# Patient Record
Sex: FEMALE | Race: WHITE | NOT HISPANIC OR LATINO | Employment: OTHER | URBAN - METROPOLITAN AREA
[De-identification: names, ages, dates, MRNs, and addresses within clinical notes are randomized per-mention and may not be internally consistent; named-entity substitution may affect disease eponyms.]

---

## 2023-08-06 ENCOUNTER — APPOINTMENT (OUTPATIENT)
Dept: CARDIOLOGY | Facility: HOSPITAL | Age: 83
DRG: 281 | End: 2023-08-06
Payer: MEDICARE

## 2023-08-06 ENCOUNTER — HOSPITAL ENCOUNTER (INPATIENT)
Facility: HOSPITAL | Age: 83
LOS: 2 days | Discharge: HOME | DRG: 281 | End: 2023-08-08
Attending: EMERGENCY MEDICINE
Payer: MEDICARE

## 2023-08-06 ENCOUNTER — APPOINTMENT (OUTPATIENT)
Dept: GENERAL RADIOLOGY | Facility: HOSPITAL | Age: 83
DRG: 281 | End: 2023-08-06
Payer: MEDICARE

## 2023-08-06 DIAGNOSIS — Z78.9 DECREASED ACTIVITIES OF DAILY LIVING (ADL): ICD-10-CM

## 2023-08-06 DIAGNOSIS — I21.3 ST ELEVATION MYOCARDIAL INFARCTION (STEMI), UNSPECIFIED ARTERY: Primary | ICD-10-CM

## 2023-08-06 DIAGNOSIS — E87.1 HYPONATREMIA: ICD-10-CM

## 2023-08-06 DIAGNOSIS — J44.1 COPD WITH EXACERBATION: ICD-10-CM

## 2023-08-06 DIAGNOSIS — I51.81 STRESS-INDUCED CARDIOMYOPATHY: ICD-10-CM

## 2023-08-06 PROBLEM — I21.9 TYPE 1 MYOCARDIAL INFARCTION: Status: ACTIVE | Noted: 2023-08-06

## 2023-08-06 PROBLEM — I21.4: Status: ACTIVE | Noted: 2023-08-06

## 2023-08-06 PROBLEM — I21.4 NSTEMI (NON-ST ELEVATED MYOCARDIAL INFARCTION): Status: ACTIVE | Noted: 2023-08-06

## 2023-08-06 LAB
ALBUMIN SERPL-MCNC: 3.9 G/DL (ref 3.5–5.2)
ALBUMIN/GLOB SERPL: 1.3 G/DL
ALP SERPL-CCNC: 117 U/L (ref 39–117)
ALT SERPL W P-5'-P-CCNC: 11 U/L (ref 1–33)
ANION GAP SERPL CALCULATED.3IONS-SCNC: 12.2 MMOL/L (ref 5–15)
ANION GAP SERPL CALCULATED.3IONS-SCNC: 12.7 MMOL/L (ref 5–15)
AST SERPL-CCNC: 22 U/L (ref 1–32)
BASOPHILS # BLD AUTO: 0.04 10*3/MM3 (ref 0–0.2)
BASOPHILS NFR BLD AUTO: 0.3 % (ref 0–1.5)
BILIRUB SERPL-MCNC: 0.4 MG/DL (ref 0–1.2)
BUN SERPL-MCNC: 11 MG/DL (ref 8–23)
BUN SERPL-MCNC: 12 MG/DL (ref 8–23)
BUN/CREAT SERPL: 16.2 (ref 7–25)
BUN/CREAT SERPL: 17.9 (ref 7–25)
CALCIUM SPEC-SCNC: 9.1 MG/DL (ref 8.6–10.5)
CALCIUM SPEC-SCNC: 9.2 MG/DL (ref 8.6–10.5)
CHLORIDE SERPL-SCNC: 93 MMOL/L (ref 98–107)
CHLORIDE SERPL-SCNC: 93 MMOL/L (ref 98–107)
CHOLEST SERPL-MCNC: 216 MG/DL (ref 0–200)
CO2 SERPL-SCNC: 21.8 MMOL/L (ref 22–29)
CO2 SERPL-SCNC: 23.3 MMOL/L (ref 22–29)
CREAT SERPL-MCNC: 0.67 MG/DL (ref 0.57–1)
CREAT SERPL-MCNC: 0.68 MG/DL (ref 0.57–1)
DEPRECATED RDW RBC AUTO: 40.5 FL (ref 37–54)
EGFRCR SERPLBLD CKD-EPI 2021: 87.1 ML/MIN/1.73
EGFRCR SERPLBLD CKD-EPI 2021: 87.4 ML/MIN/1.73
EOSINOPHIL # BLD AUTO: 0.45 10*3/MM3 (ref 0–0.4)
EOSINOPHIL NFR BLD AUTO: 3.7 % (ref 0.3–6.2)
ERYTHROCYTE [DISTWIDTH] IN BLOOD BY AUTOMATED COUNT: 12.9 % (ref 12.3–15.4)
GEN 5 2HR TROPONIN T REFLEX: 553 NG/L
GLOBULIN UR ELPH-MCNC: 3 GM/DL
GLUCOSE SERPL-MCNC: 146 MG/DL (ref 65–99)
GLUCOSE SERPL-MCNC: 174 MG/DL (ref 65–99)
HCT VFR BLD AUTO: 42.2 % (ref 34–46.6)
HDLC SERPL-MCNC: 44 MG/DL (ref 40–60)
HGB BLD-MCNC: 14.3 G/DL (ref 12–15.9)
HOLD SPECIMEN: NORMAL
HOLD SPECIMEN: NORMAL
IMM GRANULOCYTES # BLD AUTO: 0.05 10*3/MM3 (ref 0–0.05)
IMM GRANULOCYTES NFR BLD AUTO: 0.4 % (ref 0–0.5)
LDLC SERPL CALC-MCNC: 139 MG/DL (ref 0–100)
LDLC/HDLC SERPL: 3.07 {RATIO}
LIPASE SERPL-CCNC: 29 U/L (ref 13–60)
LYMPHOCYTES # BLD AUTO: 1.88 10*3/MM3 (ref 0.7–3.1)
LYMPHOCYTES NFR BLD AUTO: 15.5 % (ref 19.6–45.3)
MAGNESIUM SERPL-MCNC: 1.7 MG/DL (ref 1.6–2.4)
MCH RBC QN AUTO: 29.1 PG (ref 26.6–33)
MCHC RBC AUTO-ENTMCNC: 33.9 G/DL (ref 31.5–35.7)
MCV RBC AUTO: 85.8 FL (ref 79–97)
MONOCYTES # BLD AUTO: 0.56 10*3/MM3 (ref 0.1–0.9)
MONOCYTES NFR BLD AUTO: 4.6 % (ref 5–12)
NEUTROPHILS NFR BLD AUTO: 75.5 % (ref 42.7–76)
NEUTROPHILS NFR BLD AUTO: 9.13 10*3/MM3 (ref 1.7–7)
NRBC BLD AUTO-RTO: 0 /100 WBC (ref 0–0.2)
NT-PROBNP SERPL-MCNC: 1164 PG/ML (ref 0–1800)
PLATELET # BLD AUTO: 280 10*3/MM3 (ref 140–450)
PMV BLD AUTO: 10.6 FL (ref 6–12)
POTASSIUM SERPL-SCNC: 4 MMOL/L (ref 3.5–5.2)
POTASSIUM SERPL-SCNC: 4.1 MMOL/L (ref 3.5–5.2)
PROT SERPL-MCNC: 6.9 G/DL (ref 6–8.5)
RBC # BLD AUTO: 4.92 10*6/MM3 (ref 3.77–5.28)
SODIUM SERPL-SCNC: 127 MMOL/L (ref 136–145)
SODIUM SERPL-SCNC: 129 MMOL/L (ref 136–145)
TRIGL SERPL-MCNC: 185 MG/DL (ref 0–150)
TROPONIN T DELTA: -37 NG/L
TROPONIN T SERPL HS-MCNC: 590 NG/L
VLDLC SERPL-MCNC: 33 MG/DL (ref 5–40)
WBC NRBC COR # BLD: 12.11 10*3/MM3 (ref 3.4–10.8)
WHOLE BLOOD HOLD COAG: NORMAL
WHOLE BLOOD HOLD SPECIMEN: NORMAL

## 2023-08-06 PROCEDURE — 25010000002 MIDAZOLAM PER 1MG: Performed by: INTERNAL MEDICINE

## 2023-08-06 PROCEDURE — 99285 EMERGENCY DEPT VISIT HI MDM: CPT

## 2023-08-06 PROCEDURE — 25010000002 MORPHINE PER 10 MG: Performed by: EMERGENCY MEDICINE

## 2023-08-06 PROCEDURE — 25010000002 FUROSEMIDE PER 20 MG: Performed by: INTERNAL MEDICINE

## 2023-08-06 PROCEDURE — 93005 ELECTROCARDIOGRAM TRACING: CPT | Performed by: EMERGENCY MEDICINE

## 2023-08-06 PROCEDURE — 99223 1ST HOSP IP/OBS HIGH 75: CPT | Performed by: INTERNAL MEDICINE

## 2023-08-06 PROCEDURE — 84300 ASSAY OF URINE SODIUM: CPT | Performed by: HOSPITALIST

## 2023-08-06 PROCEDURE — 93458 L HRT ARTERY/VENTRICLE ANGIO: CPT | Performed by: INTERNAL MEDICINE

## 2023-08-06 PROCEDURE — 99152 MOD SED SAME PHYS/QHP 5/>YRS: CPT | Performed by: INTERNAL MEDICINE

## 2023-08-06 PROCEDURE — 83690 ASSAY OF LIPASE: CPT | Performed by: EMERGENCY MEDICINE

## 2023-08-06 PROCEDURE — 94799 UNLISTED PULMONARY SVC/PX: CPT

## 2023-08-06 PROCEDURE — 83935 ASSAY OF URINE OSMOLALITY: CPT | Performed by: HOSPITALIST

## 2023-08-06 PROCEDURE — 93306 TTE W/DOPPLER COMPLETE: CPT | Performed by: INTERNAL MEDICINE

## 2023-08-06 PROCEDURE — B2111ZZ FLUOROSCOPY OF MULTIPLE CORONARY ARTERIES USING LOW OSMOLAR CONTRAST: ICD-10-PCS | Performed by: INTERNAL MEDICINE

## 2023-08-06 PROCEDURE — 83880 ASSAY OF NATRIURETIC PEPTIDE: CPT | Performed by: EMERGENCY MEDICINE

## 2023-08-06 PROCEDURE — 83930 ASSAY OF BLOOD OSMOLALITY: CPT | Performed by: HOSPITALIST

## 2023-08-06 PROCEDURE — C1894 INTRO/SHEATH, NON-LASER: HCPCS | Performed by: INTERNAL MEDICINE

## 2023-08-06 PROCEDURE — 93005 ELECTROCARDIOGRAM TRACING: CPT

## 2023-08-06 PROCEDURE — 71045 X-RAY EXAM CHEST 1 VIEW: CPT

## 2023-08-06 PROCEDURE — 25510000001 IOPAMIDOL PER 1 ML: Performed by: INTERNAL MEDICINE

## 2023-08-06 PROCEDURE — 25010000002 ONDANSETRON PER 1 MG: Performed by: EMERGENCY MEDICINE

## 2023-08-06 PROCEDURE — C1769 GUIDE WIRE: HCPCS | Performed by: INTERNAL MEDICINE

## 2023-08-06 PROCEDURE — C1760 CLOSURE DEV, VASC: HCPCS | Performed by: INTERNAL MEDICINE

## 2023-08-06 PROCEDURE — 4A023N7 MEASUREMENT OF CARDIAC SAMPLING AND PRESSURE, LEFT HEART, PERCUTANEOUS APPROACH: ICD-10-PCS | Performed by: INTERNAL MEDICINE

## 2023-08-06 PROCEDURE — 80061 LIPID PANEL: CPT | Performed by: HOSPITALIST

## 2023-08-06 PROCEDURE — 85025 COMPLETE CBC W/AUTO DIFF WBC: CPT | Performed by: EMERGENCY MEDICINE

## 2023-08-06 PROCEDURE — 83735 ASSAY OF MAGNESIUM: CPT | Performed by: EMERGENCY MEDICINE

## 2023-08-06 PROCEDURE — 25010000002 FENTANYL CITRATE (PF) 50 MCG/ML SOLUTION: Performed by: INTERNAL MEDICINE

## 2023-08-06 PROCEDURE — 80053 COMPREHEN METABOLIC PANEL: CPT | Performed by: EMERGENCY MEDICINE

## 2023-08-06 PROCEDURE — 25010000002 HEPARIN (PORCINE) PER 1000 UNITS: Performed by: EMERGENCY MEDICINE

## 2023-08-06 PROCEDURE — 93306 TTE W/DOPPLER COMPLETE: CPT

## 2023-08-06 PROCEDURE — 25010000002 ONDANSETRON PER 1 MG: Performed by: INTERNAL MEDICINE

## 2023-08-06 PROCEDURE — 84484 ASSAY OF TROPONIN QUANT: CPT | Performed by: EMERGENCY MEDICINE

## 2023-08-06 PROCEDURE — 93010 ELECTROCARDIOGRAM REPORT: CPT | Performed by: INTERNAL MEDICINE

## 2023-08-06 PROCEDURE — 84484 ASSAY OF TROPONIN QUANT: CPT | Performed by: INTERNAL MEDICINE

## 2023-08-06 RX ORDER — ONDANSETRON 2 MG/ML
4 INJECTION INTRAMUSCULAR; INTRAVENOUS ONCE
Status: COMPLETED | OUTPATIENT
Start: 2023-08-06 | End: 2023-08-06

## 2023-08-06 RX ORDER — BISACODYL 10 MG
10 SUPPOSITORY, RECTAL RECTAL DAILY PRN
Status: DISCONTINUED | OUTPATIENT
Start: 2023-08-06 | End: 2023-08-08 | Stop reason: HOSPADM

## 2023-08-06 RX ORDER — POLYETHYLENE GLYCOL 3350 17 G/17G
17 POWDER, FOR SOLUTION ORAL DAILY PRN
Status: DISCONTINUED | OUTPATIENT
Start: 2023-08-06 | End: 2023-08-08 | Stop reason: HOSPADM

## 2023-08-06 RX ORDER — FENTANYL CITRATE 50 UG/ML
INJECTION, SOLUTION INTRAMUSCULAR; INTRAVENOUS
Status: DISCONTINUED | OUTPATIENT
Start: 2023-08-06 | End: 2023-08-06 | Stop reason: HOSPADM

## 2023-08-06 RX ORDER — SODIUM CHLORIDE 9 MG/ML
INJECTION, SOLUTION INTRAVENOUS
Status: COMPLETED | OUTPATIENT
Start: 2023-08-06 | End: 2023-08-06

## 2023-08-06 RX ORDER — HEPARIN SODIUM 5000 [USP'U]/ML
60 INJECTION, SOLUTION INTRAVENOUS; SUBCUTANEOUS ONCE
Status: COMPLETED | OUTPATIENT
Start: 2023-08-06 | End: 2023-08-06

## 2023-08-06 RX ORDER — ACETAMINOPHEN 325 MG/1
650 TABLET ORAL EVERY 4 HOURS PRN
Status: DISCONTINUED | OUTPATIENT
Start: 2023-08-06 | End: 2023-08-08 | Stop reason: HOSPADM

## 2023-08-06 RX ORDER — AMOXICILLIN 250 MG
2 CAPSULE ORAL 2 TIMES DAILY
Status: DISCONTINUED | OUTPATIENT
Start: 2023-08-06 | End: 2023-08-08 | Stop reason: HOSPADM

## 2023-08-06 RX ORDER — ONDANSETRON 2 MG/ML
INJECTION INTRAMUSCULAR; INTRAVENOUS
Status: DISCONTINUED | OUTPATIENT
Start: 2023-08-06 | End: 2023-08-06 | Stop reason: HOSPADM

## 2023-08-06 RX ORDER — HYDROCODONE BITARTRATE AND ACETAMINOPHEN 5; 325 MG/1; MG/1
1 TABLET ORAL EVERY 4 HOURS PRN
Status: DISCONTINUED | OUTPATIENT
Start: 2023-08-06 | End: 2023-08-08 | Stop reason: HOSPADM

## 2023-08-06 RX ORDER — TRIAMTERENE AND HYDROCHLOROTHIAZIDE 37.5; 25 MG/1; MG/1
1 TABLET ORAL DAILY
COMMUNITY
End: 2023-08-08 | Stop reason: HOSPADM

## 2023-08-06 RX ORDER — SODIUM CHLORIDE 0.9 % (FLUSH) 0.9 %
10 SYRINGE (ML) INJECTION AS NEEDED
Status: DISCONTINUED | OUTPATIENT
Start: 2023-08-06 | End: 2023-08-08 | Stop reason: HOSPADM

## 2023-08-06 RX ORDER — BISACODYL 5 MG/1
5 TABLET, DELAYED RELEASE ORAL DAILY PRN
Status: DISCONTINUED | OUTPATIENT
Start: 2023-08-06 | End: 2023-08-08 | Stop reason: HOSPADM

## 2023-08-06 RX ORDER — MORPHINE SULFATE 2 MG/ML
2 INJECTION, SOLUTION INTRAMUSCULAR; INTRAVENOUS ONCE
Status: COMPLETED | OUTPATIENT
Start: 2023-08-06 | End: 2023-08-06

## 2023-08-06 RX ORDER — POTASSIUM CHLORIDE 750 MG/1
10 CAPSULE, EXTENDED RELEASE ORAL DAILY
COMMUNITY
End: 2023-08-08 | Stop reason: HOSPADM

## 2023-08-06 RX ORDER — FUROSEMIDE 10 MG/ML
INJECTION INTRAMUSCULAR; INTRAVENOUS
Status: DISCONTINUED | OUTPATIENT
Start: 2023-08-06 | End: 2023-08-06 | Stop reason: HOSPADM

## 2023-08-06 RX ORDER — CARVEDILOL 3.12 MG/1
3.12 TABLET ORAL 2 TIMES DAILY WITH MEALS
Status: DISCONTINUED | OUTPATIENT
Start: 2023-08-06 | End: 2023-08-08 | Stop reason: HOSPADM

## 2023-08-06 RX ORDER — ASPIRIN 81 MG/1
81 TABLET ORAL DAILY
Status: DISCONTINUED | OUTPATIENT
Start: 2023-08-06 | End: 2023-08-08 | Stop reason: HOSPADM

## 2023-08-06 RX ORDER — ONDANSETRON 2 MG/ML
4 INJECTION INTRAMUSCULAR; INTRAVENOUS EVERY 6 HOURS PRN
Status: DISCONTINUED | OUTPATIENT
Start: 2023-08-06 | End: 2023-08-08 | Stop reason: HOSPADM

## 2023-08-06 RX ORDER — DIPHENHYDRAMINE HCL 25 MG
25 CAPSULE ORAL EVERY 6 HOURS PRN
Status: DISCONTINUED | OUTPATIENT
Start: 2023-08-06 | End: 2023-08-08 | Stop reason: HOSPADM

## 2023-08-06 RX ORDER — LIDOCAINE HYDROCHLORIDE 20 MG/ML
INJECTION, SOLUTION INFILTRATION; PERINEURAL
Status: DISCONTINUED | OUTPATIENT
Start: 2023-08-06 | End: 2023-08-06 | Stop reason: HOSPADM

## 2023-08-06 RX ORDER — ONDANSETRON 4 MG/1
4 TABLET, FILM COATED ORAL EVERY 6 HOURS PRN
Status: DISCONTINUED | OUTPATIENT
Start: 2023-08-06 | End: 2023-08-08 | Stop reason: HOSPADM

## 2023-08-06 RX ORDER — ASPIRIN 81 MG/1
324 TABLET, CHEWABLE ORAL ONCE
Status: COMPLETED | OUTPATIENT
Start: 2023-08-06 | End: 2023-08-06

## 2023-08-06 RX ORDER — POTASSIUM CHLORIDE 750 MG/1
10 CAPSULE, EXTENDED RELEASE ORAL DAILY
Status: DISCONTINUED | OUTPATIENT
Start: 2023-08-06 | End: 2023-08-08 | Stop reason: HOSPADM

## 2023-08-06 RX ORDER — ASPIRIN 81 MG/1
324 TABLET, CHEWABLE ORAL ONCE
Status: DISCONTINUED | OUTPATIENT
Start: 2023-08-06 | End: 2023-08-08

## 2023-08-06 RX ORDER — FUROSEMIDE 40 MG/1
40 TABLET ORAL DAILY
Status: DISCONTINUED | OUTPATIENT
Start: 2023-08-06 | End: 2023-08-07

## 2023-08-06 RX ORDER — ISOSORBIDE MONONITRATE 30 MG/1
30 TABLET, EXTENDED RELEASE ORAL
Status: DISCONTINUED | OUTPATIENT
Start: 2023-08-06 | End: 2023-08-08

## 2023-08-06 RX ORDER — LISINOPRIL 20 MG/1
20 TABLET ORAL DAILY
COMMUNITY
End: 2023-08-08 | Stop reason: HOSPADM

## 2023-08-06 RX ORDER — LISINOPRIL 20 MG/1
20 TABLET ORAL DAILY
Status: DISCONTINUED | OUTPATIENT
Start: 2023-08-06 | End: 2023-08-08

## 2023-08-06 RX ORDER — MIDAZOLAM HYDROCHLORIDE 2 MG/2ML
INJECTION, SOLUTION INTRAMUSCULAR; INTRAVENOUS
Status: DISCONTINUED | OUTPATIENT
Start: 2023-08-06 | End: 2023-08-06 | Stop reason: HOSPADM

## 2023-08-06 RX ORDER — ALUMINA, MAGNESIA, AND SIMETHICONE 2400; 2400; 240 MG/30ML; MG/30ML; MG/30ML
15 SUSPENSION ORAL EVERY 6 HOURS PRN
Status: DISCONTINUED | OUTPATIENT
Start: 2023-08-06 | End: 2023-08-08 | Stop reason: HOSPADM

## 2023-08-06 RX ORDER — NITROGLYCERIN 0.4 MG/1
0.4 TABLET SUBLINGUAL
Status: DISCONTINUED | OUTPATIENT
Start: 2023-08-06 | End: 2023-08-08 | Stop reason: HOSPADM

## 2023-08-06 RX ORDER — CHOLECALCIFEROL (VITAMIN D3) 125 MCG
5 CAPSULE ORAL NIGHTLY PRN
Status: DISCONTINUED | OUTPATIENT
Start: 2023-08-06 | End: 2023-08-08 | Stop reason: HOSPADM

## 2023-08-06 RX ADMIN — POTASSIUM CHLORIDE 10 MEQ: 10 CAPSULE, COATED, EXTENDED RELEASE ORAL at 08:25

## 2023-08-06 RX ADMIN — NITROGLYCERIN 0.4 MG: 0.4 TABLET, ORALLY DISINTEGRATING SUBLINGUAL at 14:34

## 2023-08-06 RX ADMIN — ISOSORBIDE MONONITRATE 30 MG: 30 TABLET, EXTENDED RELEASE ORAL at 16:12

## 2023-08-06 RX ADMIN — LISINOPRIL 20 MG: 20 TABLET ORAL at 08:25

## 2023-08-06 RX ADMIN — SENNOSIDES AND DOCUSATE SODIUM 2 TABLET: 50; 8.6 TABLET ORAL at 23:08

## 2023-08-06 RX ADMIN — ONDANSETRON 4 MG: 2 INJECTION INTRAMUSCULAR; INTRAVENOUS at 03:46

## 2023-08-06 RX ADMIN — ASPIRIN 81 MG 324 MG: 81 TABLET ORAL at 04:42

## 2023-08-06 RX ADMIN — CARVEDILOL 3.12 MG: 3.12 TABLET, FILM COATED ORAL at 08:25

## 2023-08-06 RX ADMIN — Medication 10 ML: at 08:32

## 2023-08-06 RX ADMIN — ACETAMINOPHEN 650 MG: 325 TABLET ORAL at 14:35

## 2023-08-06 RX ADMIN — HEPARIN SODIUM 3800 UNITS: 5000 INJECTION, SOLUTION INTRAVENOUS; SUBCUTANEOUS at 04:45

## 2023-08-06 RX ADMIN — NITROGLYCERIN 0.4 MG: 0.4 TABLET, ORALLY DISINTEGRATING SUBLINGUAL at 11:28

## 2023-08-06 RX ADMIN — CARVEDILOL 3.12 MG: 3.12 TABLET, FILM COATED ORAL at 17:56

## 2023-08-06 RX ADMIN — MORPHINE SULFATE 2 MG: 2 INJECTION, SOLUTION INTRAMUSCULAR; INTRAVENOUS at 03:46

## 2023-08-06 RX ADMIN — TICAGRELOR 90 MG: 90 TABLET ORAL at 23:06

## 2023-08-06 NOTE — CONSULTS
[facility]   Hospitalist Consult Note  Date: 2023   Patient Name: Cris Agudelo  : 1940  MRN: [id number]  Primary Care Physician:  Ramirez Maurice MD  Referring Physician: No ref. provider found  Date of admission: 2023    Subjective Chest pain   Subjective     Reason for Consult/ Chief Complaint: chest pain     HPI:  Cris Agudelo is a 82 y.o. female with a past medical history of hypertension; who presented with an elevated troponin and ECG with subtle ST elevations in the anterior leads.  Repeat ECG also showed reciprocal changes in the inferior leads and code   STEMI was activated.  Patient was urgently taken to the Cath Lab.  Urgent cardiac catheter showed significant lesions in the diagonal and the PDA.  Since patient was asymptomatic and the risk benefit of intervention t was discussed; medical management was decided to be the safer option.  Patient was loaded with DAPT.    After the procedure, patient's temperature was 98.2, pulse was 58, respiratory rate was 16, blood pressure was 116/44, and she was saturating 94% on room air.  Patient did not have any acute complaints.    Labs, patient's glucose was 174, sodium was 129, chloride was 93.        Review of Systems   All systems were reviewed and negative except for: chest pain     Personal History     Past Medical History:  Past Medical History:   Diagnosis Date    Hypertension          Past Surgical History:  History reviewed. No pertinent surgical history.     Family History:   Breast Cancer-related family history is not on file.      Social History:   Social History     Socioeconomic History    Marital status:    Tobacco Use    Smoking status: Former     Types: Cigarettes    Smokeless tobacco: Never   Vaping Use    Vaping Use: Never used   Substance and Sexual Activity    Alcohol use: Not Currently    Drug use: Never    Sexual activity: Defer         Home Medications:  lisinopril, potassium chloride, and  triamterene-hydrochlorothiazide    Allergies:  Allergies   Allergen Reactions    Aleve [Naproxen] Hives       Review of Systems   All systems were reviewed and negative except for: Chest pain    Objective    Objective     Vitals:   Temp:  [97.3 øF (36.3 øC)-98.2 øF (36.8 øC)] 98.2 øF (36.8 øC)  Heart Rate:  [58-89] 58  Resp:  [16-18] 16  BP: ()/(44-84) 116/44  Flow (L/min):  [2] 2    Physical Exam:   Constitutional: Awake, alert, no acute distress   Eyes: Pupils equal, sclerae anicteric, no conjunctival injection   HENT: NCAT, mucous membranes moist   Neck: Supple, no thyromegaly, no lymphadenopathy, trachea midline   Respiratory: Clear to auscultation bilaterally, nonlabored respirations    Cardiovascular: RRR, no murmurs, rubs, or gallops, palpable pedal pulses bilaterally   Gastrointestinal: Positive bowel sounds, soft, nontender, nondistended   Musculoskeletal: No bilateral ankle edema, no clubbing or cyanosis to extremities   Psychiatric: Appropriate affect, cooperative   Neurologic: Oriented x 3, strength symmetric in all extremities, Cranial Nerves grossly intact to confrontation, speech clear   Skin: No rashes     Result Review    Result Review:  I have personally reviewed the results from the time of this admission to 8/6/2023 17:31 EDT and agree with these findings:  [x]  Laboratory  []  Microbiology  [x]  Radiology  []  EKG/Telemetry   []  Cardiology/Vascular   []  Pathology  [x]  Old records  []  Other:    Assessment & Plan   Assessment / Plan   Assessment/Plan:  #1 NSTEMI of the anterior wall involving the right ventricle  -Significant lesions in the diagonal and the PDA  -No stenting done; patient medically managed since it was the safer option.  -DAPT was started.  -Patient reports statin intolerance.  Lipid panel ordered.  Hemoglobin A1c ordered.  -Patient will need referral to cardiac rehab.    #2 concern for CHF  -Patient started on IV Lasix.  Will transition to oral.  Will supplement  potassium.  Goal-directed care: Patient started on beta-blocker, ACE inhibitor, Imdur, diuretic.    #3 hyponatremia  -Ordered urine studies  -Continue diuresis since patient is hypervolemic.    #4 insomnia we will order melatonin    #5 poor oral intake started supplements    DVT prophylaxis:  No DVT prophylaxis order currently exists.    CODE STATUS:    Level Of Support Discussed With: Patient  Code Status (Patient has no pulse and is not breathing): CPR (Attempt to Resuscitate)  Medical Interventions (Patient has pulse or is breathing): Full Support  Release to patient: Routine Release      Electronically signed by Kevyn Sommer DO, 08/06/23, 5:31 PM EDT.

## 2023-08-06 NOTE — H&P
HISTORY AND PHYSICAL       Reason for Admission: Chest pain      Patient Care Team:  Ramirez Maurice MD as PCP - General (Family Medicine)      SUBJECTIVE     Chief Complaint: Chest pain    History of present illness:  Cris Agudelo is a 82 y.o. female with no known medical history other than hypertension however her medications also suggest heart failure who presented to the hospital with sudden onset of left-sided chest pain.  Once in the emergency room she was noted to have elevated troponin of 590 and ECG suggested subtle ST elevations in the anterior leads.  Repeat ECG also showed reciprocal changes in the inferior leads and code STEMI was activated.  Urgent discussion of risk and benefit of cardiac catheterization was held with the patient and her daughter and it was decided to take her emergently to the Cath Lab.    Review of systems:    Constitutional: No weakness, fatigue, fever, rigors, chills   Eyes: No vision changes, eye pain   ENT/oropharynx: No difficulty swallowing, sore throat, epistaxis, changes in hearing   Cardiovascular: + chest pain, chest tightness, palpitations, paroxysmal nocturnal dyspnea, orthopnea, diaphoresis, dizziness / syncopal episode   Respiratory: No shortness of breath, dyspnea on exertion, cough, wheezing, hemoptysis   Gastrointestinal: No abdominal pain, nausea, vomiting, diarrhea, bloody stools   Genitourinary: No hematuria, dysuria   Neurological: No headache, tremors, numbness, one-sided weakness    Musculoskeletal: No cramps, myalgias, joint pain, joint swelling   Integument: No rash, edema        Personal History:      Past Medical History:   Diagnosis Date    Hypertension        History reviewed. No pertinent surgical history.    History reviewed. No pertinent family history.    Social History     Tobacco Use    Smoking status: Former     Types: Cigarettes   Substance Use Topics    Drug use: Never        Medications Prior to Admission   Medication Sig Dispense Refill Last  "Dose    lisinopril (PRINIVIL,ZESTRIL) 20 MG tablet Take 1 tablet by mouth Daily.       potassium chloride (MICRO-K) 10 MEQ CR capsule Take 1 capsule by mouth Daily.       triamterene-hydrochlorothiazide (MAXZIDE-25) 37.5-25 MG per tablet Take 1 tablet by mouth Daily.           Allergies:     Aleve [naproxen]    Scheduled Meds:aspirin, 324 mg, Oral, Once  aspirin, 81 mg, Oral, Daily  carvedilol, 3.125 mg, Oral, BID With Meals  furosemide, 40 mg, Oral, Daily  lisinopril, 20 mg, Oral, Daily  potassium chloride, 10 mEq, Oral, Daily  ticagrelor, 90 mg, Oral, BID      Continuous Infusions:   PRN Meds:  acetaminophen    diphenhydrAMINE    HYDROcodone-acetaminophen    nitroglycerin    ondansetron **OR** ondansetron    sodium chloride      OBJECTIVE    Vital Signs  Vitals:    08/06/23 0300 08/06/23 0430 08/06/23 0500 08/06/23 0510   BP: 128/78 105/84 109/63    BP Location: Left arm      Patient Position: Sitting      Pulse: 86 77 77    Resp: 18  18    Temp: 97.8 øF (36.6 øC)      TempSrc: Oral      SpO2: 94% 95% 95% 99%   Weight: 63.2 kg (139 lb 5.3 oz)      Height: 152.4 cm (60\")          Flowsheet Rows      Flowsheet Row First Filed Value   Admission Height 152.4 cm (60\") Documented at 08/06/2023 0300   Admission Weight 63.2 kg (139 lb 5.3 oz) Documented at 08/06/2023 0300            No intake or output data in the 24 hours ending 08/06/23 0545     Telemetry: Sinus rhythm    Physical Exam:  The patient is alert, oriented and in no distress.  Vital signs as noted above.  Head and neck revealed no carotid bruits or jugular venous distention.  No thyromegaly or lymphadenopathy is present  Lungs clear.  No wheezing.  Breath sounds are normal bilaterally.  Heart: Normal first and second heart sounds. No murmur.  No precordial rub is present.  No gallop is present.  Abdomen: Soft and nontender.  No organomegaly is present.  Extremities with good peripheral pulses without any pedal edema.  Skin: Warm and dry.  Musculoskeletal " system is grossly normal.  CNS grossly normal.       Results Review:  I have personally reviewed the results from the time of this admission to 8/6/2023 05:45 EDT and agree with these findings:  []  Laboratory  []  Microbiology  []  Radiology  []  EKG/Telemetry   []  Cardiology/Vascular   []  Pathology  []  Old records  []  Other:    Most notable findings include:     Lab Results (last 24 hours)       Procedure Component Value Units Date/Time    High Sensitivity Troponin T [[id number]]  (Abnormal) Collected: 08/06/23 0325    Specimen: Blood from Arm, Right Updated: 08/06/23 0430     HS Troponin T 590 ng/L     Narrative:      High Sensitive Troponin T Reference Range:  <10.0 ng/L- Negative Female for AMI  <15.0 ng/L- Negative Male for AMI  >=10 - Abnormal Female indicating possible myocardial injury.  >=15 - Abnormal Male indicating possible myocardial injury.   Clinicians would have to utilize clinical acumen, EKG, Troponin, and serial changes to determine if it is an Acute Myocardial Infarction or myocardial injury due to an underlying chronic condition.         Comprehensive Metabolic Panel [[id number]]  (Abnormal) Collected: 08/06/23 0325    Specimen: Blood from Arm, Right Updated: 08/06/23 0418     Glucose 174 mg/dL      BUN 11 mg/dL      Creatinine 0.68 mg/dL      Sodium 129 mmol/L      Potassium 4.1 mmol/L      Comment: Slight hemolysis detected by analyzer. Results may be affected.        Chloride 93 mmol/L      CO2 23.3 mmol/L      Calcium 9.2 mg/dL      Total Protein 6.9 g/dL      Albumin 3.9 g/dL      ALT (SGPT) 11 U/L      AST (SGOT) 22 U/L      Comment: Slight hemolysis detected by analyzer. Results may be affected.        Alkaline Phosphatase 117 U/L      Total Bilirubin 0.4 mg/dL      Globulin 3.0 gm/dL      A/G Ratio 1.3 g/dL      BUN/Creatinine Ratio 16.2     Anion Gap 12.7 mmol/L      eGFR 87.1 mL/min/1.73     Narrative:      GFR Normal >60  Chronic Kidney Disease <60  Kidney Failure <15    The GFR  formula is only valid for adults with stable renal function between ages 18 and 70.    Magnesium [[id number]]  (Normal) Collected: 08/06/23 0325    Specimen: Blood from Arm, Right Updated: 08/06/23 0418     Magnesium 1.7 mg/dL     Lipase [[id number]]  (Normal) Collected: 08/06/23 0325    Specimen: Blood from Arm, Right Updated: 08/06/23 0414     Lipase 29 U/L     BNP [[id number]]  (Normal) Collected: 08/06/23 0325    Specimen: Blood from Arm, Right Updated: 08/06/23 0410     proBNP 1,164.0 pg/mL     Narrative:      Among patients with dyspnea, NT-proBNP is highly sensitive for the detection of acute congestive heart failure. In addition NT-proBNP of <300 pg/ml effectively rules out acute congestive heart failure with 99% negative predictive value.      Valentine Draw [[id number]] Collected: 08/06/23 0325    Specimen: Blood from Arm, Right Updated: 08/06/23 0409    Narrative:      The following orders were created for panel order Valentine Draw.  Procedure                               Abnormality         Status                     ---------                               -----------         ------                     Green Top (Gel)[[id number]]                                  Final result               Lavender Top[[id number]]                                     Final result               Gold Top - SST[[id number]]                                   Final result               Light Blue Top[[id number]]                                   Final result                 Please view results for these tests on the individual orders.    Gold Top - SST [[id number]] Collected: 08/06/23 0325    Specimen: Blood from Arm, Right Updated: 08/06/23 0409     Extra Tube Hold for add-ons.     Comment: Auto resulted.       Light Blue Top [[id number]] Collected: 08/06/23 0325    Specimen: Blood from Arm, Right Updated: 08/06/23 0409     Extra Tube Hold for add-ons.     Comment: Auto resulted       Green Top (Gel) [[id number]] Collected: 08/06/23 0325     Specimen: Blood from Arm, Right Updated: 08/06/23 0409     Extra Tube Hold for add-ons.     Comment: Auto resulted.       Lavender Top [[id number]] Collected: 08/06/23 0325    Specimen: Blood from Arm, Right Updated: 08/06/23 0409     Extra Tube hold for add-on     Comment: Auto resulted       CBC & Differential [[id number]]  (Abnormal) Collected: 08/06/23 0325    Specimen: Blood from Arm, Right Updated: 08/06/23 0348    Narrative:      The following orders were created for panel order CBC & Differential.  Procedure                               Abnormality         Status                     ---------                               -----------         ------                     CBC Auto Differential[[id number]]        Abnormal            Final result                 Please view results for these tests on the individual orders.    CBC Auto Differential [[id number]]  (Abnormal) Collected: 08/06/23 0325    Specimen: Blood from Arm, Right Updated: 08/06/23 0348     WBC 12.11 10*3/mm3      RBC 4.92 10*6/mm3      Hemoglobin 14.3 g/dL      Hematocrit 42.2 %      MCV 85.8 fL      MCH 29.1 pg      MCHC 33.9 g/dL      RDW 12.9 %      RDW-SD 40.5 fl      MPV 10.6 fL      Platelets 280 10*3/mm3      Neutrophil % 75.5 %      Lymphocyte % 15.5 %      Monocyte % 4.6 %      Eosinophil % 3.7 %      Basophil % 0.3 %      Immature Grans % 0.4 %      Neutrophils, Absolute 9.13 10*3/mm3      Lymphocytes, Absolute 1.88 10*3/mm3      Monocytes, Absolute 0.56 10*3/mm3      Eosinophils, Absolute 0.45 10*3/mm3      Basophils, Absolute 0.04 10*3/mm3      Immature Grans, Absolute 0.05 10*3/mm3      nRBC 0.0 /100 WBC             Imaging Results (Last 24 Hours)       Procedure Component Value Units Date/Time    XR Chest 1 View [[id number]] Collected: 08/06/23 0350     Updated: 08/06/23 0353    Narrative:      PROCEDURE: XR CHEST 1 VW     COMPARISON: None     INDICATIONS: CHEST PAIN     FINDINGS: A single frontal (AP or PA upright portable) chest  radiograph reveals borderline cardiac   enlargement and probably no acute infiltrate. No pneumothorax is seen. External artifacts obscure   detail. The thoracic aorta is atherosclerotic. Chronic calcified granulomatous disease involves the   chest.  Degenerative changes involve the bilateral shoulders and the imaged spine.  There are   slightly low lung volumes.  No definite pleural effusion is suspected.       Impression:       Probably no acute infiltrate is identified.                Please note that portions of this note were completed with a voice recognition program.     KINGSTON MORALES JR, MD         Electronically Signed and Approved By: KINGSTON MORALES JR, MD on 8/06/2023 at 3:50                                LAB RESULTS (LAST 7 DAYS)    CBC  Results from last 7 days   Lab Units 08/06/23  0325   WBC 10*3/mm3 12.11*   RBC 10*6/mm3 4.92   HEMOGLOBIN g/dL 14.3   HEMATOCRIT % 42.2   MCV fL 85.8   PLATELETS 10*3/mm3 280       BMP  Results from last 7 days   Lab Units 08/06/23  0325   SODIUM mmol/L 129*   POTASSIUM mmol/L 4.1   CHLORIDE mmol/L 93*   CO2 mmol/L 23.3   BUN mg/dL 11   CREATININE mg/dL 0.68   GLUCOSE mg/dL 174*   MAGNESIUM mg/dL 1.7       CMP   Results from last 7 days   Lab Units 08/06/23  0325   SODIUM mmol/L 129*   POTASSIUM mmol/L 4.1   CHLORIDE mmol/L 93*   CO2 mmol/L 23.3   BUN mg/dL 11   CREATININE mg/dL 0.68   GLUCOSE mg/dL 174*   ALBUMIN g/dL 3.9   BILIRUBIN mg/dL 0.4   ALK PHOS U/L 117   AST (SGOT) U/L 22   ALT (SGPT) U/L 11   LIPASE U/L 29       BNP        TROPONIN  Results from last 7 days   Lab Units 08/06/23  0325   HSTROP T ng/L 590*       CoAg        Creatinine Clearance  Estimated Creatinine Clearance: 53 mL/min (by C-G formula based on SCr of 0.68 mg/dL).    ABG          Radiology  XR Chest 1 View    Result Date: 8/6/2023   Probably no acute infiltrate is identified.      Please note that portions of this note were completed with a voice recognition program.  KINGSTON MORALES JR, MD        Electronically Signed and Approved By: KINGSTON MORALES JR, MD on 8/06/2023 at 3:50                 EKG  I personally viewed and interpreted the patient's EKG/Telemetry data:  ECG 12 Lead Chest Pain   Preliminary Result   HEART RATE= 81  bpm   RR Interval= 740  ms   OK Interval= 163  ms   P Horizontal Axis=   deg   P Front Axis= 60  deg   QRSD Interval= 107  ms   QT Interval= 396  ms   QRS Axis= -13  deg   T Wave Axis= -26  deg   - ABNORMAL ECG -   Sinus rhythm   Anteroseptal infarct, acute (LAD)   Electronically Signed By:    Date and Time of Study: 2023-08-06 04:34:59      ECG 12 Lead ED Triage Standing Order; Chest Pain   Preliminary Result   HEART RATE= 84  bpm   RR Interval= 724  ms   OK Interval= 169  ms   P Horizontal Axis= 4  deg   P Front Axis= 55  deg   QRSD Interval= 110  ms   QT Interval= 375  ms   QRS Axis= -20  deg   T Wave Axis= 36  deg   - ABNORMAL ECG -   Sinus rhythm   Borderline left axis deviation   Anteroseptal infarct, acute (LAD)   Minimal ST elevation, lateral leads   Electronically Signed By:    Date and Time of Study: 2023-08-06 03:04:29      ECG 12 Lead ED Triage Standing Order; Chest Pain    (Results Pending)         Echocardiogram:          Stress Test:        Cardiac Catheterization:  No results found for this or any previous visit.        Other:      ASSESSMENT & PLAN:    Principal Problem:    Acute non-ST elevation myocardial infarction (NSTEMI) of anterior wall involving right ventricle  Active Problems:    NSTEMI (non-ST elevated myocardial infarction)    Type 1 myocardial infarction    Chest pain/non-ST elevation MI  Signs and symptoms consistent with unstable angina however with elevated troponin and ECG changes she ruled in for non-ST elevation MI.  Urgent cardiac catheterization showed significant lesions in diagonal and PDA.  Both vessels had HERRERA-3 flow.  Native LAD, left circumflex and RCA are free from any significant disease.  Her chest pain completely resolved on the  table  Given absence of symptoms we discussed the risk and benefit of intervention to small caliber vessels.  We concluded that medical management would be a safer option.  She was loaded with dual antiplatelet therapy.  Reports intolerance to statin. Obtain lipid panel.  Adding imdur.  She would also benefit from cardiac rehab at the time of discharge    Heart failure  Unknown ejection fraction  She has elevated EDP noted during the cardiac catheterization.  proBNP is normal  I will start her on IV diuretics to be switched to oral.  Start potassium supplementation.    Hypertension  Continue home medications including lisinopril.  I will discontinue hydrochlorothiazide and switch it with Lasix.  I will start her on Coreg.    Hyponatremia  Sodium is 129  Discontinuing triamterene and hydrochlorothiazide.  Closely monitor sodium levels with diuresis    Preventive care  I will obtain a lipid panel and A1c for further risk stratification.    I will make further changes to her medications depending on the results of echocardiogram and her hospital progress.    Echocardiogram findings consistent with Takotsubo cardiomyopathy  EF is 50 to 55%.      Mitch Correia MD  08/06/23  05:45 EDT

## 2023-08-06 NOTE — PLAN OF CARE
Goal Outcome Evaluation: pt post cath, no interventions.  Cath site dry and intact, no bruising.  Chest pain x 2 today, relieved with sublingual nitro.  Slightly confused at times.  Family at bedside.

## 2023-08-06 NOTE — Clinical Note
Level of Care: Critical Care [6]   Diagnosis: STEMI (ST elevation myocardial infarction) [604986]   Certification: I Certify That Inpatient Hospital Services Are Medically Necessary For Greater Than 2 Midnights

## 2023-08-06 NOTE — ED PROVIDER NOTES
Time: 3:06 AM EDT  Date of encounter:  8/6/2023  Independent Historian/Clinical History and Information was obtained by:   Patient and Family    History is limited by: N/A    Chief Complaint: Chest pain      History of Present Illness:  Patient is a 82 y.o. year old female who presents to the emergency department for evaluation of chest pain    Patient describes onset of chest pain at approximately 1030 last night is located in her left chest and radiates through to her back.  Is associated with nausea.  She has had no shortness of breath.  She has had no treatment prior to arrival.  She denies any previous cardiac history.    [facility]    Patient Care Team  Primary Care Provider: Ramirez Maurice MD    Past Medical History:     Allergies   Allergen Reactions    Aleve [Naproxen] Hives     Past Medical History:   Diagnosis Date    Hypertension      History reviewed. No pertinent surgical history.  History reviewed. No pertinent family history.    Home Medications:  Prior to Admission medications    Not on File        Social History:   Social History     Tobacco Use    Smoking status: Former     Types: Cigarettes    Smokeless tobacco: Never   Vaping Use    Vaping Use: Never used   Substance Use Topics    Alcohol use: Not Currently    Drug use: Never         Review of Systems:  Review of Systems   Constitutional:  Negative for chills and fever.   HENT:  Negative for congestion, ear pain and sore throat.    Eyes:  Negative for pain.   Respiratory:  Negative for cough, chest tightness and shortness of breath.    Cardiovascular:  Positive for chest pain.   Gastrointestinal:  Negative for abdominal pain, diarrhea, nausea and vomiting.   Genitourinary:  Negative for flank pain and hematuria.   Musculoskeletal:  Positive for back pain. Negative for joint swelling.   Skin:  Negative for pallor.   Neurological:  Negative for seizures and headaches.   All other systems reviewed and are negative.     Physical Exam:  /69 (BP  "Location: Left arm, Patient Position: Lying)   Pulse 75   Temp 97.4 øF (36.3 øC) (Oral)   Resp 18   Ht 152.4 cm (60\")   Wt 63.2 kg (139 lb 5.3 oz)   SpO2 97%   BMI 27.21 kg/mý     Physical Exam  Vitals and nursing note reviewed.   Constitutional:       General: She is not in acute distress.     Appearance: Normal appearance. She is not toxic-appearing.      Comments: Patient appears uncomfortable   HENT:      Head: Normocephalic and atraumatic.      Jaw: There is normal jaw occlusion.   Eyes:      General: Lids are normal.      Extraocular Movements: Extraocular movements intact.      Conjunctiva/sclera: Conjunctivae normal.      Pupils: Pupils are equal, round, and reactive to light.   Cardiovascular:      Rate and Rhythm: Normal rate and regular rhythm.      Pulses: Normal pulses.      Heart sounds: Normal heart sounds.   Pulmonary:      Effort: Pulmonary effort is normal. No respiratory distress.      Breath sounds: Normal breath sounds. No wheezing or rhonchi.   Abdominal:      General: Abdomen is flat.      Palpations: Abdomen is soft.      Tenderness: There is no abdominal tenderness. There is no guarding or rebound.   Musculoskeletal:         General: Normal range of motion.      Cervical back: Normal range of motion and neck supple.      Right lower leg: No edema.      Left lower leg: No edema.      Comments: She has a left scapular chronic lipoma   Skin:     General: Skin is warm and dry.   Neurological:      Mental Status: She is alert and oriented to person, place, and time. Mental status is at baseline.   Psychiatric:         Mood and Affect: Mood normal.              Procedures:  Procedures      Medical Decision Making:      Comorbidities that affect care:    Hypertension    External Notes reviewed:    None      The following orders were placed and all results were independently analyzed by me:  Orders Placed This Encounter   Procedures    XR Chest 1 View    [place] Draw    High Sensitivity " Troponin T    Comprehensive Metabolic Panel    Lipase    BNP    Magnesium    CBC Auto Differential    High Sensitivity Troponin T 2Hr    Basic Metabolic Panel    Diet: Cardiac Diets; Healthy Heart (2-3 Na+); Texture: Regular Texture (IDDSI 7); Fluid Consistency: Thin (IDDSI 0)    Undress & Gown    Describe MI Type    Vital Signs and Check distal extremity for warmth, color, sensation and pulses with each vital sign and site check.    Telemetry - Maintain IV Access    Telemetry - Place Orders & Notify Provider of Results When Patient Experiences Acute Chest Pain, Dysrhythmia or Respiratory Distress    No IM injections, hold pressure on venipuncture sites for 5 minutes.    Change site dressing    Encourage fluids    Strict intake and output    Continuous Pulse Oximetry    Notify MD if platelet count is less than 100,000, is less than 1/2 baseline, or if Hgb drops by more than 3mg/dl.    Notify MD of hypotension (SBP less than 95), bleeding, or dysrythmia and follow Sheath Removal Policy if needed.    Notify Provider - Labs    Closure Device Used    Assess Puncture Site, Vital Signs, Distal Pulses & Observe Site for Bleeding or Swelling    Head of Bed: Flat; 1 Hour; Elevate Head of Bed: 30 Degrees; 1 Hour; Keep Affected Extremity/ Extremities Straight; Total Bedrest Time? 2 Hours    Oxygen Therapy- Nasal Cannula; Titrate 1-6 LPM Per SpO2; 90 - 95%    ECG 12 Lead ED Triage Standing Order; Chest Pain    ECG 12 Lead Chest Pain    Adult Transthoracic Echo Complete W/ Cont if Necessary Per Protocol    Insert Peripheral IV    Inpatient Admission    Inpatient Admission    CBC & Differential    Green Top (Gel)    Lavender Top    Gold Top - SST    Light Blue Top       Medications Given in the Emergency Department:  Medications   sodium chloride 0.9 % flush 10 mL ( Intravenous MAR Unhold 8/6/23 0538)   aspirin chewable tablet 324 mg ( Oral MAR Unhold 8/6/23 0538)   lisinopril (PRINIVIL,ZESTRIL) tablet 20 mg (has no  administration in time range)   nitroglycerin (NITROSTAT) SL tablet 0.4 mg (has no administration in time range)   acetaminophen (TYLENOL) tablet 650 mg (has no administration in time range)   HYDROcodone-acetaminophen (NORCO) 5-325 MG per tablet 1 tablet (has no administration in time range)   ondansetron (ZOFRAN) tablet 4 mg (has no administration in time range)     Or   ondansetron (ZOFRAN) injection 4 mg (has no administration in time range)   diphenhydrAMINE (BENADRYL) capsule 25 mg (has no administration in time range)   ticagrelor (BRILINTA) tablet 90 mg (has no administration in time range)   aspirin EC tablet 81 mg (has no administration in time range)   carvedilol (COREG) tablet 3.125 mg (has no administration in time range)   furosemide (LASIX) tablet 40 mg (has no administration in time range)   potassium chloride (MICRO-K) CR capsule 10 mEq (has no administration in time range)   ondansetron (ZOFRAN) injection 4 mg (4 mg Intravenous Given 8/6/23 0346)   morphine injection 2 mg (2 mg Intravenous Given 8/6/23 0346)   aspirin chewable tablet 324 mg (324 mg Oral Given 8/6/23 0442)   heparin (porcine) 5000 UNIT/ML injection 3,800 Units (3,800 Units Intravenous Given 8/6/23 0445)   sodium chloride 0.9 % infusion (125 mL/hr Intravenous New Bag 8/6/23 0514)        ED Course:    ED Course as of 08/06/23 0726   Sun Aug 06, 2023   0441 My interpretation of initial ECG at 0304.    Sinus rhythm 84 minimal ST elevation in V1 V2 without evidence of reciprocal change. [JS]   0442 Repeat ECG at 0434    Sinus rhythm 81 ST elevation in V1 V2  Now with ST depression II, III, aVF  T wave inversion V4 V5 V6 [JS]   0442 Patient's pain is improved after morphine and Zofran in the emergency department. [JS]   0443 Patient's ECG changes and elevated troponin discussed with Dr. Correia of interventional cardiology who agrees with plan for activation of Cath Lab. [JS]      ED Course User Index  [JS] Leon Messina MD        Labs:    Lab Results (last 24 hours)       Procedure Component Value Units Date/Time    High Sensitivity Troponin T [[id number]]  (Abnormal) Collected: 08/06/23 0325    Specimen: Blood from Arm, Right Updated: 08/06/23 0430     HS Troponin T 590 ng/L     Narrative:      High Sensitive Troponin T Reference Range:  <10.0 ng/L- Negative Female for AMI  <15.0 ng/L- Negative Male for AMI  >=10 - Abnormal Female indicating possible myocardial injury.  >=15 - Abnormal Male indicating possible myocardial injury.   Clinicians would have to utilize clinical acumen, EKG, Troponin, and serial changes to determine if it is an Acute Myocardial Infarction or myocardial injury due to an underlying chronic condition.         CBC & Differential [[id number]]  (Abnormal) Collected: 08/06/23 0325    Specimen: Blood from Arm, Right Updated: 08/06/23 0348    Narrative:      The following orders were created for panel order CBC & Differential.  Procedure                               Abnormality         Status                     ---------                               -----------         ------                     CBC Auto Differential[[id number]]        Abnormal            Final result                 Please view results for these tests on the individual orders.    Comprehensive Metabolic Panel [[id number]]  (Abnormal) Collected: 08/06/23 0325    Specimen: Blood from Arm, Right Updated: 08/06/23 0418     Glucose 174 mg/dL      BUN 11 mg/dL      Creatinine 0.68 mg/dL      Sodium 129 mmol/L      Potassium 4.1 mmol/L      Comment: Slight hemolysis detected by analyzer. Results may be affected.        Chloride 93 mmol/L      CO2 23.3 mmol/L      Calcium 9.2 mg/dL      Total Protein 6.9 g/dL      Albumin 3.9 g/dL      ALT (SGPT) 11 U/L      AST (SGOT) 22 U/L      Comment: Slight hemolysis detected by analyzer. Results may be affected.        Alkaline Phosphatase 117 U/L      Total Bilirubin 0.4 mg/dL      Globulin 3.0 gm/dL      A/G Ratio  1.3 g/dL      BUN/Creatinine Ratio 16.2     Anion Gap 12.7 mmol/L      eGFR 87.1 mL/min/1.73     Narrative:      GFR Normal >60  Chronic Kidney Disease <60  Kidney Failure <15    The GFR formula is only valid for adults with stable renal function between ages 18 and 70.    Lipase [[id number]]  (Normal) Collected: 08/06/23 0325    Specimen: Blood from Arm, Right Updated: 08/06/23 0414     Lipase 29 U/L     BNP [[id number]]  (Normal) Collected: 08/06/23 0325    Specimen: Blood from Arm, Right Updated: 08/06/23 0410     proBNP 1,164.0 pg/mL     Narrative:      Among patients with dyspnea, NT-proBNP is highly sensitive for the detection of acute congestive heart failure. In addition NT-proBNP of <300 pg/ml effectively rules out acute congestive heart failure with 99% negative predictive value.      Magnesium [[id number]]  (Normal) Collected: 08/06/23 0325    Specimen: Blood from Arm, Right Updated: 08/06/23 0418     Magnesium 1.7 mg/dL     CBC Auto Differential [[id number]]  (Abnormal) Collected: 08/06/23 0325    Specimen: Blood from Arm, Right Updated: 08/06/23 0348     WBC 12.11 10*3/mm3      RBC 4.92 10*6/mm3      Hemoglobin 14.3 g/dL      Hematocrit 42.2 %      MCV 85.8 fL      MCH 29.1 pg      MCHC 33.9 g/dL      RDW 12.9 %      RDW-SD 40.5 fl      MPV 10.6 fL      Platelets 280 10*3/mm3      Neutrophil % 75.5 %      Lymphocyte % 15.5 %      Monocyte % 4.6 %      Eosinophil % 3.7 %      Basophil % 0.3 %      Immature Grans % 0.4 %      Neutrophils, Absolute 9.13 10*3/mm3      Lymphocytes, Absolute 1.88 10*3/mm3      Monocytes, Absolute 0.56 10*3/mm3      Eosinophils, Absolute 0.45 10*3/mm3      Basophils, Absolute 0.04 10*3/mm3      Immature Grans, Absolute 0.05 10*3/mm3      nRBC 0.0 /100 WBC     High Sensitivity Troponin T 2Hr [[id number]]  (Abnormal) Collected: 08/06/23 0623    Specimen: Blood, Venous Line Updated: 08/06/23 0709     HS Troponin T 553 ng/L      Troponin T Delta -37 ng/L     Narrative:      High  Sensitive Troponin T Reference Range:  <10.0 ng/L- Negative Female for AMI  <15.0 ng/L- Negative Male for AMI  >=10 - Abnormal Female indicating possible myocardial injury.  >=15 - Abnormal Male indicating possible myocardial injury.   Clinicians would have to utilize clinical acumen, EKG, Troponin, and serial changes to determine if it is an Acute Myocardial Infarction or myocardial injury due to an underlying chronic condition.         Basic Metabolic Panel [[id number]]  (Abnormal) Collected: 08/06/23 0623    Specimen: Blood, Venous Line Updated: 08/06/23 0656     Glucose 146 mg/dL      BUN 12 mg/dL      Creatinine 0.67 mg/dL      Sodium 127 mmol/L      Potassium 4.0 mmol/L      Comment: Slight hemolysis detected by analyzer. Results may be affected.        Chloride 93 mmol/L      CO2 21.8 mmol/L      Calcium 9.1 mg/dL      BUN/Creatinine Ratio 17.9     Anion Gap 12.2 mmol/L      eGFR 87.4 mL/min/1.73     Narrative:      GFR Normal >60  Chronic Kidney Disease <60  Kidney Failure <15    The GFR formula is only valid for adults with stable renal function between ages 18 and 70.             Imaging:    Cardiac Catheterization/Vascular Study    Result Date: 8/6/2023  OPERATORS Mitch Correia M.D. (Attending Cardiologist)   PROCEDURES PERFORMED Ultrasound guided Vascular access Left Heart Catheterization Emergent coronary Angiogram 47520 Moderate sedation  INDICATIONS FOR PROCEDURE 82 years old woman with hypertension presented with chest pain and ruled in for non-ST elevation MI.  Due to subtle ST elevations in the anterior leads code STEMI was called and she was emergently brought to the Cath Lab after discussing the risk and benefits of the procedure.  PROCEDURE IN DETAIL Informed consent was obtained from the patient after explaining the risks, benefits, and alternative options of the procedure. After obtaining informed consent, the patient was brought to the cath lab and was prepped in a sterile fashion. Lidocaine 2%  was used for local anesthesia into the right femoral access site. The right femoral artery was accessed with a micropuncture needle via modified Seldinger technique under ultrasound guidance. A 6F was inserted successfully. Afterwards, 6F JR4 and JL4 diagnostic catheters were advanced over a wire into the ascending aorta and were used to engage the ostia of the left main and RCA respectively. JR4 used to cross the AV and obtain LV pressures and gradient across the AV measured via pullback technique. Images of the right and left coronary systems were obtained. All the catheters were exchanged over a wire and subsequently removed. Angiogram of the femoral access site was obtained and did not show complications. The patient tolerated the procedure well without any complications. The pictures were reviewed at the end of the procedure. A 6 English Angio-Seal closure device was applied.  HEMODYNAMICS  LV: 114/13, 29 mmHg AO: 113/53, 80 mmHg No significant gradient across the aortic valve during pullback of JR4 catheter.  We will obtain an echocardiogram and therefore LV gram was not performed.  FINDINGS Coronary Angiogram Right dominant circulation Left main: Left main is a large caliber vessel which gives rise to the Left Anterior Descending and the Left circumflex.  Left main is angiographically free from any significant disease. Left Anterior Descending Artery: LAD is a medium caliber vessel which gives rise to several septal perforators and several diagonal branches.  LAD tapers into a very small less than 1.5 mm caliber from mid to distal segment.  D1 and D2 are very small caliber vessels.  D3 is a medium caliber vessel which has proximal 90% stenosis.  It has HERRERA-3 flow.  This appears to be the culprit for non-ST elevation MI. Left Circumflex: Left circumflex artery gives rise to obtuse marginals.  Left circumflex artery is angiographically free from any significant disease. Right Coronary Artery: The RCA is a large  caliber vessel gives rise to PDA and PLV.  PDA reaches the apex.  Mid segment of PDA has 70% stenosis.  It has HERRERA-3 flow  ESTIMATED BLOOD LOSS: 10 ml  COMPLICATIONS: None  PROCEDURE DATA: Contrast Used: 38 cc Sedation Time: 30mins  IMPRESSIONS Patent LAD, left circumflex and RCA. Obstructive coronary disease involving third diagonal and mid segment of PDA. Elevated LVEDP  RECOMMENDATIONS -Medical management for obstructive branch disease with dual antiplatelet therapy, statin and beta-blocker -Obtain an echocardiogram -Start diuretics -GDMT based on findings of echocardiogram Electronically signed by Mitch Correia MD, 08/06/23, 6:00 AM EDT.       XR Chest 1 View    Result Date: 8/6/2023  PROCEDURE: XR CHEST 1 VW  COMPARISON: None  INDICATIONS: CHEST PAIN  FINDINGS: A single frontal (AP or PA upright portable) chest radiograph reveals borderline cardiac enlargement and probably no acute infiltrate. No pneumothorax is seen. External artifacts obscure detail. The thoracic aorta is atherosclerotic. Chronic calcified granulomatous disease involves the chest.  Degenerative changes involve the bilateral shoulders and the imaged spine.  There are slightly low lung volumes.  No definite pleural effusion is suspected.       Probably no acute infiltrate is identified.      Please note that portions of this note were completed with a voice recognition program.  KINGSTON MORALES JR, MD       Electronically Signed and Approved By: KINGSTON MORALES JR, MD on 8/06/2023 at 3:50                 Differential Diagnosis and Discussion:    Chest Pain:  Based on the patient's signs and symptoms, I considered aortic dissection, myocardial infaction, pulmonary embolism, cardiac tamponade, pericarditis, pneumothorax, musculoskeletal chest pain and other differential diagnosis as an etiology of the patient's chest pain.     All labs were reviewed and interpreted by me.  All X-rays impressions were independently interpreted by me.  EKG was  interpreted by me.    MDM       Critical Care Note: Total Critical Care time of 35 minutes. Total critical care time documented does not include time spent on separately billed procedures for services of nurses or physician assistants. I personally saw and examined the patient. I have reviewed all diagnostic interpretations and treatment plans as written. I was present for the key portions of any procedures performed and the inclusive time noted in any critical care statement. Critical care time includes patient management by me, time spent at the patients bedside,  time to review lab and imaging results, discussing patient care, documentation in the medical record, and time spent with family or caregiver.    Patient Care Considerations:          Consultants/Shared Management Plan:    Consultant: I have discussed the case with Dr. Gomez of interventional cardiology who states he agrees with plan to activate cardiac Cath Lab.    Social Determinants of Health:    Patient has presented with family members who are responsible, reliable and will ensure follow up care.      Disposition and Care Coordination:    Admit:   Through independent evaluation of the patient's history, physical, and imperical data, the patient meets criteria for observation/admission to the hospital.        Final diagnoses:   ST elevation myocardial infarction (STEMI), unspecified artery        ED Disposition       ED Disposition   Decision to Admit    Condition   --    Comment   Level of Care: Critical Care [6]   Diagnosis: NSTEMI (non-ST elevated myocardial infarction) [745670]   Admitting Physician: GERMAINE GOMEZ [[id number]]   Certification: I Certify That Inpatient Hospital Services Are Medically Necessary For Greater Than 2 Midnights                 This medical record created using voice recognition software.             Leon Messina MD  08/06/23 0876

## 2023-08-07 ENCOUNTER — APPOINTMENT (OUTPATIENT)
Dept: GENERAL RADIOLOGY | Facility: HOSPITAL | Age: 83
DRG: 281 | End: 2023-08-07
Payer: MEDICARE

## 2023-08-07 PROBLEM — E87.1 HYPONATREMIA: Status: ACTIVE | Noted: 2023-08-07

## 2023-08-07 PROBLEM — I51.81 STRESS-INDUCED CARDIOMYOPATHY: Status: ACTIVE | Noted: 2023-08-07

## 2023-08-07 PROBLEM — I21.4: Status: RESOLVED | Noted: 2023-08-06 | Resolved: 2023-08-07

## 2023-08-07 LAB
ANION GAP SERPL CALCULATED.3IONS-SCNC: 10.8 MMOL/L (ref 5–15)
BASOPHILS # BLD AUTO: 0.04 10*3/MM3 (ref 0–0.2)
BASOPHILS NFR BLD AUTO: 0.3 % (ref 0–1.5)
BH CV ECHO MEAS - AI P1/2T: 615.3 MSEC
BH CV ECHO MEAS - AO ROOT DIAM: 3 CM
BH CV ECHO MEAS - EDV(CUBED): 54.7 ML
BH CV ECHO MEAS - EDV(MOD-SP2): 48.3 ML
BH CV ECHO MEAS - EDV(MOD-SP4): 38.3 ML
BH CV ECHO MEAS - EF(MOD-BP): 58.2 %
BH CV ECHO MEAS - EF(MOD-SP2): 62.3 %
BH CV ECHO MEAS - EF(MOD-SP4): 57.4 %
BH CV ECHO MEAS - ESV(CUBED): 21.5 ML
BH CV ECHO MEAS - ESV(MOD-SP2): 18.2 ML
BH CV ECHO MEAS - ESV(MOD-SP4): 16.3 ML
BH CV ECHO MEAS - FS: 26.7 %
BH CV ECHO MEAS - IVS/LVPW: 1.46 CM
BH CV ECHO MEAS - IVSD: 1.39 CM
BH CV ECHO MEAS - LA DIMENSION: 3.5 CM
BH CV ECHO MEAS - LAT PEAK E' VEL: 9 CM/SEC
BH CV ECHO MEAS - LV DIASTOLIC VOL/BSA (35-75): 23.7 CM2
BH CV ECHO MEAS - LV MASS(C)D: 148 GRAMS
BH CV ECHO MEAS - LV SYSTOLIC VOL/BSA (12-30): 10.1 CM2
BH CV ECHO MEAS - LVIDD: 3.8 CM
BH CV ECHO MEAS - LVIDS: 2.8 CM
BH CV ECHO MEAS - LVOT AREA: 3.2 CM2
BH CV ECHO MEAS - LVOT DIAM: 2.01 CM
BH CV ECHO MEAS - LVPWD: 0.96 CM
BH CV ECHO MEAS - MED PEAK E' VEL: 4 CM/SEC
BH CV ECHO MEAS - MV A MAX VEL: 125.2 CM/SEC
BH CV ECHO MEAS - MV DEC SLOPE: 354 CM/SEC2
BH CV ECHO MEAS - MV DEC TIME: 0.15 MSEC
BH CV ECHO MEAS - MV E MAX VEL: 51.4 CM/SEC
BH CV ECHO MEAS - MV E/A: 0.41
BH CV ECHO MEAS - RAP SYSTOLE: 5 MMHG
BH CV ECHO MEAS - RVSP: 31 MMHG
BH CV ECHO MEAS - SI(MOD-SP2): 18.6 ML/M2
BH CV ECHO MEAS - SI(MOD-SP4): 13.6 ML/M2
BH CV ECHO MEAS - SV(MOD-SP2): 30.1 ML
BH CV ECHO MEAS - SV(MOD-SP4): 22 ML
BH CV ECHO MEAS - TAPSE (>1.6): 1.38 CM
BH CV ECHO MEAS - TR MAX PG: 26 MMHG
BH CV ECHO MEAS - TR MAX VEL: 255.2 CM/SEC
BH CV ECHO MEASUREMENTS AVERAGE E/E' RATIO: 7.91
BUN SERPL-MCNC: 11 MG/DL (ref 8–23)
BUN/CREAT SERPL: 17.5 (ref 7–25)
CALCIUM SPEC-SCNC: 8.6 MG/DL (ref 8.6–10.5)
CHLORIDE SERPL-SCNC: 88 MMOL/L (ref 98–107)
CK MB SERPL-CCNC: 9.34 NG/ML
CK SERPL-CCNC: 78 U/L (ref 20–180)
CO2 SERPL-SCNC: 25.2 MMOL/L (ref 22–29)
CREAT SERPL-MCNC: 0.63 MG/DL (ref 0.57–1)
DEPRECATED RDW RBC AUTO: 40.4 FL (ref 37–54)
EGFRCR SERPLBLD CKD-EPI 2021: 88.7 ML/MIN/1.73
EOSINOPHIL # BLD AUTO: 0.43 10*3/MM3 (ref 0–0.4)
EOSINOPHIL NFR BLD AUTO: 3.8 % (ref 0.3–6.2)
ERYTHROCYTE [DISTWIDTH] IN BLOOD BY AUTOMATED COUNT: 12.7 % (ref 12.3–15.4)
GLUCOSE SERPL-MCNC: 149 MG/DL (ref 65–99)
HCT VFR BLD AUTO: 38.4 % (ref 34–46.6)
HGB BLD-MCNC: 13 G/DL (ref 12–15.9)
IMM GRANULOCYTES # BLD AUTO: 0.09 10*3/MM3 (ref 0–0.05)
IMM GRANULOCYTES NFR BLD AUTO: 0.8 % (ref 0–0.5)
LEFT ATRIUM VOLUME INDEX: 10.1 ML/M2
LYMPHOCYTES # BLD AUTO: 1.95 10*3/MM3 (ref 0.7–3.1)
LYMPHOCYTES NFR BLD AUTO: 17 % (ref 19.6–45.3)
MCH RBC QN AUTO: 29.5 PG (ref 26.6–33)
MCHC RBC AUTO-ENTMCNC: 33.9 G/DL (ref 31.5–35.7)
MCV RBC AUTO: 87.3 FL (ref 79–97)
MONOCYTES # BLD AUTO: 0.71 10*3/MM3 (ref 0.1–0.9)
MONOCYTES NFR BLD AUTO: 6.2 % (ref 5–12)
NEUTROPHILS NFR BLD AUTO: 71.9 % (ref 42.7–76)
NEUTROPHILS NFR BLD AUTO: 8.23 10*3/MM3 (ref 1.7–7)
NRBC BLD AUTO-RTO: 0 /100 WBC (ref 0–0.2)
OSMOLALITY SERPL: 267 MOSM/KG (ref 280–301)
OSMOLALITY UR: 564 MOSM/KG
PLATELET # BLD AUTO: 259 10*3/MM3 (ref 140–450)
PMV BLD AUTO: 10.3 FL (ref 6–12)
POTASSIUM SERPL-SCNC: 3.5 MMOL/L (ref 3.5–5.2)
POTASSIUM SERPL-SCNC: 4.1 MMOL/L (ref 3.5–5.2)
QT INTERVAL: 375 MS
QT INTERVAL: 396 MS
QT INTERVAL: 500 MS
RBC # BLD AUTO: 4.4 10*6/MM3 (ref 3.77–5.28)
SODIUM SERPL-SCNC: 124 MMOL/L (ref 136–145)
SODIUM UR-SCNC: 34 MMOL/L
WBC NRBC COR # BLD: 11.45 10*3/MM3 (ref 3.4–10.8)

## 2023-08-07 PROCEDURE — 82550 ASSAY OF CK (CPK): CPT | Performed by: INTERNAL MEDICINE

## 2023-08-07 PROCEDURE — 94761 N-INVAS EAR/PLS OXIMETRY MLT: CPT

## 2023-08-07 PROCEDURE — 85025 COMPLETE CBC W/AUTO DIFF WBC: CPT | Performed by: HOSPITALIST

## 2023-08-07 PROCEDURE — 82553 CREATINE MB FRACTION: CPT | Performed by: INTERNAL MEDICINE

## 2023-08-07 PROCEDURE — 99232 SBSQ HOSP IP/OBS MODERATE 35: CPT | Performed by: INTERNAL MEDICINE

## 2023-08-07 PROCEDURE — 71045 X-RAY EXAM CHEST 1 VIEW: CPT

## 2023-08-07 PROCEDURE — 94799 UNLISTED PULMONARY SVC/PX: CPT

## 2023-08-07 PROCEDURE — 94640 AIRWAY INHALATION TREATMENT: CPT

## 2023-08-07 PROCEDURE — 93005 ELECTROCARDIOGRAM TRACING: CPT | Performed by: INTERNAL MEDICINE

## 2023-08-07 PROCEDURE — 80048 BASIC METABOLIC PNL TOTAL CA: CPT | Performed by: HOSPITALIST

## 2023-08-07 PROCEDURE — 25010000002 FUROSEMIDE PER 20 MG: Performed by: INTERNAL MEDICINE

## 2023-08-07 PROCEDURE — 84132 ASSAY OF SERUM POTASSIUM: CPT | Performed by: HOSPITALIST

## 2023-08-07 RX ORDER — ATORVASTATIN CALCIUM 20 MG/1
20 TABLET, FILM COATED ORAL NIGHTLY
Status: DISCONTINUED | OUTPATIENT
Start: 2023-08-07 | End: 2023-08-08 | Stop reason: HOSPADM

## 2023-08-07 RX ORDER — BUDESONIDE 0.5 MG/2ML
0.5 INHALANT ORAL
Status: DISCONTINUED | OUTPATIENT
Start: 2023-08-07 | End: 2023-08-08 | Stop reason: HOSPADM

## 2023-08-07 RX ORDER — IPRATROPIUM BROMIDE AND ALBUTEROL SULFATE 2.5; .5 MG/3ML; MG/3ML
3 SOLUTION RESPIRATORY (INHALATION)
Status: DISCONTINUED | OUTPATIENT
Start: 2023-08-07 | End: 2023-08-07

## 2023-08-07 RX ORDER — ARFORMOTEROL TARTRATE 15 UG/2ML
15 SOLUTION RESPIRATORY (INHALATION)
Status: DISCONTINUED | OUTPATIENT
Start: 2023-08-07 | End: 2023-08-08 | Stop reason: HOSPADM

## 2023-08-07 RX ORDER — ECHINACEA PURPUREA EXTRACT 125 MG
1 TABLET ORAL AS NEEDED
Status: DISCONTINUED | OUTPATIENT
Start: 2023-08-07 | End: 2023-08-08 | Stop reason: HOSPADM

## 2023-08-07 RX ORDER — FUROSEMIDE 10 MG/ML
20 INJECTION INTRAMUSCULAR; INTRAVENOUS ONCE
Status: COMPLETED | OUTPATIENT
Start: 2023-08-07 | End: 2023-08-07

## 2023-08-07 RX ORDER — ATORVASTATIN CALCIUM 20 MG/1
20 TABLET, FILM COATED ORAL NIGHTLY
Qty: 90 TABLET | Refills: 0 | Status: CANCELLED | OUTPATIENT
Start: 2023-08-07

## 2023-08-07 RX ORDER — POTASSIUM CHLORIDE 20 MEQ/1
40 TABLET, EXTENDED RELEASE ORAL EVERY 4 HOURS
Status: COMPLETED | OUTPATIENT
Start: 2023-08-07 | End: 2023-08-07

## 2023-08-07 RX ORDER — ISOSORBIDE MONONITRATE 30 MG/1
30 TABLET, EXTENDED RELEASE ORAL
Qty: 30 TABLET | Refills: 0 | Status: CANCELLED | OUTPATIENT
Start: 2023-08-08

## 2023-08-07 RX ORDER — CARVEDILOL 3.12 MG/1
3.12 TABLET ORAL 2 TIMES DAILY WITH MEALS
Qty: 30 TABLET | Refills: 0 | Status: CANCELLED | OUTPATIENT
Start: 2023-08-07

## 2023-08-07 RX ORDER — GUAIFENESIN 600 MG/1
600 TABLET, EXTENDED RELEASE ORAL EVERY 12 HOURS SCHEDULED
Status: DISCONTINUED | OUTPATIENT
Start: 2023-08-07 | End: 2023-08-08 | Stop reason: HOSPADM

## 2023-08-07 RX ORDER — ASPIRIN 81 MG/1
81 TABLET ORAL DAILY
Qty: 30 TABLET | Refills: 0 | Status: CANCELLED | OUTPATIENT
Start: 2023-08-08

## 2023-08-07 RX ORDER — IPRATROPIUM BROMIDE AND ALBUTEROL SULFATE 2.5; .5 MG/3ML; MG/3ML
3 SOLUTION RESPIRATORY (INHALATION) EVERY 6 HOURS PRN
Status: DISCONTINUED | OUTPATIENT
Start: 2023-08-07 | End: 2023-08-08 | Stop reason: HOSPADM

## 2023-08-07 RX ADMIN — SENNOSIDES AND DOCUSATE SODIUM 2 TABLET: 50; 8.6 TABLET ORAL at 11:26

## 2023-08-07 RX ADMIN — ASPIRIN 81 MG: 81 TABLET, COATED ORAL at 11:26

## 2023-08-07 RX ADMIN — ARFORMOTEROL TARTRATE 15 MCG: 15 SOLUTION RESPIRATORY (INHALATION) at 15:04

## 2023-08-07 RX ADMIN — ISOSORBIDE MONONITRATE 30 MG: 30 TABLET, EXTENDED RELEASE ORAL at 11:26

## 2023-08-07 RX ADMIN — Medication 10 ML: at 21:41

## 2023-08-07 RX ADMIN — Medication 5 MG: at 21:40

## 2023-08-07 RX ADMIN — BUDESONIDE 0.5 MG: 0.5 SUSPENSION RESPIRATORY (INHALATION) at 15:04

## 2023-08-07 RX ADMIN — POTASSIUM CHLORIDE 40 MEQ: 1500 TABLET, EXTENDED RELEASE ORAL at 11:26

## 2023-08-07 RX ADMIN — TICAGRELOR 90 MG: 90 TABLET ORAL at 21:40

## 2023-08-07 RX ADMIN — TICAGRELOR 90 MG: 90 TABLET ORAL at 11:29

## 2023-08-07 RX ADMIN — GUAIFENESIN 600 MG: 600 TABLET ORAL at 14:05

## 2023-08-07 RX ADMIN — FUROSEMIDE 20 MG: 10 INJECTION, SOLUTION INTRAMUSCULAR; INTRAVENOUS at 11:26

## 2023-08-07 RX ADMIN — ARFORMOTEROL TARTRATE 15 MCG: 15 SOLUTION RESPIRATORY (INHALATION) at 20:23

## 2023-08-07 RX ADMIN — POTASSIUM CHLORIDE 40 MEQ: 1500 TABLET, EXTENDED RELEASE ORAL at 14:05

## 2023-08-07 RX ADMIN — CARVEDILOL 3.12 MG: 3.12 TABLET, FILM COATED ORAL at 18:24

## 2023-08-07 RX ADMIN — Medication 10 ML: at 11:27

## 2023-08-07 RX ADMIN — CARVEDILOL 3.12 MG: 3.12 TABLET, FILM COATED ORAL at 11:29

## 2023-08-07 RX ADMIN — ATORVASTATIN CALCIUM 20 MG: 20 TABLET, FILM COATED ORAL at 21:40

## 2023-08-07 RX ADMIN — POTASSIUM CHLORIDE 10 MEQ: 10 CAPSULE, COATED, EXTENDED RELEASE ORAL at 11:27

## 2023-08-07 RX ADMIN — GUAIFENESIN 600 MG: 600 TABLET ORAL at 21:40

## 2023-08-07 NOTE — PLAN OF CARE
Goal Outcome Evaluation: pt alert and oriented today.  O2 for comfort.  No cp. Will monitor.

## 2023-08-07 NOTE — PLAN OF CARE
Goal Outcome Evaluation:    Pt with no apparent distress this shift, pt was cooperative with Care Plan.

## 2023-08-07 NOTE — PROGRESS NOTES
[facility]     Cardiology Progress Note    Patient Name: Cris Agudelo  : 1940  MRN: [id number]  Primary Care Physician:  Ramirez Maurice MD  Date of admission: 2023    Subjective   Subjective     Chief Complaint: Follow-up visit for chest pain, cardiomyopathy    Interval HPI:    Patient denies any new complaints at this time.  Specifically no further episodes of chest pain overnight.  She reports poor sleep.  Per patient's daughter, she is slow to respond, but answering questions appropriately.  No events on telemetry    Review of Systems   All systems were reviewed and negative except for: Fatigue and intermittent confusion    Objective   Objective     Vitals:   Temp:  [97.3 øF (36.3 øC)-98.2 øF (36.8 øC)] 97.5 øF (36.4 øC)  Heart Rate:  [58-89] 71  Resp:  [16] 16  BP: ()/(44-64) 107/54  Flow (L/min):  [2] 2  Physical Exam      General : Alert, awake, no acute distress  CVS : Regular rate and rhythm, no murmur, rubs or gallops  Lungs: Bilateral faint basilar crackles heard, no wheezing  Abdomen: Soft, nontender, bowel sounds heard in all 4 quadrants  Extremities: Warm, well-perfused, no pedal edema    Scheduled Meds:aspirin, 324 mg, Oral, Once  aspirin, 81 mg, Oral, Daily  atorvastatin, 20 mg, Oral, Nightly  carvedilol, 3.125 mg, Oral, BID With Meals  furosemide, 20 mg, Intravenous, Once  isosorbide mononitrate, 30 mg, Oral, Q24H  lisinopril, 20 mg, Oral, Daily  potassium chloride, 10 mEq, Oral, Daily  senna-docusate sodium, 2 tablet, Oral, BID  ticagrelor, 90 mg, Oral, BID             Result Review    Result Review:  I have personally reviewed the results from the time of this admission to 2023 08:19 EDT and agree with these findings:  [x]  Laboratory  []  Microbiology  [x]  Radiology  [x]  EKG/Telemetry   [x]  Cardiology/Vascular   []  Pathology  []  Old records  []  Other:  Most notable findings include:     CBC          2023    03:25 2023    04:23   CBC   WBC 12.11  11.45     RBC 4.92  4.40    Hemoglobin 14.3  13.0    Hematocrit 42.2  38.4    MCV 85.8  87.3    MCH 29.1  29.5    MCHC 33.9  33.9    RDW 12.9  12.7    Platelets 280  259      CMP          8/6/2023    03:25 8/6/2023    06:23 8/7/2023    04:23   CMP   Glucose 174  146  149    BUN 11  12  11    Creatinine 0.68  0.67  0.63    EGFR 87.1  87.4  88.7    Sodium 129  127  124    Potassium 4.1  4.0  3.5    Chloride 93  93  88    Calcium 9.2  9.1  8.6    Total Protein 6.9      Albumin 3.9      Globulin 3.0      Total Bilirubin 0.4      Alkaline Phosphatase 117      AST (SGOT) 22      ALT (SGPT) 11      Albumin/Globulin Ratio 1.3      BUN/Creatinine Ratio 16.2  17.9  17.5    Anion Gap 12.7  12.2  10.8       CARDIAC LABS:     Latest Reference Range & Units 08/06/23 03:25 08/06/23 06:23 08/07/23 04:23   Creatine Kinase 20 - 180 U/L   78   CKMB <=5.30 ng/mL   9.34 (H)   HS Troponin T <10 ng/L 590 (C) 553 (C)    Troponin T Delta >=-4 - <+4 ng/L  -37 (L)    proBNP 0.0 - 1,800.0 pg/mL 1,164.0           Assessment & Plan   Assessment / Plan     Brief Patient Summary:  Cris Agudelo is a 82 y.o. female with hypertension, who presented to the hospital because of left-sided chest pain, cardiac markers are elevated.  Cardiac cath and echocardiogram suggestive of stress-induced cardiomyopathy    Active Hospital Problems:  Active Hospital Problems    Diagnosis     **Stress-induced cardiomyopathy     Hyponatremia     NSTEMI (non-ST elevated myocardial infarction)     Type 1 myocardial infarction        Stress-induced cardiomyopathy : Ejection fraction is 35 to 40% on my visual assessment of sonograms, with a wall motion pattern suggestive of stress-induced cardiomyopathy.  She has mild basal crackles.  Blood pressure stable.    Elevated troponin : Type II MI, secondary to cardiomyopathy.  Cardiac cath did not show any culprit lesions, plaque rupture.  She has nonobstructive and borderline lesions in the diagonal branch.    Essential hypertension :  Blood pressure well controlled.    Hyponatremia    Plan:     We will continue dual antiplatelet therapy for 6 months  Continue carvedilol , lisinopril  Starting atorvastatin 20 mg nightly  We will give 1 dose of IV Lasix 20 mg today : Discussed with nephrology    PT/OT to continue  Wean off oxygen as tolerated, encourage ambulation    Discussed with primary team and nephrology    CODE STATUS:   Level Of Support Discussed With: Patient  Code Status (Patient has no pulse and is not breathing): CPR (Attempt to Resuscitate)  Medical Interventions (Patient has pulse or is breathing): Full Support  Release to patient: Routine Release      Electronically signed by Leon Hernandez MD, 08/07/23, 8:15 AM EDT.

## 2023-08-07 NOTE — PROGRESS NOTES
[facility]   Hospitalist Progress Note  Date: 2023  Patient Name: Cris Agudelo  : 1940  MRN: [id number]  Date of admission: 2023      Subjective Chest pain    Subjective     Chief Complaint: chest pain     Summary: Cris Agudelo is a 82 y.o. female with a past medical history of hypertension; who presented with an elevated troponin and ECG with subtle ST elevations in the anterior leads.  Repeat ECG also showed reciprocal changes in the inferior leads and code   STEMI was activated.  Patient was urgently taken to the Cath Lab.  Urgent cardiac catheter showed significant lesions in the diagonal and the PDA.  Since patient was asymptomatic and the risk benefit of intervention t was discussed; medical management was decided to be the safer option.  Patient was loaded with DAPT.      Interval Followup: Patient did not sleep well last night. She is confused.  Sodium is dropped further.    Review of Systems   All systems were reviewed and negative except for: Patient is confused    Objective   Objective     Vitals:   Temp:  [97.3 øF (36.3 øC)-98.2 øF (36.8 øC)] 97.5 øF (36.4 øC)  Heart Rate:  [58-89] 71  Resp:  [16] 16  BP: ()/(44-61) 107/54  Flow (L/min):  [2] 2  Physical Exam    Constitutional: Awake, alert, no acute distress   Eyes: Pupils equal, sclerae anicteric, no conjunctival injection   HENT: NCAT, mucous membranes moist   Neck: Supple, no thyromegaly, no lymphadenopathy, trachea midline   Respiratory: Clear to auscultation bilaterally, nonlabored respirations    Cardiovascular: RRR, no murmurs, rubs, or gallops, palpable pedal pulses bilaterally   Gastrointestinal: Positive bowel sounds, soft, nontender, nondistended   Musculoskeletal: No bilateral ankle edema, no clubbing or cyanosis to extremities   Psychiatric: Appropriate affect, cooperative   Neurologic: Oriented x 2, strength symmetric in all extremities, Cranial Nerves grossly intact to confrontation, speech clear   Skin: No rashes      Result Review    Result Review:  I have personally reviewed the results from the time of this admission to 8/7/2023 10:03 EDT and agree with these findings:  [x]  Laboratory  []  Microbiology  [x]  Radiology  []  EKG/Telemetry   []  Cardiology/Vascular   []  Pathology  [x]  Old records  []  Other:    Assessment & Plan   Assessment / Plan   Assessment/Plan:  #1 NSTEMI of the anterior wall involving the right ventricle  -Significant lesions in the diagonal and the PDA  -No stenting done; patient medically managed since it was the safer option.  -DAPT was started.  Will need to be continued for 6 months.  -Patient reports statin intolerance.  Start Lipitor 20 mg nightly  -Patient will need referral to cardiac rehab.     #2 concern for CHF-stress-induced cardiomyopathy EF of 35 to 40%  -Try 1 dose of Lasix 20 mg IV.  Cardiology discussed it with nephrology.  -Goal-directed care: Patient started on beta-blocker, ACE inhibitor, Imdur, diuretic.     #3 hyponatremia  -Nephrology consulted     #4 insomnia we will order melatonin     #5 poor oral intake started supplements    #6 chronic shortness of breath could be secondary to COPD.  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  Will need outpatient PFT.  -Started on Brovana, Pulmicort, DuoNeb, RT for bronchopulmonary hygiene, RT for bronchodilator therapy          Discussed plan with RN.    DVT prophylaxis:  Mechanical DVT prophylaxis orders are present.    CODE STATUS:   Level Of Support Discussed With: Patient  Code Status (Patient has no pulse and is not breathing): CPR (Attempt to Resuscitate)  Medical Interventions (Patient has pulse or is breathing): Full Support  Release to patient: Routine Release        Electronically signed by Kevyn Sommer DO, 08/07/23, 9:50 AM EDT.

## 2023-08-07 NOTE — CONSULTS
[facility]   Consult Note    Patient Name: Cris Agudelo  : 1940  MRN: [id number]  Primary Care Physician:  Ramirez Maurice MD  Referring Physician: No ref. provider found  Date of admission: 2023    Subjective   Subjective     Reason for Consult/ Chief Complaint: Hyponatremia    HPI:  Cris Agudelo is a 82 y.o. female with a past medical history of hypertension; who presented with left sided CP found to have elevated troponin and ECG with subtle ST elevations in the anterior leads. Urgent cardiac catheter showed significant lesions in the diagonal and the PDA. Since patient was asymptomatic and the risk benefit of interventiont was discussed; medical management was decided to be the safer option. Patient was loaded with DAPT.    On presentation, patient's sodium was 129 which is slowly down trended to 124 for which nephrology is consulted.  At home, patient is on triamterene-HCTZ combination which has been discontinued during inpatient due to hyponatremia.  Urine electrolytes and urine osmolality are pending however, patient did get a dose of Lasix yesterday after which urine labs were collected.    In today's encounter, patient reports feeling well overall.  She denies any more chest pain or nausea which she initially had since yesterday but resolved now.  She reports drinking about 1500 mL of water daily at home.  This does not include other fluid intake.  She denied any other concerns today.           Review of Systems    Review of Systems   Constitutional: Negative.    HENT: Negative.     Eyes: Negative.    Respiratory:  Positive for shortness of breath (Intermittent).    Cardiovascular:  Positive for chest pain (Resolved at the time of my interaction). Negative for palpitations and leg swelling.   Gastrointestinal:  Positive for nausea (Resolved at the time of my interaction). Negative for abdominal distention, abdominal pain, diarrhea and vomiting.   Endocrine: Negative.    Genitourinary:  Negative.    Skin: Negative.    Neurological: Negative.    Hematological: Negative.    Psychiatric/Behavioral: Negative.        Personal History     Past Medical History:   Diagnosis Date    Acute non-ST elevation myocardial infarction (NSTEMI) of anterior wall involving right ventricle 08/06/2023    Hypertension        History reviewed. No pertinent surgical history.    Family History: family history is not on file. Otherwise pertinent FHx was reviewed and not pertinent to current issue.    Social History:  reports that she has quit smoking. Her smoking use included cigarettes. She has never used smokeless tobacco. She reports that she does not currently use alcohol. She reports that she does not use drugs.    Home Medications:  lisinopril, potassium chloride, and triamterene-hydrochlorothiazide    Allergies:  Allergies   Allergen Reactions    Aleve [Naproxen] Hives       Objective    Objective     Vitals:   Temp:  [97.5 øF (36.4 øC)-98.2 øF (36.8 øC)] 97.9 øF (36.6 øC)  Heart Rate:  [58-82] 82  Resp:  [16] 16  BP: ()/(44-61) 116/58  Flow (L/min):  [2] 2    Physical Exam:    Physical Exam  Vitals reviewed.   Constitutional:       Appearance: Normal appearance.   HENT:      Head: Normocephalic and atraumatic.   Eyes:      General: No scleral icterus.     Pupils: Pupils are equal, round, and reactive to light.   Cardiovascular:      Rate and Rhythm: Normal rate and regular rhythm.      Heart sounds: Normal heart sounds. No murmur heard.  Pulmonary:      Effort: Pulmonary effort is normal.      Breath sounds: Normal breath sounds.   Abdominal:      General: There is no distension.      Palpations: Abdomen is soft.      Tenderness: There is no abdominal tenderness. There is no guarding.   Musculoskeletal:      Cervical back: Neck supple.      Right lower leg: No edema.      Left lower leg: No edema.   Skin:     General: Skin is warm and dry.      Findings: No rash.   Neurological:      General: No focal deficit  present.      Mental Status: She is alert and oriented to person, place, and time.   Psychiatric:         Mood and Affect: Mood normal.         Behavior: Behavior normal.        Result Review    Result Review:  I have personally reviewed the results from the time of this admission to 8/7/2023 13:03 EDT and agree with these findings:  [x]  Laboratory  []  Microbiology  [x]  Radiology  [x]  EKG/Telemetry   []  Cardiology/Vascular   []  Pathology  []  Old records  []  Other:      Assessment & Plan   Assessment / Plan     Active Hospital Problems:  Active Hospital Problems    Diagnosis     **Stress-induced cardiomyopathy     Hyponatremia     NSTEMI (non-ST elevated myocardial infarction)     Type 1 myocardial infarction        Plan:     Hyponatremia    -Baseline Na: Unknown  -On presentation: Na 129  -Etiology: Most likely Chest pain, nausea and HCTZ combination (pain and nausea can lead to SIADH)  -Urine studies: Pending, however, patient received Lasix on the same day which would make urine studies inaccurate.  -Fluid restriction: Start fluid restriction 1.2L/day  -Patient is okay to discharge from renal standpoint but she will need repeat sodium level on Wednesday and a telemedicine follow-up visit with nephrology in on Thursday.    Electronically signed by Arcelia Chavez MD, 08/07/23, 8:00 AM EDT.

## 2023-08-07 NOTE — PROGRESS NOTES
Respiratory Therapist Broncho-Pulmonary Hygiene Progress Note      Patient Name:  Cris Agudelo  YOB: 1940    Cris Agudelo meets the qualification for Level 1 of the Bronco-Pulmonary Hygiene Protocol. This was based on my daily patient assessment and includes review of chest x-ray results, cough ability and quality, oxygenation, secretions or risk for secretion development and patient mobility.     Broncho-Pulmonary Hygiene Assessment:    Level of Movement: Actively changing positions-requires assistance  Disoriented/Follows Commands    Breath Sounds: Clear to slightly diminished    Cough: Strong, effective    Chest X-Ray: Possible signs of consolidation and/or atelectasis or clear.     Sputum Productions: None or small amount of thin or watery secretions with effective cough    History and Physical: None    SpO2 to Oxygen Need: greater than 92% on room air or  less than 3L nasal canula    Current SpO2 is: 94 on room air    Based on this information, I have completed the following interventions: Teach/Instruct patient on cough and deep breathe      Electronically signed by Jennifer Martinez RRT, 08/07/23, 4:01 PM EDT.

## 2023-08-08 ENCOUNTER — READMISSION MANAGEMENT (OUTPATIENT)
Dept: CALL CENTER | Facility: HOSPITAL | Age: 83
End: 2023-08-08
Payer: MEDICARE

## 2023-08-08 VITALS
RESPIRATION RATE: 18 BRPM | HEART RATE: 76 BPM | HEIGHT: 60 IN | BODY MASS INDEX: 27.35 KG/M2 | SYSTOLIC BLOOD PRESSURE: 102 MMHG | TEMPERATURE: 97.7 F | OXYGEN SATURATION: 98 % | DIASTOLIC BLOOD PRESSURE: 60 MMHG | WEIGHT: 139.33 LBS

## 2023-08-08 LAB
ALBUMIN SERPL-MCNC: 3.3 G/DL (ref 3.5–5.2)
ALP SERPL-CCNC: 87 U/L (ref 39–117)
ALT SERPL W P-5'-P-CCNC: 8 U/L (ref 1–33)
ANION GAP SERPL CALCULATED.3IONS-SCNC: 10.9 MMOL/L (ref 5–15)
ANION GAP SERPL CALCULATED.3IONS-SCNC: 8.9 MMOL/L (ref 5–15)
AST SERPL-CCNC: 17 U/L (ref 1–32)
BASOPHILS # BLD AUTO: 0.02 10*3/MM3 (ref 0–0.2)
BASOPHILS NFR BLD AUTO: 0.2 % (ref 0–1.5)
BILIRUB CONJ SERPL-MCNC: 0.2 MG/DL (ref 0–0.3)
BILIRUB INDIRECT SERPL-MCNC: 0.5 MG/DL
BILIRUB SERPL-MCNC: 0.7 MG/DL (ref 0–1.2)
BUN SERPL-MCNC: 13 MG/DL (ref 8–23)
BUN SERPL-MCNC: 14 MG/DL (ref 8–23)
BUN/CREAT SERPL: 21 (ref 7–25)
BUN/CREAT SERPL: 22.2 (ref 7–25)
CALCIUM SPEC-SCNC: 8.5 MG/DL (ref 8.6–10.5)
CALCIUM SPEC-SCNC: 8.6 MG/DL (ref 8.6–10.5)
CHLORIDE SERPL-SCNC: 89 MMOL/L (ref 98–107)
CHLORIDE SERPL-SCNC: 90 MMOL/L (ref 98–107)
CO2 SERPL-SCNC: 24.1 MMOL/L (ref 22–29)
CO2 SERPL-SCNC: 24.1 MMOL/L (ref 22–29)
CREAT SERPL-MCNC: 0.62 MG/DL (ref 0.57–1)
CREAT SERPL-MCNC: 0.63 MG/DL (ref 0.57–1)
DEPRECATED RDW RBC AUTO: 38.6 FL (ref 37–54)
EGFRCR SERPLBLD CKD-EPI 2021: 88.7 ML/MIN/1.73
EGFRCR SERPLBLD CKD-EPI 2021: 89 ML/MIN/1.73
EOSINOPHIL # BLD AUTO: 0.17 10*3/MM3 (ref 0–0.4)
EOSINOPHIL NFR BLD AUTO: 1.7 % (ref 0.3–6.2)
ERYTHROCYTE [DISTWIDTH] IN BLOOD BY AUTOMATED COUNT: 12.8 % (ref 12.3–15.4)
GLUCOSE SERPL-MCNC: 136 MG/DL (ref 65–99)
GLUCOSE SERPL-MCNC: 187 MG/DL (ref 65–99)
HCT VFR BLD AUTO: 34.6 % (ref 34–46.6)
HGB BLD-MCNC: 11.8 G/DL (ref 12–15.9)
IMM GRANULOCYTES # BLD AUTO: 0.06 10*3/MM3 (ref 0–0.05)
IMM GRANULOCYTES NFR BLD AUTO: 0.6 % (ref 0–0.5)
LYMPHOCYTES # BLD AUTO: 1.67 10*3/MM3 (ref 0.7–3.1)
LYMPHOCYTES NFR BLD AUTO: 17.1 % (ref 19.6–45.3)
MCH RBC QN AUTO: 28.9 PG (ref 26.6–33)
MCHC RBC AUTO-ENTMCNC: 34.1 G/DL (ref 31.5–35.7)
MCV RBC AUTO: 84.8 FL (ref 79–97)
MONOCYTES # BLD AUTO: 0.77 10*3/MM3 (ref 0.1–0.9)
MONOCYTES NFR BLD AUTO: 7.9 % (ref 5–12)
NEUTROPHILS NFR BLD AUTO: 7.08 10*3/MM3 (ref 1.7–7)
NEUTROPHILS NFR BLD AUTO: 72.5 % (ref 42.7–76)
NRBC BLD AUTO-RTO: 0 /100 WBC (ref 0–0.2)
PLATELET # BLD AUTO: 250 10*3/MM3 (ref 140–450)
PMV BLD AUTO: 10.7 FL (ref 6–12)
POTASSIUM SERPL-SCNC: 4 MMOL/L (ref 3.5–5.2)
POTASSIUM SERPL-SCNC: 4.1 MMOL/L (ref 3.5–5.2)
PROT SERPL-MCNC: 5.9 G/DL (ref 6–8.5)
RBC # BLD AUTO: 4.08 10*6/MM3 (ref 3.77–5.28)
SODIUM SERPL-SCNC: 123 MMOL/L (ref 136–145)
SODIUM SERPL-SCNC: 124 MMOL/L (ref 136–145)
SODIUM SERPL-SCNC: 124 MMOL/L (ref 136–145)
WBC NRBC COR # BLD: 9.77 10*3/MM3 (ref 3.4–10.8)

## 2023-08-08 PROCEDURE — 94799 UNLISTED PULMONARY SVC/PX: CPT

## 2023-08-08 PROCEDURE — 97165 OT EVAL LOW COMPLEX 30 MIN: CPT

## 2023-08-08 PROCEDURE — 80076 HEPATIC FUNCTION PANEL: CPT | Performed by: INTERNAL MEDICINE

## 2023-08-08 PROCEDURE — 84295 ASSAY OF SERUM SODIUM: CPT | Performed by: STUDENT IN AN ORGANIZED HEALTH CARE EDUCATION/TRAINING PROGRAM

## 2023-08-08 PROCEDURE — 80048 BASIC METABOLIC PNL TOTAL CA: CPT | Performed by: HOSPITALIST

## 2023-08-08 PROCEDURE — 99232 SBSQ HOSP IP/OBS MODERATE 35: CPT | Performed by: INTERNAL MEDICINE

## 2023-08-08 PROCEDURE — 94761 N-INVAS EAR/PLS OXIMETRY MLT: CPT

## 2023-08-08 PROCEDURE — 94664 DEMO&/EVAL PT USE INHALER: CPT

## 2023-08-08 PROCEDURE — 85025 COMPLETE CBC W/AUTO DIFF WBC: CPT | Performed by: HOSPITALIST

## 2023-08-08 RX ORDER — LISINOPRIL 10 MG/1
10 TABLET ORAL DAILY
Qty: 30 TABLET | Refills: 1 | Status: SHIPPED | OUTPATIENT
Start: 2023-08-09

## 2023-08-08 RX ORDER — SODIUM CHLORIDE 1 G/1
2 TABLET ORAL 2 TIMES DAILY WITH MEALS
Status: DISCONTINUED | OUTPATIENT
Start: 2023-08-08 | End: 2023-08-08 | Stop reason: HOSPADM

## 2023-08-08 RX ORDER — ATORVASTATIN CALCIUM 20 MG/1
20 TABLET, FILM COATED ORAL NIGHTLY
Qty: 90 TABLET | Refills: 1 | Status: SHIPPED | OUTPATIENT
Start: 2023-08-08

## 2023-08-08 RX ORDER — GUAIFENESIN 600 MG/1
600 TABLET, EXTENDED RELEASE ORAL EVERY 12 HOURS SCHEDULED
Qty: 15 TABLET | Refills: 0 | Status: SHIPPED | OUTPATIENT
Start: 2023-08-08

## 2023-08-08 RX ORDER — ASPIRIN 81 MG/1
81 TABLET ORAL DAILY
Qty: 30 TABLET | Refills: 1 | Status: SHIPPED | OUTPATIENT
Start: 2023-08-09

## 2023-08-08 RX ORDER — CARVEDILOL 3.12 MG/1
3.12 TABLET ORAL 2 TIMES DAILY WITH MEALS
Qty: 30 TABLET | Refills: 0 | Status: SHIPPED | OUTPATIENT
Start: 2023-08-08

## 2023-08-08 RX ORDER — IPRATROPIUM BROMIDE AND ALBUTEROL SULFATE 2.5; .5 MG/3ML; MG/3ML
3 SOLUTION RESPIRATORY (INHALATION) EVERY 6 HOURS PRN
Qty: 360 ML | Refills: 0 | Status: SHIPPED | OUTPATIENT
Start: 2023-08-08

## 2023-08-08 RX ORDER — ECHINACEA PURPUREA EXTRACT 125 MG
1 TABLET ORAL AS NEEDED
Qty: 1 EACH | Refills: 1 | Status: SHIPPED | OUTPATIENT
Start: 2023-08-08

## 2023-08-08 RX ORDER — BUDESONIDE 0.5 MG/2ML
0.5 INHALANT ORAL
Qty: 30 EACH | Refills: 0 | Status: SHIPPED | OUTPATIENT
Start: 2023-08-08

## 2023-08-08 RX ORDER — CLOPIDOGREL BISULFATE 75 MG/1
75 TABLET ORAL DAILY
Status: DISCONTINUED | OUTPATIENT
Start: 2023-08-08 | End: 2023-08-08 | Stop reason: HOSPADM

## 2023-08-08 RX ORDER — ARFORMOTEROL TARTRATE 15 UG/2ML
15 SOLUTION RESPIRATORY (INHALATION)
Qty: 120 ML | Refills: 1 | Status: SHIPPED | OUTPATIENT
Start: 2023-08-08

## 2023-08-08 RX ORDER — POTASSIUM CHLORIDE 750 MG/1
10 CAPSULE, EXTENDED RELEASE ORAL DAILY
Qty: 20 CAPSULE | Refills: 0 | Status: SHIPPED | OUTPATIENT
Start: 2023-08-09

## 2023-08-08 RX ORDER — CLOPIDOGREL BISULFATE 75 MG/1
75 TABLET ORAL DAILY
Qty: 30 TABLET | Refills: 0 | Status: SHIPPED | OUTPATIENT
Start: 2023-08-09

## 2023-08-08 RX ORDER — SODIUM CHLORIDE 1 G/1
2 TABLET ORAL 2 TIMES DAILY WITH MEALS
Qty: 60 TABLET | Refills: 0 | Status: SHIPPED | OUTPATIENT
Start: 2023-08-08

## 2023-08-08 RX ORDER — LISINOPRIL 10 MG/1
10 TABLET ORAL DAILY
Status: DISCONTINUED | OUTPATIENT
Start: 2023-08-08 | End: 2023-08-08 | Stop reason: HOSPADM

## 2023-08-08 RX ADMIN — GUAIFENESIN 600 MG: 600 TABLET ORAL at 09:03

## 2023-08-08 RX ADMIN — POTASSIUM CHLORIDE 10 MEQ: 10 CAPSULE, COATED, EXTENDED RELEASE ORAL at 09:03

## 2023-08-08 RX ADMIN — BUDESONIDE 0.5 MG: 0.5 SUSPENSION RESPIRATORY (INHALATION) at 07:35

## 2023-08-08 RX ADMIN — SODIUM CHLORIDE TAB 1 GM 2 G: 1 TAB at 10:29

## 2023-08-08 RX ADMIN — CLOPIDOGREL BISULFATE 75 MG: 75 TABLET ORAL at 09:03

## 2023-08-08 RX ADMIN — LISINOPRIL 10 MG: 10 TABLET ORAL at 09:03

## 2023-08-08 RX ADMIN — CARVEDILOL 3.12 MG: 3.12 TABLET, FILM COATED ORAL at 09:04

## 2023-08-08 RX ADMIN — ARFORMOTEROL TARTRATE 15 MCG: 15 SOLUTION RESPIRATORY (INHALATION) at 07:35

## 2023-08-08 RX ADMIN — ASPIRIN 81 MG: 81 TABLET, COATED ORAL at 09:03

## 2023-08-08 NOTE — PROGRESS NOTES
[facility]     Progress Note    Patient Name: Cris Agudelo  : 1940  MRN: [id number]  Primary Care Physician:  Ramirez Maurice MD  Date of admission: 2023    Subjective     Patient was seen at the bedside today.  She reports feeling well overall and wants to go home.  Sodium is a stable at 124 today with fluid restriction.    Objective   Objective     Vitals:   Temp:  [97.3 øF (36.3 øC)-97.9 øF (36.6 øC)] 97.8 øF (36.6 øC)  Heart Rate:  [72-87] 72  Resp:  [16-18] 18  BP: ()/(42-58) 104/50  Flow (L/min):  [2] 2    Physical Exam   Physical Exam    Vitals reviewed.   Constitutional:       Appearance: Normal appearance.   HENT:      Head: Normocephalic and atraumatic.   Eyes:      General: No scleral icterus.     Pupils: Pupils are equal, round, and reactive to light.   Cardiovascular:      Rate and Rhythm: Normal rate and regular rhythm.      Heart sounds: Normal heart sounds. No murmur heard.  Pulmonary:      Effort: Pulmonary effort is normal.      Breath sounds: Normal breath sounds.   Abdominal:      General: There is no distension.      Palpations: Abdomen is soft.      Tenderness: There is no abdominal tenderness. There is no guarding.   Musculoskeletal:      Cervical back: Neck supple.      Right lower leg: No edema.      Left lower leg: No edema.   Skin:     General: Skin is warm and dry.      Findings: No rash.   Neurological:      General: No focal deficit present.      Mental Status: She is alert and oriented to person, place, and time.   Psychiatric:         Mood and Affect: Mood normal.         Behavior: Behavior normal.     Result Review    Result Review:  I have personally reviewed the results from the time of this admission to 2023 10:26 EDT and agree with these findings:  [x]  Laboratory  []  Microbiology  []  Radiology  []  EKG/Telemetry   []  Cardiology/Vascular   []  Pathology  []  Old records  []  Other:    Assessment & Plan   Assessment / Plan       Active Hospital  Problems:    Active Hospital Problems    Diagnosis  POA    **Stress-induced cardiomyopathy [I51.81]  Unknown    Hyponatremia [E87.1]  Yes    NSTEMI (non-ST elevated myocardial infarction) [I21.4]  Yes    Type 1 myocardial infarction [I21.9]  Yes       Plan:     Hyponatremia     -Baseline Na: Unknown  -On presentation: Na 129 which down trended to 123, now 124.  -Etiology: Most likely Chest pain, nausea and HCTZ combination (pain and nausea can lead to SIADH)  -Urine studies: Consistent with SIADH  -Fluid restriction: Continue fluid restriction 1.2L/day  -Start salt tablets 2 g twice daily  -Patient is okay to discharge from renal standpoint but she will need repeat sodium level on Thursday and a telemedicine follow-up visit with nephrology in on Friday.       Electronically signed by Arcelia Chavez MD, 08/08/23, 10:26 AM EDT.

## 2023-08-08 NOTE — PLAN OF CARE
Goal Outcome Evaluation:  Plan of Care Reviewed With: patient        Progress: improving          VSS. Pt has rested this shift. Plans to discharge home. Will continue to monitor

## 2023-08-08 NOTE — PROGRESS NOTES
[facility]     Cardiology Progress Note    Patient Name: Cris Agudelo  : 1940  MRN: [id number]  Primary Care Physician:  Ramirez Maurice MD  Date of admission: 2023    Subjective   Subjective     Chief Complaint: Follow-up visit for chest pain, cardiomyopathy    Interval HPI:    Patient had a rough night, she was confused and not cooperative with care.  Per patient's daughter who stayed overnight, she was not behaving herself.  This morning she is somewhat better.  She did not sleep last night.  Patient denies any chest pain.  Reporting that shortness of breath is better.  No events on telemetry    Review of Systems   All systems were reviewed and negative except for: Fatigue and intermittent confusion    Objective   Objective     Vitals:   Temp:  [97.3 øF (36.3 øC)-97.9 øF (36.6 øC)] 97.8 øF (36.6 øC)  Heart Rate:  [72-87] 72  Resp:  [16-18] 18  BP: ()/(42-58) 104/50  Flow (L/min):  [2] 2  Physical Exam      General : Alert, awake, no acute distress  CVS : Regular rate and rhythm, no murmur, rubs or gallops  Lungs: Bilateral faint basilar crackles heard, no wheezing  Abdomen: Soft, nontender, bowel sounds heard in all 4 quadrants  Extremities: Warm, well-perfused, no pedal edema    Scheduled Meds:arformoterol, 15 mcg, Nebulization, BID - RT  aspirin, 81 mg, Oral, Daily  atorvastatin, 20 mg, Oral, Nightly  budesonide, 0.5 mg, Nebulization, BID - RT  carvedilol, 3.125 mg, Oral, BID With Meals  clopidogrel, 75 mg, Oral, Daily  guaiFENesin, 600 mg, Oral, Q12H  lisinopril, 10 mg, Oral, Daily  potassium chloride, 10 mEq, Oral, Daily  senna-docusate sodium, 2 tablet, Oral, BID  sodium chloride, 2 g, Oral, BID With Meals             Result Review    Result Review:  I have personally reviewed the results from the time of this admission to 2023 08:23 EDT and agree with these findings:  [x]  Laboratory  []  Microbiology  [x]  Radiology  [x]  EKG/Telemetry   [x]  Cardiology/Vascular   []   Pathology  []  Old records  []  Other:  Most notable findings include:     CBC          8/6/2023    03:25 8/7/2023    04:23 8/8/2023    04:55   CBC   WBC 12.11  11.45  9.77    RBC 4.92  4.40  4.08    Hemoglobin 14.3  13.0  11.8    Hematocrit 42.2  38.4  34.6    MCV 85.8  87.3  84.8    MCH 29.1  29.5  28.9    MCHC 33.9  33.9  34.1    RDW 12.9  12.7  12.8    Platelets 280  259  250      CMP          8/6/2023    03:25 8/6/2023    06:23 8/7/2023    04:23 8/7/2023    18:18 8/8/2023    04:55   CMP   Glucose 174  146  149   136    BUN 11  12  11   14    Creatinine 0.68  0.67  0.63   0.63    EGFR 87.1  87.4  88.7   88.7    Sodium 129  127  124   123    Potassium 4.1  4.0  3.5  4.1  4.1    Chloride 93  93  88   90    Calcium 9.2  9.1  8.6   8.5    Total Protein 6.9        Albumin 3.9        Globulin 3.0        Total Bilirubin 0.4        Alkaline Phosphatase 117        AST (SGOT) 22        ALT (SGPT) 11        Albumin/Globulin Ratio 1.3        BUN/Creatinine Ratio 16.2  17.9  17.5   22.2    Anion Gap 12.7  12.2  10.8   8.9       CARDIAC LABS:     Latest Reference Range & Units 08/06/23 03:25 08/06/23 06:23 08/07/23 04:23   Creatine Kinase 20 - 180 U/L   78   CKMB <=5.30 ng/mL   9.34 (H)   HS Troponin T <10 ng/L 590 (C) 553 (C)    Troponin T Delta >=-4 - <+4 ng/L  -37 (L)    proBNP 0.0 - 1,800.0 pg/mL 1,164.0           Assessment & Plan   Assessment / Plan     Brief Patient Summary:  Cris Agudelo is a 82 y.o. female with hypertension, who presented to the hospital because of left-sided chest pain, cardiac markers are elevated.  Cardiac cath and echocardiogram suggestive of stress-induced cardiomyopathy    Active Hospital Problems:  Active Hospital Problems    Diagnosis     **Stress-induced cardiomyopathy     Hyponatremia     NSTEMI (non-ST elevated myocardial infarction)     Type 1 myocardial infarction        Stress-induced cardiomyopathy : Ejection fraction is 35 to 40% on my visual assessment of sonograms, with a wall  motion pattern suggestive of stress-induced cardiomyopathy.  She has mild basal crackles.  Blood pressure on the lower side    Elevated troponin : Type II MI, secondary to cardiomyopathy.  Cardiac cath did not show any culprit lesions, plaque rupture.  She has nonobstructive and borderline lesions in the diagonal branch.  Currently chest pain-free    Essential hypertension : Blood pressure on the lower side    Hyponatremia : Sodium 123 today    Confusion/delirium : Likely multifactorial    Plan:     Continue aspirin, statin  Will change Brilinta to Plavix 75 mg daily from today to simplify the regimen  Discontinue isosorbide mononitrate  Will decrease dose of lisinopril to 10 mg daily  Continue Coreg 3.125 mg twice daily with holding parameters    PT/OT to continue      CODE STATUS:   Level Of Support Discussed With: Patient  Code Status (Patient has no pulse and is not breathing): CPR (Attempt to Resuscitate)  Medical Interventions (Patient has pulse or is breathing): Full Support  Release to patient: Routine Release      Electronically signed by Leon Hernandez MD, 08/07/23, 8:15 AM EDT.

## 2023-08-08 NOTE — DISCHARGE INSTR - APPOINTMENTS
Patient needs to follow up with (PCP) on   Patient needs to follow up with Celeste Jason(Nephro) on 8/14/23 @2:15pm [phone]  Patient needs to follow up with (Cardiology) on 8/23/23 @2:00pm [phone]  Patient needs to follow up with (Pulmonary) on 1/4/24 @9:00am [phone]

## 2023-08-08 NOTE — PLAN OF CARE
Goal Outcome Evaluation:    Pt resistant to caregivers this shift and not being cooperative this shift, pt's daughter at bedside says that she wonders if it is related to a new medication, pt did allow phlebotomist to draw labs after being redirected by this nurse, no apparent signs or symptoms of distress this shift.

## 2023-08-08 NOTE — THERAPY EVALUATION
Patient Name: Cris Agudelo  : 1940    MRN: [id number]                              Today's Date: 2023       Admit Date: 2023    Visit Dx:     ICD-10-CM ICD-9-CM   1. ST elevation myocardial infarction (STEMI), unspecified artery  I21.3 410.90   2. Stress-induced cardiomyopathy  I51.81 429.83   3. Hyponatremia  E87.1 276.1   4. Decreased activities of daily living (ADL)  Z78.9 V49.89     Patient Active Problem List   Diagnosis    NSTEMI (non-ST elevated myocardial infarction)    Type 1 myocardial infarction    Hyponatremia    Stress-induced cardiomyopathy     Past Medical History:   Diagnosis Date    Acute non-ST elevation myocardial infarction (NSTEMI) of anterior wall involving right ventricle 2023    Hypertension      History reviewed. No pertinent surgical history.   General Information       Row Name 23 1308          OT Time and Intention    Document Type evaluation  -ES     Mode of Treatment individual therapy;occupational therapy  -ES       Row Name 23 1308          General Information    Patient Profile Reviewed yes  -ES     Prior Level of Function independent:;ADL's;all household mobility;community mobility  Patient independent with B and IADLs at baseline. No device for functional mobility. Walk in shower, standing shower completion. No home O2 use. Denies recent falls.  -ES     Existing Precautions/Restrictions no known precautions/restrictions  -ES     Barriers to Rehab none identified  -ES       Row Name 23 1308          Occupational Profile    Reason for Services/Referral (Occupational Profile) Patient is 82 yr old female admitted to [facility] on 2023 with elevated troponin levels. OT evaluation and treatment ordered d/t recent decline in ADLs/transfer ability and discharge planning recommendations. No previous OT services for current condition.  -ES       Row Name 23 1308          Living Environment    People in Home alone  -ES       Row Name  08/08/23 1308          Cognition    Orientation Status (Cognition) oriented x 3  -ES       Row Name 08/08/23 1308          Safety Issues, Functional Mobility    Impairments Affecting Function (Mobility) endurance/activity tolerance;balance;shortness of breath  -ES               User Key  (r) = Recorded By, (t) = Taken By, (c) = Cosigned By      Initials Name Provider Type    ES Gisela Ochoa, OTR/L, CSRS Occupational Therapist                     Mobility/ADL's       Row Name 08/08/23 1310          Bed Mobility    Bed Mobility supine-sit;sit-supine  -ES     Supine-Sit [place] (Bed Mobility) standby assist  -ES     Sit-Supine [place] (Bed Mobility) minimum assist (75% patient effort)  -ES     Bed Mobility, Safety Issues decreased use of legs for bridging/pushing  -ES     Comment, (Bed Mobility) patient requires min A sit <> sup secondary to fatigue and decreased ability to lift LEs in to bed from edge of bed sitting  -ES       Row Name 08/08/23 1310          Transfers    Transfers sit-stand transfer;stand-sit transfer  -ES       Row Name 08/08/23 1310          Sit-Stand Transfer    Sit-Stand [place] (Transfers) standby assist  -ES     Assistive Device (Sit-Stand Transfers) other (see comments)  no AD  -ES       Row Name 08/08/23 1310          Stand-Sit Transfer    Stand-Sit [place] (Transfers) standby assist  -ES     Assistive Device (Stand-Sit Transfers) other (see comments)  no AD  -ES       Row Name 08/08/23 1310          Functional Mobility    Functional Mobility- Ind. Level contact guard assist;1 person  -ES     Functional Mobility- Device other (see comments)  furniture walking  -ES     Functional Mobility- Comment patient declines use of device with functional mobility. Patient performs funcitonal mobility to/from bathroom, CGA holding on to furniture with mobility. Patient would benefit from use of rolling walker at time of discharge to decrase patient fall risk.  -ES       Row Name  08/08/23 1310          Activities of Daily Living    BADL Assessment/Intervention bathing;upper body dressing;lower body dressing;grooming;feeding;toileting  -ES       Row Name 08/08/23 1310          Bathing Assessment/Intervention    [place] Level (Bathing) bathing skills;minimum assist (75% patient effort)  -ES       Row Name 08/08/23 1310          Upper Body Dressing Assessment/Training    [place] Level (Upper Body Dressing) upper body dressing skills;set up  -ES       Row Name 08/08/23 1310          Lower Body Dressing Assessment/Training    [place] Level (Lower Body Dressing) lower body dressing skills;minimum assist (75% patient effort)  -ES       Row Name 08/08/23 1310          Grooming Assessment/Training    [place] Level (Grooming) grooming skills;set up  -ES       Row Name 08/08/23 1310          Self-Feeding Assessment/Training    [place] Level (Feeding) feeding skills;set up  -ES       Row Name 08/08/23 1310          Toileting Assessment/Training    [place] Level (Toileting) toileting skills;contact guard assist  -ES               User Key  (r) = Recorded By, (t) = Taken By, (c) = Cosigned By      Initials Name Provider Type    ES Gisela Ochoa, OTR/L, CSRS Occupational Therapist                   Obj/Interventions       Row Name 08/08/23 1321          Vision Assessment/Intervention    Visual Impairment/Limitations WFL  -ES       Row Name 08/08/23 1321          Range of Motion Comprehensive    General Range of Motion no range of motion deficits identified  -ES       Row Name 08/08/23 1321          Strength Comprehensive (MMT)    General Manual Muscle Testing (MMT) Assessment upper extremity strength deficits identified  -ES     Comment, General Manual Muscle Testing (MMT) Assessment BUEs assessed 4/5  -ES       Row Name 08/08/23 1321          Motor Skills    Motor Skills functional endurance  -ES     Functional Endurance fair minus. Patient presents with SOA with short  distance mobility and extended standing. Labored breathing on RA noted  -ES       Row Name 08/08/23 1321          Balance    Balance Assessment sitting dynamic balance;standing dynamic balance  -ES     Dynamic Sitting Balance standby assist  -ES     Position, Sitting Balance unsupported;sitting edge of bed  -ES     Dynamic Standing Balance contact guard  -ES     Position/Device Used, Standing Balance unsupported  -ES     Comment, Balance patient with decreased balance, holds on to furniture with functional mobility. Patient declines use of rolling walker with mobility  -ES               User Key  (r) = Recorded By, (t) = Taken By, (c) = Cosigned By      Initials Name Provider Type    ES Gisela Ochoa, OTR/L, CSRS Occupational Therapist                   Goals/Plan       Row Name 08/08/23 1324          Transfer Goal 1 (OT)    Activity/Assistive Device (Transfer Goal 1, OT) transfers, all  -ES     [place] Level/Cues Needed (Transfer Goal 1, OT) independent  -ES     Time Frame (Transfer Goal 1, OT) long term goal (LTG);10 days  -ES       Row Name 08/08/23 1324          Bathing Goal 1 (OT)    Activity/Device (Bathing Goal 1, OT) bathing skills, all  -ES     [place] Level/Cues Needed (Bathing Goal 1, OT) independent  -ES     Time Frame (Bathing Goal 1, OT) 10 days;long term goal (LTG)  -ES       Row Name 08/08/23 1324          Dressing Goal 1 (OT)    Activity/Device (Dressing Goal 1, OT) dressing skills, all  -ES     [place]/Cues Needed (Dressing Goal 1, OT) independent  -ES     Time Frame (Dressing Goal 1, OT) long term goal (LTG);10 days  -ES       Row Name 08/08/23 1324          Toileting Goal 1 (OT)    Activity/Device (Toileting Goal 1, OT) toileting skills, all  -ES     [place] Level/Cues Needed (Toileting Goal 1, OT) independent  -ES     Time Frame (Toileting Goal 1, OT) long term goal (LTG);10 days  -ES       Row Name 08/08/23 1324          Grooming Goal 1 (OT)    Activity/Device (Grooming Goal  1, OT) grooming skills, all  -ES     [place] (Grooming Goal 1, OT) independent  -ES     Time Frame (Grooming Goal 1, OT) long term goal (LTG);10 days  -ES       Row Name 08/08/23 1324          Problem Specific Goal 1 (OT)    Problem Specific Goal 1 (OT) Patient will demonstrate fair plus activity tolerance in preperation for independent ADL routine completion at time of discharge  -ES     Time Frame (Problem Specific Goal 1, OT) long term goal (LTG);10 days  -ES       Row Name 08/08/23 1324          Therapy Assessment/Plan (OT)    Planned Therapy Interventions (OT) activity tolerance training;BADL retraining;functional balance retraining;occupation/activity based interventions;transfer/mobility retraining;strengthening exercise  -ES               User Key  (r) = Recorded By, (t) = Taken By, (c) = Cosigned By      Initials Name Provider Type    Gisela Ventura, OTR/L, CSRS Occupational Therapist                   Clinical Impression       Row Name 08/08/23 1322          Plan of Care Review    Plan of Care Reviewed With patient;daughter  -ES     Progress no change  -ES     Outcome Evaluation Patient has experienced decline in function from baseline status, presenting w/ deficits related to activity tolerance, balance, transfers and mobility that impede patient independence with activities of daily living.  Patient would benefit from skilled Occupational Therapy intervention to maxamize patient safety, and promote return to baseline independence.  -ES       Row Name 08/08/23 1322          Therapy Assessment/Plan (OT)    Rehab Potential (OT) good, to achieve stated therapy goals  -ES     Criteria for Skilled Therapeutic Interventions Met (OT) yes;meets criteria;skilled treatment is necessary  -ES     Therapy Frequency (OT) 5 times/wk  -ES       Row Name 08/08/23 1322          Therapy Plan Review/Discharge Plan (OT)    Equipment Needs Upon Discharge (OT) walker, rolling  -ES     Anticipated Discharge Disposition  (OT) home with outpatient therapy services  cardiopulmonary rehab may be warranted  -ES       Row Name 08/08/23 1322          Vital Signs    O2 Delivery Pre Treatment room air  -ES     O2 Delivery Intra Treatment room air  -ES     O2 Delivery Post Treatment room air  -ES               User Key  (r) = Recorded By, (t) = Taken By, (c) = Cosigned By      Initials Name Provider Type    ES Gisela Ochoa, OTR/L, CSRS Occupational Therapist                   Outcome Measures       Row Name 08/08/23 1325          How much help from another is currently needed...    Putting on and taking off regular lower body clothing? 3  -ES     Bathing (including washing, rinsing, and drying) 3  -ES     Toileting (which includes using toilet bed pan or urinal) 3  -ES     Putting on and taking off regular upper body clothing 4  -ES     Taking care of personal grooming (such as brushing teeth) 4  -ES     Eating meals 4  -ES     AM-PAC 6 Clicks Score (OT) 21  -ES       Row Name 08/08/23 1325          Functional Assessment    Outcome Measure Options AM-PAC 6 Clicks Daily Activity (OT);Optimal Instrument  -ES       Row Name 08/08/23 1325          Optimal Instrument    Optimal Instrument Optimal - 3  -ES     Bending/Stooping 2  -ES     Balancing 2  -ES     Standing 2  -ES     Reaching 2  -ES     From the list, choose the 3 activities you would most like to be able to do without any difficulty Bending/stooping;Standing;Reaching;Balancing  -ES     Total Score Optimal - 3 8  -ES               User Key  (r) = Recorded By, (t) = Taken By, (c) = Cosigned By      Initials Name Provider Type    Gisela Ventura, OTR/L, CSRS Occupational Therapist                      OT Recommendation and Plan  Planned Therapy Interventions (OT): activity tolerance training, BADL retraining, functional balance retraining, occupation/activity based interventions, transfer/mobility retraining, strengthening exercise  Therapy Frequency (OT): 5 times/wk  Plan of Care  Review  Plan of Care Reviewed With: patient, daughter  Progress: no change  Outcome Evaluation: Patient has experienced decline in function from baseline status, presenting w/ deficits related to activity tolerance, balance, transfers and mobility that impede patient independence with activities of daily living.  Patient would benefit from skilled Occupational Therapy intervention to maxamize patient safety, and promote return to baseline independence.     Time Calculation:   Evaluation Complexity (OT)  Review Occupational Profile/Medical/Therapy History Complexity: brief/low complexity  Assessment, Occupational Performance/Identification of Deficit Complexity: 3-5 performance deficits  Clinical Decision Making Complexity (OT): problem focused assessment/low complexity  Overall Complexity of Evaluation (OT): low complexity     Time Calculation- OT       Row Name 08/08/23 1326             Time Calculation- OT    OT Received On 08/08/23  -ES      OT Goal Re-Cert Due Date 08/17/23  -ES         Untimed Charges    OT Eval/Re-eval Minutes 32  -ES         Total Minutes    Untimed Charges Total Minutes 32  -ES       Total Minutes 32  -ES                User Key  (r) = Recorded By, (t) = Taken By, (c) = Cosigned By      Initials Name Provider Type    ES Gisela Ochoa OTR/L, CSRS Occupational Therapist                  Therapy Charges for Today       Code Description Service Date Service Provider Modifiers Qty    82602220152 HC OT EVAL LOW COMPLEXITY 3 8/8/2023 Gisela Ochoa OTR/L, CSRS GO 1                 DIEGO Bull/L, CSRS  8/8/2023

## 2023-08-08 NOTE — PLAN OF CARE
Goal Outcome Evaluation:  Plan of Care Reviewed With: patient, daughter        Progress: no change  Outcome Evaluation: Patient has experienced decline in function from baseline status, presenting w/ deficits related to activity tolerance, balance, transfers and mobility that impede patient independence with activities of daily living.  Patient would benefit from skilled Occupational Therapy intervention to maxamize patient safety, and promote return to baseline independence.      Anticipated Discharge Disposition (OT): home with outpatient therapy services (cardiopulmonary rehab may be warranted)

## 2023-08-09 NOTE — PROGRESS NOTES
"Enter Query Response Below      Query Response: NSTEMI type 2, integral to stress-induced cardiomyopathy      Electronically signed by Leon Hernandez MD, 23, 7:47 AM EDT.               If applicable, please update the problem list.     Patient: Cris Agudelo        : 1940  Account: [id number]           Admit Date:         How to Respond to this query:       a. Click New Note     b. Answer query within the yellow box.                c. Update the Problem List, if applicable.          82 year old female with HTN and sudden onset of chest pain admitted  .Patient taken to the heart cath lab.  Cardiac cath report states \"D3 is a medium caliber vessel which has proximal 90% stenosis. It has HERRERA-3 flow. This appears to be the culprit for non-ST elevation MI.\"  Cardiology states Ejection fraction is 35 to 40% on my visual assessment of sonograms, with a wall motion pattern suggestive of stress-induced cardiomyopathy\"  PNs also state \"Type II MI, secondary to cardiomyopathy. Cardiac cath did not show any culprit lesions, plaque rupture\".  NSTEMI in the body of the DC Summary and states \"Urgent cardiac catheterization showed significant lesions in diagonal and PDA.\"  HS Troponin levels this admission:   @ 0325 hrs - 590   @ 0623 hrs - 553 ( [place] -37)  Patient was treated with Brilinta, Imdur, Lasix IV (, ), Heparin IV, ASA, Nitrostat (8/6 x 2), Lisinopril, Plavix, Coreg and Lipitor    Please state the condition this patient was treated and or monitored for as:      - NSTEMI type II integral to stress induced cardiomyopathy  - NSTEMI type 2 is a separate event and not integral to stress induced cardiomyopathy, please specify etiology of the type 2 NSTEMI ____________  - Other, please specify: ________________  - Unable to clinically determine       By submitting this query, we are merely seeking further clarification of documentation to accurately reflect all conditions that you are " monitoring, evaluating, treating or that extend the hospitalization or utilize additional resources of care. Please utilize your independent clinical judgment when addressing the question(s) above.     This query and your response, once completed, will be entered into the legal medical record.    Sincerely,  Quan Lu RN CDIS  Clinical Documentation Integrity Program   [email or web address]

## 2023-08-09 NOTE — OUTREACH NOTE
Prep Survey      Flowsheet Row Responses   [facility] facility patient discharged from? [facility]   Is LACE score < 7 ? Yes   Eligibility Readm Mgmt   Discharge diagnosis Stress-induced cardiomyopathy   Does the patient have one of the following disease processes/diagnoses(primary or secondary)? Acute MI (STEMI,NSTEMI)   Does the patient have Home health ordered? No   Is there a DME ordered? No   Prep survey completed? Yes            Chikis NUNEZ - Registered Nurse

## 2023-08-11 ENCOUNTER — READMISSION MANAGEMENT (OUTPATIENT)
Dept: CALL CENTER | Facility: HOSPITAL | Age: 83
End: 2023-08-11
Payer: MEDICARE

## 2023-08-11 NOTE — OUTREACH NOTE
AMI Week 1 Survey      Flowsheet Row Responses   [facility] patient discharged from? [facility]   Does the patient have one of the following disease processes/diagnoses(primary or secondary)? Acute MI (STEMI,NSTEMI)   Week 1 attempt successful? Yes   Call start time 1147   Call end time 1218   Discharge diagnosis Stress-induced cardiomyopathy   Person spoke with today (if not patient) and relationship dtr   Meds reviewed with patient/caregiver? Yes   Is the patient having any side effects they believe may be caused by any medication additions or changes? No   Does the patient have all prescriptions related to this admission filled (includes statins,anticoagulants,HTN meds,anti-arrhythmia meds) No   What is keeping the patient from filling the prescriptions? Financial   Nursing Interventions Nurse provided patient education, Nurse advised patient to call provider   Prescription comments Pt did not get: Lipitor from pharmacy as they did not have it. Dtr will call pharm today to inquire why. Pt can't afford Jardiance, which was not prescribed for DM per dtr, as if cost >$600.MD changed to Farxiga, which pt also could not afford as was >$600. The Brovanna neb was >$300, pt unable to afford this neb med. Pt does not have prescription insurance at this time.   Is the patient taking all medications as directed (includes completed medication regime)? No   What is preventing the patient from taking all medications as directed? Other   Nursing Interventions Advised patient to call provider   Does the patient have a primary care provider?  Yes   Does the patient have an appointment with their PCP,cardiologist,or clinic within 7 days of discharge? Yes   Has the patient kept scheduled appointments due by today? N/A   DME comments home nebulizer   Psychosocial issues? Yes   Psychosocial comments no insurance at this time.   Comments [facility] site- right groin is w/o issue per dtr. Per dtr the pt is doing well with no chest pain.    Did the patient receive a copy of their discharge instructions? Yes   Nursing interventions Reviewed instructions with patient   What is the patient's perception of their health status since discharge? Improving   Nursing interventions Nurse provided patient education   Is the patient/caregiver able to teach back signs and symptoms of when to call for help immediately: Sudden chest discomfort, Sudden discomfort in arms, back, neck or jaw, Sudden sweating or clammy skin, Dizziness or lightheadedness, Irregular or rapid heart rate, Shortness of breath at any time   Nursing interventions Nurse provided patient education, Advised patient to call provider   Is the patient/caregiver able to teach back lifestyle changes to help prevent MIs Heart healthy diet   Is the patient/caregiver able to teach back ways to prevent a second heart attack: Take medications, Follow up with MD   If the patient is a current smoker, are they able to teach back resources for cessation? Not a smoker   Is the patient/caregiver able to teach back the hierarchy of who to call/visit for symptoms/problems? PCP, Specialist, Home health nurse, Urgent Care, ED, 911 Yes   Week 1 call completed? Yes   Is the patient interested in additional calls from an ambulatory ? No   Call end time 1218            Jojo JAQUEZ - Registered Nurse

## 2023-08-17 ENCOUNTER — OFFICE VISIT (OUTPATIENT)
Dept: PULMONOLOGY | Facility: CLINIC | Age: 83
End: 2023-08-17
Payer: MEDICARE

## 2023-08-17 VITALS
WEIGHT: 144.1 LBS | DIASTOLIC BLOOD PRESSURE: 70 MMHG | TEMPERATURE: 98 F | SYSTOLIC BLOOD PRESSURE: 154 MMHG | BODY MASS INDEX: 28.29 KG/M2 | HEART RATE: 80 BPM | OXYGEN SATURATION: 97 % | HEIGHT: 60 IN | RESPIRATION RATE: 18 BRPM

## 2023-08-17 DIAGNOSIS — R06.2 WHEEZING: ICD-10-CM

## 2023-08-17 DIAGNOSIS — Z23 ENCOUNTER FOR IMMUNIZATION: ICD-10-CM

## 2023-08-17 DIAGNOSIS — R06.09 DOE (DYSPNEA ON EXERTION): Primary | ICD-10-CM

## 2023-08-17 RX ORDER — NITROGLYCERIN 0.4 MG/1
TABLET SUBLINGUAL
COMMUNITY
Start: 2023-08-14

## 2023-08-17 RX ORDER — DAPAGLIFLOZIN 5 MG/1
1 TABLET, FILM COATED ORAL DAILY
COMMUNITY
Start: 2023-08-10

## 2023-08-17 RX ORDER — TIOTROPIUM BROMIDE AND OLODATEROL 3.124; 2.736 UG/1; UG/1
2 SPRAY, METERED RESPIRATORY (INHALATION)
Qty: 1 EACH | Refills: 0 | COMMUNITY
Start: 2023-08-17 | End: 2023-08-18

## 2023-08-17 RX ORDER — TIOTROPIUM BROMIDE AND OLODATEROL 3.124; 2.736 UG/1; UG/1
2 SPRAY, METERED RESPIRATORY (INHALATION)
Qty: 1 EACH | Refills: 3 | Status: SHIPPED | OUTPATIENT
Start: 2023-08-17

## 2023-08-17 NOTE — PROGRESS NOTES
CHIEF COMPLAINT    Primary Care Provider  Ramirez Maurice MD     Referring Provider  No ref. provider found    Patient Complaint  COPD, Shortness of Breath, Wheezing, Cough, and Establish Care (New pt here for hospital follow up)        Subjective          Cris Agudelo presents to [facility] PULMONARY & CRITICAL CARE MEDICINE  History of Present Illness  Cris Agudelo is a 82 y.o. female who has possible COPD.  She was referred to me by No ref. provider found.  She was recently admitted to hospital with chest pain and shortness of breath.  She was found to have some ST and T wave changes on the EKG and was taken to Cath Lab, had small vessel disease and was thought to have a stress-induced cardiomyopathy.  She was treated conservatively.  She was started on nebulizers including Brovana, Pulmicort and DuoNeb at discharge home and was referred to pulmonology for possible COPD.  She is currently using budesonide nebulizer twice daily.  However, she could not afford Brovana.  She has DuoNeb which she uses once or twice a day.  She is short of breath with activities she has been having wheezing.  Chest tightness.  She has cough, not able to clear secretions.  She used to smoke socially.  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  She has shortness of breath when walking long distance.  She has no significant changes in weight or appetite.  No nausea or vomiting.  No fever or chills.  Never had pulmonary function test in past.  She has thick cough, not able to clear secretions all the time.    Tobacco Use: Medium Risk    Smoking Tobacco Use: Former    Smokeless Tobacco Use: Never    Passive Exposure: Past   .    Review of Systems     General:  No Fatigue, No Fever No weight loss or loss of appetite  HEENT: No dysphagia, No Visual Changes, no rhinorrhea  Respiratory:  + Productive cough,+Dyspnea, mucoid thick phlegm, No Pleuritic Pain, no wheezing, no  hemoptysis.  Cardiovascular: Denies chest pain, denies palpitations,+OROSCO, No Chest Pressure  Gastrointestinal:  No Abdominal Pain, No Nausea, No Vomiting, No Diarrhea  Genitourinary:  No Dysuria, No Frequency, No Hesitancy  Musculoskeletal: No muscle pain or swelling  Endocrine:  No Heat Intolerance, No Cold Intolerance  Hematologic:  No Bleeding, No Bruising  Psychiatric:  No Anxiety, No Depression  Neurologic:  No Confusion, no Dysarthria, No Headaches  Skin:  No Rash, No Open Wounds    History reviewed. No pertinent family history.     Social History     Socioeconomic History    Marital status:    Tobacco Use    Smoking status: Former     Types: Cigarettes     Passive exposure: Past    Smokeless tobacco: Never    Tobacco comments:     Pt was a social smoker   Vaping Use    Vaping Use: Never used   Substance and Sexual Activity    Alcohol use: Not Currently    Drug use: Never    Sexual activity: Defer        Past Medical History:   Diagnosis Date    Acute non-ST elevation myocardial infarction (NSTEMI) of anterior wall involving right ventricle 08/06/2023    Hypertension         There is no immunization history for the selected administration types on file for this patient.      Allergies   Allergen Reactions    Aleve [Naproxen] Hives          Current Outpatient Medications:     arformoterol (BROVANA) 15 MCG/2ML nebulizer solution, Take 2 mL by nebulization 2 (Two) Times a Day., Disp: 120 mL, Rfl: 1    aspirin 81 MG EC tablet, Take 1 tablet by mouth Daily., Disp: 30 tablet, Rfl: 1    aspirin 81 MG EC tablet, Take 1 tablet by mouth everyday, Disp: 30 tablet, Rfl: 1    atorvastatin (LIPITOR) 20 MG tablet, Take 1 tablet by mouth Every Night., Disp: 90 tablet, Rfl: 1    atorvastatin (LIPITOR) 20 MG tablet, Take 1 tablet by mouth everynight, Disp: 90 tablet, Rfl: 1    budesonide (PULMICORT) 0.5 MG/2ML nebulizer solution, Take 2 mL by nebulization 2 (Two) Times a Day., Disp: 30 each, Rfl: 0    carvedilol (COREG)  3.125 MG tablet, Take 1 tablet by mouth 2 (Two) Times a Day With Meals., Disp: 30 tablet, Rfl: 0    carvedilol (COREG) 3.125 MG tablet, Take 1 tablet by mouth twice a day, Disp: 30 tablet, Rfl: 0    clopidogrel (PLAVIX) 75 MG tablet, Take 1 tablet by mouth Daily., Disp: 30 tablet, Rfl: 0    clopidogrel (PLAVIX) 75 MG tablet, Take 1 tablet by mouth everyday, Disp: 30 tablet, Rfl: 0    Farxiga 5 MG tablet tablet, Take 1 tablet by mouth Daily., Disp: , Rfl:     guaiFENesin (MUCINEX) 600 MG 12 hr tablet, Take 1 tablet by mouth Every 12 (Twelve) Hours., Disp: 15 tablet, Rfl: 0    guaiFENesin (Mucinex) 600 MG 12 hr tablet, Take 1 tablet by mouth every 12 hours, Disp: 15 tablet, Rfl: 0    ipratropium-albuterol (DUO-NEB) 0.5-2.5 mg/3 ml nebulizer, Take 3 mL by nebulization Every 6 (Six) Hours As Needed for Shortness of Air., Disp: 360 mL, Rfl: 0    lisinopril (PRINIVIL,ZESTRIL) 10 MG tablet, Take 1 tablet by mouth Daily., Disp: 30 tablet, Rfl: 1    lisinopril (PRINIVIL,ZESTRIL) 10 MG tablet, Take 1 tablet by mouth everyday, Disp: 30 tablet, Rfl: 1    nitroglycerin (NITROSTAT) 0.4 MG SL tablet, , Disp: , Rfl:     potassium chloride (MICRO-K) 10 MEQ CR capsule, Take 1 capsule by mouth Daily., Disp: 20 capsule, Rfl: 0    potassium chloride (MICRO-K) 10 MEQ CR capsule, Take 1 tablet by mouth everyday, Disp: 20 capsule, Rfl: 0    sodium chloride (Ocean Nasal Spray) 0.65 % nasal spray, 1 spray into each nostril(s) as directed for congestion, Disp: 44 mL, Rfl: 0    sodium chloride 0.65 % nasal spray, 1 spray into the nostril(s) as directed by provider As Needed for Congestion., Disp: 1 each, Rfl: 1    sodium chloride 1 g tablet, Take 2 tablets by mouth 2 (Two) Times a Day With Meals., Disp: 60 tablet, Rfl: 0    budesonide (PULMICORT) 0.5 MG/2ML nebulizer solution, Take 1 vial nebulization twice a day (Patient not taking: Reported on 8/17/2023), Disp: 60 mL, Rfl: 0    empagliflozin (Jardiance) 10 MG tablet tablet, Take 1 tablet by  "mouth Daily. (Patient not taking: Reported on 8/17/2023), Disp: 30 tablet, Rfl: 0    empagliflozin (Jardiance) 10 MG tablet tablet, Take 1 tablet by mouth everyday (Patient not taking: Reported on 8/17/2023), Disp: 30 tablet, Rfl: 0    sodium chloride 1 g tablet, Take 2 tablets by mouth twice a day (Patient not taking: Reported on 8/17/2023), Disp: 60 tablet, Rfl: 0    tiotropium bromide-olodaterol (Stiolto Respimat) 2.5-2.5 MCG/ACT aerosol solution inhaler, Inhale 2 puffs Daily., Disp: 1 each, Rfl: 3     Objective   Vital Signs:   /70 (BP Location: Left arm, Patient Position: Sitting, Cuff Size: Adult)   Pulse 80   Temp 98 øF (36.7 øC) (Tympanic)   Resp 18   Ht 152.4 cm (60\")   Wt 65.4 kg (144 lb 1.6 oz)   SpO2 97% Comment: room air  BMI 28.14 kg/mý   Mallampatti classification : 1  Physical Exam      Vital Signs Reviewed  WDWN, Alert, in mild distress, has conversational dyspnea  HEENT:  PERRL, EOMI.  OP, nares clear, no sinus tenderness  Neck:  Supple, no JVD, no thyromegaly  Lymph: no axillary, cervical, supraclavicular lymphadenopathy noted bilaterally  Chest:  good aeration, bilateral diminished breath sounds, no wheezing, crackles, has scattered rhonchi, resonant to percussion b/l  CV: RRR, no MGR, pulses 2+, equal  Abd:  Soft, NT, ND, + BS, no HSM  EXT:  no clubbing, no cyanosis, No BLE edema  Neuro:  A&Ox3, CN grossly intact, no focal deficits  Skin: No rashes or lesions noted     Result Review :   The following data was reviewed by: Endy Nascimento MD on 08/17/2023:  Common labs          8/6/2023    03:25 8/6/2023    06:23 8/6/2023    18:14 8/7/2023    04:23 8/7/2023    18:18 8/8/2023    04:55 8/8/2023    08:46   Common Labs   Glucose 174  146   149   136  187    BUN 11  12   11   14  13    Creatinine 0.68  0.67   0.63   0.63  0.62    Sodium 129  127   124   123  124     124    Potassium 4.1  4.0   3.5  4.1  4.1  4.0    Chloride 93  93   88   90  89    Calcium 9.2  9.1   8.6   8.5  8.6  "   Albumin 3.9      3.3     Total Bilirubin 0.4      0.7     Alkaline Phosphatase 117      87     AST (SGOT) 22      17     ALT (SGPT) 11      8     WBC 12.11    11.45   9.77     Hemoglobin 14.3    13.0   11.8     Hematocrit 42.2    38.4   34.6     Platelets 280    259   250     Total Cholesterol   216        Triglycerides   185        HDL Cholesterol   44        LDL Cholesterol    139          CMP          8/6/2023    03:25 8/6/2023    06:23 8/7/2023    04:23 8/7/2023    18:18 8/8/2023    04:55 8/8/2023    08:46   CMP   Glucose 174  146  149   136  187    BUN 11  12  11   14  13    Creatinine 0.68  0.67  0.63   0.63  0.62    EGFR 87.1  87.4  88.7   88.7  89.0    Sodium 129  127  124   123  124     124    Potassium 4.1  4.0  3.5  4.1  4.1  4.0    Chloride 93  93  88   90  89    Calcium 9.2  9.1  8.6   8.5  8.6    Total Protein 6.9     5.9     Albumin 3.9     3.3     Globulin 3.0         Total Bilirubin 0.4     0.7     Alkaline Phosphatase 117     87     AST (SGOT) 22     17     ALT (SGPT) 11     8     Albumin/Globulin Ratio 1.3         BUN/Creatinine Ratio 16.2  17.9  17.5   22.2  21.0    Anion Gap 12.7  12.2  10.8   8.9  10.9      CBC          8/6/2023    03:25 8/7/2023    04:23 8/8/2023    04:55   CBC   WBC 12.11  11.45  9.77    RBC 4.92  4.40  4.08    Hemoglobin 14.3  13.0  11.8    Hematocrit 42.2  38.4  34.6    MCV 85.8  87.3  84.8    MCH 29.1  29.5  28.9    MCHC 33.9  33.9  34.1    RDW 12.9  12.7  12.8    Platelets 280  259  250      CBC w/diff          8/6/2023    03:25 8/7/2023    04:23 8/8/2023    04:55   CBC w/Diff   WBC 12.11  11.45  9.77    RBC 4.92  4.40  4.08    Hemoglobin 14.3  13.0  11.8    Hematocrit 42.2  38.4  34.6    MCV 85.8  87.3  84.8    MCH 29.1  29.5  28.9    MCHC 33.9  33.9  34.1    RDW 12.9  12.7  12.8    Platelets 280  259  250    Neutrophil Rel % 75.5  71.9  72.5    Immature Granulocyte Rel % 0.4  0.8  0.6    Lymphocyte Rel % 15.5  17.0  17.1    Monocyte Rel % 4.6  6.2  7.9    Eosinophil  Rel % 3.7  3.8  1.7    Basophil Rel % 0.3  0.3  0.2      Data reviewed : Radiologic studies chest x-ray from recent hospitalization was reviewed.  Showed no acute infiltrate.           Narrative & Impression   PROCEDURE:  XR CHEST 1 VW     COMPARISON: 8/06/2023, 3:18.     INDICATIONS:  Shortness of breath.     FINDINGS:                     A single AP (or PA) upright portable chest radiograph was performed.  No cardiac   enlargement is seen.  No acute infiltrate is appreciated.  No pleural effusion or pneumothorax is   identified.  The thoracic aorta is atherosclerotic.  Degenerative changes involve the bilateral   shoulders.  There may be mild levoscoliosis of thoracic spine.  Degenerative changes are seen   throughout the imaged spine, as well.  Chronic calcified granulomatous disease involves the chest.    No significant interval change is seen since the prior study (or studies).     IMPRESSION:                            No acute infiltrate is appreciated.  No cardiac enlargement is suggested.           Please note that portions of this note were completed with a voice recognition program.     KINGSTON MORALES JR, MD         Electronically Signed and Approved By: KINGSTON MORALES JR, MD on 8/07/2023 at 7:03                  Assessment and Plan    Diagnoses and all orders for this visit:    1. OROSCO (dyspnea on exertion) (Primary)  -     Complete PFT - Pre & Post Bronchodilator; Future  -     6 Minute Walk Test; Future  -     IgE Level; Future  -     CT Chest With Contrast Diagnostic; Future  -     Pneumococcal Conjugate Vaccine 20-Valent (PCV20)    2. Wheezing  -     Complete PFT - Pre & Post Bronchodilator; Future  -     6 Minute Walk Test; Future  -     IgE Level; Future  -     CT Chest With Contrast Diagnostic; Future  -     Pneumococcal Conjugate Vaccine 20-Valent (PCV20)    3. Encounter for immunization  -     Pneumococcal Conjugate Vaccine 20-Valent (PCV20)    Other orders  -     tiotropium bromide-olodaterol  (Stiolto Respimat) 2.5-2.5 MCG/ACT aerosol solution inhaler; Inhale 2 puffs Daily.  Dispense: 1 each; Refill: 3      Possible COPD:  She has exertional dyspnea and wheezing.  Check pulmonary function test and 6-minute walk test.  Check serum IgE level.  Check CT scan of the chest.  She has difficulty with airway clearance, may need bronchoscopy in future.  Currently using budesonide nebulizer twice daily.  Advised her to use DuoNeb nebulizer 2-3 times a day for airway clearance.  Started Stiolto 2 puffs once daily.    Patient has never been vaccinated.  We will administer pneumococcal 20 vaccine today.  She refuses COVID and flu vaccine.    Follow Up   Return in about 2 months (around 10/17/2023).  Patient was given instructions and counseling regarding her condition or for health maintenance advice. Please see specific information pulled into the AVS if appropriate.        Electronically signed by Endy Nascimento MD, 8/17/2023, 09:07 EDT.

## 2023-08-18 ENCOUNTER — TRANSCRIBE ORDERS (OUTPATIENT)
Dept: LAB | Facility: HOSPITAL | Age: 83
End: 2023-08-18
Payer: MEDICARE

## 2023-08-18 ENCOUNTER — LAB (OUTPATIENT)
Dept: LAB | Facility: HOSPITAL | Age: 83
End: 2023-08-18
Payer: MEDICARE

## 2023-08-18 DIAGNOSIS — R06.2 WHEEZING: ICD-10-CM

## 2023-08-18 DIAGNOSIS — N17.9 ACUTE RENAL FAILURE, UNSPECIFIED ACUTE RENAL FAILURE TYPE: ICD-10-CM

## 2023-08-18 DIAGNOSIS — R06.09 DOE (DYSPNEA ON EXERTION): ICD-10-CM

## 2023-08-18 DIAGNOSIS — N17.9 ACUTE RENAL FAILURE, UNSPECIFIED ACUTE RENAL FAILURE TYPE: Primary | ICD-10-CM

## 2023-08-18 LAB
ALBUMIN SERPL-MCNC: 4.1 G/DL (ref 3.5–5.2)
ALBUMIN/GLOB SERPL: 1.8 G/DL
ALP SERPL-CCNC: 97 U/L (ref 39–117)
ALT SERPL W P-5'-P-CCNC: 17 U/L (ref 1–33)
ANION GAP SERPL CALCULATED.3IONS-SCNC: 10 MMOL/L (ref 5–15)
AST SERPL-CCNC: 16 U/L (ref 1–32)
BILIRUB SERPL-MCNC: 0.4 MG/DL (ref 0–1.2)
BUN SERPL-MCNC: 10 MG/DL (ref 8–23)
BUN/CREAT SERPL: 12.5 (ref 7–25)
CALCIUM SPEC-SCNC: 8.8 MG/DL (ref 8.6–10.5)
CHLORIDE SERPL-SCNC: 107 MMOL/L (ref 98–107)
CO2 SERPL-SCNC: 24 MMOL/L (ref 22–29)
CREAT SERPL-MCNC: 0.8 MG/DL (ref 0.57–1)
EGFRCR SERPLBLD CKD-EPI 2021: 73.7 ML/MIN/1.73
GLOBULIN UR ELPH-MCNC: 2.3 GM/DL
GLUCOSE SERPL-MCNC: 114 MG/DL (ref 65–99)
POTASSIUM SERPL-SCNC: 3.9 MMOL/L (ref 3.5–5.2)
PROT SERPL-MCNC: 6.4 G/DL (ref 6–8.5)
SODIUM SERPL-SCNC: 141 MMOL/L (ref 136–145)

## 2023-08-18 PROCEDURE — 82785 ASSAY OF IGE: CPT

## 2023-08-18 PROCEDURE — 80053 COMPREHEN METABOLIC PANEL: CPT

## 2023-08-18 PROCEDURE — 36415 COLL VENOUS BLD VENIPUNCTURE: CPT

## 2023-08-23 ENCOUNTER — OFFICE VISIT (OUTPATIENT)
Dept: CARDIOLOGY | Facility: CLINIC | Age: 83
End: 2023-08-23
Payer: MEDICARE

## 2023-08-23 VITALS
HEIGHT: 60 IN | WEIGHT: 144 LBS | HEART RATE: 87 BPM | SYSTOLIC BLOOD PRESSURE: 159 MMHG | DIASTOLIC BLOOD PRESSURE: 88 MMHG | BODY MASS INDEX: 28.27 KG/M2

## 2023-08-23 DIAGNOSIS — I50.22 CHRONIC HFREF (HEART FAILURE WITH REDUCED EJECTION FRACTION): ICD-10-CM

## 2023-08-23 DIAGNOSIS — I25.10 CORONARY ARTERY DISEASE INVOLVING NATIVE CORONARY ARTERY OF NATIVE HEART WITHOUT ANGINA PECTORIS: ICD-10-CM

## 2023-08-23 DIAGNOSIS — I10 PRIMARY HYPERTENSION: ICD-10-CM

## 2023-08-23 DIAGNOSIS — E87.1 HYPONATREMIA: ICD-10-CM

## 2023-08-23 DIAGNOSIS — E78.2 MIXED HYPERLIPIDEMIA: ICD-10-CM

## 2023-08-23 DIAGNOSIS — I51.81 STRESS-INDUCED CARDIOMYOPATHY: Primary | ICD-10-CM

## 2023-08-23 PROBLEM — E78.5 HYPERLIPIDEMIA: Status: ACTIVE | Noted: 2023-08-18

## 2023-08-23 PROBLEM — I25.2 HISTORY OF NON-ST ELEVATION MYOCARDIAL INFARCTION (NSTEMI): Status: ACTIVE | Noted: 2023-08-23

## 2023-08-23 PROBLEM — I21.9 MYOCARDIAL INFARCTION: Status: ACTIVE | Noted: 2023-08-06

## 2023-08-23 PROBLEM — B02.9 HERPES ZOSTER: Status: ACTIVE | Noted: 2023-08-18

## 2023-08-23 PROBLEM — B02.9 SHINGLES: Status: ACTIVE | Noted: 2023-08-23

## 2023-08-23 LAB — IGE SERPL-ACNC: 449 IU/ML (ref 6–495)

## 2023-08-23 PROCEDURE — 99214 OFFICE O/P EST MOD 30 MIN: CPT | Performed by: FAMILY MEDICINE

## 2023-08-23 PROCEDURE — 3079F DIAST BP 80-89 MM HG: CPT | Performed by: FAMILY MEDICINE

## 2023-08-23 PROCEDURE — 3077F SYST BP >= 140 MM HG: CPT | Performed by: FAMILY MEDICINE

## 2023-08-23 RX ORDER — CARVEDILOL 6.25 MG/1
6.25 TABLET ORAL 2 TIMES DAILY
Qty: 180 TABLET | Refills: 3 | Status: SHIPPED | OUTPATIENT
Start: 2023-08-23

## 2023-08-23 RX ORDER — FUROSEMIDE 20 MG/1
20 TABLET ORAL DAILY
Qty: 90 TABLET | Refills: 3 | Status: SHIPPED | OUTPATIENT
Start: 2023-08-23

## 2023-08-23 NOTE — PROGRESS NOTES
Chief Complaint  Follow-up (2 week follow up from ER - [facility] ), Shortness of Breath, and Leg Swelling    Subjective        History of Present Illness  Cris Agudelo presents to [facility] CARDIOLOGY   Ms. Agudelo is an 82-year-old female  patient coming in today for hospital follow-up.  She is accompanied by her daughter prior to her recent hospitalization her only known medical history was hypertension.  She initially presented with sudden onset of left-sided chest pain, with elevated troponins, and hence underwent urgent LHC.  Although she did have complex lesions and diagonal branches, the HERRERA flow was good, and she did not require any intervention.  Ultimately was deemed to be a type II non-STEMI, secondary to stress-induced cardiomyopathy.  During course of her hospitalization she also had low sodium, and nephrology was consulted, doses of home triamterene and hydrochlorothiazide discontinued.  She was discharged home on sodium tablets, and has had follow-up with nephrology since with improved sodium level, and sodium tablets have been discontinued.  She was discharged home on dual antiplatelet therapy with Plavix, and lower dose of lisinopril.  Since discharge she is having worsening swelling of her ankles, and shortness of breath.  Denies any chest pain or pressure.      Past Medical History:   Diagnosis Date    Acute non-ST elevation myocardial infarction (NSTEMI) of anterior wall involving right ventricle 08/06/2023    COPD (chronic obstructive pulmonary disease)     Heart attack     Hyperlipidemia     Hypertension        Allergies   Allergen Reactions    Aleve [Naproxen] Hives        Past Surgical History:   Procedure Laterality Date    CARDIAC CATHETERIZATION N/A 8/6/2023    Procedure: Left Heart Cath;  Surgeon: Mitch Correia MD;  Location: [facility] CATH INVASIVE LOCATION;  Service: Cardiology;  Laterality: N/A;    CARDIAC CATHETERIZATION N/A 8/6/2023    Procedure: Coronary angiography;   "Surgeon: Mitch Correia MD;  Location: [facility] INVASIVE LOCATION;  Service: Cardiology;  Laterality: N/A;        Social History  She  reports that she has quit smoking. Her smoking use included cigarettes. She has been exposed to tobacco smoke. She has never used smokeless tobacco. She reports that she does not currently use alcohol. She reports that she does not use drugs.    Family History  Her family history is not on file.       Current Outpatient Medications on File Prior to Visit   Medication Sig    arformoterol (BROVANA) 15 MCG/2ML nebulizer solution Take 2 mL by nebulization 2 (Two) Times a Day.    aspirin 81 MG EC tablet Take 1 tablet by mouth Daily.    atorvastatin (LIPITOR) 20 MG tablet Take 1 tablet by mouth Every Night.    budesonide (PULMICORT) 0.5 MG/2ML nebulizer solution Take 2 mL by nebulization 2 (Two) Times a Day.    clopidogrel (PLAVIX) 75 MG tablet Take 1 tablet by mouth Daily.    Farxiga 5 MG tablet tablet Take 1 tablet by mouth Daily.    guaiFENesin (MUCINEX) 600 MG 12 hr tablet Take 1 tablet by mouth Every 12 (Twelve) Hours.    ipratropium-albuterol (DUO-NEB) 0.5-2.5 mg/3 ml nebulizer Take 3 mL by nebulization Every 6 (Six) Hours As Needed for Shortness of Air.    lisinopril (PRINIVIL,ZESTRIL) 10 MG tablet Take 1 tablet by mouth Daily.    nitroglycerin (NITROSTAT) 0.4 MG SL tablet     potassium chloride (MICRO-K) 10 MEQ CR capsule Take 1 capsule by mouth Daily.    tiotropium bromide-olodaterol (Stiolto Respimat) 2.5-2.5 MCG/ACT aerosol solution inhaler Inhale 2 puffs Daily.    [DISCONTINUED] carvedilol (COREG) 3.125 MG tablet Take 1 tablet by mouth 2 (Two) Times a Day With Meals.     No current facility-administered medications on file prior to visit.         Review of Systems     Objective   Vitals:    08/23/23 1402   BP: 159/88   BP Location: Right arm   Patient Position: Sitting   Pulse: 87   Weight: 65.3 kg (144 lb)   Height: 152.4 cm (60\")         Physical Exam  General : Alert, " awake, no acute distress  Neck : Supple, no carotid bruit, no jugular venous distention  CVS : Regular rate and rhythm, no murmur, rubs or gallops  Lungs: Clear but diminished to auscultation bilaterally, prolonged expiration ,no crackles or rhonchi  Abdomen: Soft, nontender, bowel sounds active  Extremities: Warm, well-perfused, +1 bilateral pedal edema      Result Review     The following data was reviewed by JES Cutler  proBNP   Date Value Ref Range Status   08/06/2023 1,164.0 0.0 - 1,800.0 pg/mL Final     CMP          8/7/2023    04:23 8/7/2023    18:18 8/8/2023    04:55 8/8/2023    08:46 8/18/2023    12:16   CMP   Glucose 149   136  187  114    BUN 11   14  13  10    Creatinine 0.63   0.63  0.62  0.80    EGFR 88.7   88.7  89.0  73.7    Sodium 124   123  124     124  141    Potassium 3.5  4.1  4.1  4.0  3.9    Chloride 88   90  89  107    Calcium 8.6   8.5  8.6  8.8    Total Protein   5.9   6.4    Albumin   3.3   4.1    Globulin     2.3    Total Bilirubin   0.7   0.4    Alkaline Phosphatase   87   97    AST (SGOT)   17   16    ALT (SGPT)   8   17    Albumin/Globulin Ratio     1.8    BUN/Creatinine Ratio 17.5   22.2  21.0  12.5    Anion Gap 10.8   8.9  10.9  10.0      CBC w/diff          8/6/2023    03:25 8/7/2023    04:23 8/8/2023    04:55   CBC w/Diff   WBC 12.11  11.45  9.77    RBC 4.92  4.40  4.08    Hemoglobin 14.3  13.0  11.8    Hematocrit 42.2  38.4  34.6    MCV 85.8  87.3  84.8    MCH 29.1  29.5  28.9    MCHC 33.9  33.9  34.1    RDW 12.9  12.7  12.8    Platelets 280  259  250    Neutrophil Rel % 75.5  71.9  72.5    Immature Granulocyte Rel % 0.4  0.8  0.6    Lymphocyte Rel % 15.5  17.0  17.1    Monocyte Rel % 4.6  6.2  7.9    Eosinophil Rel % 3.7  3.8  1.7    Basophil Rel % 0.3  0.3  0.2       No results found for: TSH   No results found for: FREET4   No results found for: DDIMERQUANT  Magnesium   Date Value Ref Range Status   08/06/2023 1.7 1.6 - 2.4 mg/dL Final      No results found for:  DIGOXIN   Lab Results   Component Value Date    TROPONINT 553 (C) 08/06/2023           Lipid Panel          8/6/2023    18:14   Lipid Panel   Total Cholesterol 216    Triglycerides 185    HDL Cholesterol 44    VLDL Cholesterol 33    LDL Cholesterol  139    LDL/HDL Ratio 3.07        Results for orders placed during the hospital encounter of 08/06/23    Adult Transthoracic Echo Complete W/ Cont if Necessary Per Protocol    Interpretation Summary    Left ventricular systolic function is normal. Left ventricular ejection fraction appears to be 36 - 40%.    The following left ventricular wall segments are hypokinetic: mid anterior and apical anterior.    The findings are consistent with stress-induced (Takotsubo) cardiomyopathy.    Left ventricular diastolic function is consistent with (grade I) impaired relaxation.    Estimated right ventricular systolic pressure from tricuspid regurgitation is normal (<35 mmHg).           Assessment and Plan   Diagnoses and all orders for this visit:    1. Stress-induced cardiomyopathy (Primary)  Assessment & Plan:  NYHA class II-III symptoms, with reduced EF of 36 to 40%.  Increase dose of carvedilol, and will start low-dose loop diuretic.  Continue current dose lisinopril.  Apparently Jardiance was not affordable and she was transitioned over to Farxiga, she is also having a cost barrier to this.  I provided information to see about patient assistance program.  Ultimately I would prefer for her to be on a dose of 10 mg for heart failure, however we will hold off for the next couple weeks to see what her response is to adjustment of other medications before changing Farxiga.  Bring back for close follow-up in the next 2 weeks, and discussed with her plan to recheck echocardiogram in 3 months to reevaluate her LV function.    Orders:  -     Basic Metabolic Panel; Future  -     proBNP; Future    2. Chronic HFrEF (heart failure with reduced ejection fraction)  Assessment & Plan:  We will  work optimizing GDMT.  Discussed with her option to be evaluated by heart failure clinic,  will hold off and reevaluate at her follow-up visit in 2 weeks.  Location outlined under cardiomyopathy.    Orders:  -     Basic Metabolic Panel; Future  -     proBNP; Future    3. Mixed hyperlipidemia  Assessment & Plan:  DL at 139, she was started on statin therapy during her hospitalization.  We will plan to recheck in the next 3 months to evaluate response to atorvastatin.      4. Hyponatremia  Assessment & Plan:  Sodium level has normalized, and sodium tablets have been discontinued by nephrology.  She has close follow-up with nephrology this week.  Considering her normal sodium level, is reasonable to start her on a low-dose loop diuretic due to her swelling.  We will have her recheck BMP in 2 weeks for close monitoring.      5. Coronary artery disease involving native coronary artery of native heart without angina pectoris  Assessment & Plan:  Stable without any anginal symptoms.  LHC showed nonobstructive lesions.  Recent NSTEMI related to her cardiomyopathy, we will go ahead and continue current DAPT with aspirin and Plavix.  Continue beta-blocker and statin therapy as well.      6. Primary hypertension  Assessment & Plan:  Pressure is on the higher side in the office today.  We will continue current low-dose lisinopril, increase her dose of carvedilol, and add low-dose loop diuretic.  Recommended monitoring BP at home.      Other orders  -     furosemide (LASIX) 20 MG tablet; Take 1 tablet by mouth Daily.  Dispense: 90 tablet; Refill: 3  -     carvedilol (COREG) 6.25 MG tablet; Take 1 tablet by mouth 2 (Two) Times a Day.  Dispense: 180 tablet; Refill: 3         Follow Up   Return in about 2 weeks (around 9/6/2023) for Next scheduled follow up, with JES Cutler.    Patient was given instructions and counseling regarding her condition or for health maintenance advice. Please see specific information pulled  into the AVS if appropriate.     Signed,  Izabella Oro, APRN  08/23/2023     Dictated Utilizing Dragon Dictation: Please note that portions of this note were completed with a voice recognition program.  Part of this note may be an electronic transcription/translation of spoken language to printed text using the Dragon Dictation System.

## 2023-08-23 NOTE — ASSESSMENT & PLAN NOTE
NYHA class II-III symptoms, with reduced EF of 36 to 40%.  Increase dose of carvedilol, and will start low-dose loop diuretic.  Continue current dose lisinopril.  Apparently Jardiance was not affordable and she was transitioned over to Farxiga, she is also having a cost barrier to this.  I provided information to see about patient assistance program.  Ultimately I would prefer for her to be on a dose of 10 mg for heart failure, however we will hold off for the next couple weeks to see what her response is to adjustment of other medications before changing Farxiga.  Bring back for close follow-up in the next 2 weeks, and discussed with her plan to recheck echocardiogram in 3 months to reevaluate her LV function.

## 2023-08-23 NOTE — ASSESSMENT & PLAN NOTE
Sodium level has normalized, and sodium tablets have been discontinued by nephrology.  She has close follow-up with nephrology this week.  Considering her normal sodium level, is reasonable to start her on a low-dose loop diuretic due to her swelling.  We will have her recheck BMP in 2 weeks for close monitoring.

## 2023-08-23 NOTE — LETTER
August 23, 2023       No Recipients    Patient: Cris Agudelo   YOB: 1940   Date of Visit: 8/23/2023       Dear Ramirez Maurice MD    Cris Agudelo was in my office today. Below is a copy of my note.    If you have questions, please do not hesitate to call me. I look forward to following Cris along with you.         Sincerely,        JES Cutler        CC:   No Recipients    Chief Complaint  Follow-up (2 week follow up from ER - [facility] ), Shortness of Breath, and Leg Swelling    Subjective        History of Present Illness  Cris Agudelo presents to [facility] CARDIOLOGY   Ms. Agudelo is an 82-year-old female  patient coming in today for hospital follow-up.  She is accompanied by her daughter prior to her recent hospitalization her only known medical history was hypertension.  She initially presented with sudden onset of left-sided chest pain, with elevated troponins, and hence underwent urgent LHC.  Although she did have complex lesions and diagonal branches, the HERRERA flow was good, and she did not require any intervention.  Ultimately was deemed to be a type II non-STEMI, secondary to stress-induced cardiomyopathy.  During course of her hospitalization she also had low sodium, and nephrology was consulted, doses of home triamterene and hydrochlorothiazide discontinued.  She was discharged home on sodium tablets, and has had follow-up with nephrology since with improved sodium level, and sodium tablets have been discontinued.  She was discharged home on dual antiplatelet therapy with Plavix, and lower dose of lisinopril.  Since discharge she is having worsening swelling of her ankles, and shortness of breath.  Denies any chest pain or pressure.      Past Medical History:   Diagnosis Date    Acute non-ST elevation myocardial infarction (NSTEMI) of anterior wall involving right ventricle 08/06/2023    COPD (chronic obstructive pulmonary disease)     Heart attack      Hyperlipidemia     Hypertension        Allergies   Allergen Reactions    Aleve [Naproxen] Hives        Past Surgical History:   Procedure Laterality Date    CARDIAC CATHETERIZATION N/A 8/6/2023    Procedure: Left Heart Cath;  Surgeon: Mitch Correia MD;  Location: [facility] CATH INVASIVE LOCATION;  Service: Cardiology;  Laterality: N/A;    CARDIAC CATHETERIZATION N/A 8/6/2023    Procedure: Coronary angiography;  Surgeon: Mitch Correia MD;  Location: [facility] INVASIVE LOCATION;  Service: Cardiology;  Laterality: N/A;        Social History  She  reports that she has quit smoking. Her smoking use included cigarettes. She has been exposed to tobacco smoke. She has never used smokeless tobacco. She reports that she does not currently use alcohol. She reports that she does not use drugs.    Family History  Her family history is not on file.       Current Outpatient Medications on File Prior to Visit   Medication Sig    arformoterol (BROVANA) 15 MCG/2ML nebulizer solution Take 2 mL by nebulization 2 (Two) Times a Day.    aspirin 81 MG EC tablet Take 1 tablet by mouth Daily.    atorvastatin (LIPITOR) 20 MG tablet Take 1 tablet by mouth Every Night.    budesonide (PULMICORT) 0.5 MG/2ML nebulizer solution Take 2 mL by nebulization 2 (Two) Times a Day.    clopidogrel (PLAVIX) 75 MG tablet Take 1 tablet by mouth Daily.    Farxiga 5 MG tablet tablet Take 1 tablet by mouth Daily.    guaiFENesin (MUCINEX) 600 MG 12 hr tablet Take 1 tablet by mouth Every 12 (Twelve) Hours.    ipratropium-albuterol (DUO-NEB) 0.5-2.5 mg/3 ml nebulizer Take 3 mL by nebulization Every 6 (Six) Hours As Needed for Shortness of Air.    lisinopril (PRINIVIL,ZESTRIL) 10 MG tablet Take 1 tablet by mouth Daily.    nitroglycerin (NITROSTAT) 0.4 MG SL tablet     potassium chloride (MICRO-K) 10 MEQ CR capsule Take 1 capsule by mouth Daily.    tiotropium bromide-olodaterol (Stiolto Respimat) 2.5-2.5 MCG/ACT aerosol solution inhaler Inhale 2 puffs  "Daily.    [DISCONTINUED] carvedilol (COREG) 3.125 MG tablet Take 1 tablet by mouth 2 (Two) Times a Day With Meals.     No current facility-administered medications on file prior to visit.         Review of Systems     Objective   Vitals:    08/23/23 1402   BP: 159/88   BP Location: Right arm   Patient Position: Sitting   Pulse: 87   Weight: 65.3 kg (144 lb)   Height: 152.4 cm (60\")         Physical Exam  General : Alert, awake, no acute distress  Neck : Supple, no carotid bruit, no jugular venous distention  CVS : Regular rate and rhythm, no murmur, rubs or gallops  Lungs: Clear but diminished to auscultation bilaterally, prolonged expiration ,no crackles or rhonchi  Abdomen: Soft, nontender, bowel sounds active  Extremities: Warm, well-perfused, +1 bilateral pedal edema      Result Review     The following data was reviewed by JES Cutler  proBNP   Date Value Ref Range Status   08/06/2023 1,164.0 0.0 - 1,800.0 pg/mL Final     CMP          8/7/2023    04:23 8/7/2023    18:18 8/8/2023    04:55 8/8/2023    08:46 8/18/2023    12:16   CMP   Glucose 149   136  187  114    BUN 11   14  13  10    Creatinine 0.63   0.63  0.62  0.80    EGFR 88.7   88.7  89.0  73.7    Sodium 124   123  124     124  141    Potassium 3.5  4.1  4.1  4.0  3.9    Chloride 88   90  89  107    Calcium 8.6   8.5  8.6  8.8    Total Protein   5.9   6.4    Albumin   3.3   4.1    Globulin     2.3    Total Bilirubin   0.7   0.4    Alkaline Phosphatase   87   97    AST (SGOT)   17   16    ALT (SGPT)   8   17    Albumin/Globulin Ratio     1.8    BUN/Creatinine Ratio 17.5   22.2  21.0  12.5    Anion Gap 10.8   8.9  10.9  10.0      CBC w/diff          8/6/2023    03:25 8/7/2023    04:23 8/8/2023    04:55   CBC w/Diff   WBC 12.11  11.45  9.77    RBC 4.92  4.40  4.08    Hemoglobin 14.3  13.0  11.8    Hematocrit 42.2  38.4  34.6    MCV 85.8  87.3  84.8    MCH 29.1  29.5  28.9    MCHC 33.9  33.9  34.1    RDW 12.9  12.7  12.8    Platelets 280  259  250  "   Neutrophil Rel % 75.5  71.9  72.5    Immature Granulocyte Rel % 0.4  0.8  0.6    Lymphocyte Rel % 15.5  17.0  17.1    Monocyte Rel % 4.6  6.2  7.9    Eosinophil Rel % 3.7  3.8  1.7    Basophil Rel % 0.3  0.3  0.2       No results found for: TSH   No results found for: FREET4   No results found for: DDIMERQUANT  Magnesium   Date Value Ref Range Status   08/06/2023 1.7 1.6 - 2.4 mg/dL Final      No results found for: DIGOXIN   Lab Results   Component Value Date    TROPONINT 553 (C) 08/06/2023           Lipid Panel          8/6/2023    18:14   Lipid Panel   Total Cholesterol 216    Triglycerides 185    HDL Cholesterol 44    VLDL Cholesterol 33    LDL Cholesterol  139    LDL/HDL Ratio 3.07        Results for orders placed during the hospital encounter of 08/06/23    Adult Transthoracic Echo Complete W/ Cont if Necessary Per Protocol    Interpretation Summary    Left ventricular systolic function is normal. Left ventricular ejection fraction appears to be 36 - 40%.    The following left ventricular wall segments are hypokinetic: mid anterior and apical anterior.    The findings are consistent with stress-induced (Takotsubo) cardiomyopathy.    Left ventricular diastolic function is consistent with (grade I) impaired relaxation.    Estimated right ventricular systolic pressure from tricuspid regurgitation is normal (<35 mmHg).           Assessment and Plan   Diagnoses and all orders for this visit:    1. Stress-induced cardiomyopathy (Primary)  Assessment & Plan:  NYHA class II-III symptoms, with reduced EF of 36 to 40%.  Increase dose of carvedilol, and will start low-dose loop diuretic.  Continue current dose lisinopril.  Apparently Jardiance was not affordable and she was transitioned over to Farxiga, she is also having a cost barrier to this.  I provided information to see about patient assistance program.  Ultimately I would prefer for her to be on a dose of 10 mg for heart failure, however we will hold off for  the next couple weeks to see what her response is to adjustment of other medications before changing Farxiga.  Bring back for close follow-up in the next 2 weeks, and discussed with her plan to recheck echocardiogram in 3 months to reevaluate her LV function.    Orders:  -     Basic Metabolic Panel; Future  -     proBNP; Future    2. Chronic HFrEF (heart failure with reduced ejection fraction)  Assessment & Plan:  We will work optimizing GDMT.  Discussed with her option to be evaluated by heart failure clinic,  will hold off and reevaluate at her follow-up visit in 2 weeks.  Location outlined under cardiomyopathy.    Orders:  -     Basic Metabolic Panel; Future  -     proBNP; Future    3. Mixed hyperlipidemia  Assessment & Plan:  DL at 139, she was started on statin therapy during her hospitalization.  We will plan to recheck in the next 3 months to evaluate response to atorvastatin.      4. Hyponatremia  Assessment & Plan:  Sodium level has normalized, and sodium tablets have been discontinued by nephrology.  She has close follow-up with nephrology this week.  Considering her normal sodium level, is reasonable to start her on a low-dose loop diuretic due to her swelling.  We will have her recheck BMP in 2 weeks for close monitoring.      5. Coronary artery disease involving native coronary artery of native heart without angina pectoris  Assessment & Plan:  Stable without any anginal symptoms.  LHC showed nonobstructive lesions.  Recent NSTEMI related to her cardiomyopathy, we will go ahead and continue current DAPT with aspirin and Plavix.  Continue beta-blocker and statin therapy as well.      6. Primary hypertension  Assessment & Plan:  Pressure is on the higher side in the office today.  We will continue current low-dose lisinopril, increase her dose of carvedilol, and add low-dose loop diuretic.  Recommended monitoring BP at home.      Other orders  -     furosemide (LASIX) 20 MG tablet; Take 1 tablet by mouth  Daily.  Dispense: 90 tablet; Refill: 3  -     carvedilol (COREG) 6.25 MG tablet; Take 1 tablet by mouth 2 (Two) Times a Day.  Dispense: 180 tablet; Refill: 3         Follow Up   Return in about 2 weeks (around 9/6/2023) for Next scheduled follow up, with JES Cutler.    Patient was given instructions and counseling regarding her condition or for health maintenance advice. Please see specific information pulled into the AVS if appropriate.     Signed,  JES Cutler  08/23/2023     Dictated Utilizing Dragon Dictation: Please note that portions of this note were completed with a voice recognition program.  Part of this note may be an electronic transcription/translation of spoken language to printed text using the Dragon Dictation System.

## 2023-08-23 NOTE — ASSESSMENT & PLAN NOTE
Pressure is on the higher side in the office today.  We will continue current low-dose lisinopril, increase her dose of carvedilol, and add low-dose loop diuretic.  Recommended monitoring BP at home.

## 2023-08-23 NOTE — ASSESSMENT & PLAN NOTE
DL at 139, she was started on statin therapy during her hospitalization.  We will plan to recheck in the next 3 months to evaluate response to atorvastatin.

## 2023-08-23 NOTE — ASSESSMENT & PLAN NOTE
We will work optimizing GDMT.  Discussed with her option to be evaluated by heart failure clinic,  will hold off and reevaluate at her follow-up visit in 2 weeks.  Location outlined under cardiomyopathy.

## 2023-08-23 NOTE — ASSESSMENT & PLAN NOTE
Stable without any anginal symptoms.  LHC showed nonobstructive lesions.  Recent NSTEMI related to her cardiomyopathy, we will go ahead and continue current DAPT with aspirin and Plavix.  Continue beta-blocker and statin therapy as well.

## 2023-09-01 ENCOUNTER — PROCEDURE VISIT (OUTPATIENT)
Dept: CARDIAC REHAB | Facility: HOSPITAL | Age: 83
End: 2023-09-01
Payer: MEDICARE

## 2023-09-01 ENCOUNTER — HOSPITAL ENCOUNTER (OUTPATIENT)
Dept: RESPIRATORY THERAPY | Facility: HOSPITAL | Age: 83
Discharge: HOME | End: 2023-09-01
Payer: MEDICARE

## 2023-09-01 DIAGNOSIS — R06.09 DOE (DYSPNEA ON EXERTION): ICD-10-CM

## 2023-09-01 DIAGNOSIS — R06.2 WHEEZING: ICD-10-CM

## 2023-09-01 PROCEDURE — 94618 PULMONARY STRESS TESTING: CPT

## 2023-09-01 PROCEDURE — 94060 EVALUATION OF WHEEZING: CPT

## 2023-09-01 PROCEDURE — 94729 DIFFUSING CAPACITY: CPT

## 2023-09-01 PROCEDURE — 94726 PLETHYSMOGRAPHY LUNG VOLUMES: CPT

## 2023-09-01 RX ORDER — LEVALBUTEROL INHALATION SOLUTION 1.25 MG/3ML
1.25 SOLUTION RESPIRATORY (INHALATION) ONCE
Status: COMPLETED | OUTPATIENT
Start: 2023-09-01 | End: 2023-09-01

## 2023-09-01 RX ADMIN — LEVALBUTEROL HYDROCHLORIDE 1.25 MG: 1.25 SOLUTION RESPIRATORY (INHALATION) at 14:45

## 2023-09-07 ENCOUNTER — TELEPHONE (OUTPATIENT)
Dept: CARDIOLOGY | Facility: CLINIC | Age: 83
End: 2023-09-07

## 2023-09-07 DIAGNOSIS — I51.81 STRESS-INDUCED CARDIOMYOPATHY: ICD-10-CM

## 2023-09-07 DIAGNOSIS — I50.22 CHRONIC HFREF (HEART FAILURE WITH REDUCED EJECTION FRACTION): ICD-10-CM

## 2023-09-07 NOTE — TELEPHONE ENCOUNTER
Caller: PAU    Relationship: Provider    Best call back number: [phone]    Caller requesting test results: YES    What test was performed: BNP LAB WORK    When was the test performed: 9.7.23    Where was the test performed: PCP OFFICE    Additional notes: PATIENTS PCP WAS CALLING IN REGARDS TO THE LABS THAT WERE ORDERED BY NIK RUELAS AND STATED HER BNP WAS CRITICAL, RESULTS CAME BACK 2427.5, AND HAS FAXED OVER THE RECORDS THIS MORNING. SHE HAS ALSO CALLED OVER 3 TIMES TO TRY AND GET THE RESULTS OVER TO THE MEDICAL ASSISTANT BUT ONCE SHE IS TRANSFERRED TO THE OFFICE SHE HAS BEEN SEND TO A VOICEMAIL EVERY TIME AND NEEDS SOMEONE TO CALL HER BACK REGARDING HER RESULTS, THANK YOU    PROVIDER CALL GOT DISCONNECTED BEFORE I COULD CALL OVER TO TRY AND TRANSFER AGAIN.

## 2023-09-07 NOTE — TELEPHONE ENCOUNTER
Called and spoke with Елена at Dr. Sol's office.  At this time we have not received fax, they will attempt to fax again.  I did verify her BNP is 2427.5, her chemistry panel shows potassium of 4.1, sodium 141, creatinine 0.73, and GFR of 82.  Called and spoke with patient's daughter, her swelling of the lower extremities has improved, she is not complaining of any shortness of breath.  She did see nephrology following her recent office visit, and is only using furosemide on an as needed basis. As she is not symptomatic for now we will continue to only use on an as needed, and she will keep her follow-up scheduled with me in office next week.

## 2023-09-14 ENCOUNTER — OFFICE VISIT (OUTPATIENT)
Dept: CARDIOLOGY | Facility: CLINIC | Age: 83
End: 2023-09-14
Payer: MEDICARE

## 2023-09-14 VITALS
BODY MASS INDEX: 26.46 KG/M2 | HEIGHT: 60 IN | DIASTOLIC BLOOD PRESSURE: 60 MMHG | SYSTOLIC BLOOD PRESSURE: 147 MMHG | HEART RATE: 76 BPM | WEIGHT: 134.8 LBS

## 2023-09-14 DIAGNOSIS — E78.2 MIXED HYPERLIPIDEMIA: ICD-10-CM

## 2023-09-14 DIAGNOSIS — I50.22 CHRONIC HFREF (HEART FAILURE WITH REDUCED EJECTION FRACTION): ICD-10-CM

## 2023-09-14 DIAGNOSIS — I51.81 STRESS-INDUCED CARDIOMYOPATHY: Primary | ICD-10-CM

## 2023-09-14 DIAGNOSIS — I25.10 CORONARY ARTERY DISEASE INVOLVING NATIVE CORONARY ARTERY OF NATIVE HEART WITHOUT ANGINA PECTORIS: ICD-10-CM

## 2023-09-14 RX ORDER — CARVEDILOL 6.25 MG/1
6.25 TABLET ORAL 2 TIMES DAILY
Qty: 180 TABLET | Refills: 3 | Status: SHIPPED | OUTPATIENT
Start: 2023-09-14

## 2023-09-14 RX ORDER — DAPAGLIFLOZIN 10 MG/1
10 TABLET, FILM COATED ORAL DAILY
Qty: 90 TABLET | Refills: 3 | Status: SHIPPED | OUTPATIENT
Start: 2023-09-14

## 2023-09-14 RX ORDER — FUROSEMIDE 20 MG/1
20 TABLET ORAL DAILY PRN
Qty: 90 TABLET | Refills: 3
Start: 2023-09-14

## 2023-09-14 RX ORDER — LISINOPRIL 20 MG/1
20 TABLET ORAL DAILY
Qty: 90 TABLET | Refills: 3 | Status: SHIPPED | OUTPATIENT
Start: 2023-09-14

## 2023-09-14 NOTE — PROGRESS NOTES
Chief Complaint  Cardiomyopathy    Subjective        History of Present Illness  Cris Agudelo presents to [facility] CARDIOLOGY   Ms. Agudelo is an 82-year-old female patient coming in today for cardiac follow-up.  She is accompanied by her daughter.  She reports since last seen in the office her swelling is improved, she is down 10 pounds.  Although she feels like most of this was fluid weight related to her swelling of the legs, she is voices in general she is had a decreased appetite.  She is not having any complaints of stomach pain or nausea, but feels like taking multiple medications keeps her from having much appetite.   Reports her shortness of breath has been fairly stable, and is not complaining of any chest pain.  She has significant improvement in swelling of bilateral ankles.  She has seen nephrology since last seen in the office, is going to try to utilize diuretics just on an as-needed basis per their recommendation.        Past Medical History:   Diagnosis Date    Acute non-ST elevation myocardial infarction (NSTEMI) of anterior wall involving right ventricle 08/06/2023    COPD (chronic obstructive pulmonary disease)     Heart attack     Hyperlipidemia     Hypertension        Allergies   Allergen Reactions    Aleve [Naproxen] Hives        Past Surgical History:   Procedure Laterality Date    CARDIAC CATHETERIZATION N/A 8/6/2023    Procedure: Left Heart Cath;  Surgeon: Mitch Correia MD;  Location: [facility] CATH INVASIVE LOCATION;  Service: Cardiology;  Laterality: N/A;    CARDIAC CATHETERIZATION N/A 8/6/2023    Procedure: Coronary angiography;  Surgeon: Mitch Correia MD;  Location: [facility] INVASIVE LOCATION;  Service: Cardiology;  Laterality: N/A;        Social History  She  reports that she quit smoking about 4 months ago. Her smoking use included cigarettes. She has been exposed to tobacco smoke. She has never used smokeless tobacco. She reports that she does not currently use alcohol.  "She reports that she does not use drugs.    Family History  Her family history is not on file.       Current Outpatient Medications on File Prior to Visit   Medication Sig    aspirin 81 MG EC tablet Take 1 tablet by mouth Daily.    atorvastatin (LIPITOR) 20 MG tablet Take 1 tablet by mouth Every Night.    clopidogrel (PLAVIX) 75 MG tablet Take 1 tablet by mouth Daily.    guaiFENesin (MUCINEX) 600 MG 12 hr tablet Take 1 tablet by mouth Every 12 (Twelve) Hours.    nitroglycerin (NITROSTAT) 0.4 MG SL tablet  (Patient not taking: Reported on 9/29/2023)    [DISCONTINUED] budesonide (PULMICORT) 0.5 MG/2ML nebulizer solution Take 2 mL by nebulization 2 (Two) Times a Day.    [DISCONTINUED] ipratropium-albuterol (DUO-NEB) 0.5-2.5 mg/3 ml nebulizer Take 3 mL by nebulization Every 6 (Six) Hours As Needed for Shortness of Air. (Patient not taking: Reported on 9/29/2023)     No current facility-administered medications on file prior to visit.         Review of Systems   Constitutional:  Positive for appetite change and fatigue.   Respiratory:  Positive for shortness of breath. Negative for cough and chest tightness.    Cardiovascular:  Negative for chest pain, palpitations and leg swelling.   Gastrointestinal:  Negative for nausea and vomiting.   Neurological:  Negative for dizziness and syncope.   Hematological:  Does not bruise/bleed easily.      Objective   Vitals:    09/14/23 1115   BP: 147/60   Pulse: 76   Weight: 61.1 kg (134 lb 12.8 oz)   Height: 152.4 cm (60\")         Physical Exam  General : Alert, awake, no acute distress  Neck : Supple, no carotid bruit, no jugular venous distention  CVS : Regular rate and rhythm, no murmur, rubs or gallops  Lungs: Clear to auscultation bilaterally, no crackles or rhonchi  Abdomen: Soft, nontender, bowel sounds active  Extremities: Warm, well-perfused, no pedal edema      Result Review     The following data was reviewed by JES Cutler  proBNP   Date Value Ref Range Status "   08/06/2023 1,164.0 0.0 - 1,800.0 pg/mL Final     CMP          8/8/2023    04:55 8/8/2023    08:46 8/18/2023    12:16 9/19/2023    13:09   CMP   Glucose 136  187  114     BUN 14  13  10     Creatinine 0.63  0.62  0.80  0.60    EGFR 88.7  89.0  73.7  89.7    Sodium 123  124     124  141     Potassium 4.1  4.0  3.9     Chloride 90  89  107     Calcium 8.5  8.6  8.8     Total Protein 5.9   6.4     Albumin 3.3   4.1     Globulin   2.3     Total Bilirubin 0.7   0.4     Alkaline Phosphatase 87   97     AST (SGOT) 17   16     ALT (SGPT) 8   17     Albumin/Globulin Ratio   1.8     BUN/Creatinine Ratio 22.2  21.0  12.5     Anion Gap 8.9  10.9  10.0       CBC w/diff          8/6/2023    03:25 8/7/2023    04:23 8/8/2023    04:55   CBC w/Diff   WBC 12.11  11.45  9.77    RBC 4.92  4.40  4.08    Hemoglobin 14.3  13.0  11.8    Hematocrit 42.2  38.4  34.6    MCV 85.8  87.3  84.8    MCH 29.1  29.5  28.9    MCHC 33.9  33.9  34.1    RDW 12.9  12.7  12.8    Platelets 280  259  250    Neutrophil Rel % 75.5  71.9  72.5    Immature Granulocyte Rel % 0.4  0.8  0.6    Lymphocyte Rel % 15.5  17.0  17.1    Monocyte Rel % 4.6  6.2  7.9    Eosinophil Rel % 3.7  3.8  1.7    Basophil Rel % 0.3  0.3  0.2      Magnesium   Date Value Ref Range Status   08/06/2023 1.7 1.6 - 2.4 mg/dL Final      No results found for: DIGOXIN   Lab Results   Component Value Date    TROPONINT 553 (C) 08/06/2023           Lipid Panel          8/6/2023    18:14   Lipid Panel   Total Cholesterol 216    Triglycerides 185    HDL Cholesterol 44    VLDL Cholesterol 33    LDL Cholesterol  139    LDL/HDL Ratio 3.07          Labs from [facility] from 9/5/23 scanned to chart :    BNP  2427.5    Potassium  4.1  Sodium 141,  Creatinine 0.73  GFR  82.       Results for orders placed during the hospital encounter of 08/06/23    Adult Transthoracic Echo Complete W/ Cont if Necessary Per Protocol    Interpretation Summary    Left ventricular systolic function is  normal. Left ventricular ejection fraction appears to be 36 - 40%.    The following left ventricular wall segments are hypokinetic: mid anterior and apical anterior.    The findings are consistent with stress-induced (Takotsubo) cardiomyopathy.    Left ventricular diastolic function is consistent with (grade I) impaired relaxation.    Estimated right ventricular systolic pressure from tricuspid regurgitation is normal (<35 mmHg).             Assessment and Plan   Diagnoses and all orders for this visit:    1. Stress-induced cardiomyopathy (Primary)  Assessment & Plan:  Overall her volume status is improved, and continues to have NYHA class II symptoms.  New medication regimen as outlined under heart failure.  Previous echo showed reduced EF of 36 to 40%.  Plan to repeat echocardiogram to reassess LV function.    Orders:  -     Adult Transthoracic Echo Complete W/ Cont if Necessary Per Protocol; Future    2. Chronic HFrEF (heart failure with reduced ejection fraction)  Assessment & Plan:  Volume status significantly improved.  Continue carvedilol, lisinopril, and Farxiga.  We will continue using furosemide on a as needed basis for weight gain and swelling.  Avoiding spironolactone at this point due to hyponatremia during her hospitalization.    Orders:  -     Adult Transthoracic Echo Complete W/ Cont if Necessary Per Protocol; Future  -     Comprehensive Metabolic Panel; Future  -     proBNP; Future    3. Mixed hyperlipidemia  Assessment & Plan:  .  Continue current dose atorvastatin 20 mg, we will plan on rechecking fasting lipid profile in the next 3 months and reevaluate medication most recent LDL was 139, and can adjust dosing if needed.    Orders:  -     Comprehensive Metabolic Panel; Future  -     Lipid Panel; Future    4. Coronary artery disease involving native coronary artery of native heart without angina pectoris  Assessment & Plan:  She is stable without any anginal symptoms.  Continue DAPT with aspirin  and Plavix, continue carvedilol, and atorvastatin.      Other orders  -     carvedilol (COREG) 6.25 MG tablet; Take 1 tablet by mouth 2 (Two) Times a Day.  Dispense: 180 tablet; Refill: 3  -     furosemide (LASIX) 20 MG tablet; Take 1 tablet by mouth Daily As Needed (swelling/wt gain).  Dispense: 90 tablet; Refill: 3  -     lisinopril (PRINIVIL,ZESTRIL) 20 MG tablet; Take 1 tablet by mouth Daily.  Dispense: 90 tablet; Refill: 3  -     dapagliflozin Propanediol (Farxiga) 10 MG tablet; Take 10 mg by mouth Daily.  Dispense: 90 tablet; Refill: 3            Follow Up   Return for Next scheduled follow up, with Dr. Hernandez 3-4 mos.    Patient was given instructions and counseling regarding her condition or for health maintenance advice. Please see specific information pulled into the AVS if appropriate.     Signed,  Izabella Oro, APRN  09/14/2023     Dictated Utilizing Dragon Dictation: Please note that portions of this note were completed with a voice recognition program.  Part of this note may be an electronic transcription/translation of spoken language to printed text using the Dragon Dictation System.

## 2023-09-19 ENCOUNTER — HOSPITAL ENCOUNTER (OUTPATIENT)
Dept: CT IMAGING | Facility: HOSPITAL | Age: 83
Discharge: HOME | End: 2023-09-19
Admitting: INTERNAL MEDICINE
Payer: MEDICARE

## 2023-09-19 DIAGNOSIS — R06.09 DOE (DYSPNEA ON EXERTION): ICD-10-CM

## 2023-09-19 DIAGNOSIS — R06.2 WHEEZING: ICD-10-CM

## 2023-09-19 LAB
CREAT BLDA-MCNC: 0.6 MG/DL
EGFRCR SERPLBLD CKD-EPI 2021: 89.7 ML/MIN/1.73

## 2023-09-19 PROCEDURE — 71260 CT THORAX DX C+: CPT

## 2023-09-19 PROCEDURE — 25510000001 IOPAMIDOL PER 1 ML: Performed by: INTERNAL MEDICINE

## 2023-09-19 PROCEDURE — 82565 ASSAY OF CREATININE: CPT

## 2023-09-19 RX ADMIN — IOPAMIDOL 100 ML: 755 INJECTION, SOLUTION INTRAVENOUS at 13:20

## 2023-09-21 DIAGNOSIS — J90 BILATERAL PLEURAL EFFUSION: Primary | ICD-10-CM

## 2023-09-22 ENCOUNTER — TELEPHONE (OUTPATIENT)
Dept: CARDIOLOGY | Facility: CLINIC | Age: 83
End: 2023-09-22

## 2023-09-22 ENCOUNTER — HOSPITAL ENCOUNTER (OUTPATIENT)
Dept: INFUSION THERAPY | Facility: HOSPITAL | Age: 83
Discharge: HOME | End: 2023-09-22
Attending: INTERNAL MEDICINE | Admitting: INTERNAL MEDICINE
Payer: MEDICARE

## 2023-09-22 ENCOUNTER — APPOINTMENT (OUTPATIENT)
Dept: GENERAL RADIOLOGY | Facility: HOSPITAL | Age: 83
End: 2023-09-22
Payer: MEDICARE

## 2023-09-22 DIAGNOSIS — J90 BILATERAL PLEURAL EFFUSION: ICD-10-CM

## 2023-09-22 LAB
ALBUMIN FLD-MCNC: 1.5 G/DL
APPEARANCE FLD: ABNORMAL
COLOR FLD: ABNORMAL
EOSINOPHIL NFR FLD MANUAL: 1 %
GLUCOSE FLD-MCNC: 122 MG/DL
LDH FLD-CCNC: 62 U/L
LYMPHOCYTES NFR FLD MANUAL: 39 %
MACROPHAGE FLUID: 30 %
MESOTHL CELL NFR FLD MANUAL: 3 %
MONOCYTES NFR FLD: 10 %
NEUTROPHILS NFR FLD MANUAL: 17 %
NUC CELL # FLD: 428 /MM3
PH FLD: 7.5 [PH]
PROT FLD-MCNC: 2.4 G/DL
RBC # FLD AUTO: ABNORMAL /MM3

## 2023-09-22 PROCEDURE — 32555 ASPIRATE PLEURA W/ IMAGING: CPT | Performed by: INTERNAL MEDICINE

## 2023-09-22 PROCEDURE — 87116 MYCOBACTERIA CULTURE: CPT | Performed by: INTERNAL MEDICINE

## 2023-09-22 PROCEDURE — 87206 SMEAR FLUORESCENT/ACID STAI: CPT | Performed by: INTERNAL MEDICINE

## 2023-09-22 PROCEDURE — 82042 OTHER SOURCE ALBUMIN QUAN EA: CPT | Performed by: INTERNAL MEDICINE

## 2023-09-22 PROCEDURE — 87075 CULTR BACTERIA EXCEPT BLOOD: CPT | Performed by: INTERNAL MEDICINE

## 2023-09-22 PROCEDURE — 71045 X-RAY EXAM CHEST 1 VIEW: CPT

## 2023-09-22 PROCEDURE — 83615 LACTATE (LD) (LDH) ENZYME: CPT | Performed by: INTERNAL MEDICINE

## 2023-09-22 PROCEDURE — 82945 GLUCOSE OTHER FLUID: CPT | Performed by: INTERNAL MEDICINE

## 2023-09-22 PROCEDURE — 88108 CYTOPATH CONCENTRATE TECH: CPT | Performed by: INTERNAL MEDICINE

## 2023-09-22 PROCEDURE — 87015 SPECIMEN INFECT AGNT CONCNTJ: CPT | Performed by: INTERNAL MEDICINE

## 2023-09-22 PROCEDURE — 84157 ASSAY OF PROTEIN OTHER: CPT | Performed by: INTERNAL MEDICINE

## 2023-09-22 PROCEDURE — 87102 FUNGUS ISOLATION CULTURE: CPT | Performed by: INTERNAL MEDICINE

## 2023-09-22 PROCEDURE — 89051 BODY FLUID CELL COUNT: CPT | Performed by: INTERNAL MEDICINE

## 2023-09-22 PROCEDURE — 87070 CULTURE OTHR SPECIMN AEROBIC: CPT | Performed by: INTERNAL MEDICINE

## 2023-09-22 PROCEDURE — 83986 ASSAY PH BODY FLUID NOS: CPT | Performed by: INTERNAL MEDICINE

## 2023-09-22 PROCEDURE — 87205 SMEAR GRAM STAIN: CPT | Performed by: INTERNAL MEDICINE

## 2023-09-22 NOTE — TELEPHONE ENCOUNTER
Caller: BRUNO ARGUETAAN    Relationship: Emergency Contact    Best call back number: [phone]     What medications are you currently taking:   Current Outpatient Medications on File Prior to Visit   Medication Sig Dispense Refill    aspirin 81 MG EC tablet Take 1 tablet by mouth Daily. 30 tablet 1    atorvastatin (LIPITOR) 20 MG tablet Take 1 tablet by mouth Every Night. 90 tablet 1    budesonide (PULMICORT) 0.5 MG/2ML nebulizer solution Take 2 mL by nebulization 2 (Two) Times a Day. 30 each 0    carvedilol (COREG) 6.25 MG tablet Take 1 tablet by mouth 2 (Two) Times a Day. 180 tablet 3    clopidogrel (PLAVIX) 75 MG tablet Take 1 tablet by mouth Daily. 30 tablet 0    dapagliflozin Propanediol (Farxiga) 10 MG tablet Take 10 mg by mouth Daily. 90 tablet 3    furosemide (LASIX) 20 MG tablet Take 1 tablet by mouth Daily As Needed (swelling/wt gain). 90 tablet 3    guaiFENesin (MUCINEX) 600 MG 12 hr tablet Take 1 tablet by mouth Every 12 (Twelve) Hours. 15 tablet 0    ipratropium-albuterol (DUO-NEB) 0.5-2.5 mg/3 ml nebulizer Take 3 mL by nebulization Every 6 (Six) Hours As Needed for Shortness of Air. 360 mL 0    lisinopril (PRINIVIL,ZESTRIL) 20 MG tablet Take 1 tablet by mouth Daily. 90 tablet 3    nitroglycerin (NITROSTAT) 0.4 MG SL tablet        No current facility-administered medications on file prior to visit.          When did you start taking these medications: HAS NOT YET STARTED AGAIN    Which medication are you concerned about: FUROSEMIDE    Who prescribed you this medication: NIK RUELAS    What are your concerns: PATIENT HAD STOPPED TAKING HER WATER PILL, FUROSEMIDE AND NOW HER LUNG DOCTOR WANTS HER TAKING IT AGAIN BECAUSE SHE HAS FLUID AROUND HER LUNGS AND THEY'RE THINKIN [facility] EMIGHT HAVE SOME AROUND HER HEART TOO BUT THEY WERE WONDERING HOW MUCH NIK RUELAS THOUGHT WOULD BE OKAY TO TAKE, WHETHER IT WOULD BE ONE OR TWO PILLS    How long have you had these concerns: TODAY

## 2023-09-22 NOTE — PROCEDURES
Bedside thoracic ultrasound:    Left showed B lines, small to moderate effusion with anechoic space between lung and diaphragm, curtain sign seen.    Right side showed moderate effusion with anechoic space between lung and diaphragm.     Site marked for thoracentesis.    Images stored online.

## 2023-09-22 NOTE — PROCEDURES
Thoracentesis Procedure Note    Indication: Moderate right sided pleural effusion    Consent: Yes, placed in chart.    Procedure: The patient was placed in the sitting position and the appropriate landmarks were identified. The skin over the puncture site in the right posterior chest wall was prepped with ChloraPrep and draped in sterile fashion. Local anesthesia was applied with 1% lidocaine. Thoracentesis catheter was inserted with needle guidance. Pleural fluid was reddish yellow, drained 850 mL fluid. The catheter was then withdrawn and a sterile dressing was placed over the site. A post-procedure film is pending at this time.    The patient tolerated the procedure well.    Complications: None    Electronically signed by Endy Nascimento MD, 09/22/23, 11:58 AM EDT.

## 2023-09-23 LAB — NIGHT BLUE STAIN TISS: NORMAL

## 2023-09-25 LAB
BACTERIA FLD CULT: NORMAL
GRAM STN SPEC: NORMAL
GRAM STN SPEC: NORMAL

## 2023-09-25 NOTE — TELEPHONE ENCOUNTER
SW patient's daughter, Coco- verified on patient verbal consent.  Went over recommendations. Patient's daughter verbalized understanding and appreciation.

## 2023-09-26 LAB
CYTO UR: NORMAL
LAB AP CASE REPORT: NORMAL
LAB AP CLINICAL INFORMATION: NORMAL
PATH REPORT.FINAL DX SPEC: NORMAL
PATH REPORT.GROSS SPEC: NORMAL

## 2023-09-27 LAB — BACTERIA SPEC ANAEROBE CULT: NORMAL

## 2023-09-27 NOTE — PROGRESS NOTES
Primary Care Provider  Ramirez Maurice MD   Referring Provider  No ref. provider found    Patient Complaint  Follow-up, Cough, and Shortness of Breath      SUBJECTIVE    History of Presenting Illness  Cris Agudelo is a pleasant 82 y.o. female patient of Dr. Nascimento who presents to [facility] for follow-up visit.  Patient here with daughter.  Patient last saw Dr. Nascimento 8/17/2023.  Patient have shortness of air with exertion, wheezing.  She was scheduled to get a pulmonary function test, IgE level and a CT scan of her chest.   She may need a bronchoscopy.  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  Her pulmonary function test shows severe restrictive airway disease with low lung volumes and diffusion capacity.  She did pass her 6-minute walk with lowest oxygen saturation of 92%.     CT scan of chest showed small to moderate bilateral pleural effusions, mild cardiomegaly, trace pericardial effusion, borderline enlarged prevascular lymph nodes, indeterminate pulmonary nodules, airspace opacity in the lungs bases bilaterally, 1.5 cm enhancing lesion of the right hepatic lobe suspected to represent a hemangioma.    Patient underwent thoracentesis of the right side with 850 mL removed.  Cytology returned negative for malignant cells.  Fungal culture with no growth to date.    Patient has been using samples of Stiolto.  She was prescribed Brovana and Pulmicort however she cannot afford the Brovana through her pharmacy.  She does have Pulmicort at home and duo nebulizer at home.  She does have shortness of air with exertional activities but improves with rest.  She does have some coughing with occasional mucus production.  She denies wheezing, headaches, chest pain, weight loss or hemoptysis. Denies fevers, chills and night sweats. Cris Agudelo is able to perform ADLs without difficulties and denies any swollen  glands/lymph nodes in the head or neck.  Daughter is concerned patient does not have much of an appetite.  She is trying to give her boost shakes.  Daughter states she has lost about 10 pounds.  At present her weight is 128 pounds with a BMI of 25.     I have personally reviewed the review of systems, past family, social, medical and surgical histories; and agree with their findings.    Review of Systems  Constitutional symptoms:  Denied complaints   Ear, nose, throat: Denied complaints  Cardiovascular:  Denied complaints  Respiratory: Shortness of air with exertion, cough  Gastrointestinal: Denied complaints  Musculoskeletal: Denied complaints    History reviewed. No pertinent family history.     Social History     Socioeconomic History    Marital status:    Tobacco Use    Smoking status: Former     Types: Cigarettes     Quit date: 2023     Years since quittin.4     Passive exposure: Past    Smokeless tobacco: Never    Tobacco comments:     Pt was a social smoker   Vaping Use    Vaping Use: Never used   Substance and Sexual Activity    Alcohol use: Not Currently    Drug use: Never    Sexual activity: Defer        Past Medical History:   Diagnosis Date    Acute non-ST elevation myocardial infarction (NSTEMI) of anterior wall involving right ventricle 2023    COPD (chronic obstructive pulmonary disease)     Heart attack     Hyperlipidemia     Hypertension         Immunization History   Administered Date(s) Administered    Pneumococcal Conjugate 20-Valent (PCV20) 2023       Allergies   Allergen Reactions    Aleve [Naproxen] Hives          Current Outpatient Medications:     aspirin 81 MG EC tablet, Take 1 tablet by mouth Daily., Disp: 30 tablet, Rfl: 1    atorvastatin (LIPITOR) 20 MG tablet, Take 1 tablet by mouth Every Night., Disp: 90 tablet, Rfl: 1    budesonide (PULMICORT) 0.5 MG/2ML nebulizer solution, Take 2 mL by nebulization 2 (Two) Times a Day., Disp: 120 mL, Rfl: 5    carvedilol  "(COREG) 6.25 MG tablet, Take 1 tablet by mouth 2 (Two) Times a Day., Disp: 180 tablet, Rfl: 3    clopidogrel (PLAVIX) 75 MG tablet, Take 1 tablet by mouth Daily., Disp: 30 tablet, Rfl: 0    dapagliflozin Propanediol (Farxiga) 10 MG tablet, Take 10 mg by mouth Daily., Disp: 90 tablet, Rfl: 3    furosemide (LASIX) 20 MG tablet, Take 1 tablet by mouth Daily As Needed (swelling/wt gain)., Disp: 90 tablet, Rfl: 3    guaiFENesin (MUCINEX) 600 MG 12 hr tablet, Take 1 tablet by mouth Every 12 (Twelve) Hours., Disp: 15 tablet, Rfl: 0    ipratropium-albuterol (DUO-NEB) 0.5-2.5 mg/3 ml nebulizer, Take 3 mL by nebulization Every 6 (Six) Hours As Needed for Shortness of Air., Disp: 360 mL, Rfl: 5    lisinopril (PRINIVIL,ZESTRIL) 20 MG tablet, Take 1 tablet by mouth Daily., Disp: 90 tablet, Rfl: 3    arformoterol (BROVANA) 15 MCG/2ML nebulizer solution, Take 2 mL by nebulization 2 (Two) Times a Day., Disp: 120 mL, Rfl: 5    nitroglycerin (NITROSTAT) 0.4 MG SL tablet, , Disp: , Rfl:            Vital Signs   /55 (BP Location: Right arm, Patient Position: Sitting, Cuff Size: Adult)   Pulse 64   Temp 97.8 °F (36.6 °C) (Temporal)   Resp 16   Ht 152.4 cm (60\")   Wt 58.2 kg (128 lb 6.4 oz)   SpO2 97% Comment: room air  BMI 25.08 kg/m²       OBJECTIVE    Physical Exam  Vital Signs Reviewed   WDWN, Alert, NAD.    HEENT:  PERRL, EOMI.  OP, nares clear  Neck:  Supple, no JVD, no thyromegaly  Chest:  good aeration, clear to auscultation bilaterally, tympanic to percussion bilaterally, no work of breathing noted  CV: RRR, no MGR, pulses 2+, equal.  Abd:  Soft, NT, ND, + BS, no HSM  EXT:  no clubbing, no cyanosis, no edema  Neuro:  A&Ox3, CN grossly intact, no focal deficits.  Skin: No rashes or lesions noted    Results Review  I have personally reviewed the prior office notes, hospital records, labs, and diagnostics.         File Link    Scan on 9/1/2023 1551 by Nettie Burks MA: Six Minute Walk Assessment        Key " Information    Document ID File Type Document Type Description   [id number] Image PULMONARY RESULTS - SCAN Six Minute Walk Assessment     Import Information    Attached At Date Time User Dept   Order Level 9/1/2023  3:51 PM Nettie Burks MA [facility] Card Rehab     Order    Six Minute Walk Test [[id number]]     Encounter    Procedure visit on 9/1/23 with [facility] CARD REHAB, WALK TEST            File Link    Scan on 9/1/2023 by Endy Nascimento MD: PULMONARY FUNCTION TEST, [facility], 09/01/2023        Key Information    Document ID File Type Document Type Description   [id number] Image PULMONARY FUNCTION TEST - SCAN PULMONARY FUNCTION TEST, [facility], 09/01/2023     Import Information    Attached At Date Time User Dept   Order Level 9/1/2023  Endy Nascimento MD      Order    Pulmonary Function Test [[id number]]     Encounter    Hospital Encounter on 9/1/23 with [facility] PULM LAB ROOM 1   Results  Complete PFT - Pre & Post Bronchodilator (Order [id number])  Order-Level Documents:    Scan on 9/1/2023 by Endy Nascimento MD: PULMONARY FUNCTION TEST, [facility], 09/01/2023         Author: -- Service: -- Author Type: --   Filed: Date of Service: Creation Time:   Status: (Other)   PFT interpretation     Spirometry shows severe restrictive defect.  FEV1/FVC is 67.   FEV1 is 0.74 liters, 44 percent of predicted.  FVC is 1.11 liters, 48 percent of predicted.     There is no significant response to bronchodilator administration.     Lung volumes:  Lung volumes confirm restriction.   Total lung capacity is 2.90 liters, 60 percent of predicted.  Residual volume is 1.77 liters, 75 percent of predicted.        Diffusion capacity is 7.80, 42 percent of predicted.      Flow volume loop compatible with restrictive process.     Comparision:  No previous study for comparison.     Conclusion:  Normal FEV1/FVC with low FEV1 and FVC with low lung volumes and low diffusion capacity.  It suggests severe restrictive airway disease, likely pneumonitis or interstitial  lung disease.  Please correlate clinically.      IgE Level  Order: [id number]  Status: Final result       Visible to patient: Yes (seen)       Next appt: 09/29/2023 at 11:15 AM in Pulmonology (JES Wagner)       Dx: Wheezing; OROSCO (dyspnea on exertion)    Specimen Information: Blood   0 Result Notes      Component  Ref Range & Units 1 mo ago   IgE  6 - 495 IU/mL 449   Resulting Agency [facility]           Narrative  Performed by: [facility]  Performed at:  [address] [facility]  [id number]  : Jonah Zhu MD, Phone:  [phone]      Specimen Collected: 08/18/23 12:16 EDT Last Resulted: 08/23/23 19:06 EDT         CT Chest With Contrast Diagnostic [[id number]] (Order [id number])  Order  Status: Final result     Appointment Information    Display Notes    V-AS                  PACS Images     Radiology Images    Study Result    Narrative & Impression   PROCEDURE:  CT CHEST W CONTRAST DIAGNOSTIC     COMPARISON:  [facility], CR, XR CHEST 1 VW, 8/07/2023, 6:41.  [facility], CT, ABD PEL W/O CONTRAST, 9/19/2018, 9:02.  INDICATIONS:  Dyspnea on exertion     TECHNIQUE:    After obtaining the patient's consent, CT images were obtained with non-ionic   intravenous contrast material.       PROTOCOL:     Standard imaging protocol performed                 RADIATION:      DLP: 387mGy*cm               Automated exposure control was utilized to minimize radiation dose.   CONTRAST:      95cc Isovue 370 I.V.  LABS:   eGFR: 95ml/min/1.73m2     FINDINGS:          Small to moderate bilateral pleural effusions are noted measuring up to 3.8 cm in thickness on the   left and 6.0 cm in thickness on the right.  A trace pericardial effusion is noted.  Mild   cardiomegaly is noted.  The thoracic aorta demonstrates normal caliber.  Borderline enlarged   prevascular lymph nodes measure up to 1.0 cm.       In the right upper lobe on image 51 is a 0.5 cm nodule.  Along  the right minor fissure on image 61   is a 0.6 cm nodule.  There is focal thickening along the left major fissure on image 67 measuring   0.8 cm x 0.3 cm.  Abnormal airspace opacity is noted in the lower lobes bilaterally.     In the right hepatic lobe on image 113 is a 1.4 cm enhancing lesion.  This was present on the   9/19/2018 exam suggesting a benign etiology.  The visualized upper abdomen otherwise appears   unremarkable.     No focal osseous abnormality is seen.            IMPRESSION:                 1. Small to moderate bilateral pleural effusions  2. Mild cardiomegaly, new since 9/19/2018  3. Trace pericardial effusion  4. Borderline enlarged prevascular lymph nodes, as above  5. Indeterminate pulmonary nodules, as above.  The findings include multiple, incidentally   detected, solid pulmonary nodules, measuring between 6 and 8mm.  2017 guidelines from the   Fleischner Society for the follow-up and management of newly detected indeterminate pulmonary   nodules in persons at least 35 years of age depend on nodule size (average length and width) and   underlying risk factors (including smoking and other risk factors). Please consider the following   recommendations after clinical assessment of risk factors.  For 6-8mm nodules: In low risk   patients, CT in 3 to 6 months, then consider CT in 18 to 24 months.  In high risk patients, CT in 3   to 6 months, then obtain CT in 18 to 24 months.  Use most suspicious nodule as guide to management.      6. Airspace opacity in the lung bases bilaterally.  Differential includes atelectasis, pneumonia   and pulmonary edema.  7. 1.4 cm enhancing lesion of the right hepatic lobe suspected to represent a hemangioma.            Wenceslao Izquierdo M.D.         Electronically Signed and Approved By: Wenceslao Izquierdo M.D. on 9/19/2023 at 16:37       Non-gynecologic Cytology: [id number]  Order: [id number]  Status: Final result       Visible to patient: No (scheduled for 9/29/2023  9:26  AM)       Next appt: 09/29/2023 at 11:15 AM in Pulmonology (J[facility] Wagner)       Dx: Bilateral pleural effusion    Specimen Information: Pleural Cavity; Pleural Fluid   0 Result Notes      Component    Case Report   Medical Cytology Report                           Case: [id number]                                   Authorizing Provider:  Endy Nascimento MD         Collected:           09/22/2023 12:29 PM           Ordering Location:     [place] [address]    Received:            09/25/2023 09:48 AM                                  [facility] SERVICES                                                     Pathologist:           Omari Rodríguez MD                                                             Specimen:    Pleural Cavity                                                                            Final Diagnosis   Pleural fluid, right, thoracentesis:                - Negative for malignant cells      Electronically signed by Omari Rodríguez MD on 9/26/2023 at 0926      Contains abnormal data Body fluid cell count - Body Fluid, Pleural Cavity  Order: [id number] - Part of Panel Order [id number]  Status: Final result       Visible to patient: Yes (not seen)       Next appt: 09/29/2023 at 11:15 AM in Pulmonology (JES Wagner)    Specimen Information: Pleural Cavity; Body Fluid   0 Result Notes      Component  Ref Range & Units 6 d ago   Color, Fluid Orange   Appearance, Fluid  Clear Hazy Abnormal    Nucleated Cells, Fluid  /mm3 428   RBC, Fluid    /mm3 16,000   Resulting Agency [facility]           Narrative  Performed by: [facility]  No reference range established. Physician to interpret results with clinical findings.      Specimen Collected: 09/22/23 12:29 EDT Last Resulted: 09/22/23 13:18 EDT           ASSESSMENT         Patient Active Problem List   Diagnosis    NSTEMI (non-ST elevated myocardial infarction)    Type 1 myocardial infarction    Hyponatremia    Stress-induced cardiomyopathy     Hyperlipidemia    Hypertensive disorder    Herpes zoster    Shingles    Takotsubo cardiomyopathy    Myocardial infarction    History of non-ST elevation myocardial infarction (NSTEMI)    Chronic HFrEF (heart failure with reduced ejection fraction)    Coronary artery disease involving native coronary artery of native heart without angina pectoris       Encounter Diagnoses   Name Primary?    Bilateral pleural effusion Yes    OROSCO (dyspnea on exertion)     Multiple lung nodules on CT       PLAN  -Repeat CT scan of chest in 3 months order placed  -IgE normal  -Refills on Pulmicort, Brovana, and Duo nebulizers medications sent to [facility]  -followup with cardiology as scheduled  -return to office in 3 months or sooner if needed    Diagnoses and all orders for this visit:    1. Bilateral pleural effusion (Primary)  -     CT Chest Without Contrast; Future  -     budesonide (PULMICORT) 0.5 MG/2ML nebulizer solution; Take 2 mL by nebulization 2 (Two) Times a Day.  Dispense: 120 mL; Refill: 5  -     arformoterol (BROVANA) 15 MCG/2ML nebulizer solution; Take 2 mL by nebulization 2 (Two) Times a Day.  Dispense: 120 mL; Refill: 5  -     ipratropium-albuterol (DUO-NEB) 0.5-2.5 mg/3 ml nebulizer; Take 3 mL by nebulization Every 6 (Six) Hours As Needed for Shortness of Air.  Dispense: 360 mL; Refill: 5    2. OROSCO (dyspnea on exertion)  -     CT Chest Without Contrast; Future  -     budesonide (PULMICORT) 0.5 MG/2ML nebulizer solution; Take 2 mL by nebulization 2 (Two) Times a Day.  Dispense: 120 mL; Refill: 5  -     arformoterol (BROVANA) 15 MCG/2ML nebulizer solution; Take 2 mL by nebulization 2 (Two) Times a Day.  Dispense: 120 mL; Refill: 5  -     ipratropium-albuterol (DUO-NEB) 0.5-2.5 mg/3 ml nebulizer; Take 3 mL by nebulization Every 6 (Six) Hours As Needed for Shortness of Air.  Dispense: 360 mL; Refill: 5    3. Multiple lung nodules on CT  -     CT Chest Without Contrast; Future  -     budesonide (PULMICORT) 0.5 MG/2ML  nebulizer solution; Take 2 mL by nebulization 2 (Two) Times a Day.  Dispense: 120 mL; Refill: 5  -     arformoterol (BROVANA) 15 MCG/2ML nebulizer solution; Take 2 mL by nebulization 2 (Two) Times a Day.  Dispense: 120 mL; Refill: 5  -     ipratropium-albuterol (DUO-NEB) 0.5-2.5 mg/3 ml nebulizer; Take 3 mL by nebulization Every 6 (Six) Hours As Needed for Shortness of Air.  Dispense: 360 mL; Refill: 5           Smoking status: Current  Vaccination status: Up-to-date with pneumonia vaccine only, declines COVID vaccine and flu vaccines  Medications personally reviewed    Follow Up  Return in about 3 months (around 12/29/2023) for after CT scan.    Patient was given instructions and counseling regarding her condition or for health maintenance advice. Please see specific information pulled into the AVS if appropriate.

## 2023-09-29 ENCOUNTER — OFFICE VISIT (OUTPATIENT)
Dept: PULMONOLOGY | Facility: CLINIC | Age: 83
End: 2023-09-29
Payer: MEDICARE

## 2023-09-29 ENCOUNTER — OFFICE VISIT (OUTPATIENT)
Dept: CARDIOLOGY | Facility: CLINIC | Age: 83
End: 2023-09-29
Payer: MEDICARE

## 2023-09-29 VITALS
RESPIRATION RATE: 16 BRPM | SYSTOLIC BLOOD PRESSURE: 134 MMHG | DIASTOLIC BLOOD PRESSURE: 55 MMHG | WEIGHT: 128.4 LBS | OXYGEN SATURATION: 97 % | HEIGHT: 60 IN | HEART RATE: 64 BPM | TEMPERATURE: 97.8 F | BODY MASS INDEX: 25.21 KG/M2

## 2023-09-29 VITALS
HEART RATE: 69 BPM | WEIGHT: 127 LBS | BODY MASS INDEX: 24.94 KG/M2 | DIASTOLIC BLOOD PRESSURE: 45 MMHG | SYSTOLIC BLOOD PRESSURE: 107 MMHG | HEIGHT: 60 IN

## 2023-09-29 DIAGNOSIS — I25.10 CORONARY ARTERY DISEASE INVOLVING NATIVE CORONARY ARTERY OF NATIVE HEART WITHOUT ANGINA PECTORIS: ICD-10-CM

## 2023-09-29 DIAGNOSIS — I51.81 STRESS-INDUCED CARDIOMYOPATHY: Primary | ICD-10-CM

## 2023-09-29 DIAGNOSIS — J90 BILATERAL PLEURAL EFFUSION: Primary | ICD-10-CM

## 2023-09-29 DIAGNOSIS — I50.22 CHRONIC HFREF (HEART FAILURE WITH REDUCED EJECTION FRACTION): ICD-10-CM

## 2023-09-29 DIAGNOSIS — I10 PRIMARY HYPERTENSION: ICD-10-CM

## 2023-09-29 DIAGNOSIS — R91.8 MULTIPLE LUNG NODULES ON CT: ICD-10-CM

## 2023-09-29 DIAGNOSIS — R06.09 DOE (DYSPNEA ON EXERTION): ICD-10-CM

## 2023-09-29 LAB
FUNGUS WND CULT: NORMAL
MYCOBACTERIUM SPEC CULT: NORMAL
NIGHT BLUE STAIN TISS: NORMAL

## 2023-09-29 PROCEDURE — 3075F SYST BP GE 130 - 139MM HG: CPT | Performed by: NURSE PRACTITIONER

## 2023-09-29 PROCEDURE — 99214 OFFICE O/P EST MOD 30 MIN: CPT | Performed by: NURSE PRACTITIONER

## 2023-09-29 PROCEDURE — 1160F RVW MEDS BY RX/DR IN RCRD: CPT | Performed by: NURSE PRACTITIONER

## 2023-09-29 PROCEDURE — 3078F DIAST BP <80 MM HG: CPT | Performed by: NURSE PRACTITIONER

## 2023-09-29 PROCEDURE — 1159F MED LIST DOCD IN RCRD: CPT | Performed by: NURSE PRACTITIONER

## 2023-09-29 RX ORDER — BUDESONIDE 0.5 MG/2ML
0.5 INHALANT ORAL
Qty: 120 ML | Refills: 5 | COMMUNITY
Start: 2023-09-29

## 2023-09-29 RX ORDER — ARFORMOTEROL TARTRATE 15 UG/2ML
15 SOLUTION RESPIRATORY (INHALATION)
Qty: 120 ML | Refills: 5 | COMMUNITY
Start: 2023-09-29

## 2023-09-29 RX ORDER — IPRATROPIUM BROMIDE AND ALBUTEROL SULFATE 2.5; .5 MG/3ML; MG/3ML
3 SOLUTION RESPIRATORY (INHALATION) EVERY 6 HOURS PRN
Qty: 360 ML | Refills: 5 | Status: SHIPPED | OUTPATIENT
Start: 2023-09-29

## 2023-09-29 NOTE — ASSESSMENT & PLAN NOTE
She is euvolemic appearing today.  Continue current doses of carvedilol, lisinopril, and Farxiga.  Pleural effusion developed while she was only using furosemide on a as needed basis, encouraged her to go ahead and resume taking daily.  At this point her blood pressure is soft enough I do not want to add any further agents, I would like to see what her electrolyte responses before considering any addition of spironolactone.

## 2023-09-29 NOTE — PROGRESS NOTES
"Chief Complaint  Follow-up and Cardiomyopathy    Subjective        History of Present Illness  Cris Agudelo presents to [facility] CARDIOLOGY   Ms. Agudelo is an 82-year-old female patient coming in today for follow-up.  She is scheduled follow-up today to discuss diuretic therapy.  She was hospitalized in early August for cardiomyopathy.  At that time she had significant hyponatremia, improved after stopping HCTZ, although she temporarily was also on salt tablets.  Following her hospitalization, we did temporarily use furosemide to diurese her due to lower extremity swelling, but she transition to \"prn' use at nephrology recommendation.  She had seen pulmonology, and CT of the chest showed pleural effusion, therefore she is underwent right-sided thoracentesis.  She reports her breathing is back to her baseline she is not complaining of any significant shortness of breath in office today.  Her swelling is also not returned.  She has no complaints of chest pain/pressure.      Past Medical History:   Diagnosis Date    Acute non-ST elevation myocardial infarction (NSTEMI) of anterior wall involving right ventricle 08/06/2023    COPD (chronic obstructive pulmonary disease)     Heart attack     Hyperlipidemia     Hypertension        Allergies   Allergen Reactions    Aleve [Naproxen] Hives        Past Surgical History:   Procedure Laterality Date    CARDIAC CATHETERIZATION N/A 8/6/2023    Procedure: Left Heart Cath;  Surgeon: Mitch Correia MD;  Location: [facility] INVASIVE LOCATION;  Service: Cardiology;  Laterality: N/A;    CARDIAC CATHETERIZATION N/A 8/6/2023    Procedure: Coronary angiography;  Surgeon: Mitch Correia MD;  Location: [facility] INVASIVE LOCATION;  Service: Cardiology;  Laterality: N/A;        Social History  She  reports that she quit smoking about 4 months ago. Her smoking use included cigarettes. She has been exposed to tobacco smoke. She has never used smokeless tobacco. She reports " that she does not currently use alcohol. She reports that she does not use drugs.    Family History  Her family history is not on file.       Current Outpatient Medications on File Prior to Visit   Medication Sig    arformoterol (BROVANA) 15 MCG/2ML nebulizer solution Take 2 mL by nebulization 2 (Two) Times a Day.    aspirin 81 MG EC tablet Take 1 tablet by mouth Daily.    atorvastatin (LIPITOR) 20 MG tablet Take 1 tablet by mouth Every Night.    budesonide (PULMICORT) 0.5 MG/2ML nebulizer solution Take 2 mL by nebulization 2 (Two) Times a Day.    carvedilol (COREG) 6.25 MG tablet Take 1 tablet by mouth 2 (Two) Times a Day.    clopidogrel (PLAVIX) 75 MG tablet Take 1 tablet by mouth Daily.    dapagliflozin Propanediol (Farxiga) 10 MG tablet Take 10 mg by mouth Daily.    furosemide (LASIX) 20 MG tablet Take 1 tablet by mouth Daily As Needed (swelling/wt gain).    guaiFENesin (MUCINEX) 600 MG 12 hr tablet Take 1 tablet by mouth Every 12 (Twelve) Hours.    ipratropium-albuterol (DUO-NEB) 0.5-2.5 mg/3 ml nebulizer Take 3 mL by nebulization Every 6 (Six) Hours As Needed for Shortness of Air.    lisinopril (PRINIVIL,ZESTRIL) 20 MG tablet Take 1 tablet by mouth Daily.    nitroglycerin (NITROSTAT) 0.4 MG SL tablet     [DISCONTINUED] budesonide (PULMICORT) 0.5 MG/2ML nebulizer solution Take 2 mL by nebulization 2 (Two) Times a Day.    [DISCONTINUED] ipratropium-albuterol (DUO-NEB) 0.5-2.5 mg/3 ml nebulizer Take 3 mL by nebulization Every 6 (Six) Hours As Needed for Shortness of Air. (Patient not taking: Reported on 9/29/2023)     No current facility-administered medications on file prior to visit.         Review of Systems   Constitutional:  Positive for fatigue.   Respiratory:  Positive for shortness of breath (Mild). Negative for cough and chest tightness.    Cardiovascular:  Negative for chest pain, palpitations and leg swelling.   Gastrointestinal:  Negative for nausea and vomiting.   Neurological:  Negative for  "dizziness and syncope.      Objective   Vitals:    09/29/23 1311   BP: 107/45   Pulse: 69   Weight: 57.6 kg (127 lb)   Height: 152.4 cm (60\")         Physical Exam  General : Alert, awake, no acute distress  Neck : Supple, no carotid bruit, no jugular venous distention  CVS : Regular rate and rhythm, no murmur, rubs or gallops  Lungs: Clear to auscultation bilaterally, no crackles or rhonchi  Abdomen: Soft, nontender, bowel sounds active  Extremities: Warm, well-perfused, no pedal edema      Result Review     The following data was reviewed by JES Cutler  proBNP   Date Value Ref Range Status   08/06/2023 1,164.0 0.0 - 1,800.0 pg/mL Final     CMP          8/8/2023    04:55 8/8/2023    08:46 8/18/2023    12:16 9/19/2023    13:09   CMP   Glucose 136  187  114     BUN 14  13  10     Creatinine 0.63  0.62  0.80  0.60    EGFR 88.7  89.0  73.7  89.7    Sodium 123  124     124  141     Potassium 4.1  4.0  3.9     Chloride 90  89  107     Calcium 8.5  8.6  8.8     Total Protein 5.9   6.4     Albumin 3.3   4.1     Globulin   2.3     Total Bilirubin 0.7   0.4     Alkaline Phosphatase 87   97     AST (SGOT) 17   16     ALT (SGPT) 8   17     Albumin/Globulin Ratio   1.8     BUN/Creatinine Ratio 22.2  21.0  12.5     Anion Gap 8.9  10.9  10.0       CBC w/diff          8/6/2023    03:25 8/7/2023    04:23 8/8/2023    04:55   CBC w/Diff   WBC 12.11  11.45  9.77    RBC 4.92  4.40  4.08    Hemoglobin 14.3  13.0  11.8    Hematocrit 42.2  38.4  34.6    MCV 85.8  87.3  84.8    MCH 29.1  29.5  28.9    MCHC 33.9  33.9  34.1    RDW 12.9  12.7  12.8    Platelets 280  259  250    Neutrophil Rel % 75.5  71.9  72.5    Immature Granulocyte Rel % 0.4  0.8  0.6    Lymphocyte Rel % 15.5  17.0  17.1    Monocyte Rel % 4.6  6.2  7.9    Eosinophil Rel % 3.7  3.8  1.7    Basophil Rel % 0.3  0.3  0.2       No results found for: TSH   No results found for: FREET4   No results found for: DDIMERQUANT  Magnesium   Date Value Ref Range Status "   08/06/2023 1.7 1.6 - 2.4 mg/dL Final      No results found for: DIGOXIN   Lab Results   Component Value Date    TROPONINT 553 (C) 08/06/2023           Lipid Panel          8/6/2023    18:14   Lipid Panel   Total Cholesterol 216    Triglycerides 185    HDL Cholesterol 44    VLDL Cholesterol 33    LDL Cholesterol  139    LDL/HDL Ratio 3.07        Results for orders placed during the hospital encounter of 08/06/23    Adult Transthoracic Echo Complete W/ Cont if Necessary Per Protocol    Interpretation Summary    Left ventricular systolic function is normal. Left ventricular ejection fraction appears to be 36 - 40%.    The following left ventricular wall segments are hypokinetic: mid anterior and apical anterior.    The findings are consistent with stress-induced (Takotsubo) cardiomyopathy.    Left ventricular diastolic function is consistent with (grade I) impaired relaxation.    Estimated right ventricular systolic pressure from tricuspid regurgitation is normal (<35 mmHg).             Assessment and Plan   Diagnoses and all orders for this visit:    1. Primary hypertension (Primary)  Assessment & Plan:  Blood pressures well controlled, continue current regiment.      2. Coronary artery disease involving native coronary artery of native heart without angina pectoris  Assessment & Plan:  She is stable, without any symptoms of angina.  Continue DAPT therapy with Plavix and aspirin, continue current dose of beta-blocker and statin therapy.      3. Stress-induced cardiomyopathy  Assessment & Plan:  She is currently fairly euvolemic, but considering her reduced ejection fraction of 36 to 40%, recommend we go ahead and continue her diuretics on a daily basis instead of as needed basis.  She already has orders to repeat echocardiogram to reassess her LV function coming up.      4. Chronic HFrEF (heart failure with reduced ejection fraction)  Assessment & Plan:  She is euvolemic appearing today.  Continue current doses of  carvedilol, lisinopril, and Farxiga.  Pleural effusion developed while she was only using furosemide on a as needed basis, encouraged her to go ahead and resume taking daily.  At this point her blood pressure is soft enough I do not want to add any further agents, I would like to see what her electrolyte responses before considering any addition of spironolactone.             Follow Up   Return for with Dr. Hernandez, As already scheduled.    Patient was given instructions and counseling regarding her condition or for health maintenance advice. Please see specific information pulled into the AVS if appropriate.     Signed,  Izabella Oro, APRN  09/29/2023     Dictated Utilizing Dragon Dictation: Please note that portions of this note were completed with a voice recognition program.  Part of this note may be an electronic transcription/translation of spoken language to printed text using the Dragon Dictation System.

## 2023-09-29 NOTE — ASSESSMENT & PLAN NOTE
She is stable, without any symptoms of angina.  Continue DAPT therapy with Plavix and aspirin, continue current dose of beta-blocker and statin therapy.

## 2023-09-29 NOTE — ASSESSMENT & PLAN NOTE
She is currently fairly euvolemic, but considering her reduced ejection fraction of 36 to 40%, recommend we go ahead and continue her diuretics on a daily basis instead of as needed basis.  She already has orders to repeat echocardiogram to reassess her LV function coming up.

## 2023-10-02 ENCOUNTER — TELEPHONE (OUTPATIENT)
Dept: PULMONOLOGY | Facility: CLINIC | Age: 83
End: 2023-10-02
Payer: MEDICARE

## 2023-10-04 DIAGNOSIS — J90 BILATERAL PLEURAL EFFUSION: ICD-10-CM

## 2023-10-04 DIAGNOSIS — R06.09 DOE (DYSPNEA ON EXERTION): ICD-10-CM

## 2023-10-04 DIAGNOSIS — R91.8 MULTIPLE LUNG NODULES ON CT: ICD-10-CM

## 2023-10-04 RX ORDER — BUDESONIDE 0.5 MG/2ML
0.5 INHALANT ORAL
Qty: 120 ML | Refills: 5 | Status: CANCELLED | OUTPATIENT
Start: 2023-10-04

## 2023-10-04 RX ORDER — ARFORMOTEROL TARTRATE 15 UG/2ML
15 SOLUTION RESPIRATORY (INHALATION)
Qty: 120 ML | Refills: 5 | Status: CANCELLED | OUTPATIENT
Start: 2023-10-04

## 2023-10-06 LAB
FUNGUS WND CULT: NORMAL
MYCOBACTERIUM SPEC CULT: NORMAL
NIGHT BLUE STAIN TISS: NORMAL

## 2023-10-13 LAB
FUNGUS WND CULT: NORMAL
MYCOBACTERIUM SPEC CULT: NORMAL
NIGHT BLUE STAIN TISS: NORMAL

## 2023-10-20 LAB
FUNGUS WND CULT: NORMAL
MYCOBACTERIUM SPEC CULT: NORMAL
NIGHT BLUE STAIN TISS: NORMAL

## 2023-10-27 LAB
MYCOBACTERIUM SPEC CULT: NORMAL
NIGHT BLUE STAIN TISS: NORMAL

## 2023-11-03 LAB
MYCOBACTERIUM SPEC CULT: NORMAL
NIGHT BLUE STAIN TISS: NORMAL

## 2023-12-08 ENCOUNTER — HOSPITAL ENCOUNTER (OUTPATIENT)
Dept: CARDIOLOGY | Facility: HOSPITAL | Age: 83
Discharge: HOME | End: 2023-12-08
Payer: MEDICARE

## 2023-12-08 ENCOUNTER — TELEPHONE (OUTPATIENT)
Dept: CARDIOLOGY | Facility: CLINIC | Age: 83
End: 2023-12-08

## 2023-12-08 DIAGNOSIS — I50.22 CHRONIC HFREF (HEART FAILURE WITH REDUCED EJECTION FRACTION): ICD-10-CM

## 2023-12-08 DIAGNOSIS — I51.81 STRESS-INDUCED CARDIOMYOPATHY: ICD-10-CM

## 2023-12-08 LAB
BH CV ECHO MEAS - AI P1/2T: 505.4 MSEC
BH CV ECHO MEAS - AO ROOT DIAM: 3 CM
BH CV ECHO MEAS - EDV(CUBED): 125 ML
BH CV ECHO MEAS - EDV(MOD-SP2): 52.4 ML
BH CV ECHO MEAS - EDV(MOD-SP4): 73.1 ML
BH CV ECHO MEAS - EF(MOD-BP): 58 %
BH CV ECHO MEAS - EF(MOD-SP2): 54.8 %
BH CV ECHO MEAS - EF(MOD-SP4): 59.5 %
BH CV ECHO MEAS - ESV(CUBED): 46.7 ML
BH CV ECHO MEAS - ESV(MOD-SP2): 23.7 ML
BH CV ECHO MEAS - ESV(MOD-SP4): 29.6 ML
BH CV ECHO MEAS - FS: 28 %
BH CV ECHO MEAS - IVS/LVPW: 1.2 CM
BH CV ECHO MEAS - IVSD: 1.26 CM
BH CV ECHO MEAS - LA DIMENSION: 3.8 CM
BH CV ECHO MEAS - LAT PEAK E' VEL: 4.2 CM/SEC
BH CV ECHO MEAS - LV DIASTOLIC VOL/BSA (35-75): 48.7 CM2
BH CV ECHO MEAS - LV MASS(C)D: 221.6 GRAMS
BH CV ECHO MEAS - LV MAX PG: 3.5 MMHG
BH CV ECHO MEAS - LV MEAN PG: 2 MMHG
BH CV ECHO MEAS - LV SYSTOLIC VOL/BSA (12-30): 19.7 CM2
BH CV ECHO MEAS - LV V1 MAX: 94.2 CM/SEC
BH CV ECHO MEAS - LV V1 VTI: 22.9 CM
BH CV ECHO MEAS - LVIDD: 5 CM
BH CV ECHO MEAS - LVIDS: 3.6 CM
BH CV ECHO MEAS - LVOT AREA: 2.5 CM2
BH CV ECHO MEAS - LVOT DIAM: 1.8 CM
BH CV ECHO MEAS - LVPWD: 1.05 CM
BH CV ECHO MEAS - MED PEAK E' VEL: 4 CM/SEC
BH CV ECHO MEAS - MV A MAX VEL: 118 CM/SEC
BH CV ECHO MEAS - MV DEC SLOPE: 235 CM/SEC2
BH CV ECHO MEAS - MV DEC TIME: 0.31 SEC
BH CV ECHO MEAS - MV E MAX VEL: 72.7 CM/SEC
BH CV ECHO MEAS - MV E/A: 0.62
BH CV ECHO MEAS - RVDD: 3 CM
BH CV ECHO MEAS - SI(MOD-SP2): 19.1 ML/M2
BH CV ECHO MEAS - SI(MOD-SP4): 29 ML/M2
BH CV ECHO MEAS - SV(LVOT): 58.3 ML
BH CV ECHO MEAS - SV(MOD-SP2): 28.7 ML
BH CV ECHO MEAS - SV(MOD-SP4): 43.5 ML
BH CV ECHO MEAS - TR MAX PG: 31.4 MMHG
BH CV ECHO MEAS - TR MAX VEL: 280 CM/SEC
BH CV ECHO MEASUREMENTS AVERAGE E/E' RATIO: 17.73
IVRT: 120 MS
LEFT ATRIUM VOLUME INDEX: 28.2 ML/M2

## 2023-12-08 PROCEDURE — 93306 TTE W/DOPPLER COMPLETE: CPT

## 2023-12-08 NOTE — TELEPHONE ENCOUNTER
Caller: ALEJANDRA ARGUETA    Relationship: Emergency Contact    Best call back number: [phone]     What orders are you requesting (i.e. lab or imaging): PROBMP, LIPID, COMPREHENSIVE METABOLIC PANEL (ALREADY ORDERED)    In what timeframe would the patient need to come in: BEFORE NEXT APPOINTMENT ON 1.8.24    Where will you receive your lab/imaging services: DR. CARTER[facility] OFFICE (PCP)    Additional notes:

## 2023-12-11 ENCOUNTER — HOSPITAL ENCOUNTER (OUTPATIENT)
Dept: CT IMAGING | Facility: HOSPITAL | Age: 83
Setting detail: OBSERVATION
Discharge: HOME | End: 2023-12-11
Admitting: RADIOLOGY
Payer: MEDICARE

## 2023-12-11 DIAGNOSIS — R91.8 MULTIPLE LUNG NODULES ON CT: ICD-10-CM

## 2023-12-11 DIAGNOSIS — J90 BILATERAL PLEURAL EFFUSION: ICD-10-CM

## 2023-12-11 DIAGNOSIS — R06.09 DOE (DYSPNEA ON EXERTION): ICD-10-CM

## 2023-12-11 PROCEDURE — 71250 CT THORAX DX C-: CPT

## 2023-12-11 RX ORDER — CLOPIDOGREL BISULFATE 75 MG/1
75 TABLET ORAL DAILY
Qty: 30 TABLET | Refills: 0 | Status: SHIPPED | OUTPATIENT
Start: 2023-12-11

## 2023-12-11 NOTE — TELEPHONE ENCOUNTER
Caller: BRUNO ARGUETAAN    Relationship: Emergency Contact    Best call back number: [phone]    Requested Prescriptions:   Requested Prescriptions     Pending Prescriptions Disp Refills    clopidogrel (PLAVIX) 75 MG tablet 30 tablet 0     Sig: Take 1 tablet by mouth Daily.        Pharmacy where request should be sent:      Last office visit with prescribing clinician: 9/29/2023   Last telemedicine visit with prescribing clinician: Visit date not found   Next office visit with prescribing clinician: Visit date not found     Additional details provided by patient: PATIENT IS OUT OF HER PLAVIX 75 MG SINCE LAST FRIDAY 12.8.23    Does the patient have less than a 3 day supply:  [x] Yes  [] No    Would you like a call back once the refill request has been completed: [x] Yes [] No    If the office needs to give you a call back, can they leave a voicemail: [x] Yes [] No    Antoinette Ho Rep   12/11/23 15:19 EST

## 2023-12-12 DIAGNOSIS — R91.8 MULTIPLE LUNG NODULES ON CT: Primary | ICD-10-CM

## 2023-12-12 DIAGNOSIS — J90 BILATERAL PLEURAL EFFUSION: ICD-10-CM

## 2023-12-12 RX ORDER — CLOPIDOGREL BISULFATE 75 MG/1
75 TABLET ORAL DAILY
Qty: 90 TABLET | OUTPATIENT
Start: 2023-12-12

## 2023-12-19 ENCOUNTER — TELEPHONE (OUTPATIENT)
Dept: CARDIOLOGY | Facility: CLINIC | Age: 83
End: 2023-12-19
Payer: MEDICARE

## 2023-12-19 NOTE — TELEPHONE ENCOUNTER
"----- Message from JES Cutler sent at 12/18/2023 10:25 PM EST -----  Echocardiogram shows significant improvement, her LV function shows an EF of 56 to 60% which is returned to normal.  There is mild regurgitation or \" leaking\" near her aortic and mitral valve, this does not require any treatment change, we will continue to monitor in the future.  From a cardiac standpoint she can continue routine medications and keep her previously scheduled follow-up appointment.  "

## 2023-12-22 ENCOUNTER — TELEPHONE (OUTPATIENT)
Dept: CARDIOLOGY | Facility: CLINIC | Age: 83
End: 2023-12-22
Payer: MEDICARE

## 2023-12-22 NOTE — TELEPHONE ENCOUNTER
FARXIGA PAP APPLICATION RECEIVED FROM PT.  FAXED TO [facility] AND SCANNED IN MED[facility].  WAITING ON DETERMINATION.

## 2023-12-28 NOTE — PROGRESS NOTES
Primary Care Provider  Ramirez Maurice MD   Referring Provider  No ref. provider found    Patient Complaint  Follow-up, Cough, Wheezing, and Shortness of Breath      SUBJECTIVE    History of Presenting Illness  Cris Agudelo is a pleasant 83 y.o. female who presents to [facility]ICINE for follow-up after CT scan.  Patient is here with family member.  I last saw the patient 9/29/2023.  Patient has a history of pleural effusions with thoracentesis on the right side in August 2023.  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    Patient had CT scan 12/11/2023 which showed resolution of the pleural and pericardial effusions. There is evidence of prior granulomatous disease.  The 5 mm nodule in the right upper lobe is less nodule on the current examination has slightly decreased in size.  There is a 7 mm noncalcified nodule within the inferior right upper lobe which abuts the minor fissure, this is stable.  A follow-up CT scan in 1 year is recommended for this.  Order has been placed.    Patient continues on Pulmicort Brovana which are provided through [facility].  She continues under the care of cardiology for NSTEMI, hyperlipidemia, primary hypertension, Takotsubo cardiomyopathy, chronic heart failure with reduced ejection fraction, and coronary artery disease.  Patient has not been using her duo nebulizer.  She is complaining of having some shortness of air with exertion occasional cough.  She is concerned about having pleural effusion building back up.  She denies any exposure to COVID or flu. She also denies wheezing, headaches, chest pain, weight loss or hemoptysis. Denies fevers, chills and night sweats. Cris Agudelo is able to perform ADLs without difficulties and denies any swollen glands/lymph nodes in the head or neck.    I have personally reviewed the review of systems, past family, social, medical and  surgical histories; and agree with their findings.    Review of Systems  Constitutional symptoms:  Denied complaints   Ear, nose, throat: Denied complaints  Cardiovascular:  Denied complaints  Respiratory: Cough shortness of air  Gastrointestinal: Denied complaints  Musculoskeletal: Denied complaints    History reviewed. No pertinent family history.     Social History     Socioeconomic History    Marital status:    Tobacco Use    Smoking status: Former     Packs/day: 1     Types: Cigarettes     Quit date: 2023     Years since quittin.6     Passive exposure: Past    Smokeless tobacco: Never    Tobacco comments:     Pt was a social smoker   Vaping Use    Vaping Use: Never used   Substance and Sexual Activity    Alcohol use: Not Currently    Drug use: Never    Sexual activity: Defer        Past Medical History:   Diagnosis Date    Acute non-ST elevation myocardial infarction (NSTEMI) of anterior wall involving right ventricle 2023    COPD (chronic obstructive pulmonary disease)     Heart attack     Hyperlipidemia     Hypertension         Immunization History   Administered Date(s) Administered    Pneumococcal Conjugate 20-Valent (PCV20) 2023       Allergies   Allergen Reactions    Aleve [Naproxen] Hives          Current Outpatient Medications:     arformoterol (BROVANA) 15 MCG/2ML nebulizer solution, Take 2 mL by nebulization 2 (Two) Times a Day., Disp: , Rfl:     aspirin 81 MG EC tablet, Take 1 tablet by mouth Daily., Disp: 30 tablet, Rfl: 1    atorvastatin (LIPITOR) 20 MG tablet, Take 1 tablet by mouth Every Night., Disp: 90 tablet, Rfl: 1    budesonide (PULMICORT) 0.5 MG/2ML nebulizer solution, Take 2 mL by nebulization 2 (Two) Times a Day., Disp: , Rfl:     carvedilol (COREG) 6.25 MG tablet, Take 1 tablet by mouth 2 (Two) Times a Day., Disp: 180 tablet, Rfl: 3    clopidogrel (PLAVIX) 75 MG tablet, Take 1 tablet by mouth Daily., Disp: 30 tablet, Rfl: 0    dapagliflozin Propanediol  "(Farxiga) 10 MG tablet, Take 10 mg by mouth Daily., Disp: 90 tablet, Rfl: 3    furosemide (LASIX) 20 MG tablet, Take 1 tablet by mouth Daily As Needed (swelling/wt gain)., Disp: 90 tablet, Rfl: 3    guaiFENesin (MUCINEX) 600 MG 12 hr tablet, Take 1 tablet by mouth Every 12 (Twelve) Hours., Disp: 15 tablet, Rfl: 0    ipratropium-albuterol (DUO-NEB) 0.5-2.5 mg/3 ml nebulizer, Take 3 mL by nebulization Every 6 (Six) Hours As Needed for Shortness of Air., Disp: 360 mL, Rfl: 5    lisinopril (PRINIVIL,ZESTRIL) 20 MG tablet, Take 1 tablet by mouth Daily., Disp: 90 tablet, Rfl: 3    nitroglycerin (NITROSTAT) 0.4 MG SL tablet, , Disp: , Rfl:            Vital Signs   /77 (BP Location: Right arm, Patient Position: Sitting, Cuff Size: Adult)   Pulse 69   Temp 98.2 °F (36.8 °C) (Temporal)   Resp 18   Ht 152.4 cm (60\")   Wt 57.7 kg (127 lb 3.2 oz)   SpO2 94% Comment: room air  BMI 24.84 kg/m²       OBJECTIVE    Physical Exam  Vital Signs Reviewed   WDWN, Alert, NAD.    HEENT:  PERRL, EOMI.  OP, nares clear  Neck:  Supple, no JVD, no thyromegaly  Chest:  good aeration, clear to auscultation bilaterally, tympanic to percussion bilaterally, no work of breathing noted  CV: RRR, no MGR, pulses 2+, equal.  Abd:  Soft, NT, ND, + BS, no HSM  EXT:  no clubbing, no cyanosis, no edema  Neuro:  A&Ox3, CN grossly intact, no focal deficits.  Skin: No rashes or lesions noted    Results Review  I have personally reviewed the prior office notes, hospital records, labs, and diagnostics.    ASSESSMENT         Patient Active Problem List   Diagnosis    NSTEMI (non-ST elevated myocardial infarction)    Type 1 myocardial infarction    Hyponatremia    Stress-induced cardiomyopathy    Hyperlipidemia    Primary hypertension    Herpes zoster    Shingles    Takotsubo cardiomyopathy    Myocardial infarction    History of non-ST elevation myocardial infarction (NSTEMI)    Chronic HFrEF (heart failure with reduced ejection fraction)    Coronary " artery disease involving native coronary artery of native heart without angina pectoris       Encounter Diagnoses   Name Primary?    Bilateral pleural effusion Yes    OROSCO (dyspnea on exertion)     Multiple lung nodules on CT     Tobacco abuse       PLAN  -CT scan without contrast has been ordered for 12/20/2024  -Patient to continue with Brovana Pulmicort and duo nebulizers  -Sending patient to get a chest x-ray at this time we will notify of results when available  -Refill on Brovana Pulmicort sent to [facility] [place] and duo nebulizer sent to her pharmacy  -patient encouraged to use duo nebulizer for shortness of air or wheeze every 6 hours    Diagnoses and all orders for this visit:    1. Bilateral pleural effusion (Primary)  -     ipratropium-albuterol (DUO-NEB) 0.5-2.5 mg/3 ml nebulizer; Take 3 mL by nebulization Every 6 (Six) Hours As Needed for Shortness of Air.  Dispense: 360 mL; Refill: 5  -     budesonide (PULMICORT) 0.5 MG/2ML nebulizer solution; Take 2 mL by nebulization 2 (Two) Times a Day.  -     arformoterol (BROVANA) 15 MCG/2ML nebulizer solution; Take 2 mL by nebulization 2 (Two) Times a Day.  -     XR Chest 2 View; Future    2. OROSCO (dyspnea on exertion)  -     ipratropium-albuterol (DUO-NEB) 0.5-2.5 mg/3 ml nebulizer; Take 3 mL by nebulization Every 6 (Six) Hours As Needed for Shortness of Air.  Dispense: 360 mL; Refill: 5  -     budesonide (PULMICORT) 0.5 MG/2ML nebulizer solution; Take 2 mL by nebulization 2 (Two) Times a Day.  -     arformoterol (BROVANA) 15 MCG/2ML nebulizer solution; Take 2 mL by nebulization 2 (Two) Times a Day.  -     XR Chest 2 View; Future    3. Multiple lung nodules on CT  -     ipratropium-albuterol (DUO-NEB) 0.5-2.5 mg/3 ml nebulizer; Take 3 mL by nebulization Every 6 (Six) Hours As Needed for Shortness of Air.  Dispense: 360 mL; Refill: 5  -     budesonide (PULMICORT) 0.5 MG/2ML nebulizer solution; Take 2 mL by nebulization 2 (Two) Times a Day.  -     arformoterol (BROVANA) 15  MCG/2ML nebulizer solution; Take 2 mL by nebulization 2 (Two) Times a Day.  -     XR Chest 2 View; Future    4. Tobacco abuse  -     XR Chest 2 View; Future           Smoking status: former  Vaccination status: Up-to-date with pneumonia vaccine only declines the COVID and flu vaccines  Medications personally reviewed    Follow Up  Return in about 6 months (around 6/29/2024) for Dr. Nascimento or Nazia.    Patient was given instructions and counseling regarding her condition or for health maintenance advice. Please see specific information pulled into the AVS if appropriate.

## 2023-12-29 ENCOUNTER — HOSPITAL ENCOUNTER (OUTPATIENT)
Dept: GENERAL RADIOLOGY | Facility: HOSPITAL | Age: 83
Discharge: HOME | End: 2023-12-29
Admitting: NURSE PRACTITIONER
Payer: MEDICARE

## 2023-12-29 ENCOUNTER — OFFICE VISIT (OUTPATIENT)
Dept: PULMONOLOGY | Facility: CLINIC | Age: 83
End: 2023-12-29
Payer: MEDICARE

## 2023-12-29 VITALS
HEIGHT: 60 IN | OXYGEN SATURATION: 94 % | DIASTOLIC BLOOD PRESSURE: 77 MMHG | SYSTOLIC BLOOD PRESSURE: 155 MMHG | WEIGHT: 127.2 LBS | HEART RATE: 69 BPM | TEMPERATURE: 98.2 F | RESPIRATION RATE: 18 BRPM | BODY MASS INDEX: 24.97 KG/M2

## 2023-12-29 DIAGNOSIS — J90 BILATERAL PLEURAL EFFUSION: ICD-10-CM

## 2023-12-29 DIAGNOSIS — R91.8 MULTIPLE LUNG NODULES ON CT: ICD-10-CM

## 2023-12-29 DIAGNOSIS — Z72.0 TOBACCO ABUSE: ICD-10-CM

## 2023-12-29 DIAGNOSIS — J90 BILATERAL PLEURAL EFFUSION: Primary | ICD-10-CM

## 2023-12-29 DIAGNOSIS — R06.09 DOE (DYSPNEA ON EXERTION): ICD-10-CM

## 2023-12-29 PROCEDURE — 99214 OFFICE O/P EST MOD 30 MIN: CPT | Performed by: NURSE PRACTITIONER

## 2023-12-29 PROCEDURE — 1159F MED LIST DOCD IN RCRD: CPT | Performed by: NURSE PRACTITIONER

## 2023-12-29 PROCEDURE — 3078F DIAST BP <80 MM HG: CPT | Performed by: NURSE PRACTITIONER

## 2023-12-29 PROCEDURE — 71046 X-RAY EXAM CHEST 2 VIEWS: CPT

## 2023-12-29 PROCEDURE — 3077F SYST BP >= 140 MM HG: CPT | Performed by: NURSE PRACTITIONER

## 2023-12-29 PROCEDURE — 1160F RVW MEDS BY RX/DR IN RCRD: CPT | Performed by: NURSE PRACTITIONER

## 2023-12-29 RX ORDER — ARFORMOTEROL TARTRATE 15 UG/2ML
15 SOLUTION RESPIRATORY (INHALATION)
Start: 2023-12-29

## 2023-12-29 RX ORDER — BUDESONIDE 0.5 MG/2ML
0.5 INHALANT ORAL
Start: 2023-12-29

## 2023-12-29 RX ORDER — IPRATROPIUM BROMIDE AND ALBUTEROL SULFATE 2.5; .5 MG/3ML; MG/3ML
3 SOLUTION RESPIRATORY (INHALATION) EVERY 6 HOURS PRN
Qty: 360 ML | Refills: 5 | Status: SHIPPED | OUTPATIENT
Start: 2023-12-29

## 2024-01-04 ENCOUNTER — TELEPHONE (OUTPATIENT)
Dept: CARDIOLOGY | Facility: CLINIC | Age: 84
End: 2024-01-04
Payer: MEDICARE

## 2024-01-08 ENCOUNTER — OFFICE VISIT (OUTPATIENT)
Dept: CARDIOLOGY | Facility: CLINIC | Age: 84
End: 2024-01-08
Payer: MEDICARE

## 2024-01-08 VITALS
SYSTOLIC BLOOD PRESSURE: 132 MMHG | HEART RATE: 65 BPM | BODY MASS INDEX: 24.15 KG/M2 | WEIGHT: 123 LBS | DIASTOLIC BLOOD PRESSURE: 51 MMHG | HEIGHT: 60 IN

## 2024-01-08 DIAGNOSIS — I25.10 NON-OCCLUSIVE CORONARY ARTERY DISEASE: ICD-10-CM

## 2024-01-08 DIAGNOSIS — I51.81 STRESS-INDUCED CARDIOMYOPATHY: Primary | ICD-10-CM

## 2024-01-08 DIAGNOSIS — I10 PRIMARY HYPERTENSION: ICD-10-CM

## 2024-01-08 NOTE — LETTER
January 9, 2024     Ramirez Maurice MD  [address] [place] [address]    Patient: Cris Agudelo   YOB: 1940   Date of Visit: 1/8/2024       Dear Ramirez Maurice MD    Cris Agudelo was in my office today. Below is a copy of my note.    If you have questions, please do not hesitate to call me. I look forward to following Cris along with you.         Sincerely,        JES Cutler        CC: JES Castro    Chief Complaint  Follow-up and Cardiomyopathy    Subjective       History of Present Illness  Cris Agudelo presents to [facility] CARDIOLOGY   Ms. Agudelo is an 83-year-old female patient coming in for routine cardiac follow-up.  She was hospitalized in August with stress-induced cardiomyopathy, with decreased EF down to 36 to 40%.  She initially had some volume overload, she was corrected with diuretics, and has had significant improvement over the last few months.  Her echocardiogram last month shows normalized EF, and she denies any recent episodes of swelling of the lower extremities.   In addition during that time.  She had some shortness of breath and symptoms of orthopnea, however her breathing is also returned to her normal..  She has chronic COPD, and has some mild symptoms of shortness of breath at her baseline.  She has no complaints of chest pains, palpitations, lightheadedness or dizziness.           Past Medical History:   Diagnosis Date   • Acute non-ST elevation myocardial infarction (NSTEMI) of anterior wall involving right ventricle 08/06/2023   • COPD (chronic obstructive pulmonary disease)    • Heart attack    • Hyperlipidemia    • Hypertension        Allergies   Allergen Reactions   • Aleve [Naproxen] Hives        Past Surgical History:   Procedure Laterality Date   • CARDIAC CATHETERIZATION N/A 8/6/2023    Procedure: Left Heart Cath;  Surgeon: Mitch Correia MD;  Location: [facility] CATH INVASIVE LOCATION;  Service: Cardiology;  Laterality:  N/A;   • CARDIAC CATHETERIZATION N/A 8/6/2023    Procedure: Coronary angiography;  Surgeon: Mitch Correia MD;  Location: [facility] CATH INVASIVE LOCATION;  Service: Cardiology;  Laterality: N/A;        Social History  She  reports that she quit smoking about 8 months ago. Her smoking use included cigarettes. She smoked an average of 1 pack per day. She has been exposed to tobacco smoke. She has never used smokeless tobacco. She reports that she does not currently use alcohol. She reports that she does not use drugs.    Family History  Her family history is not on file.       Current Outpatient Medications on File Prior to Visit   Medication Sig   • arformoterol (BROVANA) 15 MCG/2ML nebulizer solution Take 2 mL by nebulization 2 (Two) Times a Day.   • aspirin 81 MG EC tablet Take 1 tablet by mouth Daily.   • atorvastatin (LIPITOR) 20 MG tablet Take 1 tablet by mouth Every Night.   • budesonide (PULMICORT) 0.5 MG/2ML nebulizer solution Take 2 mL by nebulization 2 (Two) Times a Day.   • carvedilol (COREG) 6.25 MG tablet Take 1 tablet by mouth 2 (Two) Times a Day.   • clopidogrel (PLAVIX) 75 MG tablet Take 1 tablet by mouth Daily.   • dapagliflozin Propanediol (Farxiga) 10 MG tablet Take 10 mg by mouth Daily.   • furosemide (LASIX) 20 MG tablet Take 1 tablet by mouth Daily As Needed (swelling/wt gain).   • guaiFENesin (MUCINEX) 600 MG 12 hr tablet Take 1 tablet by mouth Every 12 (Twelve) Hours.   • ipratropium-albuterol (DUO-NEB) 0.5-2.5 mg/3 ml nebulizer Take 3 mL by nebulization Every 6 (Six) Hours As Needed for Shortness of Air.   • lisinopril (PRINIVIL,ZESTRIL) 20 MG tablet Take 1 tablet by mouth Daily.   • nitroglycerin (NITROSTAT) 0.4 MG SL tablet      No current facility-administered medications on file prior to visit.         Review of Systems   Respiratory:  Positive for shortness of breath (Mild). Negative for cough and chest tightness.    Cardiovascular:  Negative for chest pain, palpitations and leg swelling.  "  Gastrointestinal:  Negative for nausea and vomiting.   Neurological:  Negative for dizziness and syncope    Objective  Vitals:    01/08/24 1409   BP: 132/51   Pulse: 65   Weight: 55.8 kg (123 lb)   Height: 152.4 cm (60\")         Physical Exam  General : Alert, awake, no acute distress  Neck : Supple, no carotid bruit, no jugular venous distention  CVS : Regular rate and rhythm, no murmur, rubs or gallops  Lungs: Clear to auscultation bilaterally, no crackles or rhonchi  Abdomen: Soft, nontender, bowel sounds active  Extremities: Warm, well-perfused, no pedal edema      Result Review    The following data was reviewed by JES Cutler  proBNP   Date Value Ref Range Status   08/06/2023 1,164.0 0.0 - 1,800.0 pg/mL Final     CMP          8/8/2023    04:55 8/8/2023    08:46 8/18/2023    12:16 9/19/2023    13:09   CMP   Glucose 136  187  114     BUN 14  13  10     Creatinine 0.63  0.62  0.80  0.60    EGFR 88.7  89.0  73.7  89.7    Sodium 123  124     124  141     Potassium 4.1  4.0  3.9     Chloride 90  89  107     Calcium 8.5  8.6  8.8     Total Protein 5.9   6.4     Albumin 3.3   4.1     Globulin   2.3     Total Bilirubin 0.7   0.4     Alkaline Phosphatase 87   97     AST (SGOT) 17   16     ALT (SGPT) 8   17     Albumin/Globulin Ratio   1.8     BUN/Creatinine Ratio 22.2  21.0  12.5     Anion Gap 8.9  10.9  10.0       CBC w/diff          8/6/2023    03:25 8/7/2023    04:23 8/8/2023    04:55   CBC w/Diff   WBC 12.11  11.45  9.77    RBC 4.92  4.40  4.08    Hemoglobin 14.3  13.0  11.8    Hematocrit 42.2  38.4  34.6    MCV 85.8  87.3  84.8    MCH 29.1  29.5  28.9    MCHC 33.9  33.9  34.1    RDW 12.9  12.7  12.8    Platelets 280  259  250    Neutrophil Rel % 75.5  71.9  72.5    Immature Granulocyte Rel % 0.4  0.8  0.6    Lymphocyte Rel % 15.5  17.0  17.1    Monocyte Rel % 4.6  6.2  7.9    Eosinophil Rel % 3.7  3.8  1.7    Basophil Rel % 0.3  0.3  0.2         Magnesium   Date Value Ref Range Status   08/06/2023 1.7 " "1.6 - 2.4 mg/dL Final      No results found for: \"DIGOXIN\"   Lab Results   Component Value Date    TROPONINT 553 (C) 08/06/2023           Lipid Panel          8/6/2023    18:14   Lipid Panel   Total Cholesterol 216    Triglycerides 185    HDL Cholesterol 44    VLDL Cholesterol 33    LDL Cholesterol  139    LDL/HDL Ratio 3.07        Results for orders placed during the hospital encounter of 12/08/23    Adult Transthoracic Echo Complete W/ Cont if Necessary Per Protocol    Interpretation Summary  •  Left ventricular systolic function is normal. Left ventricular ejection fraction appears to be 56 - 60%.  •  Left ventricular diastolic function is consistent with (grade I) impaired relaxation.  •  Aortic valve is mildly calcified.  There is mild aortic valve regurgitation.  •  Mild mitral valve regurgitation is present.  •  Estimated right ventricular systolic pressure from tricuspid regurgitation is normal (<35 mmHg).             Assessment and Plan   Diagnoses and all orders for this visit:    1. Stress-induced cardiomyopathy (Primary)  Assessment & Plan:  She has had normalization of her ejection fraction, most recent echocardiogram shows EF of 56 to 60%.  She is euvolemic.  Will continue her current daily regiment with Farxiga, carvedilol, furosemide, and lisinopril.  Will get updated chemistry panel from PCP, may be able to stop or decrease loop diuretics in the future.      2. Primary hypertension  Assessment & Plan:  She is good blood pressure control, continue current regiment.      3. Non-occlusive coronary artery disease  Assessment & Plan:  Stable without symptoms of angina.  Continue Plavix, aspirin, beta-blocker and statin therapy.              Follow Up   Return in about 6 months (around 7/8/2024) for Next scheduled follow up, with Dr. Hernandez.    Patient was given instructions and counseling regarding her condition or for health maintenance advice. Please see specific information pulled into the AVS if " appropriate.     Signed,  Izabella Oro, APRN  01/08/2024     Dictated Utilizing Dragon Dictation: Please note that portions of this note were completed with a voice recognition program.  Part of this note may be an electronic transcription/translation of spoken language to printed text using the Dragon Dictation System.

## 2024-01-08 NOTE — PROGRESS NOTES
Chief Complaint  Follow-up and Cardiomyopathy    Subjective        History of Present Illness  Cris Agudelo presents to [facility] CARDIOLOGY   Ms. Agudelo is an 83-year-old female patient coming in for routine cardiac follow-up.  She was hospitalized in August with stress-induced cardiomyopathy, with decreased EF down to 36 to 40%.  She initially had some volume overload, she was corrected with diuretics, and has had significant improvement over the last few months.  Her echocardiogram last month shows normalized EF, and she denies any recent episodes of swelling of the lower extremities.   In addition during that time.  She had some shortness of breath and symptoms of orthopnea, however her breathing is also returned to her normal..  She has chronic COPD, and has some mild symptoms of shortness of breath at her baseline.  She has no complaints of chest pains, palpitations, lightheadedness or dizziness.           Past Medical History:   Diagnosis Date    Acute non-ST elevation myocardial infarction (NSTEMI) of anterior wall involving right ventricle 08/06/2023    COPD (chronic obstructive pulmonary disease)     Heart attack     Hyperlipidemia     Hypertension        Allergies   Allergen Reactions    Aleve [Naproxen] Hives        Past Surgical History:   Procedure Laterality Date    CARDIAC CATHETERIZATION N/A 8/6/2023    Procedure: Left Heart Cath;  Surgeon: Mitch Correia MD;  Location: [facility] CATH INVASIVE LOCATION;  Service: Cardiology;  Laterality: N/A;    CARDIAC CATHETERIZATION N/A 8/6/2023    Procedure: Coronary angiography;  Surgeon: Mitch Correia MD;  Location: [facility] INVASIVE LOCATION;  Service: Cardiology;  Laterality: N/A;        Social History  She  reports that she quit smoking about 8 months ago. Her smoking use included cigarettes. She smoked an average of 1 pack per day. She has been exposed to tobacco smoke. She has never used smokeless tobacco. She reports that she does not  "currently use alcohol. She reports that she does not use drugs.    Family History  Her family history is not on file.       Current Outpatient Medications on File Prior to Visit   Medication Sig    arformoterol (BROVANA) 15 MCG/2ML nebulizer solution Take 2 mL by nebulization 2 (Two) Times a Day.    aspirin 81 MG EC tablet Take 1 tablet by mouth Daily.    atorvastatin (LIPITOR) 20 MG tablet Take 1 tablet by mouth Every Night.    budesonide (PULMICORT) 0.5 MG/2ML nebulizer solution Take 2 mL by nebulization 2 (Two) Times a Day.    carvedilol (COREG) 6.25 MG tablet Take 1 tablet by mouth 2 (Two) Times a Day.    clopidogrel (PLAVIX) 75 MG tablet Take 1 tablet by mouth Daily.    dapagliflozin Propanediol (Farxiga) 10 MG tablet Take 10 mg by mouth Daily.    furosemide (LASIX) 20 MG tablet Take 1 tablet by mouth Daily As Needed (swelling/wt gain).    guaiFENesin (MUCINEX) 600 MG 12 hr tablet Take 1 tablet by mouth Every 12 (Twelve) Hours.    ipratropium-albuterol (DUO-NEB) 0.5-2.5 mg/3 ml nebulizer Take 3 mL by nebulization Every 6 (Six) Hours As Needed for Shortness of Air.    lisinopril (PRINIVIL,ZESTRIL) 20 MG tablet Take 1 tablet by mouth Daily.    nitroglycerin (NITROSTAT) 0.4 MG SL tablet      No current facility-administered medications on file prior to visit.         Review of Systems   Respiratory:  Positive for shortness of breath (Mild). Negative for cough and chest tightness.    Cardiovascular:  Negative for chest pain, palpitations and leg swelling.   Gastrointestinal:  Negative for nausea and vomiting.   Neurological:  Negative for dizziness and syncope    Objective   Vitals:    01/08/24 1409   BP: 132/51   Pulse: 65   Weight: 55.8 kg (123 lb)   Height: 152.4 cm (60\")         Physical Exam  General : Alert, awake, no acute distress  Neck : Supple, no carotid bruit, no jugular venous distention  CVS : Regular rate and rhythm, no murmur, rubs or gallops  Lungs: Clear to auscultation bilaterally, no crackles or " "rhonchi  Abdomen: Soft, nontender, bowel sounds active  Extremities: Warm, well-perfused, no pedal edema      Result Review     The following data was reviewed by JES Cutler  proBNP   Date Value Ref Range Status   08/06/2023 1,164.0 0.0 - 1,800.0 pg/mL Final     CMP          8/8/2023    04:55 8/8/2023    08:46 8/18/2023    12:16 9/19/2023    13:09   CMP   Glucose 136  187  114     BUN 14  13  10     Creatinine 0.63  0.62  0.80  0.60    EGFR 88.7  89.0  73.7  89.7    Sodium 123  124     124  141     Potassium 4.1  4.0  3.9     Chloride 90  89  107     Calcium 8.5  8.6  8.8     Total Protein 5.9   6.4     Albumin 3.3   4.1     Globulin   2.3     Total Bilirubin 0.7   0.4     Alkaline Phosphatase 87   97     AST (SGOT) 17   16     ALT (SGPT) 8   17     Albumin/Globulin Ratio   1.8     BUN/Creatinine Ratio 22.2  21.0  12.5     Anion Gap 8.9  10.9  10.0       CBC w/diff          8/6/2023    03:25 8/7/2023    04:23 8/8/2023    04:55   CBC w/Diff   WBC 12.11  11.45  9.77    RBC 4.92  4.40  4.08    Hemoglobin 14.3  13.0  11.8    Hematocrit 42.2  38.4  34.6    MCV 85.8  87.3  84.8    MCH 29.1  29.5  28.9    MCHC 33.9  33.9  34.1    RDW 12.9  12.7  12.8    Platelets 280  259  250    Neutrophil Rel % 75.5  71.9  72.5    Immature Granulocyte Rel % 0.4  0.8  0.6    Lymphocyte Rel % 15.5  17.0  17.1    Monocyte Rel % 4.6  6.2  7.9    Eosinophil Rel % 3.7  3.8  1.7    Basophil Rel % 0.3  0.3  0.2         Magnesium   Date Value Ref Range Status   08/06/2023 1.7 1.6 - 2.4 mg/dL Final      No results found for: \"DIGOXIN\"   Lab Results   Component Value Date    TROPONINT 553 (C) 08/06/2023           Lipid Panel          8/6/2023    18:14   Lipid Panel   Total Cholesterol 216    Triglycerides 185    HDL Cholesterol 44    VLDL Cholesterol 33    LDL Cholesterol  139    LDL/HDL Ratio 3.07        Results for orders placed during the hospital encounter of 12/08/23    Adult Transthoracic Echo Complete W/ Cont if Necessary Per " Protocol    Interpretation Summary    Left ventricular systolic function is normal. Left ventricular ejection fraction appears to be 56 - 60%.    Left ventricular diastolic function is consistent with (grade I) impaired relaxation.    Aortic valve is mildly calcified.  There is mild aortic valve regurgitation.    Mild mitral valve regurgitation is present.    Estimated right ventricular systolic pressure from tricuspid regurgitation is normal (<35 mmHg).             Assessment and Plan   Diagnoses and all orders for this visit:    1. Stress-induced cardiomyopathy (Primary)  Assessment & Plan:  She has had normalization of her ejection fraction, most recent echocardiogram shows EF of 56 to 60%.  She is euvolemic.  Will continue her current daily regiment with Farxiga, carvedilol, furosemide, and lisinopril.  Will get updated chemistry panel from PCP, may be able to stop or decrease loop diuretics in the future.      2. Primary hypertension  Assessment & Plan:  She is good blood pressure control, continue current regiment.      3. Non-occlusive coronary artery disease  Assessment & Plan:  Stable without symptoms of angina.  Continue Plavix, aspirin, beta-blocker and statin therapy.              Follow Up   Return in about 6 months (around 7/8/2024) for Next scheduled follow up, with Dr. Hernandez.    Patient was given instructions and counseling regarding her condition or for health maintenance advice. Please see specific information pulled into the AVS if appropriate.     Signed,  Izabella Oro, APRN  01/08/2024     Dictated Utilizing Dragon Dictation: Please note that portions of this note were completed with a voice recognition program.  Part of this note may be an electronic transcription/translation of spoken language to printed text using the Dragon Dictation System.

## 2024-01-09 NOTE — ASSESSMENT & PLAN NOTE
She has had normalization of her ejection fraction, most recent echocardiogram shows EF of 56 to 60%.  She is euvolemic.  Will continue her current daily regiment with Farxiga, carvedilol, furosemide, and lisinopril.  Will get updated chemistry panel from PCP, may be able to stop or decrease loop diuretics in the future.

## 2024-01-11 ENCOUNTER — TELEPHONE (OUTPATIENT)
Dept: CARDIOLOGY | Facility: CLINIC | Age: 84
End: 2024-01-11
Payer: MEDICARE

## 2024-01-11 NOTE — TELEPHONE ENCOUNTER
Let patient know we got copies of her blood work, her electrolytes and renal function all look good.  From a cardiac standpoint she can continue her current medications.

## 2024-01-15 DIAGNOSIS — E78.2 MIXED HYPERLIPIDEMIA: ICD-10-CM

## 2024-01-15 DIAGNOSIS — I50.22 CHRONIC HFREF (HEART FAILURE WITH REDUCED EJECTION FRACTION): ICD-10-CM

## 2024-01-23 ENCOUNTER — TELEPHONE (OUTPATIENT)
Dept: CARDIOLOGY | Facility: CLINIC | Age: 84
End: 2024-01-23
Payer: MEDICARE

## 2024-01-23 NOTE — TELEPHONE ENCOUNTER
Procedure: Dental Extraction    Medication Directive: Plavix and aspirin    PMH: Stress- Induced Cardiomyopathy, HTN, NON-Occlusive Coronary disease    Last Seen: 01/08/24    ECHO: 12/08/23      Left ventricular systolic function is normal. Left ventricular ejection fraction appears to be 56 - 60%.    Left ventricular diastolic function is consistent with (grade I) impaired relaxation.    Aortic valve is mildly calcified.  There is mild aortic valve regurgitation.    Mild mitral valve regurgitation is present.    Estimated right ventricular systolic pressure from tricuspid regurgitation is normal (<35 mmHg).

## 2024-01-24 NOTE — TELEPHONE ENCOUNTER
She may proceed with upcoming dental extraction at moderate risk, she may hold Plavix for 5 days prior to procedure, and aspirin up to 7 days prior to procedure if needed.

## 2024-04-29 RX ORDER — CLOPIDOGREL BISULFATE 75 MG/1
75 TABLET ORAL DAILY
Qty: 30 TABLET | Refills: 0 | Status: SHIPPED | OUTPATIENT
Start: 2024-04-29

## 2024-06-27 ENCOUNTER — OFFICE VISIT (OUTPATIENT)
Dept: PULMONOLOGY | Facility: CLINIC | Age: 84
End: 2024-06-27
Payer: MEDICARE

## 2024-06-27 VITALS
HEIGHT: 60 IN | HEART RATE: 61 BPM | DIASTOLIC BLOOD PRESSURE: 75 MMHG | SYSTOLIC BLOOD PRESSURE: 160 MMHG | BODY MASS INDEX: 29.64 KG/M2 | WEIGHT: 151 LBS | RESPIRATION RATE: 16 BRPM | OXYGEN SATURATION: 92 % | TEMPERATURE: 98.3 F

## 2024-06-27 DIAGNOSIS — J90 BILATERAL PLEURAL EFFUSION: ICD-10-CM

## 2024-06-27 DIAGNOSIS — R06.09 DOE (DYSPNEA ON EXERTION): ICD-10-CM

## 2024-06-27 DIAGNOSIS — R91.8 MULTIPLE LUNG NODULES ON CT: ICD-10-CM

## 2024-06-27 PROCEDURE — 99214 OFFICE O/P EST MOD 30 MIN: CPT | Performed by: NURSE PRACTITIONER

## 2024-06-27 PROCEDURE — 3078F DIAST BP <80 MM HG: CPT | Performed by: NURSE PRACTITIONER

## 2024-06-27 PROCEDURE — 3077F SYST BP >= 140 MM HG: CPT | Performed by: NURSE PRACTITIONER

## 2024-06-27 PROCEDURE — 1160F RVW MEDS BY RX/DR IN RCRD: CPT | Performed by: NURSE PRACTITIONER

## 2024-06-27 PROCEDURE — 1159F MED LIST DOCD IN RCRD: CPT | Performed by: NURSE PRACTITIONER

## 2024-06-27 RX ORDER — BUDESONIDE 0.5 MG/2ML
0.5 INHALANT ORAL
Start: 2024-06-27

## 2024-06-27 RX ORDER — IPRATROPIUM BROMIDE AND ALBUTEROL SULFATE 2.5; .5 MG/3ML; MG/3ML
3 SOLUTION RESPIRATORY (INHALATION) EVERY 6 HOURS PRN
Qty: 360 ML | Refills: 5 | Status: SHIPPED | OUTPATIENT
Start: 2024-06-27

## 2024-06-27 RX ORDER — NYSTATIN 100000 [USP'U]/G
POWDER TOPICAL 2 TIMES DAILY
COMMUNITY
Start: 2024-05-22

## 2024-06-27 RX ORDER — ARFORMOTEROL TARTRATE 15 UG/2ML
15 SOLUTION RESPIRATORY (INHALATION)
Start: 2024-06-27

## 2024-06-27 NOTE — PROGRESS NOTES
Primary Care Provider  Ramirez Maurice MD   Referring Provider  No ref. provider found    Patient Complaint  Follow-up (6 month) and Lung Nodule (F/u)      SUBJECTIVE    History of Presenting Illness  Cris Agudelo is a pleasant 83 y.o. female who presents to [facility] for follow-up appointment.  Patient here with daughter.  I last saw the patient 12/29/2023.  Patient has a history of pleural effusions and lung nodule.  Her last CT of chest was 12/11/2023 which showed resolution of the pleural and pericardial effusions.  It did show that she had a 5 mm right upper lobe nodule and a 7 mm nodule.  She will have a follow-up CT in December 2024, order has been placed.  Patient continues on Pulmicort and Brovana maintenance nebulizers twice daily.  She has duo nebulizer as needed for shortness of air or wheeze.  Patient doing well overall since her last visit states she has not been in the hospital or been to urgent care for her lungs.  Patient states she has not been on steroids or antibiotics for her lungs.  At present time she does have shortness of air with exertional activities of moderate severity but improves with rest. She denies coughing, wheezing, headaches, chest pain, weight loss or hemoptysis. Denies fevers, chills and night sweats. Cris Agudelo is able to perform ADLs without difficulties and denies any swollen glands/lymph nodes in the head or neck.    I have personally reviewed the review of systems, past family, social, medical and surgical histories; and agree with their findings.    Review of Systems  Constitutional symptoms:  Denied complaints   Ear, nose, throat: Denied complaints  Cardiovascular:  Denied complaints  Respiratory: Shortness of air with exertional activities  Gastrointestinal: Denied complaints  Musculoskeletal: Denied complaints    History reviewed. No pertinent family history.     Social History     Socioeconomic History    Marital  status:    Tobacco Use    Smoking status: Former     Current packs/day: 0.00     Types: Cigarettes     Quit date: 2023     Years since quittin.1     Passive exposure: Past    Smokeless tobacco: Never    Tobacco comments:     Pt was a social smoker   Vaping Use    Vaping status: Never Used   Substance and Sexual Activity    Alcohol use: Not Currently    Drug use: Never    Sexual activity: Defer        Past Medical History:   Diagnosis Date    Acute non-ST elevation myocardial infarction (NSTEMI) of anterior wall involving right ventricle 2023    COPD (chronic obstructive pulmonary disease)     Heart attack     Hyperlipidemia     Hypertension         Immunization History   Administered Date(s) Administered    Pneumococcal Conjugate 20-Valent (PCV20) 2023       Allergies   Allergen Reactions    Aleve [Naproxen] Hives          Current Outpatient Medications:     arformoterol (BROVANA) 15 MCG/2ML nebulizer solution, Take 2 mL by nebulization 2 (Two) Times a Day., Disp: , Rfl:     aspirin 81 MG EC tablet, Take 1 tablet by mouth Daily., Disp: 30 tablet, Rfl: 1    atorvastatin (LIPITOR) 20 MG tablet, Take 1 tablet by mouth Every Night. (Patient taking differently: Take 2 tablets by mouth Every Night.), Disp: 90 tablet, Rfl: 1    budesonide (PULMICORT) 0.5 MG/2ML nebulizer solution, Take 2 mL by nebulization 2 (Two) Times a Day., Disp: , Rfl:     carvedilol (COREG) 6.25 MG tablet, Take 1 tablet by mouth 2 (Two) Times a Day., Disp: 180 tablet, Rfl: 3    clopidogrel (PLAVIX) 75 MG tablet, TAKE 1 TABLET BY MOUTH DAILY, Disp: 30 tablet, Rfl: 0    dapagliflozin Propanediol (Farxiga) 10 MG tablet, Take 10 mg by mouth Daily., Disp: 90 tablet, Rfl: 3    furosemide (LASIX) 20 MG tablet, Take 1 tablet by mouth Daily As Needed (swelling/wt gain)., Disp: 90 tablet, Rfl: 3    guaiFENesin (MUCINEX) 600 MG 12 hr tablet, Take 1 tablet by mouth Every 12 (Twelve) Hours., Disp: 15 tablet, Rfl: 0     "ipratropium-albuterol (DUO-NEB) 0.5-2.5 mg/3 ml nebulizer, Take 3 mL by nebulization Every 6 (Six) Hours As Needed for Shortness of Air., Disp: 360 mL, Rfl: 5    lisinopril (PRINIVIL,ZESTRIL) 20 MG tablet, Take 1 tablet by mouth Daily., Disp: 90 tablet, Rfl: 3    nitroglycerin (NITROSTAT) 0.4 MG SL tablet, , Disp: , Rfl:     nystatin 098422 UNIT/GM powder, Apply  topically to the appropriate area as directed 2 (Two) Times a Day. APPLY TO AFFECTED AREA (Patient not taking: Reported on 6/27/2024), Disp: , Rfl:            Vital Signs   /75 (BP Location: Left arm, Patient Position: Sitting, Cuff Size: Adult)   Pulse 61   Temp 98.3 °F (36.8 °C)   Resp 16   Ht 152 cm (59.84\")   Wt 68.5 kg (151 lb)   SpO2 92% Comment: room air  BMI 29.65 kg/m²       OBJECTIVE    Physical Exam  Vital Signs Reviewed   WDWN, Alert, NAD.    HEENT:  PERRL, EOMI.  OP, nares clear  Neck:  Supple, no JVD, no thyromegaly  Chest:  good aeration, clear to auscultation bilaterally, tympanic to percussion bilaterally, no work of breathing noted  CV: RRR, no MGR, pulses 2+, equal.  Abd:  Soft, NT, ND, + BS, no HSM  EXT:  no clubbing, no cyanosis, no edema  Neuro:  A&Ox3, CN grossly intact, no focal deficits.  Skin: No rashes or lesions noted    Results Review  I have personally reviewed the prior office notes, hospital records, labs, and diagnostics.    ASSESSMENT         Patient Active Problem List   Diagnosis    NSTEMI (non-ST elevated myocardial infarction)    Type 1 myocardial infarction    Hyponatremia    Stress-induced cardiomyopathy    Hyperlipidemia    Primary hypertension    Herpes zoster    Shingles    Takotsubo cardiomyopathy    Myocardial infarction    History of non-ST elevation myocardial infarction (NSTEMI)    Chronic HFrEF (heart failure with reduced ejection fraction)    Non-occlusive coronary artery disease       Encounter Diagnoses   Name Primary?    Bilateral pleural effusion     OROSCO (dyspnea on exertion)     Multiple " lung nodules on CT       PLAN  -Refills on Pulmicort Brovana DuoNeb sent to [facility]  -Request for Home nebulizer accessories supplies sent to [facility]  -Patient will have a follow-up CT of her chest in December and will follow-up afterwards to review results return to office sooner if needed  -  Diagnoses and all orders for this visit:    1. Bilateral pleural effusion  -     arformoterol (BROVANA) 15 MCG/2ML nebulizer solution; Take 2 mL by nebulization 2 (Two) Times a Day.  -     budesonide (PULMICORT) 0.5 MG/2ML nebulizer solution; Take 2 mL by nebulization 2 (Two) Times a Day.  -     ipratropium-albuterol (DUO-NEB) 0.5-2.5 mg/3 ml nebulizer; Take 3 mL by nebulization Every 6 (Six) Hours As Needed for Shortness of Air.  Dispense: 360 mL; Refill: 5  -     Home Nebulizer Accessories    2. OROSCO (dyspnea on exertion)  -     arformoterol (BROVANA) 15 MCG/2ML nebulizer solution; Take 2 mL by nebulization 2 (Two) Times a Day.  -     budesonide (PULMICORT) 0.5 MG/2ML nebulizer solution; Take 2 mL by nebulization 2 (Two) Times a Day.  -     ipratropium-albuterol (DUO-NEB) 0.5-2.5 mg/3 ml nebulizer; Take 3 mL by nebulization Every 6 (Six) Hours As Needed for Shortness of Air.  Dispense: 360 mL; Refill: 5  -     Home Nebulizer Accessories    3. Multiple lung nodules on CT  -     arformoterol (BROVANA) 15 MCG/2ML nebulizer solution; Take 2 mL by nebulization 2 (Two) Times a Day.  -     budesonide (PULMICORT) 0.5 MG/2ML nebulizer solution; Take 2 mL by nebulization 2 (Two) Times a Day.  -     ipratropium-albuterol (DUO-NEB) 0.5-2.5 mg/3 ml nebulizer; Take 3 mL by nebulization Every 6 (Six) Hours As Needed for Shortness of Air.  Dispense: 360 mL; Refill: 5  -     Home Nebulizer Accessories           Smoking status: Former  Vaccination status: Reviewed  Medications personally reviewed    Follow Up  Return in about 6 months (around 1/6/2025) for after CT scan.    Patient was given instructions and counseling regarding her condition  or for health maintenance advice. Please see specific information pulled into the AVS if appropriate.     I spent 15 minutes caring for Cris Agudelo on this date of service. This time includes time spent by me in the following activities:preparing for the visit, reviewing tests, obtaining and/or reviewing a separately obtained history, performing a medically appropriate examination and/or evaluation, counseling and educating the patient/family/caregiver, ordering medications, tests, or procedures, documenting information in the medical record, independently interpreting results and communicating that information with the patient/family/caregiver and answered questions family members, discuss medications.

## 2024-07-11 ENCOUNTER — OFFICE VISIT (OUTPATIENT)
Dept: CARDIOLOGY | Facility: CLINIC | Age: 84
End: 2024-07-11
Payer: MEDICARE

## 2024-07-11 VITALS
HEIGHT: 60 IN | DIASTOLIC BLOOD PRESSURE: 59 MMHG | HEART RATE: 66 BPM | WEIGHT: 150 LBS | BODY MASS INDEX: 29.45 KG/M2 | SYSTOLIC BLOOD PRESSURE: 147 MMHG

## 2024-07-11 DIAGNOSIS — I10 PRIMARY HYPERTENSION: ICD-10-CM

## 2024-07-11 DIAGNOSIS — E78.2 MIXED HYPERLIPIDEMIA: ICD-10-CM

## 2024-07-11 DIAGNOSIS — I51.81 STRESS-INDUCED CARDIOMYOPATHY: Primary | ICD-10-CM

## 2024-07-11 DIAGNOSIS — I25.10 NON-OCCLUSIVE CORONARY ARTERY DISEASE: ICD-10-CM

## 2024-07-11 PROBLEM — I25.2 HISTORY OF NON-ST ELEVATION MYOCARDIAL INFARCTION (NSTEMI): Status: RESOLVED | Noted: 2023-08-23 | Resolved: 2024-07-11

## 2024-07-11 PROBLEM — I21.4 NSTEMI (NON-ST ELEVATED MYOCARDIAL INFARCTION): Status: RESOLVED | Noted: 2023-08-06 | Resolved: 2024-07-11

## 2024-07-11 RX ORDER — LISINOPRIL 20 MG/1
20 TABLET ORAL DAILY
Qty: 90 TABLET | Refills: 3 | Status: SHIPPED | OUTPATIENT
Start: 2024-07-11

## 2024-07-11 RX ORDER — CARVEDILOL 6.25 MG/1
6.25 TABLET ORAL 2 TIMES DAILY
Qty: 180 TABLET | Refills: 3 | Status: SHIPPED | OUTPATIENT
Start: 2024-07-11

## 2024-07-11 RX ORDER — FUROSEMIDE 20 MG/1
20 TABLET ORAL DAILY PRN
Qty: 90 TABLET | Refills: 3 | Status: SHIPPED | OUTPATIENT
Start: 2024-07-11

## 2024-07-11 NOTE — ASSESSMENT & PLAN NOTE
Most recent labs showed an LDL of 89, dose of atorvastatin was increased to 40 mg with primary care provider, to continue.

## 2024-07-11 NOTE — PROGRESS NOTES
CARDIOLOGY FOLLOW-UP PROGRESS NOTE        Chief Complaint  Follow-up, Cardiomyopathy, Hyperlipidemia, and Hypertension    Subjective            Cris Agudelo presents to [facility] CARDIOLOGY  History of Present Illness    Ms. Larry is here for routine 6-month follow-up visit.  Today she denies any new complaints.  She is fairly active at baseline.  No recent episodes of chest pain or shortness of breath.  No dizziness.  No bleeding problems.  She is taking all the medications as prescribed.    Past History:    Stress-induced cardiomyopathy ; index presentation in August 2023 ejection fraction 35 to 40%.  Subsequently improved    Minimal nonobstructive coronary disease by Cardiac catheterization done in August 2023    Essential hypertension  Mixed hyperlipidemia    Medical History:  Past Medical History:   Diagnosis Date    COPD (chronic obstructive pulmonary disease)     History of non-ST elevation myocardial infarction (NSTEMI) 08/23/2023    Hyperlipidemia     Hypertension        Surgical History: has a past surgical history that includes Cardiac catheterization (N/A, 8/6/2023) and Cardiac catheterization (N/A, 8/6/2023).       Social History: reports that she quit smoking about 14 months ago. Her smoking use included cigarettes. She has been exposed to tobacco smoke. She has never used smokeless tobacco. She reports that she does not currently use alcohol. She reports that she does not use drugs.    Allergies: Aleve [naproxen]    Current Outpatient Medications on File Prior to Visit   Medication Sig    arformoterol (BROVANA) 15 MCG/2ML nebulizer solution Take 2 mL by nebulization 2 (Two) Times a Day.    aspirin 81 MG EC tablet Take 1 tablet by mouth Daily.    atorvastatin (LIPITOR) 20 MG tablet Take 1 tablet by mouth Every Night. (Patient taking differently: Take 2 tablets by mouth Every Night.)    budesonide (PULMICORT) 0.5 MG/2ML nebulizer solution Take 2 mL by nebulization 2 (Two) Times a Day.  "   dapagliflozin Propanediol (Farxiga) 10 MG tablet Take 10 mg by mouth Daily.    guaiFENesin (MUCINEX) 600 MG 12 hr tablet Take 1 tablet by mouth Every 12 (Twelve) Hours.    ipratropium-albuterol (DUO-NEB) 0.5-2.5 mg/3 ml nebulizer Take 3 mL by nebulization Every 6 (Six) Hours As Needed for Shortness of Air.    nitroglycerin (NITROSTAT) 0.4 MG SL tablet     nystatin [id number] UNIT/GM powder Apply  topically to the appropriate area as directed 2 (Two) Times a Day. APPLY TO AFFECTED AREA    carvedilol (COREG) 6.25 MG tablet Take 1 tablet by mouth 2 (Two) Times a Day.    [DISCONTINUED] clopidogrel (PLAVIX) 75 MG tablet TAKE 1 TABLET BY MOUTH DAILY    furosemide (LASIX) 20 MG tablet Take 1 tablet by mouth Daily As Needed (swelling/wt gain).    lisinopril (PRINIVIL,ZESTRIL) 20 MG tablet Take 1 tablet by mouth Daily.         Review of Systems   Respiratory:  Negative for cough, shortness of breath and wheezing.    Cardiovascular:  Negative for chest pain, palpitations and leg swelling.   Gastrointestinal:  Negative for nausea and vomiting.   Neurological:  Negative for dizziness and syncope.        Objective     /59   Pulse 66   Ht 152 cm (59.84\")   Wt 68 kg (150 lb)   BMI 29.45 kg/m²       Physical Exam    General : Alert, awake, no acute distress  Neck : Supple, no carotid bruit, no jugular venous distention  CVS : Regular rate and rhythm, no murmur, rubs or gallops  Lungs: Clear to auscultation bilaterally, no crackles or rhonchi  Abdomen: Soft, nontender, bowel sounds heard in all 4 quadrants  Extremities: Warm, well-perfused, trace edema bilaterally    Result Review :     The following data was reviewed by: Leon Hernandez MD on 07/11/2024:                                Data reviewed: Cardiology studies        Results for orders placed during the hospital encounter of 12/08/23    Adult Transthoracic Echo Complete W/ Cont if Necessary Per Protocol    Interpretation Summary    Left ventricular systolic " function is normal. Left ventricular ejection fraction appears to be 56 - 60%.    Left ventricular diastolic function is consistent with (grade I) impaired relaxation.    Aortic valve is mildly calcified.  There is mild aortic valve regurgitation.    Mild mitral valve regurgitation is present.    Estimated right ventricular systolic pressure from tricuspid regurgitation is normal (<35 mmHg).                   Assessment and Plan        Diagnoses and all orders for this visit:    1. Stress-induced cardiomyopathy (Primary)  Assessment & Plan:  Fraction 35 to 40% index presentation, subsequently normalized.  She is clinically not volume overloaded.  Continue carvedilol and lisinopril and Farxiga.  She may decrease the dose of Lasix to every other day.    Orders:  -     carvedilol (COREG) 6.25 MG tablet; Take 1 tablet by mouth 2 (Two) Times a Day.  Dispense: 180 tablet; Refill: 3  -     lisinopril (PRINIVIL,ZESTRIL) 20 MG tablet; Take 1 tablet by mouth Daily.  Dispense: 90 tablet; Refill: 3  -     furosemide (LASIX) 20 MG tablet; Take 1 tablet by mouth Daily As Needed (swelling/wt gain).  Dispense: 90 tablet; Refill: 3    2. Non-occlusive coronary artery disease  Assessment & Plan:  Minimal nonobstructive coronary disease per cardiac catheter on index presentation.  Antiplatelet therapy was initiated due to elevated troponin.  We can discontinue Plavix now since she completed 1 year of dual antiplatelet therapy.  Continue aspirin, statin and beta-blocker.      3. Mixed hyperlipidemia  Assessment & Plan:  Most recent labs showed an LDL of 89, dose of atorvastatin was increased to 40 mg with primary care provider, to continue.    Orders:  -     LABS SCANNED    4. Primary hypertension  Assessment & Plan:  Blood pressure well-controlled.  Continue current regimen.    Orders:  -     carvedilol (COREG) 6.25 MG tablet; Take 1 tablet by mouth 2 (Two) Times a Day.  Dispense: 180 tablet; Refill: 3  -     lisinopril  (PRINIVIL,ZESTRIL) 20 MG tablet; Take 1 tablet by mouth Daily.  Dispense: 90 tablet; Refill: 3              Follow Up     Return in about 9 months (around 4/11/2025) for Next scheduled follow up, with Izabella ANDRE.    Patient was given instructions and counseling regarding her condition or for health maintenance advice. Please see specific information pulled into the AVS if appropriate.

## 2024-07-11 NOTE — ASSESSMENT & PLAN NOTE
Fraction 35 to 40% index presentation, subsequently normalized.  She is clinically not volume overloaded.  Continue carvedilol and lisinopril and Farxiga.  She may decrease the dose of Lasix to every other day.

## 2024-07-11 NOTE — ASSESSMENT & PLAN NOTE
Minimal nonobstructive coronary disease per cardiac catheter on index presentation.  Antiplatelet therapy was initiated due to elevated troponin.  We can discontinue Plavix now since she completed 1 year of dual antiplatelet therapy.  Continue aspirin, statin and beta-blocker.

## 2024-07-18 DIAGNOSIS — I51.81 STRESS-INDUCED CARDIOMYOPATHY: ICD-10-CM

## 2024-07-18 RX ORDER — FUROSEMIDE 20 MG/1
20 TABLET ORAL DAILY
Qty: 90 TABLET | Refills: 3 | OUTPATIENT
Start: 2024-07-18

## 2024-08-06 ENCOUNTER — APPOINTMENT (OUTPATIENT)
Dept: CT IMAGING | Facility: HOSPITAL | Age: 84
End: 2024-08-06
Payer: MEDICARE

## 2024-08-06 PROCEDURE — 70450 CT HEAD/BRAIN W/O DYE: CPT

## 2024-08-06 PROCEDURE — 99284 EMERGENCY DEPT VISIT MOD MDM: CPT

## 2024-08-06 PROCEDURE — 70486 CT MAXILLOFACIAL W/O DYE: CPT

## 2024-08-07 ENCOUNTER — HOSPITAL ENCOUNTER (EMERGENCY)
Facility: HOSPITAL | Age: 84
Discharge: HOME | End: 2024-08-07
Attending: EMERGENCY MEDICINE
Payer: MEDICARE

## 2024-08-07 VITALS
DIASTOLIC BLOOD PRESSURE: 71 MMHG | SYSTOLIC BLOOD PRESSURE: 187 MMHG | RESPIRATION RATE: 18 BRPM | OXYGEN SATURATION: 92 % | TEMPERATURE: 98.6 F | BODY MASS INDEX: 30.73 KG/M2 | WEIGHT: 156.53 LBS | HEIGHT: 60 IN | HEART RATE: 64 BPM
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
## 2024-08-07 DIAGNOSIS — S00.11XA PERIORBITAL ECCHYMOSIS OF RIGHT EYE, INITIAL ENCOUNTER: Primary | ICD-10-CM

## 2024-08-07 RX ORDER — HYDROCODONE BITARTRATE AND ACETAMINOPHEN 5; 325 MG/1; MG/1
1 TABLET ORAL ONCE
Status: COMPLETED | OUTPATIENT
Start: 2024-08-07 | End: 2024-08-07

## 2024-08-07 RX ADMIN — HYDROCODONE BITARTRATE AND ACETAMINOPHEN 1 TABLET: 5; 325 TABLET ORAL at 05:06

## 2024-08-07 NOTE — ED PROVIDER NOTES
Time: 10:26 PM EDT  Date of encounter:  8/6/2024  Independent Historian/Clinical History and Information was obtained by:   Patient and Family    History is limited by: N/A    Chief Complaint: Facial injury      History of Present Illness:  Patient is a 83 y.o. year old female who presents to the emergency department for evaluation of right periorbital swelling in bruising due to a fall.  Patient was getting out of the car and fell.  Patient states she is unsure how she fell but states her legs just gave him.  She usually walks with a cane but did not have a cane with her. Denies LOC, nausea vomiting, neck pain.  Is not on blood thinners anymore.  Denies other pain.    Patient Care Team  Primary Care Provider: Ramirez Maurice MD    Past Medical History:     Allergies   Allergen Reactions    Aleve [Naproxen] Hives     Past Medical History:   Diagnosis Date    COPD (chronic obstructive pulmonary disease)     History of non-ST elevation myocardial infarction (NSTEMI) 08/23/2023    Hyperlipidemia     Hypertension      Past Surgical History:   Procedure Laterality Date    CARDIAC CATHETERIZATION N/A 8/6/2023    Procedure: Left Heart Cath;  Surgeon: Mitch Correia MD;  Location: [facility] CATH INVASIVE LOCATION;  Service: Cardiology;  Laterality: N/A;    CARDIAC CATHETERIZATION N/A 8/6/2023    Procedure: Coronary angiography;  Surgeon: Mitch Correia MD;  Location: [facility] CATH INVASIVE LOCATION;  Service: Cardiology;  Laterality: N/A;     History reviewed. No pertinent family history.    Home Medications:  Prior to Admission medications    Medication Sig Start Date End Date Taking? Authorizing Provider   arformoterol (BROVANA) 15 MCG/2ML nebulizer solution Take 2 mL by nebulization 2 (Two) Times a Day. 6/27/24   Nazia Sam APRN   aspirin 81 MG EC tablet Take 1 tablet by mouth Daily. 8/9/23   Kevyn Sommer DO   atorvastatin (LIPITOR) 20 MG tablet Take 1 tablet by mouth Every Night.  Patient taking differently: Take 2  tablets by mouth Every Night. 23   Kevyn Sommer DO   budesonide (PULMICORT) 0.5 MG/2ML nebulizer solution Take 2 mL by nebulization 2 (Two) Times a Day. 24   Nazia Sam APRN   carvedilol (COREG) 6.25 MG tablet Take 1 tablet by mouth 2 (Two) Times a Day. 24   Leon Hernandez MD   dapagliflozin Propanediol (Farxiga) 10 MG tablet Take 10 mg by mouth Daily. 23   Izabella Oro APRN   furosemide (LASIX) 20 MG tablet Take 1 tablet by mouth Daily As Needed (swelling/wt gain). 24   Leon Hernandez MD   guaiFENesin (MUCINEX) 600 MG 12 hr tablet Take 1 tablet by mouth Every 12 (Twelve) Hours. 23   Kevyn Sommer DO   ipratropium-albuterol (DUO-NEB) 0.5-2.5 mg/3 ml nebulizer Take 3 mL by nebulization Every 6 (Six) Hours As Needed for Shortness of Air. 24   Nazia Sam APRN   lisinopril (PRINIVIL,ZESTRIL) 20 MG tablet Take 1 tablet by mouth Daily. 24   Leon Hernandez MD   nitroglycerin (NITROSTAT) 0.4 MG SL tablet  23   Chelle Ibrahim MD   nystatin 417166 UNIT/GM powder Apply  topically to the appropriate area as directed 2 (Two) Times a Day. APPLY TO AFFECTED AREA 24   Chelle Ibrahim MD        Social History:   Social History     Tobacco Use    Smoking status: Former     Current packs/day: 0.00     Types: Cigarettes     Quit date: 2023     Years since quittin.2     Passive exposure: Past    Smokeless tobacco: Never    Tobacco comments:     Pt was a social smoker   Vaping Use    Vaping status: Never Used   Substance Use Topics    Alcohol use: Not Currently    Drug use: Never         Review of Systems:  Review of Systems   Constitutional:  Negative for chills and fever.   HENT:  Positive for facial swelling. Negative for congestion, ear pain and sore throat.    Eyes:  Negative for pain.   Respiratory:  Negative for cough, chest tightness and shortness of breath.    Cardiovascular:  Negative for chest pain.   Gastrointestinal:  Negative for  "abdominal pain, diarrhea, nausea and vomiting.   Genitourinary:  Negative for flank pain and hematuria.   Musculoskeletal:  Negative for joint swelling.   Skin:  Positive for color change. Negative for pallor.   Neurological:  Negative for seizures and headaches.   All other systems reviewed and are negative.       Physical Exam:  BP (!) 187/71   Pulse 60   Temp 98.6 °F (37 °C) (Oral)   Resp 18   Ht 152.4 cm (60\")   Wt 71 kg (156 lb 8.4 oz)   SpO2 94%   BMI 30.57 kg/m²     Physical Exam  Constitutional:       Appearance: Normal appearance.   HENT:      Head: Normocephalic and atraumatic.      Nose: Nose normal.      Mouth/Throat:      Mouth: Mucous membranes are moist.   Eyes:      Extraocular Movements: Extraocular movements intact.      Conjunctiva/sclera: Conjunctivae normal.      Pupils: Pupils are equal, round, and reactive to light.   Cardiovascular:      Rate and Rhythm: Normal rate and regular rhythm.      Pulses: Normal pulses.      Heart sounds: Normal heart sounds.   Pulmonary:      Effort: Pulmonary effort is normal.      Breath sounds: Normal breath sounds.   Chest:      Chest wall: No tenderness.   Abdominal:      General: There is no distension.      Palpations: Abdomen is soft.      Tenderness: There is no abdominal tenderness. There is no guarding or rebound.   Musculoskeletal:         General: Normal range of motion.      Cervical back: Normal range of motion and neck supple.   Skin:     General: Skin is warm and dry.      Capillary Refill: Capillary refill takes less than 2 seconds.   Neurological:      General: No focal deficit present.      Mental Status: She is alert and oriented to person, place, and time. Mental status is at baseline.   Psychiatric:         Mood and Affect: Mood normal.         Behavior: Behavior normal.                  Procedures:  Procedures      Medical Decision Making:      Comorbidities that affect care:    COPD, Hypertension    External Notes " reviewed:    Previous Clinic Note: Patient seen by cardiology on 7/11/2024 for stress-induced cardiomyopathy, nonocclusive coronary artery disease, hyperlipidemia, hypertension.      The following orders were placed and all results were independently analyzed by me:  Orders Placed This Encounter   Procedures    CT Facial Bones Without Contrast    CT Head Without Contrast       Medications Given in the Emergency Department:  Medications   HYDROcodone-acetaminophen (NORCO) 5-325 MG per tablet 1 tablet (has no administration in time range)        ED Course:         Labs:    Lab Results (last 24 hours)       ** No results found for the last 24 hours. **             Imaging:    CT Facial Bones Without Contrast    Result Date: 8/7/2024  CT FACIAL BONES WO CONTRAST Date of Exam: 8/6/2024 11:52 PM EDT Indication: fall/right periorbital bruising and swelling. Comparison: 3/12/2023 Technique: Axial CT images were obtained from the inferior aspect of the mandible through the frontal sinuses without contrast administration.  Reconstructed coronal and sagittal images were also obtained. Automated exposure control and iterative construction methods were used. Findings: There is chronic mucosal thickening throughout the paranasal sinuses, and there is associated thickening of the maxillary sinus walls on both sides. Fluid is noted in the maxillary sinus lumens compatible with acute sinusitis. There is right periorbital soft tissue contusion that extends into the right cheek. Temporomandibular joints demonstrate normal alignment. The mandible is intact. The patient is edentulous. There is nasal septal deviation to the right. There are no acute facial bone fractures. The  globes appear grossly normal.     1. No acute facial bone fracture identified. 2. Right periorbital soft tissue injury extending into the right cheek. 3. Chronic pansinusitis with acute maxillary sinusitis. Electronically Signed: Phil Ramirez MD  8/7/2024 1:04  AM EDT  Workstation ID: [id number]    CT Head Without Contrast    Result Date: 8/7/2024  CT HEAD WO CONTRAST HISTORY: Headache after fall. Patient on blood thinners. TECHNIQUE: Axial unenhanced head CT with multiplanar reformats. Radiation dose reduction techniques included automated exposure control or exposure modulation based on body size. COMPARISON: 12/6/2020 FINDINGS: Ventricular size and configuration are normal. No acute infarct or hemorrhage is identified. There are no masses. There is no skull fracture. There is atrophy with chronic small vessel ischemic changes in the white matter. Atherosclerotic calcifications are noted in the distal vertebral arteries and carotid siphons. There is soft tissue injury around the right orbit. Please see maxillofacial CT report dictated separately.     No acute findings in the brain and no skull fracture. Please see maxillofacial CT report dictated separately. Electronically Signed: Phil Ramirez MD  8/7/2024 12:59 AM EDT  Workstation ID: [id number]       Differential Diagnosis and Discussion:    Trauma:  Differential diagnosis considered but not limited to were subarachnoid hemorrhage, intracranial bleeding, pneumothorax, cardiac contusion, lung contusion, intra-abdominal bleeding, and compartment syndrome of any extremity or other significant traumatic pathology    CT scan radiology impression was interpreted by me.    MDM  Number of Diagnoses or Management Options  Diagnosis management comments: Patient is afebrile nontoxic-appearing.  Vital signs are stable.  Exam she has periorbital edema and ecchymosis on the right.  No other concerning findings on exam.  Patient is able to ambulate in the emergency department without difficulty.  She does not have any focal deficits.  She appears to be back to baseline.  Recommend close follow-up with her primary care provider.  Discussed return precautions, discharge instructions and answered all her questions.       Amount and/or  Complexity of Data Reviewed  Tests in the radiology section of CPT®: reviewed  Review and summarize past medical records: yes  Independent visualization of images, tracings, or specimens: yes    Risk of Complications, Morbidity, and/or Mortality  Presenting problems: moderate  Management options: moderate                 Patient Care Considerations:    SEPSIS was considered but is NOT present in the emergency department as SIRS criteria is not present.      Consultants/Shared Management Plan:    None    Social Determinants of Health:    Patient has presented with family members who are responsible, reliable and will ensure follow up care.      Disposition and Care Coordination:    Discharged: The patient is suitable and stable for discharge with no need for consideration of admission.    I have explained the patient´s condition, diagnoses and treatment plan based on the information available to me at this time. I have answered questions and addressed any concerns. The patient has a good  understanding of the patient´s diagnosis, condition, and treatment plan as can be expected at this point. The vital signs have been stable. The patient´s condition is stable and appropriate for discharge from the emergency department.      The patient will pursue further outpatient evaluation with the primary care physician or other designated or consulting physician as outlined in the discharge instructions. They are agreeable to this plan of care and follow-up instructions have been explained in detail. The patient has received these instructions in written format and has expressed an understanding of the discharge instructions. The patient is aware that any significant change in condition or worsening of symptoms should prompt an immediate return to this or the closest emergency department or call to 911.  I have explained discharge medications and the need for follow up with the patient/caretakers. This was also printed in the  discharge instructions. Patient was discharged with the following medications and follow up:      Medication List        Changed      atorvastatin 20 MG tablet  Commonly known as: LIPITOR  Take 1 tablet by mouth Every Night.  What changed: how much to take           Ramirez Maurice MD  [address] [place] [address] [phone]    In 2 days         Final diagnoses:   Periorbital ecchymosis of right eye, initial encounter   Fall, initial encounter        ED Disposition       ED Disposition   Discharge    Condition   Stable    Comment   --               This medical record created using voice recognition software.             Juliano Strong MD  08/07/24 [id number]

## 2024-08-31 DIAGNOSIS — I51.81 STRESS-INDUCED CARDIOMYOPATHY: ICD-10-CM

## 2024-09-03 RX ORDER — FUROSEMIDE 20 MG
20 TABLET ORAL EVERY OTHER DAY
Qty: 45 TABLET | Refills: 0 | Status: SHIPPED | OUTPATIENT
Start: 2024-09-03

## 2024-11-13 ENCOUNTER — TELEPHONE (OUTPATIENT)
Dept: PULMONOLOGY | Facility: CLINIC | Age: 84
End: 2024-11-13

## 2024-11-13 NOTE — TELEPHONE ENCOUNTER
Caller: ALEJANDRA ARGUETA    Relationship: Emergency Contact    Best call back number: [phone]    What is the best time to reach you: ANYTIME    Who are you requesting to speak with (clinical staff, provider,  specific staff member): CLINICAL STAFF    What was the call regarding: PT'S DAUGHTER CALLED TO SAY THAT THE PT IS HAVING SHORTNESS OF BREATH, BREATHING FAST.   WORRIED THAT PT HAS FLUID ON HER LUNGS.  SHE DOESN'T HAVE AN APPT UNTIL JAN 7H.  SHE IS STILL GIVING THE PT THE BREATHING TREATMENTS.   PLEASE ADVISE

## 2024-11-14 ENCOUNTER — LAB (OUTPATIENT)
Dept: LAB | Facility: HOSPITAL | Age: 84
End: 2024-11-14
Payer: MEDICARE

## 2024-11-14 ENCOUNTER — OFFICE VISIT (OUTPATIENT)
Dept: PULMONOLOGY | Facility: CLINIC | Age: 84
End: 2024-11-14
Payer: MEDICARE

## 2024-11-14 ENCOUNTER — HOSPITAL ENCOUNTER (OUTPATIENT)
Dept: GENERAL RADIOLOGY | Facility: HOSPITAL | Age: 84
Discharge: HOME | End: 2024-11-14
Payer: MEDICARE

## 2024-11-14 VITALS
WEIGHT: 154 LBS | BODY MASS INDEX: 30.23 KG/M2 | DIASTOLIC BLOOD PRESSURE: 48 MMHG | TEMPERATURE: 98.5 F | HEART RATE: 90 BPM | SYSTOLIC BLOOD PRESSURE: 144 MMHG | OXYGEN SATURATION: 92 % | HEIGHT: 60 IN | RESPIRATION RATE: 20 BRPM

## 2024-11-14 DIAGNOSIS — R05.8 PRODUCTIVE COUGH: ICD-10-CM

## 2024-11-14 DIAGNOSIS — J98.4 RESTRICTIVE AIRWAY DISEASE: ICD-10-CM

## 2024-11-14 DIAGNOSIS — J96.01 ACUTE RESPIRATORY FAILURE WITH HYPOXIA: ICD-10-CM

## 2024-11-14 DIAGNOSIS — R06.09 DYSPNEA ON EXERTION: ICD-10-CM

## 2024-11-14 DIAGNOSIS — J98.4 RESTRICTIVE AIRWAY DISEASE: Primary | ICD-10-CM

## 2024-11-14 LAB
ALBUMIN SERPL-MCNC: 2.7 G/DL (ref 3.5–5.2)
ALBUMIN/GLOB SERPL: 0.6 G/DL
ALP SERPL-CCNC: 111 U/L (ref 39–117)
ALT SERPL W P-5'-P-CCNC: 28 U/L (ref 1–33)
ANION GAP SERPL CALCULATED.3IONS-SCNC: 9.6 MMOL/L (ref 5–15)
AST SERPL-CCNC: 32 U/L (ref 1–32)
BASOPHILS # BLD AUTO: 0.06 10*3/MM3 (ref 0–0.2)
BASOPHILS NFR BLD AUTO: 0.3 % (ref 0–1.5)
BILIRUB SERPL-MCNC: 0.4 MG/DL (ref 0–1.2)
BUN SERPL-MCNC: 12 MG/DL (ref 8–23)
BUN/CREAT SERPL: 14.8 (ref 7–25)
CALCIUM SPEC-SCNC: 9.2 MG/DL (ref 8.6–10.5)
CHLORIDE SERPL-SCNC: 101 MMOL/L (ref 98–107)
CO2 SERPL-SCNC: 27.4 MMOL/L (ref 22–29)
CREAT SERPL-MCNC: 0.81 MG/DL (ref 0.57–1)
DEPRECATED RDW RBC AUTO: 37.4 FL (ref 37–54)
EGFRCR SERPLBLD CKD-EPI 2021: 72.1 ML/MIN/1.73
EOSINOPHIL # BLD AUTO: 0.27 10*3/MM3 (ref 0–0.4)
EOSINOPHIL NFR BLD AUTO: 1.3 % (ref 0.3–6.2)
ERYTHROCYTE [DISTWIDTH] IN BLOOD BY AUTOMATED COUNT: 11.9 % (ref 12.3–15.4)
GLOBULIN UR ELPH-MCNC: 4.4 GM/DL
GLUCOSE SERPL-MCNC: 138 MG/DL (ref 65–99)
HCT VFR BLD AUTO: 42.3 % (ref 34–46.6)
HGB BLD-MCNC: 13.8 G/DL (ref 12–15.9)
IMM GRANULOCYTES # BLD AUTO: 0.18 10*3/MM3 (ref 0–0.05)
IMM GRANULOCYTES NFR BLD AUTO: 0.9 % (ref 0–0.5)
LYMPHOCYTES # BLD AUTO: 2.02 10*3/MM3 (ref 0.7–3.1)
LYMPHOCYTES NFR BLD AUTO: 9.6 % (ref 19.6–45.3)
MCH RBC QN AUTO: 28.2 PG (ref 26.6–33)
MCHC RBC AUTO-ENTMCNC: 32.6 G/DL (ref 31.5–35.7)
MCV RBC AUTO: 86.5 FL (ref 79–97)
MONOCYTES # BLD AUTO: 1.26 10*3/MM3 (ref 0.1–0.9)
MONOCYTES NFR BLD AUTO: 6 % (ref 5–12)
NEUTROPHILS NFR BLD AUTO: 17.25 10*3/MM3 (ref 1.7–7)
NEUTROPHILS NFR BLD AUTO: 81.9 % (ref 42.7–76)
NRBC BLD AUTO-RTO: 0 /100 WBC (ref 0–0.2)
NT-PROBNP SERPL-MCNC: 677 PG/ML (ref 0–1800)
PLATELET # BLD AUTO: 485 10*3/MM3 (ref 140–450)
PMV BLD AUTO: 11.2 FL (ref 6–12)
POTASSIUM SERPL-SCNC: 3.4 MMOL/L (ref 3.5–5.2)
PROT SERPL-MCNC: 7.1 G/DL (ref 6–8.5)
RBC # BLD AUTO: 4.89 10*6/MM3 (ref 3.77–5.28)
SODIUM SERPL-SCNC: 138 MMOL/L (ref 136–145)
WBC NRBC COR # BLD AUTO: 21.04 10*3/MM3 (ref 3.4–10.8)

## 2024-11-14 PROCEDURE — 80053 COMPREHEN METABOLIC PANEL: CPT

## 2024-11-14 PROCEDURE — 71046 X-RAY EXAM CHEST 2 VIEWS: CPT

## 2024-11-14 PROCEDURE — 83880 ASSAY OF NATRIURETIC PEPTIDE: CPT

## 2024-11-14 PROCEDURE — 85025 COMPLETE CBC W/AUTO DIFF WBC: CPT

## 2024-11-14 PROCEDURE — 36415 COLL VENOUS BLD VENIPUNCTURE: CPT

## 2024-11-14 NOTE — PROGRESS NOTES
Primary Care Provider  Ramirez Maurice MD   Referring Provider  No ref. provider found    Patient Complaint  acute, Shortness of Breath, and Cough (Green,greyish mucus)      Subjective       History of Presenting Illness  Cris Agudelo is a pleasant 83 y.o. female who presents to [facility] for acute visit for cough.  Patient here with her daughter.  I last saw the patient 6/27/2024.  Patient stated her symptoms started 3 to 4 days ago she has been very short of air with breathing fast and some green-grayish mucus production.  She has had no fever or chills no known COVID or flu exposures.  Upon arrival to the examination room her sat was 84%.  O2 was applied at 2 L and patient rebounded to 93%.  Removed O2 and patient dropped down to 88%.  O2 reapplied and patient rebounded to 93%.  Patient continues on Brovana Pulmicort and duo nebulizer.  Patient's daughter states she is only been given duo nebulizer about one a day.    Patient has a history of pleural effusions and lung nodule.  Her follow-up CT of chest has been ordered for 12/11/2024 11:15 AM. Patient continues on Pulmicort and Brovana maintenance nebulizers twice daily. She has duo nebulizer as needed for shortness of air or wheeze.     Patient denies dyspnea, coughing, wheezing, headaches, chest pain, weight loss or hemoptysis. Patient denies fevers, chills and night sweats. Cris Agudelo is able to perform ADLs without difficulties.    I have personally reviewed the review of systems, past family, social, medical and surgical histories; and agree with their findings.      Review of Systems   Constitutional: Negative.    HENT: Negative.     Respiratory:  Positive for cough and shortness of breath.    Cardiovascular: Negative.    Musculoskeletal: Negative.    Neurological: Negative.    Psychiatric/Behavioral: Negative.           Family History   Problem Relation Age of Onset    Heart failure Mother         She  had congestive heart failure        Social History     Socioeconomic History    Marital status:    Tobacco Use    Smoking status: Former     Current packs/day: 0.00     Types: Cigarettes     Quit date: 2023     Years since quittin.5     Passive exposure: Past    Smokeless tobacco: Never    Tobacco comments:     Pt was a social smoker   Vaping Use    Vaping status: Never Used   Substance and Sexual Activity    Alcohol use: Yes     Alcohol/week: 2.0 standard drinks of alcohol     Types: 2 Glasses of wine per week    Drug use: Never    Sexual activity: Not Currently        Past Medical History:   Diagnosis Date    COPD (chronic obstructive pulmonary disease)     History of non-ST elevation myocardial infarction (NSTEMI) 2023    Hyperlipidemia     Hypertension         Immunization History   Administered Date(s) Administered    Pneumococcal Conjugate 20-Valent (PCV20) 2023       Allergies   Allergen Reactions    Aleve [Naproxen] Hives          Current Outpatient Medications:     arformoterol (BROVANA) 15 MCG/2ML nebulizer solution, Take 2 mL by nebulization 2 (Two) Times a Day., Disp: , Rfl:     aspirin 81 MG EC tablet, Take 1 tablet by mouth Daily., Disp: 30 tablet, Rfl: 1    atorvastatin (LIPITOR) 20 MG tablet, Take 1 tablet by mouth Every Night. (Patient taking differently: Take 2 tablets by mouth Every Night.), Disp: 90 tablet, Rfl: 1    budesonide (PULMICORT) 0.5 MG/2ML nebulizer solution, Take 2 mL by nebulization 2 (Two) Times a Day., Disp: , Rfl:     carvedilol (COREG) 6.25 MG tablet, Take 1 tablet by mouth 2 (Two) Times a Day., Disp: 180 tablet, Rfl: 3    dapagliflozin Propanediol (Farxiga) 10 MG tablet, Take 10 mg by mouth Daily., Disp: 90 tablet, Rfl: 3    guaiFENesin (MUCINEX) 600 MG 12 hr tablet, Take 1 tablet by mouth Every 12 (Twelve) Hours., Disp: 15 tablet, Rfl: 0    ipratropium-albuterol (DUO-NEB) 0.5-2.5 mg/3 ml nebulizer, Take 3 mL by nebulization Every 6 (Six) Hours As  "Needed for Shortness of Air., Disp: 360 mL, Rfl: 5    lisinopril (PRINIVIL,ZESTRIL) 20 MG tablet, Take 1 tablet by mouth Daily., Disp: 90 tablet, Rfl: 3    nitroglycerin (NITROSTAT) 0.4 MG SL tablet, , Disp: , Rfl:     amoxicillin-clavulanate (AUGMENTIN) 875-125 MG per tablet, Take 1 tablet by mouth 2 (Two) Times a Day., Disp: 20 tablet, Rfl: 0    furosemide (LASIX) 20 MG tablet, Take 1 tablet by mouth Every Other Day., Disp: 45 tablet, Rfl: 0    nystatin 196345 UNIT/GM powder, Apply  topically to the appropriate area as directed 2 (Two) Times a Day. APPLY TO AFFECTED AREA, Disp: , Rfl:          Vital Signs   /48 (BP Location: Left arm, Patient Position: Sitting, Cuff Size: Adult)   Pulse 90   Temp 98.5 °F (36.9 °C)   Resp 20   Ht 152.4 cm (60\")   Wt 69.9 kg (154 lb)   SpO2 92% Comment: 2 liters  BMI 30.08 kg/m²       Objective     Physical Exam  Vitals reviewed.   Constitutional:       General: She is not in acute distress.     Appearance: Normal appearance. She is not ill-appearing.   HENT:      Head: Normocephalic and atraumatic.      Nose: Nose normal.      Mouth/Throat:      Mouth: Mucous membranes are moist.      Pharynx: Oropharynx is clear.   Eyes:      Extraocular Movements: Extraocular movements intact.      Conjunctiva/sclera: Conjunctivae normal.      Pupils: Pupils are equal, round, and reactive to light.   Cardiovascular:      Rate and Rhythm: Normal rate and regular rhythm.      Pulses: Normal pulses.      Heart sounds: Normal heart sounds.   Pulmonary:      Effort: Pulmonary effort is normal. No respiratory distress.      Breath sounds: No stridor. Rhonchi present. No wheezing or rales.   Abdominal:      General: Bowel sounds are normal.   Musculoskeletal:         General: Normal range of motion.      Cervical back: Normal range of motion and neck supple.   Skin:     General: Skin is warm and dry.   Neurological:      Mental Status: She is alert and oriented to person, place, and time. "   Psychiatric:         Behavior: Behavior normal.         Results Review  I have personally reviewed the prior office notes, hospital records, labs, and diagnostics.    Assessment         Patient Active Problem List   Diagnosis    Hyponatremia    Stress-induced cardiomyopathy    Hyperlipidemia    Primary hypertension    Herpes zoster    Shingles    Non-occlusive coronary artery disease        Plan     Diagnoses and all orders for this visit:    1. Restrictive airway disease (Primary)  -     CBC & Differential; Future  -     BNP; Future  -     Comprehensive Metabolic Panel; Future  -     XR Chest 2 View; Future  -     amoxicillin-clavulanate (AUGMENTIN) 875-125 MG per tablet; Take 1 tablet by mouth 2 (Two) Times a Day.  Dispense: 20 tablet; Refill: 0  -     Cancel: Oxygen Therapy  -     Oxygen Therapy    2. Dyspnea on exertion  -     CBC & Differential; Future  -     BNP; Future  -     Comprehensive Metabolic Panel; Future  -     XR Chest 2 View; Future  -     amoxicillin-clavulanate (AUGMENTIN) 875-125 MG per tablet; Take 1 tablet by mouth 2 (Two) Times a Day.  Dispense: 20 tablet; Refill: 0  -     Cancel: Oxygen Therapy  -     Oxygen Therapy    3. Productive cough  -     CBC & Differential; Future  -     BNP; Future  -     Comprehensive Metabolic Panel; Future  -     XR Chest 2 View; Future  -     amoxicillin-clavulanate (AUGMENTIN) 875-125 MG per tablet; Take 1 tablet by mouth 2 (Two) Times a Day.  Dispense: 20 tablet; Refill: 0  -     Cancel: Oxygen Therapy  -     Oxygen Therapy    4. Acute respiratory failure with hypoxia  -     Cancel: Oxygen Therapy  -     Oxygen Therapy       5.  See patient to get a chest x-ray and labs at this time.  Will start patient on Augmentin prophylactically.  Will notify of the results when available.  Patient's daughter encouraged if patient has worsening of symptoms to call 911 or go to emergency room for further evaluation.  Patient provided with O2 via tank at 2 L and [facility] [facility]  will be delivering additional tanks to her home.      Smoking status:  reports that she quit smoking about 18 months ago. Her smoking use included cigarettes. She has been exposed to tobacco smoke. She has never used smokeless tobacco.    Vaccination status: Reviewed  Immunization History   Administered Date(s) Administered    Pneumococcal Conjugate 20-Valent (PCV20) 08/17/2023        Medications personally reviewed    Follow Up  Return in about 4 weeks (around 12/12/2024).    Patient was given instructions and counseling regarding her condition or for health maintenance advice. Please see specific information pulled into the AVS if appropriate.     I spent 20 minutes caring for Cris Agudelo on this date of service. This time includes time spent by me in the following activities:preparing for the visit, reviewing tests, obtaining and/or reviewing a separately obtained history, performing a medically appropriate examination and/or evaluation, counseling and educating the patient/family/caregiver, ordering medications, tests, or procedures, documenting information in the medical record, independently interpreting results and communicating that information with the patient/family/caregiver and answered questions family members, discuss medications.

## 2024-11-15 ENCOUNTER — APPOINTMENT (OUTPATIENT)
Dept: GENERAL RADIOLOGY | Facility: HOSPITAL | Age: 84
End: 2024-11-15
Payer: MEDICARE

## 2024-11-15 ENCOUNTER — HOSPITAL ENCOUNTER (INPATIENT)
Facility: HOSPITAL | Age: 84
LOS: 5 days | Discharge: HOME | End: 2024-11-20
Attending: EMERGENCY MEDICINE | Admitting: INTERNAL MEDICINE
Payer: MEDICARE

## 2024-11-15 DIAGNOSIS — R13.12 OROPHARYNGEAL DYSPHAGIA: ICD-10-CM

## 2024-11-15 DIAGNOSIS — I51.81 STRESS-INDUCED CARDIOMYOPATHY: ICD-10-CM

## 2024-11-15 DIAGNOSIS — R26.2 DIFFICULTY WALKING: ICD-10-CM

## 2024-11-15 DIAGNOSIS — A41.9 SEPSIS, DUE TO UNSPECIFIED ORGANISM, UNSPECIFIED WHETHER ACUTE ORGAN DYSFUNCTION PRESENT: ICD-10-CM

## 2024-11-15 DIAGNOSIS — J18.9 PNEUMONIA DUE TO INFECTIOUS ORGANISM, UNSPECIFIED LATERALITY, UNSPECIFIED PART OF LUNG: Primary | ICD-10-CM

## 2024-11-15 LAB
ALBUMIN SERPL-MCNC: 3 G/DL (ref 3.5–5.2)
ALBUMIN/GLOB SERPL: 0.7 G/DL
ALP SERPL-CCNC: 120 U/L (ref 39–117)
ALT SERPL W P-5'-P-CCNC: 27 U/L (ref 1–33)
ANION GAP SERPL CALCULATED.3IONS-SCNC: 10.1 MMOL/L (ref 5–15)
AST SERPL-CCNC: 27 U/L (ref 1–32)
BASOPHILS # BLD AUTO: 0.08 10*3/MM3 (ref 0–0.2)
BASOPHILS NFR BLD AUTO: 0.3 % (ref 0–1.5)
BILIRUB SERPL-MCNC: 0.7 MG/DL (ref 0–1.2)
BUN SERPL-MCNC: 11 MG/DL (ref 8–23)
BUN/CREAT SERPL: 13.4 (ref 7–25)
CALCIUM SPEC-SCNC: 8.9 MG/DL (ref 8.6–10.5)
CHLORIDE SERPL-SCNC: 98 MMOL/L (ref 98–107)
CO2 SERPL-SCNC: 25.9 MMOL/L (ref 22–29)
CREAT SERPL-MCNC: 0.82 MG/DL (ref 0.57–1)
D-LACTATE SERPL-SCNC: 1.5 MMOL/L (ref 0.5–2)
DEPRECATED RDW RBC AUTO: 42.3 FL (ref 37–54)
EGFRCR SERPLBLD CKD-EPI 2021: 71.1 ML/MIN/1.73
EOSINOPHIL # BLD AUTO: 0.58 10*3/MM3 (ref 0–0.4)
EOSINOPHIL NFR BLD AUTO: 2.5 % (ref 0.3–6.2)
ERYTHROCYTE [DISTWIDTH] IN BLOOD BY AUTOMATED COUNT: 13.2 % (ref 12.3–15.4)
FLUAV SUBTYP SPEC NAA+PROBE: NOT DETECTED
FLUBV RNA ISLT QL NAA+PROBE: NOT DETECTED
GLOBULIN UR ELPH-MCNC: 4.4 GM/DL
GLUCOSE SERPL-MCNC: 192 MG/DL (ref 65–99)
HCT VFR BLD AUTO: 43.4 % (ref 34–46.6)
HGB BLD-MCNC: 14 G/DL (ref 12–15.9)
HOLD SPECIMEN: NORMAL
HOLD SPECIMEN: NORMAL
IMM GRANULOCYTES # BLD AUTO: 0.22 10*3/MM3 (ref 0–0.05)
IMM GRANULOCYTES NFR BLD AUTO: 0.9 % (ref 0–0.5)
LYMPHOCYTES # BLD AUTO: 1.03 10*3/MM3 (ref 0.7–3.1)
LYMPHOCYTES NFR BLD AUTO: 4.4 % (ref 19.6–45.3)
M PNEUMO IGM SER QL: NEGATIVE
MCH RBC QN AUTO: 28.3 PG (ref 26.6–33)
MCHC RBC AUTO-ENTMCNC: 32.3 G/DL (ref 31.5–35.7)
MCV RBC AUTO: 87.7 FL (ref 79–97)
MONOCYTES # BLD AUTO: 1.07 10*3/MM3 (ref 0.1–0.9)
MONOCYTES NFR BLD AUTO: 4.5 % (ref 5–12)
NEUTROPHILS NFR BLD AUTO: 20.54 10*3/MM3 (ref 1.7–7)
NEUTROPHILS NFR BLD AUTO: 87.4 % (ref 42.7–76)
NRBC BLD AUTO-RTO: 0 /100 WBC (ref 0–0.2)
NT-PROBNP SERPL-MCNC: 870.5 PG/ML (ref 0–1800)
PLATELET # BLD AUTO: 503 10*3/MM3 (ref 140–450)
PMV BLD AUTO: 10.5 FL (ref 6–12)
POTASSIUM SERPL-SCNC: 3.2 MMOL/L (ref 3.5–5.2)
PROCALCITONIN SERPL-MCNC: 0.17 NG/ML (ref 0–0.25)
PROT SERPL-MCNC: 7.4 G/DL (ref 6–8.5)
QT INTERVAL: 392 MS
QTC INTERVAL: 452 MS
RBC # BLD AUTO: 4.95 10*6/MM3 (ref 3.77–5.28)
RSV RNA NPH QL NAA+NON-PROBE: NOT DETECTED
SARS-COV-2 RNA RESP QL NAA+PROBE: NOT DETECTED
SODIUM SERPL-SCNC: 134 MMOL/L (ref 136–145)
TROPONIN T SERPL HS-MCNC: 15 NG/L
WBC NRBC COR # BLD AUTO: 23.52 10*3/MM3 (ref 3.4–10.8)
WHOLE BLOOD HOLD COAG: NORMAL
WHOLE BLOOD HOLD SPECIMEN: NORMAL

## 2024-11-15 PROCEDURE — 84145 PROCALCITONIN (PCT): CPT | Performed by: INTERNAL MEDICINE

## 2024-11-15 PROCEDURE — 86738 MYCOPLASMA ANTIBODY: CPT | Performed by: INTERNAL MEDICINE

## 2024-11-15 PROCEDURE — 99291 CRITICAL CARE FIRST HOUR: CPT

## 2024-11-15 PROCEDURE — 25810000003 SODIUM CHLORIDE 0.9 % SOLUTION: Performed by: EMERGENCY MEDICINE

## 2024-11-15 PROCEDURE — 87040 BLOOD CULTURE FOR BACTERIA: CPT | Performed by: EMERGENCY MEDICINE

## 2024-11-15 PROCEDURE — 94799 UNLISTED PULMONARY SVC/PX: CPT

## 2024-11-15 PROCEDURE — 84484 ASSAY OF TROPONIN QUANT: CPT | Performed by: EMERGENCY MEDICINE

## 2024-11-15 PROCEDURE — 85025 COMPLETE CBC W/AUTO DIFF WBC: CPT | Performed by: EMERGENCY MEDICINE

## 2024-11-15 PROCEDURE — 71045 X-RAY EXAM CHEST 1 VIEW: CPT

## 2024-11-15 PROCEDURE — 25010000002 CEFTRIAXONE PER 250 MG: Performed by: EMERGENCY MEDICINE

## 2024-11-15 PROCEDURE — 25010000002 ENOXAPARIN PER 10 MG: Performed by: INTERNAL MEDICINE

## 2024-11-15 PROCEDURE — 80053 COMPREHEN METABOLIC PANEL: CPT | Performed by: EMERGENCY MEDICINE

## 2024-11-15 PROCEDURE — 87637 SARSCOV2&INF A&B&RSV AMP PRB: CPT | Performed by: INTERNAL MEDICINE

## 2024-11-15 PROCEDURE — 94640 AIRWAY INHALATION TREATMENT: CPT

## 2024-11-15 PROCEDURE — 83880 ASSAY OF NATRIURETIC PEPTIDE: CPT | Performed by: EMERGENCY MEDICINE

## 2024-11-15 PROCEDURE — 83605 ASSAY OF LACTIC ACID: CPT | Performed by: EMERGENCY MEDICINE

## 2024-11-15 PROCEDURE — 93005 ELECTROCARDIOGRAM TRACING: CPT | Performed by: EMERGENCY MEDICINE

## 2024-11-15 PROCEDURE — 36415 COLL VENOUS BLD VENIPUNCTURE: CPT

## 2024-11-15 PROCEDURE — 99223 1ST HOSP IP/OBS HIGH 75: CPT | Performed by: INTERNAL MEDICINE

## 2024-11-15 RX ORDER — FUROSEMIDE 20 MG/1
20 TABLET ORAL EVERY OTHER DAY
Qty: 45 TABLET | Refills: 0 | Status: ON HOLD | OUTPATIENT
Start: 2024-11-15

## 2024-11-15 RX ORDER — SODIUM CHLORIDE 0.9 % (FLUSH) 0.9 %
10 SYRINGE (ML) INJECTION EVERY 12 HOURS SCHEDULED
Status: DISCONTINUED | OUTPATIENT
Start: 2024-11-15 | End: 2024-11-20 | Stop reason: HOSPADM

## 2024-11-15 RX ORDER — SODIUM CHLORIDE 0.9 % (FLUSH) 0.9 %
10 SYRINGE (ML) INJECTION AS NEEDED
Status: DISCONTINUED | OUTPATIENT
Start: 2024-11-15 | End: 2024-11-20 | Stop reason: HOSPADM

## 2024-11-15 RX ORDER — AZITHROMYCIN 250 MG/1
500 TABLET, FILM COATED ORAL ONCE
Status: COMPLETED | OUTPATIENT
Start: 2024-11-15 | End: 2024-11-15

## 2024-11-15 RX ORDER — ACETAMINOPHEN 160 MG/5ML
650 SOLUTION ORAL EVERY 4 HOURS PRN
Status: DISCONTINUED | OUTPATIENT
Start: 2024-11-15 | End: 2024-11-20 | Stop reason: HOSPADM

## 2024-11-15 RX ORDER — AZITHROMYCIN 250 MG/1
500 TABLET, FILM COATED ORAL
Status: COMPLETED | OUTPATIENT
Start: 2024-11-16 | End: 2024-11-17

## 2024-11-15 RX ORDER — ENOXAPARIN SODIUM 100 MG/ML
40 INJECTION SUBCUTANEOUS DAILY
Status: DISCONTINUED | OUTPATIENT
Start: 2024-11-15 | End: 2024-11-20 | Stop reason: HOSPADM

## 2024-11-15 RX ORDER — POTASSIUM CHLORIDE 750 MG/1
40 CAPSULE, EXTENDED RELEASE ORAL ONCE
Status: COMPLETED | OUTPATIENT
Start: 2024-11-15 | End: 2024-11-15

## 2024-11-15 RX ORDER — BUDESONIDE 0.5 MG/2ML
0.5 INHALANT ORAL
Status: DISCONTINUED | OUTPATIENT
Start: 2024-11-15 | End: 2024-11-20 | Stop reason: HOSPADM

## 2024-11-15 RX ORDER — ACETAMINOPHEN 325 MG/1
650 TABLET ORAL EVERY 4 HOURS PRN
Status: DISCONTINUED | OUTPATIENT
Start: 2024-11-15 | End: 2024-11-20 | Stop reason: HOSPADM

## 2024-11-15 RX ORDER — ARFORMOTEROL TARTRATE 15 UG/2ML
15 SOLUTION RESPIRATORY (INHALATION)
Status: DISCONTINUED | OUTPATIENT
Start: 2024-11-15 | End: 2024-11-20 | Stop reason: HOSPADM

## 2024-11-15 RX ORDER — ACETAMINOPHEN 650 MG/1
650 SUPPOSITORY RECTAL EVERY 4 HOURS PRN
Status: DISCONTINUED | OUTPATIENT
Start: 2024-11-15 | End: 2024-11-20 | Stop reason: HOSPADM

## 2024-11-15 RX ORDER — SODIUM CHLORIDE 9 MG/ML
40 INJECTION, SOLUTION INTRAVENOUS AS NEEDED
Status: DISCONTINUED | OUTPATIENT
Start: 2024-11-15 | End: 2024-11-20 | Stop reason: HOSPADM

## 2024-11-15 RX ORDER — IPRATROPIUM BROMIDE AND ALBUTEROL SULFATE 2.5; .5 MG/3ML; MG/3ML
3 SOLUTION RESPIRATORY (INHALATION) EVERY 6 HOURS PRN
Status: DISCONTINUED | OUTPATIENT
Start: 2024-11-15 | End: 2024-11-20 | Stop reason: HOSPADM

## 2024-11-15 RX ADMIN — POTASSIUM CHLORIDE 40 MEQ: 750 CAPSULE, EXTENDED RELEASE ORAL at 18:19

## 2024-11-15 RX ADMIN — CEFTRIAXONE SODIUM 1000 MG: 1 INJECTION, POWDER, FOR SOLUTION INTRAMUSCULAR; INTRAVENOUS at 14:58

## 2024-11-15 RX ADMIN — AZITHROMYCIN DIHYDRATE 500 MG: 250 TABLET ORAL at 14:58

## 2024-11-15 RX ADMIN — ENOXAPARIN SODIUM 40 MG: 100 INJECTION SUBCUTANEOUS at 18:19

## 2024-11-15 RX ADMIN — SODIUM CHLORIDE 500 ML: 9 INJECTION, SOLUTION INTRAVENOUS at 14:58

## 2024-11-15 RX ADMIN — ARFORMOTEROL TARTRATE 15 MCG: 15 SOLUTION RESPIRATORY (INHALATION) at 18:33

## 2024-11-15 RX ADMIN — BUDESONIDE 0.5 MG: 0.5 INHALANT ORAL at 18:33

## 2024-11-15 RX ADMIN — Medication 10 ML: at 20:03

## 2024-11-15 NOTE — PLAN OF CARE
Goal Outcome Evaluation:  Plan of Care Reviewed With: patient        Progress: improving  Outcome Evaluation: Pt admitted today from ED with Pneumonia. O2 at 84% upon admission. 4L O2 given. O2 stats now 95%. Infrequent nonproductive dry cough. Alert x3; very tired with mild confusion. Some wheezing in lungs. Vitals stable for patient. Education and care plan started. Admission complete. Labs need to be completed. Continue with plan of care.

## 2024-11-15 NOTE — H&P
[facility]  Date: 11/15/2024   Patient Name: Cris Agudelo  : 1940  MRN: [id number]  Primary Care Physician:  Ramirez Maurice MD  Date of admission: 11/15/2024    Subjective   Subjective     Chief Complaint: Shortness of breath    HPI:    Cris Agudelo is a 83 y.o. female past medical history of COPD, coronary artery disease, dyslipidemia, and hypertension who presents to the emergency department after not feeling well.    The patient was seen in pulmonary clinic yesterday and started on antibiotic.  It seems that she got 2 doses of Augmentin.  She did not get any steroids.  States that she feels feeling much worse this morning and they called about her chest x-ray showed pneumonia so she came to the emergency room.  No fevers.  No chills.  As result of all her symptoms she can the ER for further evaluation      In the emergency department the patient's vital signs are as follows: Temperature 90.4, pulse 78, respiratory is 12, blood pressure 124/112 and 81% on room air.  CBC shows a white count of 23,000 with neutrophil count of 87.  CMP shows no concerning abnormalities.  Chest x-ray shows diffuse interstitial air opacities present throughout the lungs bases most pronounced with upper lobes likely related to multifocal pneumonia.  Patient with hospital for acute hypoxic respiratory failure due to multifocal pneumonia.    All systems reviewed and are as noted above    Personal History     Past Medical story:  COPD  Coronary disease history of NSTEMI  Dyslipidemia  Hypertension  Heart failure with preserved ejection fraction and grade 1 diastolic dysfunction    Past Surgical History:  Cardiac catheterization    Family History:   Heart failure in her mother    Social History:   Former smoker.  Occasional drinking.    Home Medications:  amoxicillin-clavulanate, arformoterol, aspirin, atorvastatin, budesonide, carvedilol, dapagliflozin Propanediol, furosemide, guaiFENesin,  ipratropium-albuterol, lisinopril, and nitroglycerin    Allergies:  Allergies   Allergen Reactions    Aleve [Naproxen] Hives         Objective   Objective     Vitals:   Temp:  [98.4 °F (36.9 °C)] 98.4 °F (36.9 °C)  Heart Rate:  [78-86] 79  Resp:  [11-18] 18  BP: (124-158)/() 142/57  Flow (L/min) (Oxygen Therapy):  [4] 4    Physical Exam    Constitutional: Unwell appearing.  Nontoxic.   Eyes: Pupils equal, sclerae anicteric, no conjunctival injection   HENT: NCAT, mucous membranes moist   Neck: Supple, no thyromegaly, no lymphadenopathy, trachea midline   Respiratory: Crackles in upper lung fields.  Occasional rhonchi   Cardiovascular: RRR, no murmurs, rubs, or gallops, palpable pedal pulses bilaterally   Gastrointestinal: Positive bowel sounds, soft, nontender, nondistended   Musculoskeletal: No bilateral ankle edema, no clubbing or cyanosis to extremities   Psychiatric: Appropriate affect, cooperative   Neurologic: Oriented x 3, strength symmetric in all extremities, Cranial Nerves grossly intact to confrontation, speech clear   Skin: No rashes     Result Review    Result Review:  I have personally reviewed the results from the time of this admission to 11/15/2024 15:39 EST and agree with these findings:  Imaging and labs reviewed    Assessment & Plan   Assessment / Plan     Assessment/Plan:   Multifocal pneumonia  Acute hypoxic respiratory failure  Stress-induced cardiomyopathy with recovery of EF  COPD  Obesity BMI 30    Plan:  --Admit to hospital service  -- Ox for saturations less than 90%  -- Patient only got 2 doses of Augmentin so I do not think it was an outpatient failure so we will continue ceftriaxone/azithromycin  -- Strep and Legionella urine antigen  -- Procalcitonin now in the morning  -- Respiratory culture  -- Mycoplasma pneumonia  -- Brovana/Pulmicort/DuoNebs  -- Patient has significant crackles no real wheezing so will not start prednisone at this time but may benefit future  -- Will  continue oral Lasix as the patient does seem to be in acute heart failure exacerbation    VTE Prophylaxis:  Lovenox        CODE STATUS:    Level Of Support Discussed With: Patient  Code Status (Patient has no pulse and is not breathing): CPR (Attempt to Resuscitate)  Medical Interventions (Patient has pulse or is breathing): Full Support      Admission Status:  I believe this patient meets admission status.    Electronically signed by Stefan Santacruz MD, 11/15/24, 2:51 PM EST.

## 2024-11-15 NOTE — PAYOR COMM NOTE
"Elsie Rodas (83 y.o. Female)       Date of Birth   1940    Social Security Number       Address   [address] [place], [place] [address]    Home Phone   [phone]    MRN   [id number]       Confucianist   Pentecostalism    Marital Status                               Admission Date   11/15/24    Admission Type   Emergency    Admitting Provider   Stefan Santacruz MD    Attending Provider   Stefan Santacruz MD    Department, Room/Bed   [address], 3021/1       Discharge Date       Discharge Disposition       Discharge Destination                                 Attending Provider: Stefan Santacruz MD    Allergies: Aleve [Naproxen]    Isolation: None   Infection: None   Code Status: CPR    Ht: 152.4 cm (60\")   Wt: 72.6 kg (160 lb 0.9 oz)    Admission Cmt: None   Principal Problem: Pneumonia [J18.9]                   Active Insurance as of 11/15/2024       Primary Coverage       Payor Plan Insurance Group Employer/Plan Group    ANTHEM MEDICARE REPLACEMENT ANTHEM MEDICARE ADVANTAGE [id number]       Payor Plan Address Payor Plan Phone Number Payor Plan Fax Number Effective Dates    [address] [phone]  2022 - None Entered    [facility] [id number]         Subscriber Name Subscriber Birth Date Member ID       ELSIE RODAS 1940 [id number]                     Emergency Contacts        (Rel.) Home Phone Work Phone Mobile Phone    ALEJANDRA ARGUETA (Daughter) [phone] -- [phone]                 History & Physical        Stefan Santacruz MD at 11/15/24 [address] [place] [address] HISTORY AND PHYSICAL  Date: 11/15/2024   Patient Name: Elsie Rodas  : 1940  MRN: [id number]  Primary Care Physician:  Ramirez Maurice MD  Date of admission: 11/15/2024    Subjective  Subjective     Chief Complaint: Shortness of breath    HPI:    Elsie Rodas is a 83 y.o. female past medical history of COPD, coronary artery disease, dyslipidemia, and hypertension who presents " to the emergency department after not feeling well.    The patient was seen in pulmonary clinic yesterday and started on antibiotic.  It seems that she got 2 doses of Augmentin.  She did not get any steroids.  States that she feels feeling much worse this morning and they called about her chest x-ray showed pneumonia so she came to the emergency room.  No fevers.  No chills.  As result of all her symptoms she can the ER for further evaluation      In the emergency department the patient's vital signs are as follows: Temperature 90.4, pulse 78, respiratory is 12, blood pressure 124/112 and 81% on room air.  CBC shows a white count of 23,000 with neutrophil count of 87.  CMP shows no concerning abnormalities.  Chest x-ray shows diffuse interstitial air opacities present throughout the lungs bases most pronounced with upper lobes likely related to multifocal pneumonia.  Patient with hospital for acute hypoxic respiratory failure due to multifocal pneumonia.    All systems reviewed and are as noted above    Personal History     Past Medical story:  COPD  Coronary disease history of NSTEMI  Dyslipidemia  Hypertension  Heart failure with preserved ejection fraction and grade 1 diastolic dysfunction    Past Surgical History:  Cardiac catheterization    Family History:   Heart failure in her mother    Social History:   Former smoker.  Occasional drinking.    Home Medications:  amoxicillin-clavulanate, arformoterol, aspirin, atorvastatin, budesonide, carvedilol, dapagliflozin Propanediol, furosemide, guaiFENesin, ipratropium-albuterol, lisinopril, and nitroglycerin    Allergies:  Allergies   Allergen Reactions    Aleve [Naproxen] Hives         Objective  Objective     Vitals:   Temp:  [98.4 °F (36.9 °C)] 98.4 °F (36.9 °C)  Heart Rate:  [78-86] 79  Resp:  [11-18] 18  BP: (124-158)/() 142/57  Flow (L/min) (Oxygen Therapy):  [4] 4    Physical Exam    Constitutional: Unwell appearing.  Nontoxic.   Eyes: Pupils equal,  sclerae anicteric, no conjunctival injection   HENT: NCAT, mucous membranes moist   Neck: Supple, no thyromegaly, no lymphadenopathy, trachea midline   Respiratory: Crackles in upper lung fields.  Occasional rhonchi   Cardiovascular: RRR, no murmurs, rubs, or gallops, palpable pedal pulses bilaterally   Gastrointestinal: Positive bowel sounds, soft, nontender, nondistended   Musculoskeletal: No bilateral ankle edema, no clubbing or cyanosis to extremities   Psychiatric: Appropriate affect, cooperative   Neurologic: Oriented x 3, strength symmetric in all extremities, Cranial Nerves grossly intact to confrontation, speech clear   Skin: No rashes     Result Review   Result Review:  I have personally reviewed the results from the time of this admission to 11/15/2024 15:39 EST and agree with these findings:  Imaging and labs reviewed    Assessment & Plan  Assessment / Plan     Assessment/Plan:   Multifocal pneumonia  Acute hypoxic respiratory failure  Stress-induced cardiomyopathy with recovery of EF  COPD  Obesity BMI 30    Plan:  --Admit to hospital service  -- Ox for saturations less than 90%  -- Patient only got 2 doses of Augmentin so I do not think it was an outpatient failure so we will continue ceftriaxone/azithromycin  -- Strep and Legionella urine antigen  -- Procalcitonin now in the morning  -- Respiratory culture  -- Mycoplasma pneumonia  -- Brovana/Pulmicort/DuoNebs  -- Patient has significant crackles no real wheezing so will not start prednisone at this time but may benefit future  -- Will continue oral Lasix as the patient does seem to be in acute heart failure exacerbation    VTE Prophylaxis:  Lovenox        CODE STATUS:    Level Of Support Discussed With: Patient  Code Status (Patient has no pulse and is not breathing): CPR (Attempt to Resuscitate)  Medical Interventions (Patient has pulse or is breathing): Full Support      Admission Status:  I believe this patient meets admission  status.    Electronically signed by Stefan Santacruz MD, 11/15/24, 2:51 PM EST.             Electronically signed by Stefan Santacruz MD at 11/15/24 1601          Emergency Department Notes        Donaldo Sarabia MD at 11/15/24 1420          Time: 2:20 PM EST  Date of encounter:  11/15/2024  Independent Historian/Clinical History and Information was obtained by:   Patient and Family    History is limited by: N/A    Chief Complaint: Shortness of breath      History of Present Illness:  Patient is a 83 y.o. year old female who presents to the emergency department for evaluation of shortness of breath.  Patient presents with family for evaluation of shortness of breath.  She was told she had pneumonia advised to come to the emergency department for further evaluation and treatment.  Patient is normally not oxygen requiring but as of yesterday is requiring supplemental oxygen.      Patient Care Team  Primary Care Provider: Ramirez Maurice MD    Past Medical History:     Allergies   Allergen Reactions    Aleve [Naproxen] Hives     Past Medical History:   Diagnosis Date    COPD (chronic obstructive pulmonary disease)     History of non-ST elevation myocardial infarction (NSTEMI) 08/23/2023    Hyperlipidemia     Hypertension      Past Surgical History:   Procedure Laterality Date    CARDIAC CATHETERIZATION N/A 8/6/2023    Procedure: Left Heart Cath;  Surgeon: Mitch Correia MD;  Location: [facility] CATH INVASIVE LOCATION;  Service: Cardiology;  Laterality: N/A;    CARDIAC CATHETERIZATION N/A 8/6/2023    Procedure: Coronary angiography;  Surgeon: Mitch Correia MD;  Location: [facility] CATH INVASIVE LOCATION;  Service: Cardiology;  Laterality: N/A;     Family History   Problem Relation Age of Onset    Heart failure Mother         She had congestive heart failure       Home Medications:  Prior to Admission medications    Medication Sig Start Date End Date Taking? Authorizing Provider   amoxicillin-clavulanate (AUGMENTIN) 875-125 MG per  tablet Take 1 tablet by mouth 2 (Two) Times a Day. 24   Nazia Sam APRN   arformoterol (BROVANA) 15 MCG/2ML nebulizer solution Take 2 mL by nebulization 2 (Two) Times a Day. 24   Nazia Sam APRN   aspirin 81 MG EC tablet Take 1 tablet by mouth Daily. 23   Kevyn Sommer DO   atorvastatin (LIPITOR) 20 MG tablet Take 1 tablet by mouth Every Night.  Patient taking differently: Take 2 tablets by mouth Every Night. 23   Kevyn Sommer DO   budesonide (PULMICORT) 0.5 MG/2ML nebulizer solution Take 2 mL by nebulization 2 (Two) Times a Day. 24   Nazia Sam APRN   carvedilol (COREG) 6.25 MG tablet Take 1 tablet by mouth 2 (Two) Times a Day. 24   Leon Hernandez MD   dapagliflozin Propanediol (Farxiga) 10 MG tablet Take 10 mg by mouth Daily. 23   Izabella Oro APRN   furosemide (LASIX) 20 MG tablet TAKE 1 TABLET BY MOUTH EVERY OTHER DAY 11/15/24   Leon Hernandez MD   guaiFENesin (MUCINEX) 600 MG 12 hr tablet Take 1 tablet by mouth Every 12 (Twelve) Hours. 23   Kevyn Sommer DO   ipratropium-albuterol (DUO-NEB) 0.5-2.5 mg/3 ml nebulizer Take 3 mL by nebulization Every 6 (Six) Hours As Needed for Shortness of Air. 24   Nazia Sam APRN   lisinopril (PRINIVIL,ZESTRIL) 20 MG tablet Take 1 tablet by mouth Daily. 24   Leon Hernandez MD   nitroglycerin (NITROSTAT) 0.4 MG SL tablet  23   Chelle Ibrahim MD   nystatin 808496 UNIT/GM powder Apply  topically to the appropriate area as directed 2 (Two) Times a Day. APPLY TO AFFECTED AREA 24   Chelle Ibrahim MD   furosemide (LASIX) 20 MG tablet Take 1 tablet by mouth Every Other Day. 9/3/24 11/15/24  Leon Hernandez MD        Social History:   Social History     Tobacco Use    Smoking status: Former     Current packs/day: 0.00     Types: Cigarettes     Quit date: 2023     Years since quittin.5     Passive exposure: Past    Smokeless tobacco: Never    Tobacco comments:     Pt was a  "social smoker   Vaping Use    Vaping status: Never Used   Substance Use Topics    Alcohol use: Yes     Alcohol/week: 2.0 standard drinks of alcohol     Types: 2 Glasses of wine per week    Drug use: Never         Review of Systems:  Review of Systems   Constitutional:  Negative for chills and fever.   HENT:  Negative for congestion, rhinorrhea and sore throat.    Eyes:  Negative for pain and visual disturbance.   Respiratory:  Positive for cough and shortness of breath. Negative for apnea and chest tightness.    Cardiovascular:  Negative for chest pain and palpitations.   Gastrointestinal:  Negative for abdominal pain, diarrhea, nausea and vomiting.   Genitourinary:  Negative for difficulty urinating and dysuria.   Musculoskeletal:  Negative for joint swelling and myalgias.   Skin:  Negative for color change.   Neurological:  Negative for seizures and headaches.   Psychiatric/Behavioral: Negative.     All other systems reviewed and are negative.       Physical Exam:  BP (!) 124/112 (BP Location: Right arm, Patient Position: Lying)   Pulse 78   Temp 98.4 °F (36.9 °C) (Oral)   Resp 12   Ht 152.4 cm (60\")   Wt 72.6 kg (160 lb 0.9 oz)   SpO2 94%   BMI 31.26 kg/m²     Physical Exam  Vitals and nursing note reviewed.   Constitutional:       General: She is not in acute distress.     Appearance: Normal appearance. She is not toxic-appearing.   HENT:      Head: Normocephalic and atraumatic.      Jaw: There is normal jaw occlusion.   Eyes:      General: Lids are normal.      Extraocular Movements: Extraocular movements intact.      Conjunctiva/sclera: Conjunctivae normal.      Pupils: Pupils are equal, round, and reactive to light.   Cardiovascular:      Rate and Rhythm: Normal rate and regular rhythm.      Pulses: Normal pulses.      Heart sounds: Normal heart sounds.   Pulmonary:      Effort: Respiratory distress present.      Breath sounds: Wheezing and rhonchi present.   Abdominal:      General: Abdomen is flat. "      Palpations: Abdomen is soft.      Tenderness: There is no abdominal tenderness. There is no guarding or rebound.   Musculoskeletal:         General: Normal range of motion.      Cervical back: Normal range of motion and neck supple.      Right lower leg: No edema.      Left lower leg: No edema.   Skin:     General: Skin is warm and dry.   Neurological:      Mental Status: She is alert and oriented to person, place, and time. Mental status is at baseline.   Psychiatric:         Mood and Affect: Mood normal.                  Procedures:  Procedures      Medical Decision Making:      Comorbidities that affect care:    CAD, CHF, hypertension    External Notes reviewed:    Previous Clinic Note: Pulmonology office visit for dyspnea management      The following orders were placed and all results were independently analyzed by me:  Orders Placed This Encounter   Procedures    Blood Culture - Blood,    Blood Culture - Blood,    XR Chest 1 View    [place] Draw    Comprehensive Metabolic Panel    BNP    Single High Sensitivity Troponin T    CBC Auto Differential    Lactic Acid, Plasma    Procalcitonin    NPO Diet NPO Type: Strict NPO    Undress & Gown    Continuous Pulse Oximetry    Vital Signs    Inpatient Hospitalist Consult    Oxygen Therapy- Nasal Cannula; Titrate 1-6 LPM Per SpO2; 90 - 95%    ECG 12 Lead ED Triage Standing Order; SOA    Insert Peripheral IV    CBC & Differential    Green Top (Gel)    Lavender Top    Gold Top - SST    Light Blue Top       Medications Given in the Emergency Department:  Medications   sodium chloride 0.9 % flush 10 mL (has no administration in time range)   cefTRIAXone (ROCEPHIN) in NS 1 gram/10ml IV PUSH syringe (has no administration in time range)   azithromycin (ZITHROMAX) tablet 500 mg (has no administration in time range)   sodium chloride 0.9 % bolus 500 mL (has no administration in time range)        ED Course:         Labs:    Lab Results (last 24 hours)       Procedure  Component Value Units Date/Time    CBC & Differential [[id number]]  (Abnormal) Collected: 11/14/24 1620    Specimen: Blood Updated: 11/14/24 2243    Narrative:      The following orders were created for panel order CBC & Differential.  Procedure                               Abnormality         Status                     ---------                               -----------         ------                     CBC Auto Differential[[id number]]        Abnormal            Final result                 Please view results for these tests on the individual orders.    BNP [[id number]]  (Normal) Collected: 11/14/24 1620    Specimen: Blood Updated: 11/14/24 2255     proBNP 677.0 pg/mL     Narrative:      This assay is used as an aid in the diagnosis of individuals suspected of having heart failure. It can be used as an aid in the diagnosis of acute decompensated heart failure (ADHF) in patients presenting with signs and symptoms of ADHF to the emergency department (ED). In addition, NT-proBNP of <300 pg/mL indicates ADHF is not likely.    Age Range Result Interpretation  NT-proBNP Concentration (pg/mL:      <50             Positive            >450                   Gray                 300-450                    Negative             <300    50-75           Positive            >900                  Gray                300-900                  Negative            <300      >75             Positive            >1800                  Gray                300-1800                  Negative            <300    Comprehensive Metabolic Panel [[id number]]  (Abnormal) Collected: 11/14/24 1620    Specimen: Blood Updated: 11/14/24 2255     Glucose 138 mg/dL      BUN 12 mg/dL      Creatinine 0.81 mg/dL      Sodium 138 mmol/L      Potassium 3.4 mmol/L      Chloride 101 mmol/L      CO2 27.4 mmol/L      Calcium 9.2 mg/dL      Total Protein 7.1 g/dL      Albumin 2.7 g/dL      ALT (SGPT) 28 U/L      AST (SGOT) 32 U/L      Alkaline Phosphatase 111 U/L       Total Bilirubin 0.4 mg/dL      Globulin 4.4 gm/dL      A/G Ratio 0.6 g/dL      BUN/Creatinine Ratio 14.8     Anion Gap 9.6 mmol/L      eGFR 72.1 mL/min/1.73     Narrative:      GFR Normal >60  Chronic Kidney Disease <60  Kidney Failure <15    The GFR formula is only valid for adults with stable renal function between ages 18 and 70.    CBC Auto Differential [[id number]]  (Abnormal) Collected: 11/14/24 1620    Specimen: Blood Updated: 11/14/24 2243     WBC 21.04 10*3/mm3      RBC 4.89 10*6/mm3      Hemoglobin 13.8 g/dL      Hematocrit 42.3 %      MCV 86.5 fL      MCH 28.2 pg      MCHC 32.6 g/dL      RDW 11.9 %      RDW-SD 37.4 fl      MPV 11.2 fL      Platelets 485 10*3/mm3      Neutrophil % 81.9 %      Lymphocyte % 9.6 %      Monocyte % 6.0 %      Eosinophil % 1.3 %      Basophil % 0.3 %      Immature Grans % 0.9 %      Neutrophils, Absolute 17.25 10*3/mm3      Lymphocytes, Absolute 2.02 10*3/mm3      Monocytes, Absolute 1.26 10*3/mm3      Eosinophils, Absolute 0.27 10*3/mm3      Basophils, Absolute 0.06 10*3/mm3      Immature Grans, Absolute 0.18 10*3/mm3      nRBC 0.0 /100 WBC     CBC & Differential [[id number]]  (Abnormal) Collected: 11/15/24 1348    Specimen: Blood Updated: 11/15/24 1355    Narrative:      The following orders were created for panel order CBC & Differential.  Procedure                               Abnormality         Status                     ---------                               -----------         ------                     CBC Auto Differential[[id number]]        Abnormal            Final result                 Please view results for these tests on the individual orders.    Comprehensive Metabolic Panel [[id number]]  (Abnormal) Collected: 11/15/24 1348    Specimen: Blood Updated: 11/15/24 1415     Glucose 192 mg/dL      BUN 11 mg/dL      Creatinine 0.82 mg/dL      Sodium 134 mmol/L      Potassium 3.2 mmol/L      Comment: Slight hemolysis detected by analyzer. Result may be falsely  elevated.        Chloride 98 mmol/L      CO2 25.9 mmol/L      Calcium 8.9 mg/dL      Total Protein 7.4 g/dL      Albumin 3.0 g/dL      ALT (SGPT) 27 U/L      AST (SGOT) 27 U/L      Alkaline Phosphatase 120 U/L      Total Bilirubin 0.7 mg/dL      Globulin 4.4 gm/dL      A/G Ratio 0.7 g/dL      BUN/Creatinine Ratio 13.4     Anion Gap 10.1 mmol/L      eGFR 71.1 mL/min/1.73     Narrative:      GFR Normal >60  Chronic Kidney Disease <60  Kidney Failure <15    The GFR formula is only valid for adults with stable renal function between ages 18 and 70.    BNP [[id number]]  (Normal) Collected: 11/15/24 1348    Specimen: Blood Updated: 11/15/24 1413     proBNP 870.5 pg/mL     Narrative:      This assay is used as an aid in the diagnosis of individuals suspected of having heart failure. It can be used as an aid in the diagnosis of acute decompensated heart failure (ADHF) in patients presenting with signs and symptoms of ADHF to the emergency department (ED). In addition, NT-proBNP of <300 pg/mL indicates ADHF is not likely.    Age Range Result Interpretation  NT-proBNP Concentration (pg/mL:      <50             Positive            >450                   Gray                 300-450                    Negative             <300    50-75           Positive            >900                  Gray                300-900                  Negative            <300      >75             Positive            >1800                  Gray                300-1800                  Negative            <300    Single High Sensitivity Troponin T [[id number]]  (Abnormal) Collected: 11/15/24 1348    Specimen: Blood Updated: 11/15/24 1415     HS Troponin T 15 ng/L     Narrative:      High Sensitive Troponin T Reference Range:  <14.0 ng/L- Negative Female for AMI  <22.0 ng/L- Negative Male for AMI  >=14 - Abnormal Female indicating possible myocardial injury.  >=22 - Abnormal Male indicating possible myocardial injury.   Clinicians would have to utilize  clinical acumen, EKG, Troponin, and serial changes to determine if it is an Acute Myocardial Infarction or myocardial injury due to an underlying chronic condition.         CBC Auto Differential [[id number]]  (Abnormal) Collected: 11/15/24 1348    Specimen: Blood Updated: 11/15/24 1355     WBC 23.52 10*3/mm3      RBC 4.95 10*6/mm3      Hemoglobin 14.0 g/dL      Hematocrit 43.4 %      MCV 87.7 fL      MCH 28.3 pg      MCHC 32.3 g/dL      RDW 13.2 %      RDW-SD 42.3 fl      MPV 10.5 fL      Platelets 503 10*3/mm3      Neutrophil % 87.4 %      Lymphocyte % 4.4 %      Monocyte % 4.5 %      Eosinophil % 2.5 %      Basophil % 0.3 %      Immature Grans % 0.9 %      Neutrophils, Absolute 20.54 10*3/mm3      Lymphocytes, Absolute 1.03 10*3/mm3      Monocytes, Absolute 1.07 10*3/mm3      Eosinophils, Absolute 0.58 10*3/mm3      Basophils, Absolute 0.08 10*3/mm3      Immature Grans, Absolute 0.22 10*3/mm3      nRBC 0.0 /100 WBC     Lactic Acid, Plasma [[id number]]  (Normal) Collected: 11/15/24 1348    Specimen: Blood Updated: 11/15/24 1413     Lactate 1.5 mmol/L     Procalcitonin [[id number]] Collected: 11/15/24 1348    Specimen: Blood Updated: 11/15/24 1447             Imaging:    XR Chest 1 View    Result Date: 11/15/2024  XR CHEST 1 VW Date of Exam: 11/15/2024 1:50 PM EST Indication: SOA Triage Protocol Comparison: Chest x-ray 11/14/2024 Findings: Patient's head and neck obscure the lung apices due to kyphosis. Cardiomegaly. Increased opacity in the right hilum. There is bilateral upper lobe airspace opacity with bronchiectasis and reticular opacity. No pneumothorax or pleural effusion.     Impression: Increased opacity in the right hilum could be seen with pneumonia or malignancy. Upper lobe predominant airspace opacities with bronchiectasis and septal thickening. Worse appearance the chest when compared to 1 day prior. Electronically Signed: Monika Garcia MD  11/15/2024 2:12 PM EST  Workstation ID: [id number]    XR Chest 2  View    Result Date: 11/14/2024  XR CHEST 2 VW Date of Exam: 11/14/2024 3:55 PM EST Indication: dyspnea, hypoxia Comparison: 12/29/2023. Findings: Diffuse interstitial and airspace opacities are present throughout the lungs bilaterally, most pronounced within the bilateral upper lobes. No pleural fluid. No pneumothorax. The pulmonary vasculature appears within normal limits. The cardiac and mediastinal silhouette appear unremarkable. No acute osseous abnormality identified.     Impression: Diffuse interstitial and airspace opacities present throughout the lungs bilaterally, most pronounced within the upper lobes, likely related to a multifocal pneumonia. Follow-up to resolution recommended. Electronically Signed: Anusha Marie MD  11/14/2024 8:17 PM EST  Workstation ID: [id number]       Differential Diagnosis and Discussion:    Dyspnea: Differential diagnosis includes but is not limited to metabolic acidosis, neurological disorders, psychogenic, asthma, pneumothorax, upper airway obstruction, COPD, pneumonia, noncardiogenic pulmonary edema, interstitial lung disease, anemia, congestive heart failure, and pulmonary embolism    All labs were reviewed and interpreted by me.  All X-rays impressions were independently interpreted by me.    MDM  Number of Diagnoses or Management Options  Pneumonia due to infectious organism, unspecified laterality, unspecified part of lung  Sepsis, due to unspecified organism, unspecified whether acute organ dysfunction present  Diagnosis management comments: In summary this is an 83-year-old female who presents to the Emergency Department for evaluation of pneumonia.  Chest x-ray reviewed by me read reveals significant pneumonia worse since yesterday.  CBC independently reviewed interpreted by me reveals critical normalities of leukocytosis.  CMP independently reviewed and interpreted by me and shows no critical abnormalities.  Patient was given IV antibiotics in the emergency department  along with IV fluids.  She is requiring supplemental oxygen as well.  I believe patient would benefit this time from IV antibiotics.  Patient case has been discussed with the hospitalist team who will admit to the hospital for further evaluation and continuation of treatment.                   Sepsis criteria was met in the emergency department and the Sepsis protocol (including antibiotic administration) was initiated.      SIRS criteria considered:   1.  Temperature > 100.4 or <96.8    2.  Heart Rate > 90    3.  Respiratory Rate > 22    4.  WBC > 12K or <4K.             Severe Sepsis:     Respiratory: Mechanical Ventilation or Bipap  Hypotension: SBP > 90 or MAP < 65  Renal: Creatinine > 2  Metabolic: Lactic Acid > 2  Hematologic: Platelets < 100K or INR > 1.5  Hepatic: BILI  >  2  CNS: Sudden AMS     Septic Shock:     Severe Sepsis + Persistent hypotension or Lactic Acid > 4     Normal saline bolus, Antibiotics, and final disposition was based on these definitions.        Sepsis was recognized at 1420    Antibiotics were ordered.     30 mL/kg bolus was not indicated.       Patient did not receive the recommended 30 mL/kg fluid bolus for sepsis because it would be harmful or detrimental to the patient.    The patient has Heart Failure.   The patient was ordered 500 mL of fluids.    The patient presents with 2 out of the 4 SIRS criteria and a suspected source for sepsis.  Patient was evaluated and placed on a cardiac monitor for fear of worsening tachycardia and life-threatening hypotension.  Patient was monitored for shock and signs of end-organ damage.  Mental status was repeatedly checked throughout the ED stay.  Medications were ordered by me which includes IV Rocephin and azithromycin.  The case was discussed at length with admitting physician.    Total Critical Care time of 40 minutes. Total critical care time documented does not include time spent on separately billed procedures for services of nurses or  physician assistants. I personally saw and examined the patient. I have reviewed all diagnostic interpretations and treatment plans as written. I was present for the key portions of any procedures performed and the inclusive time noted in any critical care statement. Critical care time includes patient management by me, time spent at the patients bedside,  time to review lab and imaging results, discussing patient care, documentation in the medical record, and time spent with family or caregiver.    Patient Care Considerations:    CT CHEST: I considered ordering a CT scan of the chest, however chest x-ray reveals significant pneumonia      Consultants/Shared Management Plan:    Hospitalist: I have discussed the case with Dr. Santacruz who agrees to accept the patient for admission.    Social Determinants of Health:    Patient has presented with family members who are responsible, reliable and will ensure follow up care.      Disposition and Care Coordination:    Admit:   Through independent evaluation of the patient's history, physical, and imperical data, the patient meets criteria for inpatient admission to the hospital.        Final diagnoses:   Pneumonia due to infectious organism, unspecified laterality, unspecified part of lung   Sepsis, due to unspecified organism, unspecified whether acute organ dysfunction present        ED Disposition       ED Disposition   Decision to Admit    Condition   --    Comment   --               This medical record created using voice recognition software.             Donaldo Sarabia MD  11/15/24 1448      Electronically signed by Donaldo Sarabia MD at 11/15/24 1448       Vital Signs (last day)       Date/Time Temp Temp src Pulse Resp BP Patient Position SpO2    11/15/24 1654 97.7 (36.5) Oral 82 20 136/56 Lying 97    11/15/24 1515 98.4 (36.9) Oral 80 18 136/48 Lying 94    11/15/24 1500 98.4 (36.9) Oral 79 18 142/57 Lying 92    11/15/24 1400 98.4 (36.9) Oral 78 12 124/112 Lying 94     11/15/24 1347 98.4 (36.9) Oral -- -- -- -- --    11/15/24 1340 -- -- 86 11 158/64 Sitting 81          Oxygen Therapy (last day)       Date/Time SpO2 Device (Oxygen Therapy) Flow (L/min) (Oxygen Therapy) Oxygen Concentration (%) ETCO2 (mmHg)    11/15/24 1654 97 nasal cannula 4 -- --    11/15/24 1515 94 room air 4 -- --    11/15/24 1500 92 nasal cannula 4 -- --    11/15/24 1400 94 nasal cannula 4 -- --    11/15/24 13:53:52 -- nasal cannula 4 -- --    11/15/24 13:53:43 -- nasal cannula 4 -- --    11/15/24 1340 81 room air -- -- --          Facility-Administered Medications as of 11/15/2024   Medication Dose Route Frequency Provider Last Rate Last Admin    acetaminophen (TYLENOL) tablet 650 mg  650 mg Oral Q4H PRN Stefan Santacruz MD        Or    acetaminophen (TYLENOL) 160 MG/5ML oral solution 650 mg  650 mg Oral Q4H PRN Stefan Santacruz MD        Or    acetaminophen (TYLENOL) suppository 650 mg  650 mg Rectal Q4H PRN Stefan Santacruz MD        arformoterol (BROVANA) nebulizer solution 15 mcg  15 mcg Nebulization BID - RT Stefan Santacruz MD        [COMPLETED] azithromycin (ZITHROMAX) tablet 500 mg  500 mg Oral Once Donaldo Sarabia MD   500 mg at 11/15/24 1458    [START ON 11/16/2024] azithromycin (ZITHROMAX) tablet 500 mg  500 mg Oral Q24H Stefan Santacruz MD        budesonide (PULMICORT) nebulizer solution 0.5 mg  0.5 mg Nebulization BID - RT Stefan Santacruz MD        [COMPLETED] cefTRIAXone (ROCEPHIN) in NS 1 gram/10ml IV PUSH syringe  1,000 mg Intravenous Once Donaldo Sarabia MD   1,000 mg at 11/15/24 1458    [START ON 11/16/2024] cefTRIAXone (ROCEPHIN) in NS 2 GRAMS/20ml IV PUSH syringe  2,000 mg Intravenous Q24H Stefan Santacruz MD        Enoxaparin Sodium (LOVENOX) syringe 40 mg  40 mg Subcutaneous Daily Stefan Santacruz MD        ipratropium-albuterol (DUO-NEB) nebulizer solution 3 mL  3 mL Nebulization Q6H PRN Stefan Santacruz MD        potassium chloride (MICRO-K/KLOR-CON) CR capsule  40 mEq Oral Once Stefan Santacruz MD        [COMPLETED] sodium chloride  0.9 % bolus 500 mL  500 mL Intravenous Once Donaldo Sarabia MD 1,000 mL/hr at 11/15/24 1458 500 mL at 11/15/24 1458    sodium chloride 0.9 % flush 10 mL  10 mL Intravenous PRN Donaldo Sarabia MD        sodium chloride 0.9 % flush 10 mL  10 mL Intravenous Q12H Stefan Santacruz MD        sodium chloride 0.9 % flush 10 mL  10 mL Intravenous PRN Stefan Santacruz MD        sodium chloride 0.9 % infusion 40 mL  40 mL Intravenous PRN Stefan Santacruz MD         Orders (active)        Start     Ordered    11/16/24 1500  cefTRIAXone (ROCEPHIN) in NS 2 GRAMS/20ml IV PUSH syringe  Every 24 Hours         11/15/24 1655    11/16/24 0900  azithromycin (ZITHROMAX) tablet 500 mg  Every 24 Hours Scheduled         11/15/24 1655    11/16/24 0600  CBC Auto Differential  Morning Draw         11/15/24 1655    11/16/24 0600  Comprehensive Metabolic Panel  Morning Draw         11/15/24 1655    11/16/24 0600  Magnesium  Morning Draw         11/15/24 1655    11/16/24 0600  Procalcitonin  Morning Draw         11/15/24 1656    11/16/24 0000  Inpatient Consult to Advance Care Planning  Once        Provider:  (Not yet assigned)    11/15/24 1710    11/15/24 2130  arformoterol (BROVANA) nebulizer solution 15 mcg  2 Times Daily - RT         11/15/24 1655    11/15/24 2130  budesonide (PULMICORT) nebulizer solution 0.5 mg  2 Times Daily - RT         11/15/24 1655    11/15/24 2100  sodium chloride 0.9 % flush 10 mL  Every 12 Hours Scheduled         11/15/24 1655    11/15/24 2000  Vital Signs  Every 4 Hours       11/15/24 1655    11/15/24 1800  Oral Care  2 Times Daily       11/15/24 1655    11/15/24 1745  Enoxaparin Sodium (LOVENOX) syringe 40 mg  Daily         11/15/24 1656    11/15/24 1700  Strict Intake & Output  Every Hour       11/15/24 1655    11/15/24 1656  Intake & Output  Every Shift       11/15/24 1655    11/15/24 1656  Weigh Patient  Once         11/15/24 1655    11/15/24 1656  Insert Peripheral IV  Once         11/15/24 1655    11/15/24 1656  Saline Lock  "& Maintain IV Access  Continuous         11/15/24 1655    11/15/24 1656  Daily Weights  Daily       11/15/24 1655    11/15/24 1656  Diet: Cardiac; Healthy Heart (2-3 Na+); Fluid Consistency: Thin (IDDSI 0)  Diet Effective Now         11/15/24 1655    11/15/24 1656  S. Pneumo Ag Urine or CSF - Urine, Urine, Clean Catch  Once         11/15/24 1655    11/15/24 1656  Legionella Antigen, Urine - Urine, Urine, Clean Catch  Once         11/15/24 1655    11/15/24 1656  Respiratory Culture - Sputum, Cough  Once         11/15/24 1655    11/15/24 1656  Mycoplasma Pneumoniae Antibody, IgM - Blood,  Once         11/15/24 1655    11/15/24 1656  Discontinue Cardiac Monitoring  Once         11/15/24 1655    11/15/24 1655  ipratropium-albuterol (DUO-NEB) nebulizer solution 3 mL  Every 6 Hours PRN         11/15/24 1655    11/15/24 1655  acetaminophen (TYLENOL) tablet 650 mg  Every 4 Hours PRN        Placed in \"Or\" Linked Group    11/15/24 1655    11/15/24 1655  acetaminophen (TYLENOL) 160 MG/5ML oral solution 650 mg  Every 4 Hours PRN        Placed in \"Or\" Linked Group    11/15/24 1655    11/15/24 1655  acetaminophen (TYLENOL) suppository 650 mg  Every 4 Hours PRN        Placed in \"Or\" Linked Group    11/15/24 1655    11/15/24 1655  sodium chloride 0.9 % flush 10 mL  As Needed         11/15/24 1655    11/15/24 1655  sodium chloride 0.9 % infusion 40 mL  As Needed         11/15/24 1655    11/15/24 1630  potassium chloride (MICRO-K/KLOR-CON) CR capsule  Once         11/15/24 1604    11/15/24 1525  Code Status and Medical Interventions: CPR (Attempt to Resuscitate); Full Support  Continuous         11/15/24 1526    11/15/24 1421  Blood Culture - Blood, Arm, Right  Once        Placed in \"And\" Linked Group    11/15/24 1420    11/15/24 1421  Blood Culture - Blood, Arm, Left  Once        Placed in \"And\" Linked Group    11/15/24 1420    11/15/24 1421  Inpatient Hospitalist Consult  Once        Specialty:  Hospitalist  Provider:  Stefan Santacruz, " MD    11/15/24 1420    11/15/24 1338  Insert Peripheral IV  Once         11/15/24 1338    11/15/24 1337  sodium chloride 0.9 % flush 10 mL  As Needed         11/15/24 1338    Unscheduled  Oxygen Therapy- Nasal Cannula; Titrate 1-6 LPM Per SpO2; 90 - 95%  Continuous PRN       11/15/24 1338

## 2024-11-15 NOTE — ED PROVIDER NOTES
Time: 2:20 PM EST  Date of encounter:  11/15/2024  Independent Historian/Clinical History and Information was obtained by:   Patient and Family    History is limited by: N/A    Chief Complaint: Shortness of breath      History of Present Illness:  Patient is a 83 y.o. year old female who presents to the emergency department for evaluation of shortness of breath.  Patient presents with family for evaluation of shortness of breath.  She was told she had pneumonia advised to come to the emergency department for further evaluation and treatment.  Patient is normally not oxygen requiring but as of yesterday is requiring supplemental oxygen.      Patient Care Team  Primary Care Provider: Ramirez Maurice MD    Past Medical History:     Allergies   Allergen Reactions    Aleve [Naproxen] Hives     Past Medical History:   Diagnosis Date    COPD (chronic obstructive pulmonary disease)     History of non-ST elevation myocardial infarction (NSTEMI) 08/23/2023    Hyperlipidemia     Hypertension      Past Surgical History:   Procedure Laterality Date    CARDIAC CATHETERIZATION N/A 8/6/2023    Procedure: Left Heart Cath;  Surgeon: Mitch Correia MD;  Location: [facility] CATH INVASIVE LOCATION;  Service: Cardiology;  Laterality: N/A;    CARDIAC CATHETERIZATION N/A 8/6/2023    Procedure: Coronary angiography;  Surgeon: Mitch Correia MD;  Location: [facility] CATH INVASIVE LOCATION;  Service: Cardiology;  Laterality: N/A;     Family History   Problem Relation Age of Onset    Heart failure Mother         She had congestive heart failure       Home Medications:  Prior to Admission medications    Medication Sig Start Date End Date Taking? Authorizing Provider   amoxicillin-clavulanate (AUGMENTIN) 875-125 MG per tablet Take 1 tablet by mouth 2 (Two) Times a Day. 11/14/24   Nazia Sam APRN   arformoterol (BROVANA) 15 MCG/2ML nebulizer solution Take 2 mL by nebulization 2 (Two) Times a Day. 6/27/24   Nazia Sam APRN   aspirin 81 MG EC  tablet Take 1 tablet by mouth Daily. 23   Kevyn Sommer DO   atorvastatin (LIPITOR) 20 MG tablet Take 1 tablet by mouth Every Night.  Patient taking differently: Take 2 tablets by mouth Every Night. 23   Kevyn Sommer DO   budesonide (PULMICORT) 0.5 MG/2ML nebulizer solution Take 2 mL by nebulization 2 (Two) Times a Day. 24   Nazia Sam APRN   carvedilol (COREG) 6.25 MG tablet Take 1 tablet by mouth 2 (Two) Times a Day. 24   Leon Hernandez MD   dapagliflozin Propanediol (Farxiga) 10 MG tablet Take 10 mg by mouth Daily. 23   Izabella Oro APRN   furosemide (LASIX) 20 MG tablet TAKE 1 TABLET BY MOUTH EVERY OTHER DAY 11/15/24   Leon Hernandez MD   guaiFENesin (MUCINEX) 600 MG 12 hr tablet Take 1 tablet by mouth Every 12 (Twelve) Hours. 23   Kevyn Sommer DO   ipratropium-albuterol (DUO-NEB) 0.5-2.5 mg/3 ml nebulizer Take 3 mL by nebulization Every 6 (Six) Hours As Needed for Shortness of Air. 24   Nazia Sam APRN   lisinopril (PRINIVIL,ZESTRIL) 20 MG tablet Take 1 tablet by mouth Daily. 24   Leon Hernandez MD   nitroglycerin (NITROSTAT) 0.4 MG SL tablet  23   Chelle Ibrahim MD   nystatin 579007 UNIT/GM powder Apply  topically to the appropriate area as directed 2 (Two) Times a Day. APPLY TO AFFECTED AREA 24   Chelle Ibrahim MD   furosemide (LASIX) 20 MG tablet Take 1 tablet by mouth Every Other Day. 9/3/24 11/15/24  Leon Heranndez MD        Social History:   Social History     Tobacco Use    Smoking status: Former     Current packs/day: 0.00     Types: Cigarettes     Quit date: 2023     Years since quittin.5     Passive exposure: Past    Smokeless tobacco: Never    Tobacco comments:     Pt was a social smoker   Vaping Use    Vaping status: Never Used   Substance Use Topics    Alcohol use: Yes     Alcohol/week: 2.0 standard drinks of alcohol     Types: 2 Glasses of wine per week    Drug use: Never         Review of  "Systems:  Review of Systems   Constitutional:  Negative for chills and fever.   HENT:  Negative for congestion, rhinorrhea and sore throat.    Eyes:  Negative for pain and visual disturbance.   Respiratory:  Positive for cough and shortness of breath. Negative for apnea and chest tightness.    Cardiovascular:  Negative for chest pain and palpitations.   Gastrointestinal:  Negative for abdominal pain, diarrhea, nausea and vomiting.   Genitourinary:  Negative for difficulty urinating and dysuria.   Musculoskeletal:  Negative for joint swelling and myalgias.   Skin:  Negative for color change.   Neurological:  Negative for seizures and headaches.   Psychiatric/Behavioral: Negative.     All other systems reviewed and are negative.       Physical Exam:  BP (!) 124/112 (BP Location: Right arm, Patient Position: Lying)   Pulse 78   Temp 98.4 °F (36.9 °C) (Oral)   Resp 12   Ht 152.4 cm (60\")   Wt 72.6 kg (160 lb 0.9 oz)   SpO2 94%   BMI 31.26 kg/m²     Physical Exam  Vitals and nursing note reviewed.   Constitutional:       General: She is not in acute distress.     Appearance: Normal appearance. She is not toxic-appearing.   HENT:      Head: Normocephalic and atraumatic.      Jaw: There is normal jaw occlusion.   Eyes:      General: Lids are normal.      Extraocular Movements: Extraocular movements intact.      Conjunctiva/sclera: Conjunctivae normal.      Pupils: Pupils are equal, round, and reactive to light.   Cardiovascular:      Rate and Rhythm: Normal rate and regular rhythm.      Pulses: Normal pulses.      Heart sounds: Normal heart sounds.   Pulmonary:      Effort: Respiratory distress present.      Breath sounds: Wheezing and rhonchi present.   Abdominal:      General: Abdomen is flat.      Palpations: Abdomen is soft.      Tenderness: There is no abdominal tenderness. There is no guarding or rebound.   Musculoskeletal:         General: Normal range of motion.      Cervical back: Normal range of motion and " neck supple.      Right lower leg: No edema.      Left lower leg: No edema.   Skin:     General: Skin is warm and dry.   Neurological:      Mental Status: She is alert and oriented to person, place, and time. Mental status is at baseline.   Psychiatric:         Mood and Affect: Mood normal.                  Procedures:  Procedures      Medical Decision Making:      Comorbidities that affect care:    CAD, CHF, hypertension    External Notes reviewed:    Previous Clinic Note: Pulmonology office visit for dyspnea management      The following orders were placed and all results were independently analyzed by me:  Orders Placed This Encounter   Procedures    Blood Culture - Blood,    Blood Culture - Blood,    XR Chest 1 View    Kim Draw    Comprehensive Metabolic Panel    BNP    Single High Sensitivity Troponin T    CBC Auto Differential    Lactic Acid, Plasma    Procalcitonin    NPO Diet NPO Type: Strict NPO    Undress & Gown    Continuous Pulse Oximetry    Vital Signs    Inpatient Hospitalist Consult    Oxygen Therapy- Nasal Cannula; Titrate 1-6 LPM Per SpO2; 90 - 95%    ECG 12 Lead ED Triage Standing Order; SOA    Insert Peripheral IV    CBC & Differential    Green Top (Gel)    Lavender Top    Gold Top - SST    Light Blue Top       Medications Given in the Emergency Department:  Medications   sodium chloride 0.9 % flush 10 mL (has no administration in time range)   cefTRIAXone (ROCEPHIN) in NS 1 gram/10ml IV PUSH syringe (has no administration in time range)   azithromycin (ZITHROMAX) tablet 500 mg (has no administration in time range)   sodium chloride 0.9 % bolus 500 mL (has no administration in time range)        ED Course:         Labs:    Lab Results (last 24 hours)       Procedure Component Value Units Date/Time    CBC & Differential [[id number]]  (Abnormal) Collected: 11/14/24 1620    Specimen: Blood Updated: 11/14/24 2243    Narrative:      The following orders were created for panel order CBC &  Differential.  Procedure                               Abnormality         Status                     ---------                               -----------         ------                     CBC Auto Differential[[id number]]        Abnormal            Final result                 Please view results for these tests on the individual orders.    BNP [[id number]]  (Normal) Collected: 11/14/24 1620    Specimen: Blood Updated: 11/14/24 2255     proBNP 677.0 pg/mL     Narrative:      This assay is used as an aid in the diagnosis of individuals suspected of having heart failure. It can be used as an aid in the diagnosis of acute decompensated heart failure (ADHF) in patients presenting with signs and symptoms of ADHF to the emergency department (ED). In addition, NT-proBNP of <300 pg/mL indicates ADHF is not likely.    Age Range Result Interpretation  NT-proBNP Concentration (pg/mL:      <50             Positive            >450                   Gray                 300-450                    Negative             <300    50-75           Positive            >900                  Gray                300-900                  Negative            <300      >75             Positive            >1800                  Gray                300-1800                  Negative            <300    Comprehensive Metabolic Panel [[id number]]  (Abnormal) Collected: 11/14/24 1620    Specimen: Blood Updated: 11/14/24 2255     Glucose 138 mg/dL      BUN 12 mg/dL      Creatinine 0.81 mg/dL      Sodium 138 mmol/L      Potassium 3.4 mmol/L      Chloride 101 mmol/L      CO2 27.4 mmol/L      Calcium 9.2 mg/dL      Total Protein 7.1 g/dL      Albumin 2.7 g/dL      ALT (SGPT) 28 U/L      AST (SGOT) 32 U/L      Alkaline Phosphatase 111 U/L      Total Bilirubin 0.4 mg/dL      Globulin 4.4 gm/dL      A/G Ratio 0.6 g/dL      BUN/Creatinine Ratio 14.8     Anion Gap 9.6 mmol/L      eGFR 72.1 mL/min/1.73     Narrative:      GFR Normal >60  Chronic Kidney Disease  <60  Kidney Failure <15    The GFR formula is only valid for adults with stable renal function between ages 18 and 70.    CBC Auto Differential [[id number]]  (Abnormal) Collected: 11/14/24 1620    Specimen: Blood Updated: 11/14/24 2243     WBC 21.04 10*3/mm3      RBC 4.89 10*6/mm3      Hemoglobin 13.8 g/dL      Hematocrit 42.3 %      MCV 86.5 fL      MCH 28.2 pg      MCHC 32.6 g/dL      RDW 11.9 %      RDW-SD 37.4 fl      MPV 11.2 fL      Platelets 485 10*3/mm3      Neutrophil % 81.9 %      Lymphocyte % 9.6 %      Monocyte % 6.0 %      Eosinophil % 1.3 %      Basophil % 0.3 %      Immature Grans % 0.9 %      Neutrophils, Absolute 17.25 10*3/mm3      Lymphocytes, Absolute 2.02 10*3/mm3      Monocytes, Absolute 1.26 10*3/mm3      Eosinophils, Absolute 0.27 10*3/mm3      Basophils, Absolute 0.06 10*3/mm3      Immature Grans, Absolute 0.18 10*3/mm3      nRBC 0.0 /100 WBC     CBC & Differential [[id number]]  (Abnormal) Collected: 11/15/24 1348    Specimen: Blood Updated: 11/15/24 1355    Narrative:      The following orders were created for panel order CBC & Differential.  Procedure                               Abnormality         Status                     ---------                               -----------         ------                     CBC Auto Differential[[id number]]        Abnormal            Final result                 Please view results for these tests on the individual orders.    Comprehensive Metabolic Panel [[id number]]  (Abnormal) Collected: 11/15/24 1348    Specimen: Blood Updated: 11/15/24 1415     Glucose 192 mg/dL      BUN 11 mg/dL      Creatinine 0.82 mg/dL      Sodium 134 mmol/L      Potassium 3.2 mmol/L      Comment: Slight hemolysis detected by analyzer. Result may be falsely elevated.        Chloride 98 mmol/L      CO2 25.9 mmol/L      Calcium 8.9 mg/dL      Total Protein 7.4 g/dL      Albumin 3.0 g/dL      ALT (SGPT) 27 U/L      AST (SGOT) 27 U/L      Alkaline Phosphatase 120 U/L      Total  Bilirubin 0.7 mg/dL      Globulin 4.4 gm/dL      A/G Ratio 0.7 g/dL      BUN/Creatinine Ratio 13.4     Anion Gap 10.1 mmol/L      eGFR 71.1 mL/min/1.73     Narrative:      GFR Normal >60  Chronic Kidney Disease <60  Kidney Failure <15    The GFR formula is only valid for adults with stable renal function between ages 18 and 70.    BNP [[id number]]  (Normal) Collected: 11/15/24 1348    Specimen: Blood Updated: 11/15/24 1413     proBNP 870.5 pg/mL     Narrative:      This assay is used as an aid in the diagnosis of individuals suspected of having heart failure. It can be used as an aid in the diagnosis of acute decompensated heart failure (ADHF) in patients presenting with signs and symptoms of ADHF to the emergency department (ED). In addition, NT-proBNP of <300 pg/mL indicates ADHF is not likely.    Age Range Result Interpretation  NT-proBNP Concentration (pg/mL:      <50             Positive            >450                   Gray                 300-450                    Negative             <300    50-75           Positive            >900                  Gray                300-900                  Negative            <300      >75             Positive            >1800                  Gray                300-1800                  Negative            <300    Single High Sensitivity Troponin T [[id number]]  (Abnormal) Collected: 11/15/24 1348    Specimen: Blood Updated: 11/15/24 1415     HS Troponin T 15 ng/L     Narrative:      High Sensitive Troponin T Reference Range:  <14.0 ng/L- Negative Female for AMI  <22.0 ng/L- Negative Male for AMI  >=14 - Abnormal Female indicating possible myocardial injury.  >=22 - Abnormal Male indicating possible myocardial injury.   Clinicians would have to utilize clinical acumen, EKG, Troponin, and serial changes to determine if it is an Acute Myocardial Infarction or myocardial injury due to an underlying chronic condition.         CBC Auto Differential [[id number]]  (Abnormal)  Collected: 11/15/24 1348    Specimen: Blood Updated: 11/15/24 1355     WBC 23.52 10*3/mm3      RBC 4.95 10*6/mm3      Hemoglobin 14.0 g/dL      Hematocrit 43.4 %      MCV 87.7 fL      MCH 28.3 pg      MCHC 32.3 g/dL      RDW 13.2 %      RDW-SD 42.3 fl      MPV 10.5 fL      Platelets 503 10*3/mm3      Neutrophil % 87.4 %      Lymphocyte % 4.4 %      Monocyte % 4.5 %      Eosinophil % 2.5 %      Basophil % 0.3 %      Immature Grans % 0.9 %      Neutrophils, Absolute 20.54 10*3/mm3      Lymphocytes, Absolute 1.03 10*3/mm3      Monocytes, Absolute 1.07 10*3/mm3      Eosinophils, Absolute 0.58 10*3/mm3      Basophils, Absolute 0.08 10*3/mm3      Immature Grans, Absolute 0.22 10*3/mm3      nRBC 0.0 /100 WBC     Lactic Acid, Plasma [[id number]]  (Normal) Collected: 11/15/24 1348    Specimen: Blood Updated: 11/15/24 1413     Lactate 1.5 mmol/L     Procalcitonin [[id number]] Collected: 11/15/24 1348    Specimen: Blood Updated: 11/15/24 1447             Imaging:    XR Chest 1 View    Result Date: 11/15/2024  XR CHEST 1 VW Date of Exam: 11/15/2024 1:50 PM EST Indication: SOA Triage Protocol Comparison: Chest x-ray 11/14/2024 Findings: Patient's head and neck obscure the lung apices due to kyphosis. Cardiomegaly. Increased opacity in the right hilum. There is bilateral upper lobe airspace opacity with bronchiectasis and reticular opacity. No pneumothorax or pleural effusion.     Impression: Increased opacity in the right hilum could be seen with pneumonia or malignancy. Upper lobe predominant airspace opacities with bronchiectasis and septal thickening. Worse appearance the chest when compared to 1 day prior. Electronically Signed: Monika Garcia MD  11/15/2024 2:12 PM EST  Workstation ID: [id number]    XR Chest 2 View    Result Date: 11/14/2024  XR CHEST 2 VW Date of Exam: 11/14/2024 3:55 PM EST Indication: dyspnea, hypoxia Comparison: 12/29/2023. Findings: Diffuse interstitial and airspace opacities are present throughout the lungs  bilaterally, most pronounced within the bilateral upper lobes. No pleural fluid. No pneumothorax. The pulmonary vasculature appears within normal limits. The cardiac and mediastinal silhouette appear unremarkable. No acute osseous abnormality identified.     Impression: Diffuse interstitial and airspace opacities present throughout the lungs bilaterally, most pronounced within the upper lobes, likely related to a multifocal pneumonia. Follow-up to resolution recommended. Electronically Signed: Anusha Marie MD  11/14/2024 8:17 PM EST  Workstation ID: [id number]       Differential Diagnosis and Discussion:    Dyspnea: Differential diagnosis includes but is not limited to metabolic acidosis, neurological disorders, psychogenic, asthma, pneumothorax, upper airway obstruction, COPD, pneumonia, noncardiogenic pulmonary edema, interstitial lung disease, anemia, congestive heart failure, and pulmonary embolism    All labs were reviewed and interpreted by me.  All X-rays impressions were independently interpreted by me.    MDM  Number of Diagnoses or Management Options  Pneumonia due to infectious organism, unspecified laterality, unspecified part of lung  Sepsis, due to unspecified organism, unspecified whether acute organ dysfunction present  Diagnosis management comments: In summary this is an 83-year-old female who presents to the Emergency Department for evaluation of pneumonia.  Chest x-ray reviewed by me read reveals significant pneumonia worse since yesterday.  CBC independently reviewed interpreted by me reveals critical normalities of leukocytosis.  CMP independently reviewed and interpreted by me and shows no critical abnormalities.  Patient was given IV antibiotics in the emergency department along with IV fluids.  She is requiring supplemental oxygen as well.  I believe patient would benefit this time from IV antibiotics.  Patient case has been discussed with the hospitalist team who will admit to the hospital for  further evaluation and continuation of treatment.                   Sepsis criteria was met in the emergency department and the Sepsis protocol (including antibiotic administration) was initiated.      SIRS criteria considered:   1.  Temperature > 100.4 or <96.8    2.  Heart Rate > 90    3.  Respiratory Rate > 22    4.  WBC > 12K or <4K.             Severe Sepsis:     Respiratory: Mechanical Ventilation or Bipap  Hypotension: SBP > 90 or MAP < 65  Renal: Creatinine > 2  Metabolic: Lactic Acid > 2  Hematologic: Platelets < 100K or INR > 1.5  Hepatic: BILI  >  2  CNS: Sudden AMS     Septic Shock:     Severe Sepsis + Persistent hypotension or Lactic Acid > 4     Normal saline bolus, Antibiotics, and final disposition was based on these definitions.        Sepsis was recognized at 1420    Antibiotics were ordered.     30 mL/kg bolus was not indicated.       Patient did not receive the recommended 30 mL/kg fluid bolus for sepsis because it would be harmful or detrimental to the patient.    The patient has Heart Failure.   The patient was ordered 500 mL of fluids.    The patient presents with 2 out of the 4 SIRS criteria and a suspected source for sepsis.  Patient was evaluated and placed on a cardiac monitor for fear of worsening tachycardia and life-threatening hypotension.  Patient was monitored for shock and signs of end-organ damage.  Mental status was repeatedly checked throughout the ED stay.  Medications were ordered by me which includes IV Rocephin and azithromycin.  The case was discussed at length with admitting physician.    Total Critical Care time of 40 minutes. Total critical care time documented does not include time spent on separately billed procedures for services of nurses or physician assistants. I personally saw and examined the patient. I have reviewed all diagnostic interpretations and treatment plans as written. I was present for the key portions of any procedures performed and the inclusive time  noted in any critical care statement. Critical care time includes patient management by me, time spent at the patients bedside,  time to review lab and imaging results, discussing patient care, documentation in the medical record, and time spent with family or caregiver.    Patient Care Considerations:    CT CHEST: I considered ordering a CT scan of the chest, however chest x-ray reveals significant pneumonia      Consultants/Shared Management Plan:    Hospitalist: I have discussed the case with Dr. Santacruz who agrees to accept the patient for admission.    Social Determinants of Health:    Patient has presented with family members who are responsible, reliable and will ensure follow up care.      Disposition and Care Coordination:    Admit:   Through independent evaluation of the patient's history, physical, and imperical data, the patient meets criteria for inpatient admission to the hospital.        Final diagnoses:   Pneumonia due to infectious organism, unspecified laterality, unspecified part of lung   Sepsis, due to unspecified organism, unspecified whether acute organ dysfunction present        ED Disposition       ED Disposition   Decision to Admit    Condition   --    Comment   --               This medical record created using voice recognition software.             Donaldo Sarabia MD  11/15/24 [id number]

## 2024-11-16 ENCOUNTER — APPOINTMENT (OUTPATIENT)
Dept: CT IMAGING | Facility: HOSPITAL | Age: 84
End: 2024-11-16
Payer: MEDICARE

## 2024-11-16 LAB
ALBUMIN SERPL-MCNC: 2.2 G/DL (ref 3.5–5.2)
ALBUMIN/GLOB SERPL: 0.6 G/DL
ALP SERPL-CCNC: 94 U/L (ref 39–117)
ALT SERPL W P-5'-P-CCNC: 20 U/L (ref 1–33)
ANION GAP SERPL CALCULATED.3IONS-SCNC: 8.5 MMOL/L (ref 5–15)
AST SERPL-CCNC: 17 U/L (ref 1–32)
BASOPHILS # BLD AUTO: 0.06 10*3/MM3 (ref 0–0.2)
BASOPHILS NFR BLD AUTO: 0.4 % (ref 0–1.5)
BILIRUB SERPL-MCNC: 0.5 MG/DL (ref 0–1.2)
BUN SERPL-MCNC: 9 MG/DL (ref 8–23)
BUN/CREAT SERPL: 15 (ref 7–25)
CALCIUM SPEC-SCNC: 7.9 MG/DL (ref 8.6–10.5)
CHLORIDE SERPL-SCNC: 107 MMOL/L (ref 98–107)
CO2 SERPL-SCNC: 23.5 MMOL/L (ref 22–29)
CREAT SERPL-MCNC: 0.6 MG/DL (ref 0.57–1)
DEPRECATED RDW RBC AUTO: 43 FL (ref 37–54)
EGFRCR SERPLBLD CKD-EPI 2021: 89.2 ML/MIN/1.73
EOSINOPHIL # BLD AUTO: 0.71 10*3/MM3 (ref 0–0.4)
EOSINOPHIL NFR BLD AUTO: 4.9 % (ref 0.3–6.2)
ERYTHROCYTE [DISTWIDTH] IN BLOOD BY AUTOMATED COUNT: 13.2 % (ref 12.3–15.4)
GLOBULIN UR ELPH-MCNC: 3.6 GM/DL
GLUCOSE SERPL-MCNC: 96 MG/DL (ref 65–99)
HCT VFR BLD AUTO: 39.5 % (ref 34–46.6)
HGB BLD-MCNC: 12.5 G/DL (ref 12–15.9)
IMM GRANULOCYTES # BLD AUTO: 0.15 10*3/MM3 (ref 0–0.05)
IMM GRANULOCYTES NFR BLD AUTO: 1 % (ref 0–0.5)
LYMPHOCYTES # BLD AUTO: 1.31 10*3/MM3 (ref 0.7–3.1)
LYMPHOCYTES NFR BLD AUTO: 9 % (ref 19.6–45.3)
MAGNESIUM SERPL-MCNC: 1.8 MG/DL (ref 1.6–2.4)
MCH RBC QN AUTO: 28.2 PG (ref 26.6–33)
MCHC RBC AUTO-ENTMCNC: 31.6 G/DL (ref 31.5–35.7)
MCV RBC AUTO: 89 FL (ref 79–97)
MONOCYTES # BLD AUTO: 0.93 10*3/MM3 (ref 0.1–0.9)
MONOCYTES NFR BLD AUTO: 6.4 % (ref 5–12)
NEUTROPHILS NFR BLD AUTO: 11.45 10*3/MM3 (ref 1.7–7)
NEUTROPHILS NFR BLD AUTO: 78.3 % (ref 42.7–76)
NRBC BLD AUTO-RTO: 0 /100 WBC (ref 0–0.2)
PLATELET # BLD AUTO: 420 10*3/MM3 (ref 140–450)
PMV BLD AUTO: 10.5 FL (ref 6–12)
POTASSIUM SERPL-SCNC: 3.6 MMOL/L (ref 3.5–5.2)
PROCALCITONIN SERPL-MCNC: 0.14 NG/ML (ref 0–0.25)
PROT SERPL-MCNC: 5.8 G/DL (ref 6–8.5)
RBC # BLD AUTO: 4.44 10*6/MM3 (ref 3.77–5.28)
SODIUM SERPL-SCNC: 139 MMOL/L (ref 136–145)
WBC NRBC COR # BLD AUTO: 14.61 10*3/MM3 (ref 3.4–10.8)

## 2024-11-16 PROCEDURE — 80053 COMPREHEN METABOLIC PANEL: CPT | Performed by: INTERNAL MEDICINE

## 2024-11-16 PROCEDURE — 71250 CT THORAX DX C-: CPT

## 2024-11-16 PROCEDURE — 84145 PROCALCITONIN (PCT): CPT | Performed by: INTERNAL MEDICINE

## 2024-11-16 PROCEDURE — 99232 SBSQ HOSP IP/OBS MODERATE 35: CPT | Performed by: INTERNAL MEDICINE

## 2024-11-16 PROCEDURE — 25010000002 ENOXAPARIN PER 10 MG: Performed by: INTERNAL MEDICINE

## 2024-11-16 PROCEDURE — 94799 UNLISTED PULMONARY SVC/PX: CPT

## 2024-11-16 PROCEDURE — 85025 COMPLETE CBC W/AUTO DIFF WBC: CPT | Performed by: INTERNAL MEDICINE

## 2024-11-16 PROCEDURE — 83735 ASSAY OF MAGNESIUM: CPT | Performed by: INTERNAL MEDICINE

## 2024-11-16 PROCEDURE — 25010000002 CEFTRIAXONE PER 250 MG: Performed by: INTERNAL MEDICINE

## 2024-11-16 PROCEDURE — 94664 DEMO&/EVAL PT USE INHALER: CPT

## 2024-11-16 RX ORDER — CARVEDILOL 6.25 MG/1
6.25 TABLET ORAL 2 TIMES DAILY
Status: DISCONTINUED | OUTPATIENT
Start: 2024-11-16 | End: 2024-11-20 | Stop reason: HOSPADM

## 2024-11-16 RX ORDER — ASPIRIN 81 MG/1
81 TABLET ORAL DAILY
Status: DISCONTINUED | OUTPATIENT
Start: 2024-11-16 | End: 2024-11-20 | Stop reason: HOSPADM

## 2024-11-16 RX ORDER — ATORVASTATIN CALCIUM 40 MG/1
40 TABLET, FILM COATED ORAL NIGHTLY
Status: DISCONTINUED | OUTPATIENT
Start: 2024-11-16 | End: 2024-11-20 | Stop reason: HOSPADM

## 2024-11-16 RX ADMIN — BUDESONIDE 0.5 MG: 0.5 INHALANT ORAL at 18:48

## 2024-11-16 RX ADMIN — Medication 10 ML: at 20:22

## 2024-11-16 RX ADMIN — ATORVASTATIN CALCIUM 40 MG: 40 TABLET, FILM COATED ORAL at 20:22

## 2024-11-16 RX ADMIN — ENOXAPARIN SODIUM 40 MG: 100 INJECTION SUBCUTANEOUS at 08:26

## 2024-11-16 RX ADMIN — AZITHROMYCIN DIHYDRATE 500 MG: 250 TABLET ORAL at 08:26

## 2024-11-16 RX ADMIN — ASPIRIN 81 MG: 81 TABLET, COATED ORAL at 08:26

## 2024-11-16 RX ADMIN — CARVEDILOL 6.25 MG: 6.25 TABLET, FILM COATED ORAL at 08:26

## 2024-11-16 RX ADMIN — Medication 10 ML: at 08:29

## 2024-11-16 RX ADMIN — ARFORMOTEROL TARTRATE 15 MCG: 15 SOLUTION RESPIRATORY (INHALATION) at 18:48

## 2024-11-16 RX ADMIN — ARFORMOTEROL TARTRATE 15 MCG: 15 SOLUTION RESPIRATORY (INHALATION) at 09:42

## 2024-11-16 RX ADMIN — CARVEDILOL 6.25 MG: 6.25 TABLET, FILM COATED ORAL at 20:22

## 2024-11-16 RX ADMIN — Medication 2000 MG: at 15:42

## 2024-11-16 RX ADMIN — BUDESONIDE 0.5 MG: 0.5 INHALANT ORAL at 09:42

## 2024-11-16 NOTE — PROGRESS NOTES
[facility]   Hospitalist Progress Note  Date: 2024  Patient Name: Cris Agudelo  : 1940  MRN: [id number]  Date of admission: 11/15/2024  Room/Bed: [facility] [place]      Subjective   Subjective     Chief Complaint:   Shortness of breath    Summary:  Cris Agudelo is a 83 y.o. female with medical history of COPD, coronary artery disease, dyslipidemia, and hypertension who presents to the emergency department after not feeling well.     The patient was seen in pulmonary clinic day prior to presentation and started on antibiotic.  Appears she received 2 doses of Augmentin.  She did not get any steroids.  States she feels much worse in the morning. She received called about her chest x-ray showed pneumonia, therefore instructed to present to the emergency room.  No fevers.  No chills.  As result of all her symptoms she can the ER for further evaluation.  In the emergency department patient noted to be 81% on room air.  Leukocytosis 23.  Chest x-ray shows diffuse interstitial airspace opacities present throughout the lung bases most pronounced with upper lobes, likely multifocal pneumonia.       Interval Followup:   Patient endorses improvement in symptoms since presenting to the hospital.  Resting comfortably in bed with family at bedside.  Remains on 3 L nasal cannula at this time.  A CT scan of chest obtained today for further characterization of pneumonia.  This shows extensive bronchocentric alveolar airspace disease consistent with pneumonia.    Objective   Objective     Vitals:   Temp:  [98.1 °F (36.7 °C)-99 °F (37.2 °C)] 98.1 °F (36.7 °C)  Heart Rate:  [71-85] 71  Resp:  [18-20] 18  BP: (124-142)/(52-73) 132/60  Flow (L/min) (Oxygen Therapy):  [3] 3    Physical Exam               Constitutional: Sitting up on edge of bed, nontoxic-appearing, breathing comfortably with nasal cannula              Eyes: Pupils equal, sclerae anicteric, no conjunctival injection              HENT: NCAT, mucous membranes moist               Neck: Supple, no thyromegaly, no lymphadenopathy, trachea midline              Respiratory: Diffuse rhonchi heard throughout, no crackles no wheezing              Cardiovascular: RRR, no murmurs, rubs, or gallops, palpable pedal pulses bilaterally              Gastrointestinal: Positive bowel sounds, soft, nontender, nondistended              Musculoskeletal: No bilateral ankle edema, no clubbing or cyanosis to extremities              Psychiatric: Appropriate affect, cooperative              Neurologic: Oriented x 3, strength symmetric in all extremities, Cranial Nerves grossly intact to confrontation, speech clear              Skin: No rashes     Result Review    Result Review:  I have personally reviewed these results:  [x]  Laboratory      Lab 11/16/24  0610 11/15/24  1348 11/14/24  1620   WBC 14.61* 23.52* 21.04*   HEMOGLOBIN 12.5 14.0 13.8   HEMATOCRIT 39.5 43.4 42.3   PLATELETS 420 503* 485*   NEUTROS ABS 11.45* 20.54* 17.25*   IMMATURE GRANS (ABS) 0.15* 0.22* 0.18*   LYMPHS ABS 1.31 1.03 2.02   MONOS ABS 0.93* 1.07* 1.26*   EOS ABS 0.71* 0.58* 0.27   MCV 89.0 87.7 86.5   PROCALCITONIN 0.14 0.17  --    LACTATE  --  1.5  --          Lab 11/16/24  0610 11/15/24  1348 11/14/24  1620   SODIUM 139 134* 138   POTASSIUM 3.6 3.2* 3.4*   CHLORIDE 107 98 101   CO2 23.5 25.9 27.4   ANION GAP 8.5 10.1 9.6   BUN 9 11 12   CREATININE 0.60 0.82 0.81   EGFR 89.2 71.1 72.1   GLUCOSE 96 192* 138*   CALCIUM 7.9* 8.9 9.2   MAGNESIUM 1.8  --   --          Lab 11/16/24  0610 11/15/24  1348 11/14/24  1620   TOTAL PROTEIN 5.8* 7.4 7.1   ALBUMIN 2.2* 3.0* 2.7*   GLOBULIN 3.6 4.4 4.4   ALT (SGPT) 20 27 28   AST (SGOT) 17 27 32   BILIRUBIN 0.5 0.7 0.4   ALK PHOS 94 120* 111         Lab 11/15/24  1348 11/14/24  1620   PROBNP 870.5 677.0   HSTROP T 15*  --                  Brief Urine Lab Results       None          [x]  Microbiology   Microbiology Results (last 10 days)       Procedure Component Value - Date/Time    Blood  Culture - Blood, Arm, Right [[id number]]  (Normal) Collected: 11/15/24 1458    Lab Status: Preliminary result Specimen: Blood from Arm, Right Updated: 11/16/24 1515     Blood Culture No growth at 24 hours    Blood Culture - Blood, Arm, Left [[id number]]  (Normal) Collected: 11/15/24 1458    Lab Status: Preliminary result Specimen: Blood from Arm, Left Updated: 11/16/24 1515     Blood Culture No growth at 24 hours    COVID-19, FLU A/B, RSV PCR 1 HR TAT - Swab, Nasopharynx [[id number]]  (Normal) Collected: 11/15/24 1458    Lab Status: Final result Specimen: Swab from Nasopharynx Updated: 11/15/24 1546     COVID19 Not Detected     Influenza A PCR Not Detected     Influenza B PCR Not Detected     RSV, PCR Not Detected    Narrative:      Fact sheet for providers: https://www.fda.gov/media/440702/download    Fact sheet for patients: https://www.fda.gov/media/208248/download    Test performed by PCR.    Mycoplasma Pneumoniae Antibody, IgM - Blood, [[id number]]  (Normal) Collected: 11/15/24 1348    Lab Status: Final result Specimen: Blood Updated: 11/15/24 1739     Mycoplasma pneumo IgM Negative          [x]  Radiology  CT Chest Without Contrast Diagnostic    Result Date: 11/16/2024  Impression: CT scan of the chest without contrast demonstrating extensive bronchocentric alveolar airspace disease consistent with pneumonia. Electronically Signed: Srikanth Feng MD  11/16/2024 9:45 AM EST  Workstation ID: [id number]    XR Chest 1 View    Result Date: 11/15/2024  Impression: Increased opacity in the right hilum could be seen with pneumonia or malignancy. Upper lobe predominant airspace opacities with bronchiectasis and septal thickening. Worse appearance the chest when compared to 1 day prior. Electronically Signed: Monika Garcia MD  11/15/2024 2:12 PM EST  Workstation ID: [id number]    XR Chest 2 View    Result Date: 11/14/2024  Impression: Diffuse interstitial and airspace opacities present throughout the lungs bilaterally, most  pronounced within the upper lobes, likely related to a multifocal pneumonia. Follow-up to resolution recommended. Electronically Signed: Anusha Marie MD  11/14/2024 8:17 PM EST  Workstation ID: [id number]   []  EKG/Telemetry   []  Cardiology/Vascular   []  Pathology  []  Old records  []  Other:    Assessment & Plan   Assessment / Plan     Assessment:  Multifocal pneumonia  Acute hypoxic respiratory failure  Stress-induced cardiomyopathy with recovery of EF  COPD  Obesity BMI 30    Plan:  Patient admitted to hospital for further care and management  Continue on ceftriaxone azithromycin and this time  Additional infectious workup ordered, will follow-up and adjust antibiotics as needed  CT scan obtained today showing diffuse pneumonia  Continue supplemental oxygen as needed to maintain O2 saturations greater than 90  Aggressive bronchopulmonary hygiene  Continues on nebulized breathing treatments  Holding steroids at this time given no wheezing, however continue to reassess daily         Discussed with RN.    VTE Prophylaxis:  Pharmacologic VTE prophylaxis orders are present.        CODE STATUS:   Level Of Support Discussed With: Patient  Code Status (Patient has no pulse and is not breathing): CPR (Attempt to Resuscitate)  Medical Interventions (Patient has pulse or is breathing): Full Support      Electronically signed by Shabbir Pate MD, 11/16/2024, 18:17 EST.

## 2024-11-16 NOTE — PLAN OF CARE
Goal Outcome Evaluation:           Progress: improving  Outcome Evaluation: Patient alert and oriented, Vital signs stable on 3L nasal cannula. O2 saturation maintaining over 95%. Patient able to ambulate with one person assistance. No complaints of pain at this time. Patient reports decrease in shortness of air and increase productive cough.

## 2024-11-16 NOTE — PAYOR COMM NOTE
"Elsie Rodas (83 y.o. Female)       Date of Birth   1940    Social Security Number       Address   [address] [address]    Home Phone   [phone]    MRN   [id number]       Baptism   Orthodoxy    Marital Status                               Admission Date   11/15/24    Admission Type   Emergency    Admitting Provider   Stefan Santacruz MD    Attending Provider   Shabbir Pate MD    Department, Room/Bed   [address], 3021/1       Discharge Date       Discharge Disposition       Discharge Destination                                 Attending Provider: Shabbir Pate MD    Allergies: Aleve [Naproxen]    Isolation: None   Infection: None   Code Status: CPR    Ht: 152.4 cm (60\")   Wt: 72.6 kg (160 lb 0.9 oz)    Admission Cmt: None   Principal Problem: Pneumonia [J18.9]                   Active Insurance as of 11/15/2024       Primary Coverage       Payor Plan Insurance Group Employer/Plan Group    ANTHEM MEDICARE REPLACEMENT ANTHEM MEDICARE ADVANTAGE [id number]       Payor Plan Address Payor Plan Phone Number Payor Plan Fax Number Effective Dates    [address] [phone]  1/1/2022 - None Entered    [facility] [address]         Subscriber Name Subscriber Birth Date Member ID       ELSIE RODAS 1940 [id number]                     Emergency Contacts        (Rel.) Home Phone Work Phone Mobile Phone    ALEJANDRA ARGUETA (Daughter) [phone] -- [phone]           Pneumonia [facility] Inpatient Care       Indications Met   Last updated by Marianne Santiago on 11/15/2024 1540     Review Status Created By   Primary Completed Marianne Santiago      Criteria Review   Pneumonia [facility] Inpatient Care     Overall Determination: Indications Met     Criteria:  [×] Admission is indicated for  1 or more  of the following  [A] [B]:      [×] Hypoxemia          11/15/2024  3:40 PM              -- 11/15/2024  3:40 PM by Marianne Santiago --                                    (X) " Hypoxemia, as indicated by  1 or more  of the following  (1):                  (X) Patient without baseline need for supplemental oxygen with oxygen saturation (SaO2) of less than 90% or arterial blood gas partial pressure of oxygen (PO2) of less than 60 mm Hg (8.0 kPa) on room air [A] [B]                  (X) Patient without baseline need for supplemental oxygen who now requires supplemental oxygen to keep SaO2 greater than 89% or PO2 greater than 59 mm Hg (7.9 kPa) [A]          11/15/2024  3:40 PM              -- 11/15/2024  3:40 PM by Marianne Santiago --                  O2 sat 81% RA. Placed on O2 4 liters per NC and sat improved to 94%.     Notes:  -- 11/15/2024  3:40 PM by Marianne Santiago --      Presented to ER for evaluation of shortness of breath. History of COPD. The patient was seen in pulmonary clinic yesterday and started on antibiotic. It seems that she got 2 doses of Augmentin. She did not get any steroids. States that she feels feeling much worse this morning and they called about her chest x-ray showed pneumonia so she came to the emergency room. In the emergency department the patient's vital signs are as follows: Temperature 90.4, pulse 78, respiratory is 12, blood pressure 124/112 and 81% on room air. CBC shows a white count of 23,000 with neutrophil count of 87. CMP shows no concerning abnormalities. Chest x-ray shows diffuse interstitial air opacities present throughout the lungs bases most pronounced with upper lobes likely related to multifocal pneumonia. Patient with hospital for acute hypoxic respiratory failure due to multifocal pneumonia. Received Rocephin IV, Zithromax IV and IV NS fluid bolus in ER.                   ORDERS:      VS every 4 hours      CBC, CMP, Magnesium and Procalcitonin levels am      Lovenox      Brovana nebs      Zithromax po      Pulmicort nebs      Rocephin IV      Sputum culture      Duo nebs prn                              History & Physical        Stefan Santacruz MD at  11/15/24 1451           [facility]  Date: 11/15/2024   Patient Name: Cris Agudelo  : 1940  MRN: [id number]  Primary Care Physician:  Ramirez Maurice MD  Date of admission: 11/15/2024    Subjective  Subjective     Chief Complaint: Shortness of breath    HPI:    Cris Agudelo is a 83 y.o. female past medical history of COPD, coronary artery disease, dyslipidemia, and hypertension who presents to the emergency department after not feeling well.    The patient was seen in pulmonary clinic yesterday and started on antibiotic.  It seems that she got 2 doses of Augmentin.  She did not get any steroids.  States that she feels feeling much worse this morning and they called about her chest x-ray showed pneumonia so she came to the emergency room.  No fevers.  No chills.  As result of all her symptoms she can the ER for further evaluation      In the emergency department the patient's vital signs are as follows: Temperature 90.4, pulse 78, respiratory is 12, blood pressure 124/112 and 81% on room air.  CBC shows a white count of 23,000 with neutrophil count of 87.  CMP shows no concerning abnormalities.  Chest x-ray shows diffuse interstitial air opacities present throughout the lungs bases most pronounced with upper lobes likely related to multifocal pneumonia.  Patient with hospital for acute hypoxic respiratory failure due to multifocal pneumonia.    All systems reviewed and are as noted above    Personal History     Past Medical story:  COPD  Coronary disease history of NSTEMI  Dyslipidemia  Hypertension  Heart failure with preserved ejection fraction and grade 1 diastolic dysfunction    Past Surgical History:  Cardiac catheterization    Family History:   Heart failure in her mother    Social History:   Former smoker.  Occasional drinking.    Home Medications:  amoxicillin-clavulanate, arformoterol, aspirin, atorvastatin, budesonide, carvedilol, dapagliflozin Propanediol,  furosemide, guaiFENesin, ipratropium-albuterol, lisinopril, and nitroglycerin    Allergies:  Allergies   Allergen Reactions    Aleve [Naproxen] Hives         Objective  Objective     Vitals:   Temp:  [98.4 °F (36.9 °C)] 98.4 °F (36.9 °C)  Heart Rate:  [78-86] 79  Resp:  [11-18] 18  BP: (124-158)/() 142/57  Flow (L/min) (Oxygen Therapy):  [4] 4    Physical Exam    Constitutional: Unwell appearing.  Nontoxic.   Eyes: Pupils equal, sclerae anicteric, no conjunctival injection   HENT: NCAT, mucous membranes moist   Neck: Supple, no thyromegaly, no lymphadenopathy, trachea midline   Respiratory: Crackles in upper lung fields.  Occasional rhonchi   Cardiovascular: RRR, no murmurs, rubs, or gallops, palpable pedal pulses bilaterally   Gastrointestinal: Positive bowel sounds, soft, nontender, nondistended   Musculoskeletal: No bilateral ankle edema, no clubbing or cyanosis to extremities   Psychiatric: Appropriate affect, cooperative   Neurologic: Oriented x 3, strength symmetric in all extremities, Cranial Nerves grossly intact to confrontation, speech clear   Skin: No rashes     Result Review   Result Review:  I have personally reviewed the results from the time of this admission to 11/15/2024 15:39 EST and agree with these findings:  Imaging and labs reviewed    Assessment & Plan  Assessment / Plan     Assessment/Plan:   Multifocal pneumonia  Acute hypoxic respiratory failure  Stress-induced cardiomyopathy with recovery of EF  COPD  Obesity BMI 30    Plan:  --Admit to hospital service  -- Ox for saturations less than 90%  -- Patient only got 2 doses of Augmentin so I do not think it was an outpatient failure so we will continue ceftriaxone/azithromycin  -- Strep and Legionella urine antigen  -- Procalcitonin now in the morning  -- Respiratory culture  -- Mycoplasma pneumonia  -- Brovana/Pulmicort/DuoNebs  -- Patient has significant crackles no real wheezing so will not start prednisone at this time but may benefit  future  -- Will continue oral Lasix as the patient does seem to be in acute heart failure exacerbation    VTE Prophylaxis:  Lovenox        CODE STATUS:    Level Of Support Discussed With: Patient  Code Status (Patient has no pulse and is not breathing): CPR (Attempt to Resuscitate)  Medical Interventions (Patient has pulse or is breathing): Full Support      Admission Status:  I believe this patient meets admission status.    Electronically signed by Stefan Santacruz MD, 11/15/24, 2:51 PM EST.             Electronically signed by Stefan Santacruz MD at 11/15/24 1601          Emergency Department Notes        Donaldo Sarabia MD at 11/15/24 1420          Time: 2:20 PM EST  Date of encounter:  11/15/2024  Independent Historian/Clinical History and Information was obtained by:   Patient and Family    History is limited by: N/A    Chief Complaint: Shortness of breath      History of Present Illness:  Patient is a 83 y.o. year old female who presents to the emergency department for evaluation of shortness of breath.  Patient presents with family for evaluation of shortness of breath.  She was told she had pneumonia advised to come to the emergency department for further evaluation and treatment.  Patient is normally not oxygen requiring but as of yesterday is requiring supplemental oxygen.      Patient Care Team  Primary Care Provider: Ramirez Maurice MD    Past Medical History:     Allergies   Allergen Reactions    Aleve [Naproxen] Hives     Past Medical History:   Diagnosis Date    COPD (chronic obstructive pulmonary disease)     History of non-ST elevation myocardial infarction (NSTEMI) 08/23/2023    Hyperlipidemia     Hypertension      Past Surgical History:   Procedure Laterality Date    CARDIAC CATHETERIZATION N/A 8/6/2023    Procedure: Left Heart Cath;  Surgeon: Mitch Correia MD;  Location: [facility] CATH INVASIVE LOCATION;  Service: Cardiology;  Laterality: N/A;    CARDIAC CATHETERIZATION N/A 8/6/2023    Procedure: Coronary  angiography;  Surgeon: Mitch Correia MD;  Location: [facility] INVASIVE LOCATION;  Service: Cardiology;  Laterality: N/A;     Family History   Problem Relation Age of Onset    Heart failure Mother         She had congestive heart failure       Home Medications:  Prior to Admission medications    Medication Sig Start Date End Date Taking? Authorizing Provider   amoxicillin-clavulanate (AUGMENTIN) 875-125 MG per tablet Take 1 tablet by mouth 2 (Two) Times a Day. 11/14/24   Nazia Sam APRN   arformoterol (BROVANA) 15 MCG/2ML nebulizer solution Take 2 mL by nebulization 2 (Two) Times a Day. 6/27/24   Nazia Sam APRN   aspirin 81 MG EC tablet Take 1 tablet by mouth Daily. 8/9/23   Kevny Sommer DO   atorvastatin (LIPITOR) 20 MG tablet Take 1 tablet by mouth Every Night.  Patient taking differently: Take 2 tablets by mouth Every Night. 8/8/23   Kevyn Sommer DO   budesonide (PULMICORT) 0.5 MG/2ML nebulizer solution Take 2 mL by nebulization 2 (Two) Times a Day. 6/27/24   Nazia Sam APRN   carvedilol (COREG) 6.25 MG tablet Take 1 tablet by mouth 2 (Two) Times a Day. 7/11/24   Leon Hernandez MD   dapagliflozin Propanediol (Farxiga) 10 MG tablet Take 10 mg by mouth Daily. 9/14/23   Izabella Oro APRN   furosemide (LASIX) 20 MG tablet TAKE 1 TABLET BY MOUTH EVERY OTHER DAY 11/15/24   Leon Hernandez MD   guaiFENesin (MUCINEX) 600 MG 12 hr tablet Take 1 tablet by mouth Every 12 (Twelve) Hours. 8/8/23   Kevyn Sommer DO   ipratropium-albuterol (DUO-NEB) 0.5-2.5 mg/3 ml nebulizer Take 3 mL by nebulization Every 6 (Six) Hours As Needed for Shortness of Air. 6/27/24   Nazia Sam APRN   lisinopril (PRINIVIL,ZESTRIL) 20 MG tablet Take 1 tablet by mouth Daily. 7/11/24   Leon Hernandez MD   nitroglycerin (NITROSTAT) 0.4 MG SL tablet  8/14/23   Provider, MD Chelle   nystatin 806281 UNIT/GM powder Apply  topically to the appropriate area as directed 2 (Two) Times a Day. APPLY TO AFFECTED AREA 5/22/24   " Provider, Chelle, MD   furosemide (LASIX) 20 MG tablet Take 1 tablet by mouth Every Other Day. 9/3/24 11/15/24  Leon Hernandez MD        Social History:   Social History     Tobacco Use    Smoking status: Former     Current packs/day: 0.00     Types: Cigarettes     Quit date: 2023     Years since quittin.5     Passive exposure: Past    Smokeless tobacco: Never    Tobacco comments:     Pt was a social smoker   Vaping Use    Vaping status: Never Used   Substance Use Topics    Alcohol use: Yes     Alcohol/week: 2.0 standard drinks of alcohol     Types: 2 Glasses of wine per week    Drug use: Never         Review of Systems:  Review of Systems   Constitutional:  Negative for chills and fever.   HENT:  Negative for congestion, rhinorrhea and sore throat.    Eyes:  Negative for pain and visual disturbance.   Respiratory:  Positive for cough and shortness of breath. Negative for apnea and chest tightness.    Cardiovascular:  Negative for chest pain and palpitations.   Gastrointestinal:  Negative for abdominal pain, diarrhea, nausea and vomiting.   Genitourinary:  Negative for difficulty urinating and dysuria.   Musculoskeletal:  Negative for joint swelling and myalgias.   Skin:  Negative for color change.   Neurological:  Negative for seizures and headaches.   Psychiatric/Behavioral: Negative.     All other systems reviewed and are negative.       Physical Exam:  BP (!) 124/112 (BP Location: Right arm, Patient Position: Lying)   Pulse 78   Temp 98.4 °F (36.9 °C) (Oral)   Resp 12   Ht 152.4 cm (60\")   Wt 72.6 kg (160 lb 0.9 oz)   SpO2 94%   BMI 31.26 kg/m²     Physical Exam  Vitals and nursing note reviewed.   Constitutional:       General: She is not in acute distress.     Appearance: Normal appearance. She is not toxic-appearing.   HENT:      Head: Normocephalic and atraumatic.      Jaw: There is normal jaw occlusion.   Eyes:      General: Lids are normal.      Extraocular Movements: Extraocular " movements intact.      Conjunctiva/sclera: Conjunctivae normal.      Pupils: Pupils are equal, round, and reactive to light.   Cardiovascular:      Rate and Rhythm: Normal rate and regular rhythm.      Pulses: Normal pulses.      Heart sounds: Normal heart sounds.   Pulmonary:      Effort: Respiratory distress present.      Breath sounds: Wheezing and rhonchi present.   Abdominal:      General: Abdomen is flat.      Palpations: Abdomen is soft.      Tenderness: There is no abdominal tenderness. There is no guarding or rebound.   Musculoskeletal:         General: Normal range of motion.      Cervical back: Normal range of motion and neck supple.      Right lower leg: No edema.      Left lower leg: No edema.   Skin:     General: Skin is warm and dry.   Neurological:      Mental Status: She is alert and oriented to person, place, and time. Mental status is at baseline.   Psychiatric:         Mood and Affect: Mood normal.                  Procedures:  Procedures      Medical Decision Making:      Comorbidities that affect care:    CAD, CHF, hypertension    External Notes reviewed:    Previous Clinic Note: Pulmonology office visit for dyspnea management      The following orders were placed and all results were independently analyzed by me:  Orders Placed This Encounter   Procedures    Blood Culture - Blood,    Blood Culture - Blood,    XR Chest 1 View    [place] Draw    Comprehensive Metabolic Panel    BNP    Single High Sensitivity Troponin T    CBC Auto Differential    Lactic Acid, Plasma    Procalcitonin    NPO Diet NPO Type: Strict NPO    Undress & Gown    Continuous Pulse Oximetry    Vital Signs    Inpatient Hospitalist Consult    Oxygen Therapy- Nasal Cannula; Titrate 1-6 LPM Per SpO2; 90 - 95%    ECG 12 Lead ED Triage Standing Order; SOA    Insert Peripheral IV    CBC & Differential    Green Top (Gel)    Lavender Top    Gold Top - SST    Light Blue Top       Medications Given in the Emergency  Department:  Medications   sodium chloride 0.9 % flush 10 mL (has no administration in time range)   cefTRIAXone (ROCEPHIN) in NS 1 gram/10ml IV PUSH syringe (has no administration in time range)   azithromycin (ZITHROMAX) tablet 500 mg (has no administration in time range)   sodium chloride 0.9 % bolus 500 mL (has no administration in time range)        ED Course:         Labs:    Lab Results (last 24 hours)       Procedure Component Value Units Date/Time    CBC & Differential [[id number]]  (Abnormal) Collected: 11/14/24 1620    Specimen: Blood Updated: 11/14/24 2243    Narrative:      The following orders were created for panel order CBC & Differential.  Procedure                               Abnormality         Status                     ---------                               -----------         ------                     CBC Auto Differential[[id number]]        Abnormal            Final result                 Please view results for these tests on the individual orders.    BNP [[id number]]  (Normal) Collected: 11/14/24 1620    Specimen: Blood Updated: 11/14/24 2255     proBNP 677.0 pg/mL     Narrative:      This assay is used as an aid in the diagnosis of individuals suspected of having heart failure. It can be used as an aid in the diagnosis of acute decompensated heart failure (ADHF) in patients presenting with signs and symptoms of ADHF to the emergency department (ED). In addition, NT-proBNP of <300 pg/mL indicates ADHF is not likely.    Age Range Result Interpretation  NT-proBNP Concentration (pg/mL:      <50             Positive            >450                   Gray                 300-450                    Negative             <300    50-75           Positive            >900                  Gray                300-900                  Negative            <300      >75             Positive            >1800                  Gray                300-1800                  Negative            <300     Comprehensive Metabolic Panel [[id number]]  (Abnormal) Collected: 11/14/24 1620    Specimen: Blood Updated: 11/14/24 2255     Glucose 138 mg/dL      BUN 12 mg/dL      Creatinine 0.81 mg/dL      Sodium 138 mmol/L      Potassium 3.4 mmol/L      Chloride 101 mmol/L      CO2 27.4 mmol/L      Calcium 9.2 mg/dL      Total Protein 7.1 g/dL      Albumin 2.7 g/dL      ALT (SGPT) 28 U/L      AST (SGOT) 32 U/L      Alkaline Phosphatase 111 U/L      Total Bilirubin 0.4 mg/dL      Globulin 4.4 gm/dL      A/G Ratio 0.6 g/dL      BUN/Creatinine Ratio 14.8     Anion Gap 9.6 mmol/L      eGFR 72.1 mL/min/1.73     Narrative:      GFR Normal >60  Chronic Kidney Disease <60  Kidney Failure <15    The GFR formula is only valid for adults with stable renal function between ages 18 and 70.    CBC Auto Differential [[id number]]  (Abnormal) Collected: 11/14/24 1620    Specimen: Blood Updated: 11/14/24 2243     WBC 21.04 10*3/mm3      RBC 4.89 10*6/mm3      Hemoglobin 13.8 g/dL      Hematocrit 42.3 %      MCV 86.5 fL      MCH 28.2 pg      MCHC 32.6 g/dL      RDW 11.9 %      RDW-SD 37.4 fl      MPV 11.2 fL      Platelets 485 10*3/mm3      Neutrophil % 81.9 %      Lymphocyte % 9.6 %      Monocyte % 6.0 %      Eosinophil % 1.3 %      Basophil % 0.3 %      Immature Grans % 0.9 %      Neutrophils, Absolute 17.25 10*3/mm3      Lymphocytes, Absolute 2.02 10*3/mm3      Monocytes, Absolute 1.26 10*3/mm3      Eosinophils, Absolute 0.27 10*3/mm3      Basophils, Absolute 0.06 10*3/mm3      Immature Grans, Absolute 0.18 10*3/mm3      nRBC 0.0 /100 WBC     CBC & Differential [[id number]]  (Abnormal) Collected: 11/15/24 1348    Specimen: Blood Updated: 11/15/24 1355    Narrative:      The following orders were created for panel order CBC & Differential.  Procedure                               Abnormality         Status                     ---------                               -----------         ------                     CBC Auto Differential[[id number]]         Abnormal            Final result                 Please view results for these tests on the individual orders.    Comprehensive Metabolic Panel [[id number]]  (Abnormal) Collected: 11/15/24 1348    Specimen: Blood Updated: 11/15/24 1415     Glucose 192 mg/dL      BUN 11 mg/dL      Creatinine 0.82 mg/dL      Sodium 134 mmol/L      Potassium 3.2 mmol/L      Comment: Slight hemolysis detected by analyzer. Result may be falsely elevated.        Chloride 98 mmol/L      CO2 25.9 mmol/L      Calcium 8.9 mg/dL      Total Protein 7.4 g/dL      Albumin 3.0 g/dL      ALT (SGPT) 27 U/L      AST (SGOT) 27 U/L      Alkaline Phosphatase 120 U/L      Total Bilirubin 0.7 mg/dL      Globulin 4.4 gm/dL      A/G Ratio 0.7 g/dL      BUN/Creatinine Ratio 13.4     Anion Gap 10.1 mmol/L      eGFR 71.1 mL/min/1.73     Narrative:      GFR Normal >60  Chronic Kidney Disease <60  Kidney Failure <15    The GFR formula is only valid for adults with stable renal function between ages 18 and 70.    BNP [[id number]]  (Normal) Collected: 11/15/24 1348    Specimen: Blood Updated: 11/15/24 1413     proBNP 870.5 pg/mL     Narrative:      This assay is used as an aid in the diagnosis of individuals suspected of having heart failure. It can be used as an aid in the diagnosis of acute decompensated heart failure (ADHF) in patients presenting with signs and symptoms of ADHF to the emergency department (ED). In addition, NT-proBNP of <300 pg/mL indicates ADHF is not likely.    Age Range Result Interpretation  NT-proBNP Concentration (pg/mL:      <50             Positive            >450                   Gray                 300-450                    Negative             <300    50-75           Positive            >900                  Gray                300-900                  Negative            <300      >75             Positive            >1800                  Gray                300-1800                  Negative            <300    Single High  Sensitivity Troponin T [[id number]]  (Abnormal) Collected: 11/15/24 1348    Specimen: Blood Updated: 11/15/24 1415     HS Troponin T 15 ng/L     Narrative:      High Sensitive Troponin T Reference Range:  <14.0 ng/L- Negative Female for AMI  <22.0 ng/L- Negative Male for AMI  >=14 - Abnormal Female indicating possible myocardial injury.  >=22 - Abnormal Male indicating possible myocardial injury.   Clinicians would have to utilize clinical acumen, EKG, Troponin, and serial changes to determine if it is an Acute Myocardial Infarction or myocardial injury due to an underlying chronic condition.         CBC Auto Differential [[id number]]  (Abnormal) Collected: 11/15/24 1348    Specimen: Blood Updated: 11/15/24 1355     WBC 23.52 10*3/mm3      RBC 4.95 10*6/mm3      Hemoglobin 14.0 g/dL      Hematocrit 43.4 %      MCV 87.7 fL      MCH 28.3 pg      MCHC 32.3 g/dL      RDW 13.2 %      RDW-SD 42.3 fl      MPV 10.5 fL      Platelets 503 10*3/mm3      Neutrophil % 87.4 %      Lymphocyte % 4.4 %      Monocyte % 4.5 %      Eosinophil % 2.5 %      Basophil % 0.3 %      Immature Grans % 0.9 %      Neutrophils, Absolute 20.54 10*3/mm3      Lymphocytes, Absolute 1.03 10*3/mm3      Monocytes, Absolute 1.07 10*3/mm3      Eosinophils, Absolute 0.58 10*3/mm3      Basophils, Absolute 0.08 10*3/mm3      Immature Grans, Absolute 0.22 10*3/mm3      nRBC 0.0 /100 WBC     Lactic Acid, Plasma [[id number]]  (Normal) Collected: 11/15/24 1348    Specimen: Blood Updated: 11/15/24 1413     Lactate 1.5 mmol/L     Procalcitonin [[id number]] Collected: 11/15/24 1348    Specimen: Blood Updated: 11/15/24 1447             Imaging:    XR Chest 1 View    Result Date: 11/15/2024  XR CHEST 1 VW Date of Exam: 11/15/2024 1:50 PM EST Indication: SOA Triage Protocol Comparison: Chest x-ray 11/14/2024 Findings: Patient's head and neck obscure the lung apices due to kyphosis. Cardiomegaly. Increased opacity in the right hilum. There is bilateral upper lobe airspace  opacity with bronchiectasis and reticular opacity. No pneumothorax or pleural effusion.     Impression: Increased opacity in the right hilum could be seen with pneumonia or malignancy. Upper lobe predominant airspace opacities with bronchiectasis and septal thickening. Worse appearance the chest when compared to 1 day prior. Electronically Signed: Monika Garcia MD  11/15/2024 2:12 PM EST  Workstation ID: [id number]    XR Chest 2 View    Result Date: 11/14/2024  XR CHEST 2 VW Date of Exam: 11/14/2024 3:55 PM EST Indication: dyspnea, hypoxia Comparison: 12/29/2023. Findings: Diffuse interstitial and airspace opacities are present throughout the lungs bilaterally, most pronounced within the bilateral upper lobes. No pleural fluid. No pneumothorax. The pulmonary vasculature appears within normal limits. The cardiac and mediastinal silhouette appear unremarkable. No acute osseous abnormality identified.     Impression: Diffuse interstitial and airspace opacities present throughout the lungs bilaterally, most pronounced within the upper lobes, likely related to a multifocal pneumonia. Follow-up to resolution recommended. Electronically Signed: Anusha Marie MD  11/14/2024 8:17 PM EST  Workstation ID: [id number]       Differential Diagnosis and Discussion:    Dyspnea: Differential diagnosis includes but is not limited to metabolic acidosis, neurological disorders, psychogenic, asthma, pneumothorax, upper airway obstruction, COPD, pneumonia, noncardiogenic pulmonary edema, interstitial lung disease, anemia, congestive heart failure, and pulmonary embolism    All labs were reviewed and interpreted by me.  All X-rays impressions were independently interpreted by me.    MDM  Number of Diagnoses or Management Options  Pneumonia due to infectious organism, unspecified laterality, unspecified part of lung  Sepsis, due to unspecified organism, unspecified whether acute organ dysfunction present  Diagnosis management comments: In summary  this is an 83-year-old female who presents to the Emergency Department for evaluation of pneumonia.  Chest x-ray reviewed by me read reveals significant pneumonia worse since yesterday.  CBC independently reviewed interpreted by me reveals critical normalities of leukocytosis.  CMP independently reviewed and interpreted by me and shows no critical abnormalities.  Patient was given IV antibiotics in the emergency department along with IV fluids.  She is requiring supplemental oxygen as well.  I believe patient would benefit this time from IV antibiotics.  Patient case has been discussed with the hospitalist team who will admit to the hospital for further evaluation and continuation of treatment.                   Sepsis criteria was met in the emergency department and the Sepsis protocol (including antibiotic administration) was initiated.      SIRS criteria considered:   1.  Temperature > 100.4 or <96.8    2.  Heart Rate > 90    3.  Respiratory Rate > 22    4.  WBC > 12K or <4K.             Severe Sepsis:     Respiratory: Mechanical Ventilation or Bipap  Hypotension: SBP > 90 or MAP < 65  Renal: Creatinine > 2  Metabolic: Lactic Acid > 2  Hematologic: Platelets < 100K or INR > 1.5  Hepatic: BILI  >  2  CNS: Sudden AMS     Septic Shock:     Severe Sepsis + Persistent hypotension or Lactic Acid > 4     Normal saline bolus, Antibiotics, and final disposition was based on these definitions.        Sepsis was recognized at 1420    Antibiotics were ordered.     30 mL/kg bolus was not indicated.       Patient did not receive the recommended 30 mL/kg fluid bolus for sepsis because it would be harmful or detrimental to the patient.    The patient has Heart Failure.   The patient was ordered 500 mL of fluids.    The patient presents with 2 out of the 4 SIRS criteria and a suspected source for sepsis.  Patient was evaluated and placed on a cardiac monitor for fear of worsening tachycardia and life-threatening hypotension.   Patient was monitored for shock and signs of end-organ damage.  Mental status was repeatedly checked throughout the ED stay.  Medications were ordered by me which includes IV Rocephin and azithromycin.  The case was discussed at length with admitting physician.    Total Critical Care time of 40 minutes. Total critical care time documented does not include time spent on separately billed procedures for services of nurses or physician assistants. I personally saw and examined the patient. I have reviewed all diagnostic interpretations and treatment plans as written. I was present for the key portions of any procedures performed and the inclusive time noted in any critical care statement. Critical care time includes patient management by me, time spent at the patients bedside,  time to review lab and imaging results, discussing patient care, documentation in the medical record, and time spent with family or caregiver.    Patient Care Considerations:    CT CHEST: I considered ordering a CT scan of the chest, however chest x-ray reveals significant pneumonia      Consultants/Shared Management Plan:    Hospitalist: I have discussed the case with Dr. Santacruz who agrees to accept the patient for admission.    Social Determinants of Health:    Patient has presented with family members who are responsible, reliable and will ensure follow up care.      Disposition and Care Coordination:    Admit:   Through independent evaluation of the patient's history, physical, and imperical data, the patient meets criteria for inpatient admission to the hospital.        Final diagnoses:   Pneumonia due to infectious organism, unspecified laterality, unspecified part of lung   Sepsis, due to unspecified organism, unspecified whether acute organ dysfunction present        ED Disposition       ED Disposition   Decision to Admit    Condition   --    Comment   --               This medical record created using voice recognition  software.             Donaldo Sarabia MD  11/15/24 1448      Electronically signed by Donaldo Sarabia MD at 11/15/24 1448       Physician Progress Notes (last 24 hours)  Notes from 11/15/24 1148 through 11/16/24 1148   No notes of this type exist for this encounter.       Consult Notes (last 24 hours)  Notes from 11/15/24 1148 through 11/16/24 1148   No notes of this type exist for this encounter.     [facility] ,[facility] [phone]-  F [phone]

## 2024-11-16 NOTE — PLAN OF CARE
Goal Outcome Evaluation:                        Pt now on 3L NC, expiratory wheezes and course crackle. Shortness of breath with minimal exertion. Infrequent congested cough.  Fatigued, able to rest well. Alert and oriented X4. Family remains at bedside.

## 2024-11-17 LAB
ALBUMIN SERPL-MCNC: 2.2 G/DL (ref 3.5–5.2)
ALBUMIN/GLOB SERPL: 0.6 G/DL
ALP SERPL-CCNC: 92 U/L (ref 39–117)
ALT SERPL W P-5'-P-CCNC: 21 U/L (ref 1–33)
ANION GAP SERPL CALCULATED.3IONS-SCNC: 8 MMOL/L (ref 5–15)
AST SERPL-CCNC: 21 U/L (ref 1–32)
BILIRUB SERPL-MCNC: 0.4 MG/DL (ref 0–1.2)
BUN SERPL-MCNC: 6 MG/DL (ref 8–23)
BUN/CREAT SERPL: 11.5 (ref 7–25)
CALCIUM SPEC-SCNC: 8.1 MG/DL (ref 8.6–10.5)
CHLORIDE SERPL-SCNC: 104 MMOL/L (ref 98–107)
CO2 SERPL-SCNC: 27 MMOL/L (ref 22–29)
CREAT SERPL-MCNC: 0.52 MG/DL (ref 0.57–1)
DEPRECATED RDW RBC AUTO: 42.6 FL (ref 37–54)
EGFRCR SERPLBLD CKD-EPI 2021: 92.3 ML/MIN/1.73
ERYTHROCYTE [DISTWIDTH] IN BLOOD BY AUTOMATED COUNT: 13.2 % (ref 12.3–15.4)
GLOBULIN UR ELPH-MCNC: 3.7 GM/DL
GLUCOSE SERPL-MCNC: 134 MG/DL (ref 65–99)
HCT VFR BLD AUTO: 37.6 % (ref 34–46.6)
HGB BLD-MCNC: 11.6 G/DL (ref 12–15.9)
L PNEUMO1 AG UR QL IA: NEGATIVE
MAGNESIUM SERPL-MCNC: 1.9 MG/DL (ref 1.6–2.4)
MCH RBC QN AUTO: 27.4 PG (ref 26.6–33)
MCHC RBC AUTO-ENTMCNC: 30.9 G/DL (ref 31.5–35.7)
MCV RBC AUTO: 88.7 FL (ref 79–97)
PHOSPHATE SERPL-MCNC: 2.9 MG/DL (ref 2.5–4.5)
PLATELET # BLD AUTO: 394 10*3/MM3 (ref 140–450)
PMV BLD AUTO: 10.5 FL (ref 6–12)
POTASSIUM SERPL-SCNC: 3.3 MMOL/L (ref 3.5–5.2)
PROT SERPL-MCNC: 5.9 G/DL (ref 6–8.5)
RBC # BLD AUTO: 4.24 10*6/MM3 (ref 3.77–5.28)
S PNEUM AG SPEC QL LA: NEGATIVE
SODIUM SERPL-SCNC: 139 MMOL/L (ref 136–145)
WBC NRBC COR # BLD AUTO: 10.29 10*3/MM3 (ref 3.4–10.8)

## 2024-11-17 PROCEDURE — 85027 COMPLETE CBC AUTOMATED: CPT | Performed by: INTERNAL MEDICINE

## 2024-11-17 PROCEDURE — 87205 SMEAR GRAM STAIN: CPT | Performed by: INTERNAL MEDICINE

## 2024-11-17 PROCEDURE — 25010000002 ENOXAPARIN PER 10 MG: Performed by: INTERNAL MEDICINE

## 2024-11-17 PROCEDURE — 94799 UNLISTED PULMONARY SVC/PX: CPT

## 2024-11-17 PROCEDURE — 84100 ASSAY OF PHOSPHORUS: CPT | Performed by: INTERNAL MEDICINE

## 2024-11-17 PROCEDURE — 87070 CULTURE OTHR SPECIMN AEROBIC: CPT | Performed by: INTERNAL MEDICINE

## 2024-11-17 PROCEDURE — 87449 NOS EACH ORGANISM AG IA: CPT | Performed by: INTERNAL MEDICINE

## 2024-11-17 PROCEDURE — 63710000001 PREDNISONE PER 1 MG: Performed by: INTERNAL MEDICINE

## 2024-11-17 PROCEDURE — 25010000002 CEFTRIAXONE PER 250 MG: Performed by: INTERNAL MEDICINE

## 2024-11-17 PROCEDURE — 80053 COMPREHEN METABOLIC PANEL: CPT | Performed by: INTERNAL MEDICINE

## 2024-11-17 PROCEDURE — 83735 ASSAY OF MAGNESIUM: CPT | Performed by: INTERNAL MEDICINE

## 2024-11-17 PROCEDURE — 87899 AGENT NOS ASSAY W/OPTIC: CPT | Performed by: INTERNAL MEDICINE

## 2024-11-17 PROCEDURE — 99232 SBSQ HOSP IP/OBS MODERATE 35: CPT | Performed by: INTERNAL MEDICINE

## 2024-11-17 RX ORDER — PREDNISONE 20 MG/1
40 TABLET ORAL
Status: DISCONTINUED | OUTPATIENT
Start: 2024-11-17 | End: 2024-11-20 | Stop reason: HOSPADM

## 2024-11-17 RX ORDER — POTASSIUM CHLORIDE 750 MG/1
40 CAPSULE, EXTENDED RELEASE ORAL EVERY 4 HOURS
Status: COMPLETED | OUTPATIENT
Start: 2024-11-17 | End: 2024-11-17

## 2024-11-17 RX ADMIN — BUDESONIDE 0.5 MG: 0.5 INHALANT ORAL at 20:41

## 2024-11-17 RX ADMIN — ATORVASTATIN CALCIUM 40 MG: 40 TABLET, FILM COATED ORAL at 21:27

## 2024-11-17 RX ADMIN — ASPIRIN 81 MG: 81 TABLET, COATED ORAL at 08:22

## 2024-11-17 RX ADMIN — BUDESONIDE 0.5 MG: 0.5 INHALANT ORAL at 10:12

## 2024-11-17 RX ADMIN — Medication 2000 MG: at 15:51

## 2024-11-17 RX ADMIN — PREDNISONE 40 MG: 20 TABLET ORAL at 12:48

## 2024-11-17 RX ADMIN — CARVEDILOL 6.25 MG: 6.25 TABLET, FILM COATED ORAL at 21:27

## 2024-11-17 RX ADMIN — POTASSIUM CHLORIDE 40 MEQ: 750 CAPSULE, EXTENDED RELEASE ORAL at 08:22

## 2024-11-17 RX ADMIN — ARFORMOTEROL TARTRATE 15 MCG: 15 SOLUTION RESPIRATORY (INHALATION) at 20:41

## 2024-11-17 RX ADMIN — POTASSIUM CHLORIDE 40 MEQ: 750 CAPSULE, EXTENDED RELEASE ORAL at 12:48

## 2024-11-17 RX ADMIN — CARVEDILOL 6.25 MG: 6.25 TABLET, FILM COATED ORAL at 08:22

## 2024-11-17 RX ADMIN — Medication 10 ML: at 08:23

## 2024-11-17 RX ADMIN — ENOXAPARIN SODIUM 40 MG: 100 INJECTION SUBCUTANEOUS at 08:22

## 2024-11-17 RX ADMIN — Medication 10 ML: at 21:28

## 2024-11-17 RX ADMIN — AZITHROMYCIN DIHYDRATE 500 MG: 250 TABLET ORAL at 08:22

## 2024-11-17 RX ADMIN — ARFORMOTEROL TARTRATE 15 MCG: 15 SOLUTION RESPIRATORY (INHALATION) at 10:12

## 2024-11-17 NOTE — PLAN OF CARE
Goal Outcome Evaluation:  Plan of Care Reviewed With: patient        Progress: improving  Outcome Evaluation: Patient alert and oriented, vital signs stable while on 3l Nasal cannula, Patient able to ambulate with one persons assistance and cane. Patient started on steriods and potassium replacement this shift. Patient reports less shortness of air and increase in cough. Patient has no complaints of pain or new onset symptoms at this time.

## 2024-11-17 NOTE — PLAN OF CARE
Goal Outcome Evaluation:   Patient rested through the night, no complaints of pain or discomfort, daughter at bedside, will continue with plan of care

## 2024-11-17 NOTE — PROGRESS NOTES
[facility]   Hospitalist Progress Note  Date: 2024  Patient Name: Cris Agudelo  : 1940  MRN: [id number]  Date of admission: 11/15/2024  Room/Bed: [facility]      Subjective   Subjective     Chief Complaint:   Shortness of breath    Summary:  Cris Agudelo is a 83 y.o. female with medical history of COPD, coronary artery disease, dyslipidemia, and hypertension who presents to the emergency department after not feeling well.     The patient was seen in pulmonary clinic day prior to presentation and started on antibiotic.  Appears she received 2 doses of Augmentin.  She did not get any steroids.  States she feels much worse in the morning. She received called about her chest x-ray showed pneumonia, therefore instructed to present to the emergency room.  No fevers.  No chills.  As result of all her symptoms she can the ER for further evaluation.  In the emergency department patient noted to be 81% on room air.  Leukocytosis 23.  Chest x-ray shows diffuse interstitial airspace opacities present throughout the lung bases most pronounced with upper lobes, likely multifocal pneumonia.  Patient of chest shows extensive bronchocentric alveolar airspace disease consistent with pneumonia.     Interval Followup:   Discussed CT findings with patient and daughter at bedside.  Will start patient on steroids today, noted to have wheezing m. patient continues on daily with nebulized breathing treatments.  Patient endorses some improvement in breathing over the past 24 hours    Objective   Objective     Vitals:   Temp:  [97.9 °F (36.6 °C)-98.2 °F (36.8 °C)] 98.2 °F (36.8 °C)  Heart Rate:  [68-88] 74  Resp:  [18-20] 18  BP: (123-158)/(42-67) 142/62  Flow (L/min) (Oxygen Therapy):  [3] 3    Physical Exam               Constitutional: Breathing comfortably with nasal cannula, resting comfortably in bed              Eyes: Pupils equal, sclerae anicteric, no conjunctival injection              HENT: NCAT, mucous membranes  moist              Neck: Supple, no thyromegaly, no lymphadenopathy, trachea midline              Respiratory: Diffuse rhonchi heard throughout, no crackles no wheezing              Cardiovascular: RRR, no murmurs, rubs, or gallops, palpable pedal pulses bilaterally              Gastrointestinal: Positive bowel sounds, soft, nontender, nondistended              Musculoskeletal: No bilateral ankle edema, no clubbing or cyanosis to extremities              Psychiatric: Appropriate affect, cooperative              Neurologic: Oriented x 3, strength symmetric in all extremities, Cranial Nerves grossly intact to confrontation, speech clear              Skin: No rashes     Result Review    Result Review:  I have personally reviewed these results:  [x]  Laboratory      Lab 11/17/24  0602 11/16/24  0610 11/15/24  1348 11/14/24  1620   WBC 10.29 14.61* 23.52* 21.04*   HEMOGLOBIN 11.6* 12.5 14.0 13.8   HEMATOCRIT 37.6 39.5 43.4 42.3   PLATELETS 394 420 503* 485*   NEUTROS ABS  --  11.45* 20.54* 17.25*   IMMATURE GRANS (ABS)  --  0.15* 0.22* 0.18*   LYMPHS ABS  --  1.31 1.03 2.02   MONOS ABS  --  0.93* 1.07* 1.26*   EOS ABS  --  0.71* 0.58* 0.27   MCV 88.7 89.0 87.7 86.5   PROCALCITONIN  --  0.14 0.17  --    LACTATE  --   --  1.5  --          Lab 11/17/24  0602 11/16/24  0610 11/15/24  1348   SODIUM 139 139 134*   POTASSIUM 3.3* 3.6 3.2*   CHLORIDE 104 107 98   CO2 27.0 23.5 25.9   ANION GAP 8.0 8.5 10.1   BUN 6* 9 11   CREATININE 0.52* 0.60 0.82   EGFR 92.3 89.2 71.1   GLUCOSE 134* 96 192*   CALCIUM 8.1* 7.9* 8.9   MAGNESIUM 1.9 1.8  --    PHOSPHORUS 2.9  --   --          Lab 11/17/24  0602 11/16/24  0610 11/15/24  1348   TOTAL PROTEIN 5.9* 5.8* 7.4   ALBUMIN 2.2* 2.2* 3.0*   GLOBULIN 3.7 3.6 4.4   ALT (SGPT) 21 20 27   AST (SGOT) 21 17 27   BILIRUBIN 0.4 0.5 0.7   ALK PHOS 92 94 120*         Lab 11/15/24  1348 11/14/24  1620   PROBNP 870.5 677.0   HSTROP T 15*  --                  Brief Urine Lab Results       None           [x]  Microbiology   Microbiology Results (last 10 days)       Procedure Component Value - Date/Time    S. Pneumo Ag Urine or CSF - Urine, Urine, Clean Catch [[id number]]  (Normal) Collected: 11/17/24 1127    Lab Status: Final result Specimen: Urine, Clean Catch Updated: 11/17/24 1411     Strep Pneumo Ag Negative    Legionella Antigen, Urine - Urine, Urine, Clean Catch [[id number]]  (Normal) Collected: 11/17/24 1127    Lab Status: Final result Specimen: Urine, Clean Catch Updated: 11/17/24 1411     LEGIONELLA ANTIGEN, URINE Negative    Respiratory Culture - Sputum, Cough [[id number]] Collected: 11/17/24 1126    Lab Status: Preliminary result Specimen: Sputum from Cough Updated: 11/17/24 1436     Gram Stain Rare (1+) Epithelial cells per low power field      Moderate (3+) WBCs per low power field      Rare (1+) Budding yeast    Blood Culture - Blood, Arm, Right [[id number]]  (Normal) Collected: 11/15/24 1458    Lab Status: Preliminary result Specimen: Blood from Arm, Right Updated: 11/17/24 1515     Blood Culture No growth at 2 days    Blood Culture - Blood, Arm, Left [[id number]]  (Normal) Collected: 11/15/24 1458    Lab Status: Preliminary result Specimen: Blood from Arm, Left Updated: 11/17/24 1515     Blood Culture No growth at 2 days    COVID-19, FLU A/B, RSV PCR 1 HR TAT - Swab, Nasopharynx [[id number]]  (Normal) Collected: 11/15/24 1458    Lab Status: Final result Specimen: Swab from Nasopharynx Updated: 11/15/24 1546     COVID19 Not Detected     Influenza A PCR Not Detected     Influenza B PCR Not Detected     RSV, PCR Not Detected    Narrative:      Fact sheet for providers: https://www.fda.gov/media/812746/download    Fact sheet for patients: https://www.fda.gov/media/520405/download    Test performed by PCR.    Mycoplasma Pneumoniae Antibody, IgM - Blood, [[id number]]  (Normal) Collected: 11/15/24 1348    Lab Status: Final result Specimen: Blood Updated: 11/15/24 1739     Mycoplasma pneumo IgM Negative           [x]  Radiology  CT Chest Without Contrast Diagnostic    Result Date: 11/16/2024  Impression: CT scan of the chest without contrast demonstrating extensive bronchocentric alveolar airspace disease consistent with pneumonia. Electronically Signed: Srikanth Feng MD  11/16/2024 9:45 AM EST  Workstation ID: [id number]    XR Chest 1 View    Result Date: 11/15/2024  Impression: Increased opacity in the right hilum could be seen with pneumonia or malignancy. Upper lobe predominant airspace opacities with bronchiectasis and septal thickening. Worse appearance the chest when compared to 1 day prior. Electronically Signed: Monika Garcia MD  11/15/2024 2:12 PM EST  Workstation ID: [id number]    XR Chest 2 View    Result Date: 11/14/2024  Impression: Diffuse interstitial and airspace opacities present throughout the lungs bilaterally, most pronounced within the upper lobes, likely related to a multifocal pneumonia. Follow-up to resolution recommended. Electronically Signed: Anusha Marie MD  11/14/2024 8:17 PM EST  Workstation ID: [id number]   []  EKG/Telemetry   []  Cardiology/Vascular   []  Pathology  []  Old records  []  Other:    Assessment & Plan   Assessment / Plan     Assessment:  Multifocal pneumonia  Acute hypoxic respiratory failure  Stress-induced cardiomyopathy with recovery of EF  COPD  Obesity BMI 30    Plan:  Patient admitted to hospital for further care and management  Continue on ceftriaxone, completed azithromycin  Additional infectious workup ordered, will follow-up and adjust antibiotics as needed.  Discussed with nursing staff respiratory culture needs to be obtained  CT scan obtained showing diffuse pneumonia  Continue supplemental oxygen as needed to maintain O2 saturations greater than 90  Aggressive bronchopulmonary hygiene  Continues on nebulized breathing treatments  Wheezing noted on exam today, starting steroids     Discussed with RN.    VTE Prophylaxis:  Pharmacologic VTE prophylaxis orders are  present.        CODE STATUS:   Level Of Support Discussed With: Patient  Code Status (Patient has no pulse and is not breathing): CPR (Attempt to Resuscitate)  Medical Interventions (Patient has pulse or is breathing): Full Support      Electronically signed by Shabbir Pate MD, 11/17/2024, 16:28 EST.

## 2024-11-18 LAB
ANION GAP SERPL CALCULATED.3IONS-SCNC: 9.9 MMOL/L (ref 5–15)
BUN SERPL-MCNC: 6 MG/DL (ref 8–23)
BUN/CREAT SERPL: 12.2 (ref 7–25)
CALCIUM SPEC-SCNC: 8.6 MG/DL (ref 8.6–10.5)
CHLORIDE SERPL-SCNC: 103 MMOL/L (ref 98–107)
CO2 SERPL-SCNC: 24.1 MMOL/L (ref 22–29)
CREAT SERPL-MCNC: 0.49 MG/DL (ref 0.57–1)
DEPRECATED RDW RBC AUTO: 42.7 FL (ref 37–54)
EGFRCR SERPLBLD CKD-EPI 2021: 93.7 ML/MIN/1.73
ERYTHROCYTE [DISTWIDTH] IN BLOOD BY AUTOMATED COUNT: 13.2 % (ref 12.3–15.4)
GLUCOSE SERPL-MCNC: 143 MG/DL (ref 65–99)
HCT VFR BLD AUTO: 39.1 % (ref 34–46.6)
HGB BLD-MCNC: 12.3 G/DL (ref 12–15.9)
MAGNESIUM SERPL-MCNC: 2 MG/DL (ref 1.6–2.4)
MCH RBC QN AUTO: 27.9 PG (ref 26.6–33)
MCHC RBC AUTO-ENTMCNC: 31.5 G/DL (ref 31.5–35.7)
MCV RBC AUTO: 88.7 FL (ref 79–97)
PLATELET # BLD AUTO: 472 10*3/MM3 (ref 140–450)
PMV BLD AUTO: 10.4 FL (ref 6–12)
POTASSIUM SERPL-SCNC: 4.1 MMOL/L (ref 3.5–5.2)
RBC # BLD AUTO: 4.41 10*6/MM3 (ref 3.77–5.28)
SODIUM SERPL-SCNC: 137 MMOL/L (ref 136–145)
WBC NRBC COR # BLD AUTO: 8.6 10*3/MM3 (ref 3.4–10.8)

## 2024-11-18 PROCEDURE — 94799 UNLISTED PULMONARY SVC/PX: CPT

## 2024-11-18 PROCEDURE — 25010000002 CEFTRIAXONE PER 250 MG: Performed by: INTERNAL MEDICINE

## 2024-11-18 PROCEDURE — 97161 PT EVAL LOW COMPLEX 20 MIN: CPT

## 2024-11-18 PROCEDURE — 97165 OT EVAL LOW COMPLEX 30 MIN: CPT

## 2024-11-18 PROCEDURE — 99232 SBSQ HOSP IP/OBS MODERATE 35: CPT | Performed by: INTERNAL MEDICINE

## 2024-11-18 PROCEDURE — 85027 COMPLETE CBC AUTOMATED: CPT | Performed by: INTERNAL MEDICINE

## 2024-11-18 PROCEDURE — 25010000002 ENOXAPARIN PER 10 MG: Performed by: INTERNAL MEDICINE

## 2024-11-18 PROCEDURE — 83735 ASSAY OF MAGNESIUM: CPT | Performed by: INTERNAL MEDICINE

## 2024-11-18 PROCEDURE — 80048 BASIC METABOLIC PNL TOTAL CA: CPT | Performed by: INTERNAL MEDICINE

## 2024-11-18 PROCEDURE — 63710000001 PREDNISONE PER 1 MG: Performed by: INTERNAL MEDICINE

## 2024-11-18 PROCEDURE — 94664 DEMO&/EVAL PT USE INHALER: CPT

## 2024-11-18 RX ADMIN — ENOXAPARIN SODIUM 40 MG: 100 INJECTION SUBCUTANEOUS at 08:58

## 2024-11-18 RX ADMIN — CARVEDILOL 6.25 MG: 6.25 TABLET, FILM COATED ORAL at 08:58

## 2024-11-18 RX ADMIN — Medication 2000 MG: at 15:58

## 2024-11-18 RX ADMIN — PREDNISONE 40 MG: 20 TABLET ORAL at 08:58

## 2024-11-18 RX ADMIN — ATORVASTATIN CALCIUM 40 MG: 40 TABLET, FILM COATED ORAL at 21:29

## 2024-11-18 RX ADMIN — ARFORMOTEROL TARTRATE 15 MCG: 15 SOLUTION RESPIRATORY (INHALATION) at 07:41

## 2024-11-18 RX ADMIN — BUDESONIDE 0.5 MG: 0.5 INHALANT ORAL at 07:41

## 2024-11-18 RX ADMIN — ARFORMOTEROL TARTRATE 15 MCG: 15 SOLUTION RESPIRATORY (INHALATION) at 18:41

## 2024-11-18 RX ADMIN — Medication 10 ML: at 21:29

## 2024-11-18 RX ADMIN — ASPIRIN 81 MG: 81 TABLET, COATED ORAL at 08:58

## 2024-11-18 RX ADMIN — CARVEDILOL 6.25 MG: 6.25 TABLET, FILM COATED ORAL at 21:29

## 2024-11-18 RX ADMIN — BUDESONIDE 0.5 MG: 0.5 INHALANT ORAL at 18:40

## 2024-11-18 RX ADMIN — Medication 10 ML: at 08:59

## 2024-11-18 NOTE — PLAN OF CARE
Problem: Adult Inpatient Plan of Care  Goal: Plan of Care Review  Outcome: Progressing  Flowsheets (Taken 11/18/2024 0505)  Progress: improving  Plan of Care Reviewed With:   patient   family  Goal: Patient-Specific Goal (Individualized)  Outcome: Progressing  Goal: Absence of Hospital-Acquired Illness or Injury  Outcome: Progressing  Intervention: Identify and Manage Fall Risk  Recent Flowsheet Documentation  Taken 11/18/2024 0502 by Renay Morejon LPN  Safety Promotion/Fall Prevention: safety round/check completed  Taken 11/18/2024 0305 by Renay Morejon LPN  Safety Promotion/Fall Prevention: safety round/check completed  Taken 11/18/2024 0111 by Renay Morejon LPN  Safety Promotion/Fall Prevention: safety round/check completed  Taken 11/17/2024 2308 by Renay Morejon LPN  Safety Promotion/Fall Prevention: safety round/check completed  Taken 11/17/2024 2104 by Renay Morejon LPN  Safety Promotion/Fall Prevention: safety round/check completed  Taken 11/17/2024 1924 by Renay Morejon LPN  Safety Promotion/Fall Prevention: safety round/check completed  Intervention: Prevent Skin Injury  Recent Flowsheet Documentation  Taken 11/17/2024 2226 by Renay Morejon LPN  Skin Protection:   incontinence pads utilized   skin sealant/moisture barrier applied   transparent dressing maintained  Intervention: Prevent Infection  Recent Flowsheet Documentation  Taken 11/17/2024 2226 by Renay Morejon LPN  Infection Prevention:   equipment surfaces disinfected   hand hygiene promoted  Goal: Optimal Comfort and Wellbeing  Outcome: Progressing  Intervention: Provide Person-Centered Care  Recent Flowsheet Documentation  Taken 11/17/2024 2226 by Renay Morejon LPN  Trust Relationship/Rapport:   care explained   choices provided   questions answered   reassurance provided   thoughts/feelings acknowledged  Goal: Readiness for Transition of Care  Outcome: Progressing   Goal Outcome Evaluation:  Plan of Care Reviewed With: patient,  family        Progress: improving     Vitals stable.No complaints of pain. Remains WDL on 3L nasal cannula. Daughter at bedside. Will continue plan of care.

## 2024-11-18 NOTE — SIGNIFICANT NOTE
11/18/24 1056   Coping/Psychosocial   Observed Emotional State calm;cooperative   Verbalized Emotional State hopefulness   Trust Relationship/Rapport empathic listening provided   Family/Support Persons daughter;sibling   Involvement in Care interacting with patient   Additional Documentation Spiritual Care (Group)   Spiritual Care   Use of Spiritual Resources non-[employer] use of spiritual care   Spiritual Care Source  initiative   Spiritual Care Follow-Up follow-up, none required as presently assessed   Response to Spiritual Care receptive of support   Spiritual Care Interventions supportive conversation provided;prayer support provided   Spiritual Care Visit Type initial   Receptivity to Spiritual Care visit welcomed     Visiting time: 10 min

## 2024-11-18 NOTE — PLAN OF CARE
Goal Outcome Evaluation:  Plan of Care Reviewed With: patient        Progress: improving  Outcome Evaluation: Pt a/ox4. Takes time to respond but very talkative today and showing improvement overall with ambulating and WOB. Ambulates to toilet with walker as stand by. 3L nasal cannula- humidification added due to dryness of nares. Productive cough with sputum. Continue plan of care.

## 2024-11-18 NOTE — PLAN OF CARE
Goal Outcome Evaluation:  Plan of Care Reviewed With: patient        Progress: no change  Outcome Evaluation: Patient presents with limitations affecting strength, activity tolerance, and balance impacting patient's ability to return home safely and independently.  The skills of a therapist will be required to safely and effectively implement the following treatment plan to restore maximal level of function    Anticipated Discharge Disposition (OT): skilled nursing facility

## 2024-11-18 NOTE — THERAPY EVALUATION
Acute Care - Physical Therapy Initial Evaluation   Jadyn     Patient Name: Cris Agudelo  : 1940  MRN: [id number]  Today's Date: 2024      Visit Dx:     ICD-10-CM ICD-9-CM   1. Pneumonia due to infectious organism, unspecified laterality, unspecified part of lung  J18.9 486   2. Sepsis, due to unspecified organism, unspecified whether acute organ dysfunction present  A41.9 038.9     995.91   3. Difficulty walking  R26.2 719.7     Patient Active Problem List   Diagnosis    Hyponatremia    Stress-induced cardiomyopathy    Hyperlipidemia    Primary hypertension    Herpes zoster    Shingles    Non-occlusive coronary artery disease    Pneumonia     Past Medical History:   Diagnosis Date    COPD (chronic obstructive pulmonary disease)     History of non-ST elevation myocardial infarction (NSTEMI) 2023    Hyperlipidemia     Hypertension      Past Surgical History:   Procedure Laterality Date    CARDIAC CATHETERIZATION N/A 2023    Procedure: Left Heart Cath;  Surgeon: Mitch Correia MD;  Location: [facility] CATH INVASIVE LOCATION;  Service: Cardiology;  Laterality: N/A;    CARDIAC CATHETERIZATION N/A 2023    Procedure: Coronary angiography;  Surgeon: Mitch Correia MD;  Location: [facility] CATH INVASIVE LOCATION;  Service: Cardiology;  Laterality: N/A;     PT Assessment (Last 12 Hours)       PT Evaluation and Treatment       Row Name 24 1300          Physical Therapy Time and Intention    Document Type evaluation  -AV     Mode of Treatment individual therapy;physical therapy  -AV       Row Name 24 1300          General Information    Patient Profile Reviewed yes  -AV     Patient Observations alert;cooperative;agree to therapy  -AV     Prior Level of Function independent:;all household mobility;gait;transfer;ADL's  Ambulated with STC. Ordered rollator, just waiting for it to be devilvered. Recently began using 2 L O2.  -AV     Equipment Currently Used at Home cane, straight  Recently ordered  rollator, waiting for it to be delivered.  -AV     Existing Precautions/Restrictions fall;oxygen therapy device and L/min  -AV       Row Name 11/18/24 1300          Living Environment    Current Living Arrangements home  -AV     People in Home alone  -AV       Row Name 11/18/24 1300          Pain    Pretreatment Pain Rating 0/10 - no pain  -AV     Posttreatment Pain Rating 0/10 - no pain  -AV       Row Name 11/18/24 1300          Cognition    Orientation Status (Cognition) oriented x 3  -AV       Row Name 11/18/24 1300          Range of Motion (ROM)    Range of Motion bilateral lower extremities;ROM is WFL  -AV       [facility] Name 11/18/24 1300          Strength (Manual Muscle Testing)    Strength (Manual Muscle Testing) bilateral lower extremities;strength is WFL  -AV       [facility] Name 11/18/24 1300          Bed Mobility    Bed Mobility supine-sit;sit-supine  -AV     Supine-Sit [place] (Bed Mobility) standby assist  -AV     Sit-Supine [place] (Bed Mobility) standby assist  -AV       Row Name 11/18/24 1300          Transfers    Transfers sit-stand transfer;stand-sit transfer  -AV       Row Name 11/18/24 1300          Sit-Stand Transfer    Sit-Stand [place] (Transfers) contact guard  -AV     Assistive Device (Sit-Stand Transfers) cane, straight  -AV       Row Name 11/18/24 1300          Stand-Sit Transfer    Stand-Sit [place] (Transfers) contact guard  -AV     Assistive Device (Stand-Sit Transfers) cane, straight  -AV       Row Name 11/18/24 1300          Gait/Stairs (Locomotion)    Gait/Stairs Locomotion gait/ambulation independence;gait/ambulation assistive device;distance ambulated  -AV     [place] Level (Gait) contact guard  -AV     Assistive Device (Gait) cane, straight  -AV     Distance in Feet (Gait) 60  -AV     Deviations/Abnormal Patterns (Gait) gait speed decreased;stride length decreased  -AV       Row Name 11/18/24 1300          Safety Issues/Impairments Affecting Functional Mobility     Impairments Affecting Function (Mobility) balance;endurance/activity tolerance;shortness of breath  -AV       Row Name 11/18/24 1300          Balance    Balance Assessment standing dynamic balance  -AV     Dynamic Standing Balance contact guard  -AV     Position/Device Used, Standing Balance supported;cane, straight  -AV       Row Name 11/18/24 1300          Plan of Care Review    Plan of Care Reviewed With patient;daughter  -AV     Progress no change  -AV     Outcome Evaluation Patient presents with deficits in balance, endurance, transfers, and ambulation. Patient will benefits from skilled PT services to address these mobility deficits and decrease risk of falls.  -AV       Row Name 11/18/24 1300          Vital Signs    O2 Delivery Pre Treatment nasal cannula  -AV     O2 Delivery Intra Treatment nasal cannula  -AV     O2 Delivery Post Treatment nasal cannula  -AV       Row Name 11/18/24 1300          Positioning and Restraints    Pre-Treatment Position in bed  -AV     Post Treatment Position bed  -AV     In Bed fowlers;call light within reach;encouraged to call for assist;with family/caregiver  No alarms active upon therapist entry  -AV       Row Name 11/18/24 1300          Therapy Assessment/Plan (PT)    Rehab Potential (PT) good  -AV     Criteria for Skilled Interventions Met (PT) yes;meets criteria  -AV     Therapy Frequency (PT) daily  -AV     Predicted Duration of Therapy Intervention (PT) 10 days  -AV     Problem List (PT) problems related to;balance;mobility  -AV     Activity Limitations Related to Problem List (PT) unable to transfer safely;unable to ambulate safely  -AV       Row Name 11/18/24 1300          PT Evaluation Complexity    History, PT Evaluation Complexity no personal factors and/or comorbidities  -AV     Examination of Body Systems (PT Eval Complexity) total of 4 or more elements  -AV     Clinical Presentation (PT Evaluation Complexity) stable  -AV     Clinical Decision Making (PT Evaluation  Complexity) low complexity  -AV     Overall Complexity (PT Evaluation Complexity) low complexity  -AV       Row Name 11/18/24 1300          Therapy Plan Review/Discharge Plan (PT)    Therapy Plan Review (PT) evaluation/treatment results reviewed;patient  -AV       Row Name 11/18/24 1300          Physical Therapy Goals    Transfer Goal Selection (PT) transfer, PT goal 1  -AV     Gait Training Goal Selection (PT) gait training, PT goal 1  -AV       Row Name 11/18/24 1300          Transfer Goal 1 (PT)    Activity/Assistive Device (Transfer Goal 1, PT) sit-to-stand/stand-to-sit;bed-to-chair/chair-to-bed;walker, rolling  -AV     [place] Level/Cues Needed (Transfer Goal 1, PT) modified independence  -AV     Time Frame (Transfer Goal 1, PT) 10 days  -AV       Row Name 11/18/24 1300          Gait Training Goal 1 (PT)    Activity/Assistive Device (Gait Training Goal 1, PT) gait (walking locomotion);assistive device use;walker, rolling  -AV     [place] Level (Gait Training Goal 1, PT) modified independence  -AV     Distance (Gait Training Goal 1, PT) 200  -AV     Time Frame (Gait Training Goal 1, PT) 10 days  -AV               User Key  (r) = Recorded By, (t) = Taken By, (c) = Cosigned By      Initials Name Provider Type    Diallo Lopez, PT Physical Therapist                    Physical Therapy Education       Title: PT OT SLP Therapies (In Progress)       Topic: Physical Therapy (In Progress)       Point: Mobility training (Done)       Learning Progress Summary            Patient Acceptance, E,TB, VU by AV at 11/18/2024 1321                      Point: Home exercise program (Not Started)       Learner Progress:  Not documented in this visit.              Point: Body mechanics (Done)       Learning Progress Summary            Patient Acceptance, E,TB, VU by AV at 11/18/2024 1321                      Point: Precautions (Done)       Learning Progress Summary            Patient Acceptance, E,TB, VU by AV at  11/18/2024 1321                                      User Key       Initials Effective Dates Name Provider Type Discipline    AV 06/11/21 -  Diallo Suero, PT Physical Therapist PT                  PT Recommendation and Plan  Anticipated Discharge Disposition (PT): home with outpatient therapy services  Planned Therapy Interventions (PT): balance training, bed mobility training, gait training, home exercise program, neuromuscular re-education, strengthening, transfer training  Therapy Frequency (PT): daily  Plan of Care Reviewed With: patient, daughter  Progress: no change  Outcome Evaluation: Patient presents with deficits in balance, endurance, transfers, and ambulation. Patient will benefits from skilled PT services to address these mobility deficits and decrease risk of falls.   Outcome Measures       Row Name 11/18/24 1300             How much help from another person do you currently need...    Turning from your back to your side while in flat bed without using bedrails? 4  -AV      Moving from lying on back to sitting on the side of a flat bed without bedrails? 4  -AV      Moving to and from a bed to a chair (including a wheelchair)? 3  -AV      Standing up from a chair using your arms (e.g., wheelchair, bedside chair)? 3  -AV      Climbing 3-5 steps with a railing? 2  -AV      To walk in hospital room? 3  -AV      AM-PAC 6 Clicks Score (PT) 19  -AV         Functional Assessment    Outcome Measure Options AM-PAC 6 Clicks Basic Mobility (PT)  -AV                User Key  (r) = Recorded By, (t) = Taken By, (c) = Cosigned By      Initials Name Provider Type    AV Diallo Suero, PT Physical Therapist                     Time Calculation:    PT Charges       Row Name 11/18/24 1321             Time Calculation    PT Received On 11/18/24  -AV      PT Goal Re-Cert Due Date 11/27/24  -AV         Untimed Charges    PT Eval/Re-eval Minutes 35  -AV         Total Minutes    Untimed Charges Total Minutes 35  -AV        Total Minutes 35  -AV                User Key  (r) = Recorded By, (t) = Taken By, (c) = Cosigned By      Initials Name Provider Type    AV Diallo Suero, PT Physical Therapist                  Therapy Charges for Today       Code Description Service Date Service Provider Modifiers Qty    42380829955 HC PT EVAL LOW COMPLEXITY 3 11/18/2024 Diallo Suero, PT GP 1            PT G-Codes  Outcome Measure Options: AM-PAC 6 Clicks Basic Mobility (PT)  AM-PAC 6 Clicks Score (PT): 19  AM-PAC 6 Clicks Score (OT): 19    Diallo Suero PT  11/18/2024

## 2024-11-18 NOTE — PLAN OF CARE
Goal Outcome Evaluation:  Plan of Care Reviewed With: patient, daughter        Progress: no change  Outcome Evaluation: Patient presents with deficits in balance, endurance, transfers, and ambulation. Patient will benefits from skilled PT services to address these mobility deficits and decrease risk of falls.    Anticipated Discharge Disposition (PT): home with outpatient therapy services

## 2024-11-18 NOTE — THERAPY EVALUATION
Patient Name: Cris Agudelo  : 1940    MRN: [id number]                              Today's Date: 2024       Admit Date: 11/15/2024    Visit Dx:     ICD-10-CM ICD-9-CM   1. Pneumonia due to infectious organism, unspecified laterality, unspecified part of lung  J18.9 486   2. Sepsis, due to unspecified organism, unspecified whether acute organ dysfunction present  A41.9 038.9     995.91     Patient Active Problem List   Diagnosis    Hyponatremia    Stress-induced cardiomyopathy    Hyperlipidemia    Primary hypertension    Herpes zoster    Shingles    Non-occlusive coronary artery disease    Pneumonia     Past Medical History:   Diagnosis Date    COPD (chronic obstructive pulmonary disease)     History of non-ST elevation myocardial infarction (NSTEMI) 2023    Hyperlipidemia     Hypertension      Past Surgical History:   Procedure Laterality Date    CARDIAC CATHETERIZATION N/A 2023    Procedure: Left Heart Cath;  Surgeon: Mitch Correia MD;  [facility];  Service: Cardiology;  Laterality: N/A;    CARDIAC CATHETERIZATION N/A 2023    Procedure: Coronary angiography;  Surgeon: Mitch Correia MD;  [facility];  Service: Cardiology;  Laterality: N/A;      General Information       Row Name 24 1010          OT Time and Intention    Mode of Treatment individual therapy;occupational therapy  -PG     Session Not Performed patient/family declined evaluation  -PG       Row Name 24 1010          General Information    Patient Profile Reviewed yes  lives alone.  Indep with ADLs/light home mngt, no AD, daughter supportive, Owned and managed a bar for 40 years.  -PG     Prior Level of Function independent:;transfer;ADL's  -PG     Existing Precautions/Restrictions fall;oxygen therapy device and L/min  -PG     Barriers to Rehab none identified  -PG       Row Name 24 1010          Occupational Profile    Reason for Services/Referral  (Occupational Profile) Patient is a 83-year-old female admitted for bilateral pneumonia/sepsis with hypoxia.  Patient is being evaluated by Occupational Therapy due to recent decline in ADL function.  No previous OT services identified  -       Row Name 11/18/24 1010          Living Environment    People in Home alone  -       Row Name 11/18/24 1010          Cognition    Orientation Status (Cognition) oriented x 3  -       Row Name 11/18/24 1010          Safety Issues/Impairments Affecting Functional Mobility    Impairments Affecting Function (Mobility) balance;endurance/activity tolerance;strength;shortness of breath  -               User Key  (r) = Recorded By, (t) = Taken By, (c) = Cosigned By      Initials Name Provider Type     Efra Rudd, OT Occupational Therapist                     Mobility/ADL's       Row Name 11/18/24 1012          Transfers    Transfers bed-chair transfer  -       Row Name 11/18/24 1012          Bed-Chair Transfer    Bed-Chair [place] (Transfers) contact guard;verbal cues  -     Assistive Device (Bed-Chair Transfers) walker, front-wheeled  -       Row Name 11/18/24 1012          Activities of Daily Living    BADL Assessment/Intervention bathing;upper body dressing;lower body dressing;toileting;grooming  -       Row Name 11/18/24 1012          Bathing Assessment/Intervention    [place] Level (Bathing) bathing skills;minimum assist (75% patient effort)  -       Row Name 11/18/24 1012          Upper Body Dressing Assessment/Training    [place] Level (Upper Body Dressing) upper body dressing skills;set up  -       Row Name 11/18/24 1012          Lower Body Dressing Assessment/Training    [place] Level (Lower Body Dressing) lower body dressing skills;moderate assist (50% patient effort)  -       Row Name 11/18/24 1012          Toileting Assessment/Training    [place] Level (Toileting) toileting skills;minimum assist (75% patient effort)  -        Row Name 11/18/24 1012          Grooming Assessment/Training    [place] Level (Grooming) grooming skills;set up  -PG               User Key  (r) = Recorded By, (t) = Taken By, (c) = Cosigned By      Initials Name Provider Type    PG Efra Rudd OT Occupational Therapist                   Obj/Interventions       [facility] Name 11/18/24 1016          Sensory Assessment (Somatosensory)    Sensory Assessment (Somatosensory) sensation intact  -PG       [facility] Name 11/18/24 1016          Vision Assessment/Intervention    Visual Impairment/Limitations WFL  -PG       [facility] Name 11/18/24 1016          Range of Motion Comprehensive    General Range of Motion no range of motion deficits identified  -PG       [facility] Name 11/18/24 1016          Strength Comprehensive (MMT)    Comment, General Manual Muscle Testing (MMT) Assessment The patient will improve bilateral upper extremity strength to 4+/5 to support independence with self-care activities  -PG       [facility] Name 11/18/24 1016          Motor Skills    Motor Skills coordination;functional endurance  -PG     Coordination WFL  -PG     Functional Endurance Patient will improve activity tolerance to fair plus to support independence with self-care activities  -PG               User Key  (r) = Recorded By, (t) = Taken By, (c) = Cosigned By      Initials Name Provider Type    PG Efra Rudd OT Occupational Therapist                   Goals/Plan       [facility] Name 11/18/24 1018          Transfer Goal 1 (OT)    Activity/Assistive Device (Transfer Goal 1, OT) transfers, all  -PG     [place] Level/Cues Needed (Transfer Goal 1, OT) modified independence  -PG     Time Frame (Transfer Goal 1, OT) long term goal (LTG);10 days  -PG       [facility] Name 11/18/24 1018          Bathing Goal 1 (OT)    Activity/Device (Bathing Goal 1, OT) bathing skills, all  -PG     [place] Level/Cues Needed (Bathing Goal 1, OT) modified independence  -PG     Time Frame (Bathing Goal 1, OT) long term goal  (LTG);10 days  -PG       Row Name 11/18/24 1018          Dressing Goal 1 (OT)    Activity/Device (Dressing Goal 1, OT) dressing skills, all  -PG     [place]/Cues Needed (Dressing Goal 1, OT) modified independence  -PG     Time Frame (Dressing Goal 1, OT) long term goal (LTG);10 days  -PG       Row Name 11/18/24 1018          Toileting Goal 1 (OT)    Activity/Device (Toileting Goal 1, OT) toileting skills, all  -PG     [place] Level/Cues Needed (Toileting Goal 1, OT) modified independence  -PG     Time Frame (Toileting Goal 1, OT) long term goal (LTG);10 days  -PG       Row Name 11/18/24 1018          Grooming Goal 1 (OT)    Activity/Device (Grooming Goal 1, OT) grooming skills, all  -PG     [place] (Grooming Goal 1, OT) modified independence  -PG     Time Frame (Grooming Goal 1, OT) long term goal (LTG);10 days  -PG       Row Name 11/18/24 1018          Problem Specific Goal 1 (OT)    Problem Specific Goal 1 (OT) Patient will improve activity tolerance to good minus to support independence with self-care activities  -PG     Time Frame (Problem Specific Goal 1, OT) long term goal (LTG);10 days  -PG       Row Name 11/18/24 1018          Therapy Assessment/Plan (OT)    Planned Therapy Interventions (OT) activity tolerance training;BADL retraining;strengthening exercise;transfer/mobility retraining;patient/caregiver education/training;occupation/activity based interventions  -PG               User Key  (r) = Recorded By, (t) = Taken By, (c) = Cosigned By      Initials Name Provider Type    PG Efra Rudd, OT Occupational Therapist                   Clinical Impression       Row Name 11/18/24 1017          Pain Assessment    Pretreatment Pain Rating 0/10 - no pain  -PG     Posttreatment Pain Rating 0/10 - no pain  -PG       Row Name 11/18/24 1017          Plan of Care Review    Plan of Care Reviewed With patient  -PG     Progress no change  -PG     Outcome Evaluation Patient presents with limitations  affecting strength, activity tolerance, and balance impacting patient's ability to return home safely and independently.  The skills of a therapist will be required to safely and effectively implement the following treatment plan to restore maximal level of function  -PG       Row Name 11/18/24 1017          Therapy Assessment/Plan (OT)    Patient/Family Therapy Goal Statement (OT) Get stronger and return home independently  -PG     Rehab Potential (OT) good  -PG     Criteria for Skilled Therapeutic Interventions Met (OT) yes;meets criteria;skilled treatment is necessary  -PG     Therapy Frequency (OT) 5 times/wk  -PG       Row Name 11/18/24 1017          Therapy Plan Review/Discharge Plan (OT)    Anticipated Discharge Disposition (OT) skilled nursing facility  -PG               User Key  (r) = Recorded By, (t) = Taken By, (c) = Cosigned By      Initials Name Provider Type    PG Efra Rudd, OT Occupational Therapist                   Outcome Measures       Row Name 11/18/24 1020          How much help from another is currently needed...    Putting on and taking off regular lower body clothing? 2  -PG     Bathing (including washing, rinsing, and drying) 3  -PG     Toileting (which includes using toilet bed pan or urinal) 3  -PG     Putting on and taking off regular upper body clothing 3  -PG     Taking care of personal grooming (such as brushing teeth) 4  -PG     Eating meals 4  -PG     AM-PAC 6 Clicks Score (OT) 19  -PG       Row Name 11/17/24 2226          How much help from another person do you currently need...    Turning from your back to your side while in flat bed without using bedrails? 4  -MF     Moving from lying on back to sitting on the side of a flat bed without bedrails? 4  -MF     Moving to and from a bed to a chair (including a wheelchair)? 3  -MF     Standing up from a chair using your arms (e.g., wheelchair, bedside chair)? 3  -MF     Climbing 3-5 steps with a railing? 2  -MF     To walk in  hospital room? 2  -MF     AM-PAC 6 Clicks Score (PT) 18  -MF       Row Name 11/18/24 1020          Functional Assessment    Outcome Measure Options AM-PAC 6 Clicks Daily Activity (OT);Optimal Instrument  -PG       Row Name 11/18/24 1020          Optimal Instrument    Optimal Instrument Optimal - 3  -PG     Bending/Stooping 3  -PG     Standing 2  -PG     Reaching 1  -PG     From the list, choose the 3 activities you would most like to be able to do without any difficulty Bending/stooping;Standing;Reaching  -PG     Total Score Optimal - 3 6  -PG               User Key  (r) = Recorded By, (t) = Taken By, (c) = Cosigned By      Initials Name Provider Type    PG Efra Rudd OT Occupational Therapist    Renay Nava LPN Licensed Nurse                    Occupational Therapy Education       Title: PT OT SLP Therapies (Done)       Topic: Occupational Therapy (Done)       Point: ADL training (Done)       Description:   Instruct learner(s) on proper safety adaptation and remediation techniques during self care or transfers.   Instruct in proper use of assistive devices.                  Learning Progress Summary            Patient Acceptance, E,D, DU by PG at 11/18/2024 1020                      Point: Home exercise program (Done)       Description:   Instruct learner(s) on appropriate technique for monitoring, assisting and/or progressing therapeutic exercises/activities.                  Learning Progress Summary            Patient Acceptance, E,D, DU by PG at 11/18/2024 1020                      Point: Precautions (Done)       Description:   Instruct learner(s) on prescribed precautions during self-care and functional transfers.                  Learning Progress Summary            Patient Acceptance, E,D, DU by PG at 11/18/2024 1020                      Point: Body mechanics (Done)       Description:   Instruct learner(s) on proper positioning and spine alignment during self-care, functional mobility activities  and/or exercises.                  Learning Progress Summary            Patient Acceptance, E,D, DU by PG at 11/18/2024 1020                                      User Key       Initials Effective Dates Name Provider Type Discipline    PG 06/16/21 -  Efra Rudd OT Occupational Therapist OT                  OT Recommendation and Plan  Planned Therapy Interventions (OT): activity tolerance training, BADL retraining, strengthening exercise, transfer/mobility retraining, patient/caregiver education/training, occupation/activity based interventions  Therapy Frequency (OT): 5 times/wk  Plan of Care Review  Plan of Care Reviewed With: patient  Progress: no change  Outcome Evaluation: Patient presents with limitations affecting strength, activity tolerance, and balance impacting patient's ability to return home safely and independently.  The skills of a therapist will be required to safely and effectively implement the following treatment plan to restore maximal level of function     Time Calculation:   Evaluation Complexity (OT)  Review Occupational Profile/Medical/Therapy History Complexity: brief/low complexity  Assessment, Occupational Performance/Identification of Deficit Complexity: 3-5 performance deficits  Clinical Decision Making Complexity (OT): problem focused assessment/low complexity  Overall Complexity of Evaluation (OT): low complexity     Time Calculation- OT       Row Name 11/18/24 1021             Time Calculation- OT    OT Received On 11/18/24  -PG      OT Goal Re-Cert Due Date 11/27/24  -PG         Untimed Charges    OT Eval/Re-eval Minutes 35  -PG         Total Minutes    Untimed Charges Total Minutes 35  -PG       Total Minutes 35  -PG                User Key  (r) = Recorded By, (t) = Taken By, (c) = Cosigned By      Initials Name Provider Type    PG Efra Rudd OT Occupational Therapist                  Therapy Charges for Today       Code Description Service Date Service Provider Modifiers Qty     [id number]  OT EVAL LOW COMPLEXITY 3 11/18/2024 Efra Rudd, OT GO 1                 Efra Rudd OT  11/18/2024

## 2024-11-18 NOTE — PROGRESS NOTES
[facility]   Hospitalist Progress Note  Date: 2024  Patient Name: Cris Agudelo  : 1940  MRN: [id number]  Date of admission: 11/15/2024  Room/Bed: [facility]      Subjective   Subjective     Chief Complaint:   Shortness of breath    Summary:  Cris Agudelo is a 83 y.o. female with medical history of COPD, coronary artery disease, dyslipidemia, and hypertension who presents to the emergency department after not feeling well.     The patient was seen in pulmonary clinic day prior to presentation and started on antibiotic.  Appears she received 2 doses of Augmentin.  She did not get any steroids.  States she feels much worse in the morning. She received called about her chest x-ray showed pneumonia, therefore instructed to present to the emergency room.  No fevers.  No chills.  As result of all her symptoms she can the ER for further evaluation.  In the emergency department patient noted to be 81% on room air.  Leukocytosis 23.  Chest x-ray shows diffuse interstitial airspace opacities present throughout the lung bases most pronounced with upper lobes, likely multifocal pneumonia.  Patient of chest shows extensive bronchocentric alveolar airspace disease consistent with pneumonia.     Interval Followup:   Acute issues overnight.  This morning on rounds patient and daughter endorsing some improvement in her symptoms.  However with short ambulation desaturated.  Patient started on prednisone yesterday with no further wheezing this morning on rounds.  Noted patient with coughing while eating breakfast this morning, will have patient be seen by speech therapy to ensure no aspiration is a component of her pneumonia    Objective   Objective     Vitals:   Temp:  [97.2 °F (36.2 °C)-98.1 °F (36.7 °C)] 97.9 °F (36.6 °C)  Heart Rate:  [71-86] 72  Resp:  [18-20] 18  BP: (142-189)/(67-71) 155/69  Flow (L/min) (Oxygen Therapy):  [3] 3    Physical Exam               Constitutional: Breathing comfortably with nasal cannula,  resting comfortably in bedside chair eating breakfast.  Patient noted to cough intermittently while eating breakfast.              Eyes: Pupils equal, sclerae anicteric, no conjunctival injection              HENT: NCAT, mucous membranes moist              Neck: Supple, no thyromegaly, no lymphadenopathy, trachea midline              Respiratory: Diffuse rhonchi heard throughout, no crackles no wheezing              Cardiovascular: RRR, no murmurs, rubs, or gallops, palpable pedal pulses bilaterally              Gastrointestinal: Positive bowel sounds, soft, nontender, nondistended              Musculoskeletal: No bilateral ankle edema, no clubbing or cyanosis to extremities              Psychiatric: Appropriate affect, cooperative              Neurologic: Oriented x 3, strength symmetric in all extremities, Cranial Nerves grossly intact to confrontation, speech clear              Skin: No rashes     Result Review    Result Review:  I have personally reviewed these results:  [x]  Laboratory      Lab 11/18/24  0546 11/17/24  0602 11/16/24  0610 11/15/24  1348 11/14/24  1620   WBC 8.60 10.29 14.61* 23.52* 21.04*   HEMOGLOBIN 12.3 11.6* 12.5 14.0 13.8   HEMATOCRIT 39.1 37.6 39.5 43.4 42.3   PLATELETS 472* 394 420 503* 485*   NEUTROS ABS  --   --  11.45* 20.54* 17.25*   IMMATURE GRANS (ABS)  --   --  0.15* 0.22* 0.18*   LYMPHS ABS  --   --  1.31 1.03 2.02   MONOS ABS  --   --  0.93* 1.07* 1.26*   EOS ABS  --   --  0.71* 0.58* 0.27   MCV 88.7 88.7 89.0 87.7 86.5   PROCALCITONIN  --   --  0.14 0.17  --    LACTATE  --   --   --  1.5  --          Lab 11/18/24  0546 11/17/24  0602 11/16/24  0610   SODIUM 137 139 139   POTASSIUM 4.1 3.3* 3.6   CHLORIDE 103 104 107   CO2 24.1 27.0 23.5   ANION GAP 9.9 8.0 8.5   BUN 6* 6* 9   CREATININE 0.49* 0.52* 0.60   EGFR 93.7 92.3 89.2   GLUCOSE 143* 134* 96   CALCIUM 8.6 8.1* 7.9*   MAGNESIUM 2.0 1.9 1.8   PHOSPHORUS  --  2.9  --          Lab 11/17/24  0602 11/16/24  0610 11/15/24  3303    TOTAL PROTEIN 5.9* 5.8* 7.4   ALBUMIN 2.2* 2.2* 3.0*   GLOBULIN 3.7 3.6 4.4   ALT (SGPT) 21 20 27   AST (SGOT) 21 17 27   BILIRUBIN 0.4 0.5 0.7   ALK PHOS 92 94 120*         Lab 11/15/24  1348 11/14/24  1620   PROBNP 870.5 677.0   HSTROP T 15*  --                  Brief Urine Lab Results       None          [x]  Microbiology   Microbiology Results (last 10 days)       Procedure Component Value - Date/Time    S. Pneumo Ag Urine or CSF - Urine, Urine, Clean Catch [[id number]]  (Normal) Collected: 11/17/24 1127    Lab Status: Final result Specimen: Urine, Clean Catch Updated: 11/17/24 1411     Strep Pneumo Ag Negative    Legionella Antigen, Urine - Urine, Urine, Clean Catch [[id number]]  (Normal) Collected: 11/17/24 1127    Lab Status: Final result Specimen: Urine, Clean Catch Updated: 11/17/24 1411     LEGIONELLA ANTIGEN, URINE Negative    Respiratory Culture - Sputum, Cough [[id number]] Collected: 11/17/24 1126    Lab Status: Preliminary result Specimen: Sputum from Cough Updated: 11/18/24 0836     Respiratory Culture Growth present, too young to evaluate     Gram Stain Rare (1+) Epithelial cells per low power field      Moderate (3+) WBCs per low power field      Rare (1+) Budding yeast    Blood Culture - Blood, Arm, Right [[id number]]  (Normal) Collected: 11/15/24 1458    Lab Status: Preliminary result Specimen: Blood from Arm, Right Updated: 11/18/24 1515     Blood Culture No growth at 3 days    Blood Culture - Blood, Arm, Left [[id number]]  (Normal) Collected: 11/15/24 1458    Lab Status: Preliminary result Specimen: Blood from Arm, Left Updated: 11/18/24 1515     Blood Culture No growth at 3 days    COVID-19, FLU A/B, RSV PCR 1 HR TAT - Swab, Nasopharynx [[id number]]  (Normal) Collected: 11/15/24 1458    Lab Status: Final result Specimen: Swab from Nasopharynx Updated: 11/15/24 1546     COVID19 Not Detected     Influenza A PCR Not Detected     Influenza B PCR Not Detected     RSV, PCR Not Detected    Narrative:       Fact sheet for providers: https://www.fda.gov/media/592711/download    Fact sheet for patients: https://www.fda.gov/media/296709/download    Test performed by PCR.    Mycoplasma Pneumoniae Antibody, IgM - Blood, [[id number]]  (Normal) Collected: 11/15/24 1348    Lab Status: Final result Specimen: Blood Updated: 11/15/24 6458     Mycoplasma pneumo IgM Negative          [x]  Radiology  CT Chest Without Contrast Diagnostic    Result Date: 11/16/2024  Impression: CT scan of the chest without contrast demonstrating extensive bronchocentric alveolar airspace disease consistent with pneumonia. Electronically Signed: Srikanth Feng MD  11/16/2024 9:45 AM EST  Workstation ID: [id number]    XR Chest 1 View    Result Date: 11/15/2024  Impression: Increased opacity in the right hilum could be seen with pneumonia or malignancy. Upper lobe predominant airspace opacities with bronchiectasis and septal thickening. Worse appearance the chest when compared to 1 day prior. Electronically Signed: Monika Garcia MD  11/15/2024 2:12 PM EST  Workstation ID: [id number]    XR Chest 2 View    Result Date: 11/14/2024  Impression: Diffuse interstitial and airspace opacities present throughout the lungs bilaterally, most pronounced within the upper lobes, likely related to a multifocal pneumonia. Follow-up to resolution recommended. Electronically Signed: Anusha Marie MD  11/14/2024 8:17 PM EST  Workstation ID: [id number]   []  EKG/Telemetry   []  Cardiology/Vascular   []  Pathology  []  Old records  []  Other:    Assessment & Plan   Assessment / Plan     Assessment:  Multifocal pneumonia  Acute hypoxic respiratory failure  Stress-induced cardiomyopathy with recovery of EF  COPD  Obesity BMI 30    Plan:  Patient admitted to hospital for further care and management  Transition patient to oral Augmentin  Patient noted to cough during breakfast on the morning of 18th, consulting speech therapy  Additional infectious workup ordered, will follow-up and adjust  antibiotics as needed.  Respiratory sample with no organism  CT scan obtained showing diffuse pneumonia  Continue supplemental oxygen as needed to maintain O2 saturations greater than 90  Aggressive bronchopulmonary hygiene  Continues on nebulized breathing treatments  Continue course of steroids     Discussed with RN.    VTE Prophylaxis:  Pharmacologic VTE prophylaxis orders are present.        CODE STATUS:   Level Of Support Discussed With: Patient  Code Status (Patient has no pulse and is not breathing): CPR (Attempt to Resuscitate)  Medical Interventions (Patient has pulse or is breathing): Full Support      Electronically signed by Shabbir Pate MD, 11/18/2024, 18:15 EST.

## 2024-11-19 LAB
ANION GAP SERPL CALCULATED.3IONS-SCNC: 7.2 MMOL/L (ref 5–15)
BACTERIA SPEC RESP CULT: NORMAL
BUN SERPL-MCNC: 8 MG/DL (ref 8–23)
BUN/CREAT SERPL: 17.8 (ref 7–25)
CALCIUM SPEC-SCNC: 8.4 MG/DL (ref 8.6–10.5)
CHLORIDE SERPL-SCNC: 105 MMOL/L (ref 98–107)
CO2 SERPL-SCNC: 26.8 MMOL/L (ref 22–29)
CREAT SERPL-MCNC: 0.45 MG/DL (ref 0.57–1)
DEPRECATED RDW RBC AUTO: 42.2 FL (ref 37–54)
EGFRCR SERPLBLD CKD-EPI 2021: 95.6 ML/MIN/1.73
ERYTHROCYTE [DISTWIDTH] IN BLOOD BY AUTOMATED COUNT: 13.2 % (ref 12.3–15.4)
GLUCOSE SERPL-MCNC: 123 MG/DL (ref 65–99)
GRAM STN SPEC: NORMAL
HCT VFR BLD AUTO: 38.9 % (ref 34–46.6)
HGB BLD-MCNC: 12.5 G/DL (ref 12–15.9)
MAGNESIUM SERPL-MCNC: 2 MG/DL (ref 1.6–2.4)
MCH RBC QN AUTO: 28.2 PG (ref 26.6–33)
MCHC RBC AUTO-ENTMCNC: 32.1 G/DL (ref 31.5–35.7)
MCV RBC AUTO: 87.8 FL (ref 79–97)
PLATELET # BLD AUTO: 493 10*3/MM3 (ref 140–450)
PMV BLD AUTO: 10.3 FL (ref 6–12)
POTASSIUM SERPL-SCNC: 3.6 MMOL/L (ref 3.5–5.2)
RBC # BLD AUTO: 4.43 10*6/MM3 (ref 3.77–5.28)
SODIUM SERPL-SCNC: 139 MMOL/L (ref 136–145)
WBC NRBC COR # BLD AUTO: 11.41 10*3/MM3 (ref 3.4–10.8)

## 2024-11-19 PROCEDURE — 92610 EVALUATE SWALLOWING FUNCTION: CPT

## 2024-11-19 PROCEDURE — 83735 ASSAY OF MAGNESIUM: CPT | Performed by: INTERNAL MEDICINE

## 2024-11-19 PROCEDURE — 80048 BASIC METABOLIC PNL TOTAL CA: CPT | Performed by: INTERNAL MEDICINE

## 2024-11-19 PROCEDURE — 63710000001 PREDNISONE PER 1 MG: Performed by: INTERNAL MEDICINE

## 2024-11-19 PROCEDURE — 97110 THERAPEUTIC EXERCISES: CPT

## 2024-11-19 PROCEDURE — 25010000002 ENOXAPARIN PER 10 MG: Performed by: INTERNAL MEDICINE

## 2024-11-19 PROCEDURE — 94799 UNLISTED PULMONARY SVC/PX: CPT

## 2024-11-19 PROCEDURE — 94664 DEMO&/EVAL PT USE INHALER: CPT

## 2024-11-19 PROCEDURE — 99232 SBSQ HOSP IP/OBS MODERATE 35: CPT | Performed by: STUDENT IN AN ORGANIZED HEALTH CARE EDUCATION/TRAINING PROGRAM

## 2024-11-19 PROCEDURE — 85027 COMPLETE CBC AUTOMATED: CPT | Performed by: INTERNAL MEDICINE

## 2024-11-19 PROCEDURE — 94760 N-INVAS EAR/PLS OXIMETRY 1: CPT

## 2024-11-19 PROCEDURE — 92526 ORAL FUNCTION THERAPY: CPT

## 2024-11-19 RX ORDER — FLUTICASONE PROPIONATE 50 MCG
2 SPRAY, SUSPENSION (ML) NASAL DAILY
Status: DISCONTINUED | OUTPATIENT
Start: 2024-11-19 | End: 2024-11-19

## 2024-11-19 RX ORDER — LISINOPRIL 20 MG/1
20 TABLET ORAL DAILY
Status: DISCONTINUED | OUTPATIENT
Start: 2024-11-19 | End: 2024-11-20 | Stop reason: HOSPADM

## 2024-11-19 RX ORDER — ECHINACEA PURPUREA EXTRACT 125 MG
2 TABLET ORAL AS NEEDED
Status: DISCONTINUED | OUTPATIENT
Start: 2024-11-19 | End: 2024-11-20 | Stop reason: HOSPADM

## 2024-11-19 RX ADMIN — SALINE NASAL SPRAY 2 SPRAY: 1.5 SOLUTION NASAL at 15:38

## 2024-11-19 RX ADMIN — BUDESONIDE 0.5 MG: 0.5 INHALANT ORAL at 09:08

## 2024-11-19 RX ADMIN — ATORVASTATIN CALCIUM 40 MG: 40 TABLET, FILM COATED ORAL at 20:17

## 2024-11-19 RX ADMIN — ARFORMOTEROL TARTRATE 15 MCG: 15 SOLUTION RESPIRATORY (INHALATION) at 09:08

## 2024-11-19 RX ADMIN — ASPIRIN 81 MG: 81 TABLET, COATED ORAL at 08:36

## 2024-11-19 RX ADMIN — Medication 10 ML: at 08:44

## 2024-11-19 RX ADMIN — Medication 10 ML: at 20:18

## 2024-11-19 RX ADMIN — AMOXICILLIN AND CLAVULANATE POTASSIUM 1 TABLET: 875; 125 TABLET, FILM COATED ORAL at 20:17

## 2024-11-19 RX ADMIN — PREDNISONE 40 MG: 20 TABLET ORAL at 08:38

## 2024-11-19 RX ADMIN — LISINOPRIL 20 MG: 20 TABLET ORAL at 11:38

## 2024-11-19 RX ADMIN — ARFORMOTEROL TARTRATE 15 MCG: 15 SOLUTION RESPIRATORY (INHALATION) at 21:06

## 2024-11-19 RX ADMIN — ENOXAPARIN SODIUM 40 MG: 100 INJECTION SUBCUTANEOUS at 08:38

## 2024-11-19 RX ADMIN — BUDESONIDE 0.5 MG: 0.5 INHALANT ORAL at 21:06

## 2024-11-19 RX ADMIN — CARVEDILOL 6.25 MG: 6.25 TABLET, FILM COATED ORAL at 08:36

## 2024-11-19 RX ADMIN — CARVEDILOL 6.25 MG: 6.25 TABLET, FILM COATED ORAL at 20:17

## 2024-11-19 RX ADMIN — AMOXICILLIN AND CLAVULANATE POTASSIUM 1 TABLET: 875; 125 TABLET, FILM COATED ORAL at 08:37

## 2024-11-19 NOTE — PLAN OF CARE
Goal Outcome Evaluation:  Plan of Care Reviewed With: patient      ASSESSMENT/ PLAN OF CARE:  Pt presents with limitations, noted below, that impede her ability to swallow safely and maintain nutrition. The skills of a therapist will be required to safely and effectively implement the following treatment plan to restore maximal level of function.    PROBLEMS:  1.   Swallow delay, risk of aspiration                       LTG 1: 30 days: Patient will tolerate diet regular solids and thin liquid utilizing appropriate positioning and strategies independently.                       STG 1a: 14 days:Patient will tolerate diet regular solids and thin liquid utilizing appropriate positioning and strategies with minimal assistance.                       STG 1b: 14 days: Patient will tolerate 8 of 10 trials of thin liquids with min to no signs or symptoms of aspiration.                       STG 1c: 14 days:Patient/family education.                       TREATMENT: Dysphagia therapy to address swallow function through exercises and education of strategies.     FREQUENCY/DURATION: Once daily 5 times per week    REHAB POTENTIAL:  Pt has good rehab potential.  The following limitations may influence improvement/ length of tx: Medical status.    RECOMMENDATIONS:   1.   DIET: Regular solids cut small, thin liquid    2.  POSITION: Positioning fully upright for all p.o. intake and 30 minutes following.    3.  COMPENSATORY STRATEGIES: Alternate small bites and small sips of solids and liquids at a slow rate.  Single sips of liquid.          Anticipated Discharge Disposition (SLP): home with assist

## 2024-11-19 NOTE — PLAN OF CARE
Goal Outcome Evaluation:  Plan of Care Reviewed With: patient, family        Progress: no change  Outcome Evaluation: VSS. A&Ox4. No c/o pain this shift. Pt rested well with family at bedside throughout shift. No c/o pain or discomfort at this time.

## 2024-11-19 NOTE — THERAPY TREATMENT NOTE
Patient Name: Cris Agudelo  : 1940    MRN: [id number]                              Today's Date: 2024       Admit Date: 11/15/2024    Visit Dx:     ICD-10-CM ICD-9-CM   1. Pneumonia due to infectious organism, unspecified laterality, unspecified part of lung  J18.9 486   2. Sepsis, due to unspecified organism, unspecified whether acute organ dysfunction present  A41.9 038.9     995.91   3. Difficulty walking  R26.2 719.7   4. Oropharyngeal dysphagia  R13.12 787.22     Patient Active Problem List   Diagnosis    Hyponatremia    Stress-induced cardiomyopathy    Hyperlipidemia    Primary hypertension    Herpes zoster    Shingles    Non-occlusive coronary artery disease    Pneumonia     Past Medical History:   Diagnosis Date    COPD (chronic obstructive pulmonary disease)     History of non-ST elevation myocardial infarction (NSTEMI) 2023    Hyperlipidemia     Hypertension      Past Surgical History:   Procedure Laterality Date    CARDIAC CATHETERIZATION N/A 2023    Procedure: Left Heart Cath;  Surgeon: Mitch Correia MD;  Location: [facility] CATH INVASIVE LOCATION;  Service: Cardiology;  Laterality: N/A;    CARDIAC CATHETERIZATION N/A 2023    Procedure: Coronary angiography;  Surgeon: Mitch Correia MD;  Location: [facility] CATH INVASIVE LOCATION;  Service: Cardiology;  Laterality: N/A;      General Information       Row Name 24 1258          OT Time and Intention    Document Type therapy note (daily note)  -PG     Mode of Treatment individual therapy;occupational therapy  -PG               User Key  (r) = Recorded By, (t) = Taken By, (c) = Cosigned By      Initials Name Provider Type    PG Efra Rudd OT Occupational Therapist                     Mobility/ADL's    No documentation.                  Obj/Interventions       Row Name 24 1258          Shoulder (Therapeutic Exercise)    Shoulder (Therapeutic Exercise) strengthening exercise  -PG     Shoulder Strengthening (Therapeutic  Exercise) resistance band;15 repititions;yellow  -PG       Row Name 11/19/24 1258          Elbow/Forearm (Therapeutic Exercise)    Elbow/Forearm (Therapeutic Exercise) strengthening exercise  -PG     Elbow/Forearm Strengthening (Therapeutic Exercise) resistance band;yellow;15 repititions  -PG       Row Name 11/19/24 1258          Motor Skills    Therapeutic Exercise shoulder;elbow/forearm  -PG               User Key  (r) = Recorded By, (t) = Taken By, (c) = Cosigned By      Initials Name Provider Type    Efra Orozco OT Occupational Therapist                   Goals/Plan    No documentation.                  Clinical Impression       Row Name 11/19/24 1259          Plan of Care Review    Progress no change  -PG               User Key  (r) = Recorded By, (t) = Taken By, (c) = Cosigned By      Initials Name Provider Type    Efra Orozco OT Occupational Therapist                   Outcome Measures       Row Name 11/19/24 1301          How much help from another is currently needed...    Putting on and taking off regular lower body clothing? 2  -PG     Bathing (including washing, rinsing, and drying) 2  -PG     Toileting (which includes using toilet bed pan or urinal) 3  -PG     Putting on and taking off regular upper body clothing 3  -PG     Taking care of personal grooming (such as brushing teeth) 4  -PG     Eating meals 4  -PG     AM-PAC 6 Clicks Score (OT) 18  -PG       Row Name 11/19/24 1301          Functional Assessment    Outcome Measure Options AM-PAC 6 Clicks Daily Activity (OT);Optimal Instrument  -PG       Row Name 11/19/24 1301          Optimal Instrument    Optimal Instrument Optimal - 3  -PG     Bending/Stooping 3  -PG     Standing 2  -PG     Reaching 1  -PG               User Key  (r) = Recorded By, (t) = Taken By, (c) = Cosigned By      Initials Name Provider Type    Efra Orozco OT Occupational Therapist                    Occupational Therapy Education       Title: PT OT SLP Therapies  (In Progress)       Topic: Occupational Therapy (Done)       Point: ADL training (Done)       Description:   Instruct learner(s) on proper safety adaptation and remediation techniques during self care or transfers.   Instruct in proper use of assistive devices.                  Learning Progress Summary            Patient Acceptance, E,D, DU by PG at 11/18/2024 1020                      Point: Home exercise program (Done)       Description:   Instruct learner(s) on appropriate technique for monitoring, assisting and/or progressing therapeutic exercises/activities.                  Learning Progress Summary            Patient Acceptance, E,D, DU by PG at 11/18/2024 1020                      Point: Precautions (Done)       Description:   Instruct learner(s) on prescribed precautions during self-care and functional transfers.                  Learning Progress Summary            Patient Acceptance, E,D, DU by PG at 11/18/2024 1020                      Point: Body mechanics (Done)       Description:   Instruct learner(s) on proper positioning and spine alignment during self-care, functional mobility activities and/or exercises.                  Learning Progress Summary            Patient Acceptance, E,D, DU by PG at 11/18/2024 1020                                      User Key       Initials Effective Dates Name Provider Type Discipline    PG 06/16/21 -  Efra Rudd, RAZ Occupational Therapist OT                  OT Recommendation and Plan  Planned Therapy Interventions (OT): activity tolerance training, BADL retraining, strengthening exercise, transfer/mobility retraining, patient/caregiver education/training, occupation/activity based interventions  Therapy Frequency (OT): 5 times/wk  Plan of Care Review  Plan of Care Reviewed With: patient  Progress: no change  Outcome Evaluation: Patient presents with limitations affecting strength, activity tolerance, and balance impacting patient's ability to return home safely  and independently.  The skills of a therapist will be required to safely and effectively implement the following treatment plan to restore maximal level of function     Time Calculation:   Evaluation Complexity (OT)  Review Occupational Profile/Medical/Therapy History Complexity: brief/low complexity  Assessment, Occupational Performance/Identification of Deficit Complexity: 3-5 performance deficits  Clinical Decision Making Complexity (OT): problem focused assessment/low complexity  Overall Complexity of Evaluation (OT): low complexity     Time Calculation- OT       Row Name 11/19/24 1302             Time Calculation- OT    OT Received On 11/19/24  -PG      OT Goal Re-Cert Due Date 11/27/24  -PG         Timed Charges    10652 - OT Therapeutic Exercise Minutes 12  -PG         Total Minutes    Timed Charges Total Minutes 12  -PG       Total Minutes 12  -PG                User Key  (r) = Recorded By, (t) = Taken By, (c) = Cosigned By      Initials Name Provider Type    PG Efra Rudd OT Occupational Therapist                  Therapy Charges for Today       Code Description Service Date Service Provider Modifiers Qty    27995012491  OT EVAL LOW COMPLEXITY 3 11/18/2024 Efra Rudd OT GO 1    [id number]  OT THER PROC EA 15 MIN 11/19/2024 Efra Rudd OT GO 1                 Efra Rudd OT  11/19/2024

## 2024-11-19 NOTE — PROGRESS NOTES
[facility]   Hospitalist Progress Note  Date: 2024  Patient Name: Cris Agudelo  : 1940  MRN: [id number]  Date of admission: 11/15/2024  Room/Bed: 3021/1      Subjective   Subjective     Chief Complaint: Shortness of breath    Summary:Cris Agudelo is a 83 y.o. female with medical history of COPD, coronary artery disease, dyslipidemia, and hypertension who presents to the emergency department after not feeling well.     The patient was seen in pulmonary clinic day prior to presentation and started on antibiotic.  Appears she received 2 doses of Augmentin.  She did not get any steroids.  States she feels much worse in the morning. She received called about her chest x-ray showed pneumonia, therefore instructed to present to the emergency room.  No fevers.  No chills.  As result of all her symptoms she can the ER for further evaluation.  In the emergency department patient noted to be 81% on room air.  Leukocytosis 23.  Chest x-ray shows diffuse interstitial airspace opacities present throughout the lung bases most pronounced with upper lobes, likely multifocal pneumonia.  Patient of chest shows extensive bronchocentric alveolar airspace disease consistent with pneumonia.    Interval Followup: No acute overnight events.  No acute distress.  Patient was up ambulating from the bathroom I entered the room.  Her daughter is at bedside.  She reports increased diarrhea, has had 5 episodes since last night.  Otherwise, she feels that her respiratory status is doing well, no increased dyspnea with ambulation.    Review of Systems    All systems reviewed and negative except for what is outlined above.      Objective   Objective     Vitals:   Temp:  [98 °F (36.7 °C)-98.6 °F (37 °C)] 98.2 °F (36.8 °C)  Heart Rate:  [65-80] 80  Resp:  [16-20] 20  BP: (134-183)/(54-88) 134/54  Flow (L/min) (Oxygen Therapy):  [2-3] 2    Physical Exam   Gen: NAD, Alert and Oriented  Cards: RRR, no murmur   Pulm: CTA b/l, no  wheezing  Abd: soft, nondistended  Extremities: no pitting edema    Result Review    Result Review:  I have personally reviewed these results:  [x]  Laboratory      Lab 11/19/24  0514 11/18/24  0546 11/17/24  0602 11/16/24  0610 11/15/24  1348 11/14/24  1620   WBC 11.41* 8.60 10.29 14.61* 23.52* 21.04*   HEMOGLOBIN 12.5 12.3 11.6* 12.5 14.0 13.8   HEMATOCRIT 38.9 39.1 37.6 39.5 43.4 42.3   PLATELETS 493* 472* 394 420 503* 485*   NEUTROS ABS  --   --   --  11.45* 20.54* 17.25*   IMMATURE GRANS (ABS)  --   --   --  0.15* 0.22* 0.18*   LYMPHS ABS  --   --   --  1.31 1.03 2.02   MONOS ABS  --   --   --  0.93* 1.07* 1.26*   EOS ABS  --   --   --  0.71* 0.58* 0.27   MCV 87.8 88.7 88.7 89.0 87.7 86.5   PROCALCITONIN  --   --   --  0.14 0.17  --    LACTATE  --   --   --   --  1.5  --          Lab 11/19/24  0514 11/18/24  0546 11/17/24  0602   SODIUM 139 137 139   POTASSIUM 3.6 4.1 3.3*   CHLORIDE 105 103 104   CO2 26.8 24.1 27.0   ANION GAP 7.2 9.9 8.0   BUN 8 6* 6*   CREATININE 0.45* 0.49* 0.52*   EGFR 95.6 93.7 92.3   GLUCOSE 123* 143* 134*   CALCIUM 8.4* 8.6 8.1*   MAGNESIUM 2.0 2.0 1.9   PHOSPHORUS  --   --  2.9         Lab 11/17/24  0602 11/16/24  0610 11/15/24  1348   TOTAL PROTEIN 5.9* 5.8* 7.4   ALBUMIN 2.2* 2.2* 3.0*   GLOBULIN 3.7 3.6 4.4   ALT (SGPT) 21 20 27   AST (SGOT) 21 17 27   BILIRUBIN 0.4 0.5 0.7   ALK PHOS 92 94 120*         Lab 11/15/24  1348 11/14/24  1620   PROBNP 870.5 677.0   HSTROP T 15*  --                  Brief Urine Lab Results       None          [x]  Microbiology   Microbiology Results (last 10 days)       Procedure Component Value - Date/Time    S. Pneumo Ag Urine or CSF - Urine, Urine, Clean Catch [[id number]]  (Normal) Collected: 11/17/24 1127    Lab Status: Final result Specimen: Urine, Clean Catch Updated: 11/17/24 1411     Strep Pneumo Ag Negative    Legionella Antigen, Urine - Urine, Urine, Clean Catch [[id number]]  (Normal) Collected: 11/17/24 1127    Lab Status: Final result Specimen:  Urine, Clean Catch Updated: 11/17/24 1411     LEGIONELLA ANTIGEN, URINE Negative    Respiratory Culture - Sputum, Cough [[id number]] Collected: 11/17/24 1126    Lab Status: Final result Specimen: Sputum from Cough Updated: 11/19/24 0935     Respiratory Culture Light growth (2+) Normal Respiratory Ramila     Gram Stain Rare (1+) Epithelial cells per low power field      Moderate (3+) WBCs per low power field      Rare (1+) Budding yeast    Blood Culture - Blood, Arm, Right [[id number]]  (Normal) Collected: 11/15/24 1458    Lab Status: Preliminary result Specimen: Blood from Arm, Right Updated: 11/19/24 1515     Blood Culture No growth at 4 days    Blood Culture - Blood, Arm, Left [[id number]]  (Normal) Collected: 11/15/24 1458    Lab Status: Preliminary result Specimen: Blood from Arm, Left Updated: 11/19/24 1515     Blood Culture No growth at 4 days    COVID-19, FLU A/B, RSV PCR 1 HR TAT - Swab, Nasopharynx [[id number]]  (Normal) Collected: 11/15/24 1458    Lab Status: Final result Specimen: Swab from Nasopharynx Updated: 11/15/24 1546     COVID19 Not Detected     Influenza A PCR Not Detected     Influenza B PCR Not Detected     RSV, PCR Not Detected    Narrative:      Fact sheet for providers: https://www.fda.gov/media/419787/download    Fact sheet for patients: https://www.fda.gov/media/388113/download    Test performed by PCR.    Mycoplasma Pneumoniae Antibody, IgM - Blood, [[id number]]  (Normal) Collected: 11/15/24 1348    Lab Status: Final result Specimen: Blood Updated: 11/15/24 1739     Mycoplasma pneumo IgM Negative          [x]  Radiology  CT Chest Without Contrast Diagnostic    Result Date: 11/16/2024  Impression: CT scan of the chest without contrast demonstrating extensive bronchocentric alveolar airspace disease consistent with pneumonia. Electronically Signed: Srikanth Feng MD  11/16/2024 9:45 AM EST  Workstation ID: [id number]    XR Chest 1 View    Result Date: 11/15/2024  Impression: Increased opacity in  the right hilum could be seen with pneumonia or malignancy. Upper lobe predominant airspace opacities with bronchiectasis and septal thickening. Worse appearance the chest when compared to 1 day prior. Electronically Signed: Monika Garcia MD  11/15/2024 2:12 PM EST  Workstation ID: [id number]    XR Chest 2 View    Result Date: 11/14/2024  Impression: Diffuse interstitial and airspace opacities present throughout the lungs bilaterally, most pronounced within the upper lobes, likely related to a multifocal pneumonia. Follow-up to resolution recommended. Electronically Signed: Anusha Marie MD  11/14/2024 8:17 PM EST  Workstation ID: [id number]   []  EKG/Telemetry   []  Cardiology/Vascular   []  Pathology  []  Old records  []  Other:    Assessment & Plan   Assessment / Plan     Assessment:  Multifocal pneumonia  Acute hypoxic respiratory failure  Stress-induced cardiomyopathy with recovery of EF  COPD  Obesity BMI 30    Plan:  Patient admitted to hospital for further care and management  Continue oral Augmentin  Speech therapy following  Micro negative   CT scan obtained showing diffuse pneumonia  Continue supplemental oxygen as needed to maintain O2 saturations greater than 90.  Has 2 L home O2.  Aggressive bronchopulmonary hygiene  Continues on nebulized breathing treatments  Continue course of steroids  Check C. difficile given on antibiotics and increased diarrhea.  BP elevated.  Resume home lisinopril.  A.m. labs       Discussed with RN.    VTE Prophylaxis:  Pharmacologic VTE prophylaxis orders are present.        CODE STATUS:   Level Of Support Discussed With: Patient  Code Status (Patient has no pulse and is not breathing): CPR (Attempt to Resuscitate)  Medical Interventions (Patient has pulse or is breathing): Full Support      Electronically signed by Maddie Fisher DO, 11/19/2024, 18:01 EST.

## 2024-11-19 NOTE — THERAPY TREATMENT NOTE
Acute Care - Speech Language Pathology   Swallow Treatment Note  Jadyn     Patient Name: Cris Agudelo  : 1940  MRN: [id number]  Today's Date: 2024               Admit Date: 11/15/2024    Visit Dx:     ICD-10-CM ICD-9-CM   1. Pneumonia due to infectious organism, unspecified laterality, unspecified part of lung  J18.9 486   2. Sepsis, due to unspecified organism, unspecified whether acute organ dysfunction present  A41.9 038.9     995.91   3. Difficulty walking  R26.2 719.7   4. Oropharyngeal dysphagia  R13.12 787.22     Patient Active Problem List   Diagnosis    Hyponatremia    Stress-induced cardiomyopathy    Hyperlipidemia    Primary hypertension    Herpes zoster    Shingles    Non-occlusive coronary artery disease    Pneumonia     Past Medical History:   Diagnosis Date    COPD (chronic obstructive pulmonary disease)     History of non-ST elevation myocardial infarction (NSTEMI) 2023    Hyperlipidemia     Hypertension      Past Surgical History:   Procedure Laterality Date    CARDIAC CATHETERIZATION N/A 2023    Procedure: Left Heart Cath;  Surgeon: Mitch Correia MD;  Location: [facility] INVASIVE LOCATION;  Service: Cardiology;  Laterality: N/A;    CARDIAC CATHETERIZATION N/A 2023    Procedure: Coronary angiography;  Surgeon: Mitch Correia MD;  Location: [facility] INVASIVE LOCATION;  Service: Cardiology;  Laterality: N/A;       SPEECH PATHOLOGY DYSPHAGIA TREATMENT    Subjective/Behavioral Observations: Alert and cooperative, sitting up in bed feeding self      Day/time of Treatment: 2024      Current Diet: Regular, thin      Current Strategies:Alternate small bites and small sips of solids and liquids at a slow rate. Single sips of liquid.       Treatment received: Dysphagia therapy to address swallow function through exercises and education of strategies.      Results of treatment: P.o. intake with 25% of noon meal.  Patient feeding self, min mod cueing.  Patient frequently  taking double sips.  Educated for no straw.  No overt clinical signs or symptoms of aspiration noted with cough noted x 1 which did not appear associated to swallow.      Progress toward goals: Good      Barriers to Achieving goals: N/A      Plan of care:/changes in plan: Continue per current plan.  No straw.  Single sips of liquid.                                      Anticipated Discharge Disposition (SLP): home with assist (11/19/24 0941)                                                               EDUCATION  The patient has been educated in the following areas:   Modified Diet Instruction.                Time Calculation:    Time Calculation- SLP       Row Name 11/19/24 1302 11/19/24 0941          Time Calculation- SLP    SLP Start Time -- 0700  -TB     SLP Stop Time 1230  -TB 0800  -TB     SLP Time Calculation (min) -- 60 min  -TB     SLP Received On 11/19/24  -TB 11/19/24  -TB        Untimed Charges    SLP Eval/Re-eval  -- ST Eval Oral Pharyng Swallow - 39459  -TB     46370-HD Eval Oral Pharyng Swallow Minutes -- 60  -TB     12439-LP Treatment Swallow Minutes 40  -TB --        Total Minutes    Untimed Charges Total Minutes 40  -TB 60  -TB      Total Minutes 40  -TB 60  -TB               User Key  (r) = Recorded By, (t) = Taken By, (c) = Cosigned By      Initials Name Provider Type    TB Pita Douglas SLP Speech and Language Pathologist                    Therapy Charges for Today       Code Description Service Date Service Provider Modifiers Qty    94289786908 HC ST EVAL ORAL PHARYNG SWALLOW 4 11/19/2024 Pita Douglas SLP GN 1    [id number] HC ST TREATMENT SWALLOW 3 11/19/2024 Pita Douglas SLP GN 1                 DEVAN Jimenez  11/19/2024

## 2024-11-19 NOTE — THERAPY EVALUATION
Acute Care - Speech Language Pathology   Swallow Initial Evaluation  [facility]     Patient Name: Cris Agudelo  : 1940  MRN: [id number]  Today's Date: 2024               Admit Date: 11/15/2024    Visit Dx:     ICD-10-CM ICD-9-CM   1. Pneumonia due to infectious organism, unspecified laterality, unspecified part of lung  J18.9 486   2. Sepsis, due to unspecified organism, unspecified whether acute organ dysfunction present  A41.9 038.9     995.91   3. Difficulty walking  R26.2 719.7   4. Oropharyngeal dysphagia  R13.12 787.22     Patient Active Problem List   Diagnosis    Hyponatremia    Stress-induced cardiomyopathy    Hyperlipidemia    Primary hypertension    Herpes zoster    Shingles    Non-occlusive coronary artery disease    Pneumonia     Past Medical History:   Diagnosis Date    COPD (chronic obstructive pulmonary disease)     History of non-ST elevation myocardial infarction (NSTEMI) 2023    Hyperlipidemia     Hypertension      Past Surgical History:   Procedure Laterality Date    CARDIAC CATHETERIZATION N/A 2023    Procedure: Left Heart Cath;  Surgeon: Mitch Correia MD;  Location: [facility] INVASIVE LOCATION;  Service: Cardiology;  Laterality: N/A;    CARDIAC CATHETERIZATION N/A 2023    Procedure: Coronary angiography;  Surgeon: Mitch Correia MD;  Location: [facility] INVASIVE LOCATION;  Service: Cardiology;  Laterality: N/A;           Inpatient Speech Pathology Dysphagia Evaluation        PAIN SCALE: None indicated.    PRECAUTIONS/CONTRAINDICATIONS: Standard    SUSPECTED ABUSE/NEGLECT/EXPLOITATION: None indicated.    SOCIAL/PSYCHOLOGICAL NEEDS/BARRIERS: None indicated.    PAST SOCIAL HISTORY: 83-year-old female lives at home alone    PRIOR FUNCTION: On regular diet    PATIENT GOALS/EXPECTATIONS: Continue with regular diet    HISTORY: 83-year-old female the above diagnosis is referred for speech therapy evaluation assess for swallowing.  Concern for coughing noted while eating.   Previous speech pathology services are reported.    CURRENT DIET LEVEL: Regular, thin    OBJECTIVE:    TEST ADMINISTERED: Medical dysphagia evaluation    COGNITION/SAFETY AWARENESS: Patient follow directions with visual cueing.    BEHAVIORAL OBSERVATIONS: Alert and cooperative    ORAL MOTOR EXAM: Grossly within limits.  Patient had dentures present    VOICE QUALITY: Adequate    REFLEX EXAM: Patient demonstrated cough    POSTURE: Assisted sitting upright in bed    FEEDING/SWALLOWING FUNCTION: Assessed with nectar liquid, thin liquids, puréed solids, crunchy solid.    CLINICAL OBSERVATIONS: Nectar liquid  by cup appeared timely with vocal quality remaining clear to cervical auscultation.  Thin liquid by cup and by straw with minimal delay, vocal quality remaining clear to cervical auscultation.  Decreased coordination of respiration and swallow is noted.  Purée solid with swallow completed with laryngeal elevation noted to palpation.  Crunchy solid with adequate chewing followed by swallow completed clearing the oral cavity.    DYSPHAGIA CRITERIA: Risk of aspiration, swallow delay, decreased coordination of respiration and swallow    FUNCTIONAL ASSESSMENT INSTRUMENT: Patient currently scored a level 6 of 7 on Functional Communication Measures for swallowing indicating a 1-19% limitation in function.    ASSESSMENT/ PLAN OF CARE:  Pt presents with limitations, noted below, that impede her ability to swallow safely and maintain nutrition. The skills of a therapist will be required to safely and effectively implement the following treatment plan to restore maximal level of function.    PROBLEMS:  1.   Swallow delay, risk of aspiration                       LTG 1: 30 days: Patient will tolerate diet regular solids and thin liquid utilizing appropriate positioning and strategies independently.                       STG 1a: 14 days:Patient will tolerate diet regular solids and thin liquid utilizing appropriate positioning  and strategies with minimal assistance.                       STG 1b: 14 days: Patient will tolerate 8 of 10 trials of thin liquids with min to no signs or symptoms of aspiration.                       STG 1c: 14 days:Patient/family education.                       TREATMENT: Dysphagia therapy to address swallow function through exercises and education of strategies.     FREQUENCY/DURATION: Once daily 5 times per week    REHAB POTENTIAL:  Pt has good rehab potential.  The following limitations may influence improvement/ length of tx: Medical status.    RECOMMENDATIONS:   1.   DIET: Regular solids cut small, thin liquid    2.  POSITION: Positioning fully upright for all p.o. intake and 30 minutes following.    3.  COMPENSATORY STRATEGIES: Alternate small bites and small sips of solids and liquids at a slow rate.  Single sips of liquid.    Pt/responsible party agrees with plan of care and has been informed of all alternatives, risks and benefits.                            Anticipated Discharge Disposition (SLP): home with assist (11/19/24 0941)                                                               EDUCATION  The patient has been educated in the following areas:   Modified Diet Instruction.                Time Calculation:    Time Calculation- SLP       Row Name 11/19/24 0941             Time Calculation- SLP    SLP Start Time 0700  -TB      SLP Stop Time 0800  -TB      SLP Time Calculation (min) 60 min  -TB      SLP Received On 11/19/24  -TB         Untimed Charges    SLP Eval/Re-eval  ST Eval Oral Pharyng Swallow - 43020  -TB      80148-IQ Eval Oral Pharyng Swallow Minutes 60  -TB         Total Minutes    Untimed Charges Total Minutes 60  -TB       Total Minutes 60  -TB                User Key  (r) = Recorded By, (t) = Taken By, (c) = Cosigned By      Initials Name Provider Type    Pita Scott SLP Speech and Language Pathologist                    Therapy Charges for Today       Code Description Service  Date Service Provider Modifiers Qty    [id number]  ST EVAL ORAL PHARYNG SWALLOW 4 11/19/2024 Pita Douglas, SLP GN 1                 DEVAN Jimenez  11/19/2024

## 2024-11-20 ENCOUNTER — READMISSION MANAGEMENT (OUTPATIENT)
Dept: CALL CENTER | Facility: HOSPITAL | Age: 84
End: 2024-11-20
Payer: MEDICARE

## 2024-11-20 VITALS
TEMPERATURE: 98.1 F | RESPIRATION RATE: 18 BRPM | BODY MASS INDEX: 28.57 KG/M2 | HEART RATE: 75 BPM | WEIGHT: 145.5 LBS | HEIGHT: 60 IN | OXYGEN SATURATION: 91 % | DIASTOLIC BLOOD PRESSURE: 58 MMHG | SYSTOLIC BLOOD PRESSURE: 130 MMHG

## 2024-11-20 LAB
027 TOXIN: NORMAL
BACTERIA SPEC AEROBE CULT: NORMAL
BACTERIA SPEC AEROBE CULT: NORMAL
C DIFF TOX GENS STL QL NAA+PROBE: NEGATIVE

## 2024-11-20 PROCEDURE — 87493 C DIFF AMPLIFIED PROBE: CPT | Performed by: STUDENT IN AN ORGANIZED HEALTH CARE EDUCATION/TRAINING PROGRAM

## 2024-11-20 PROCEDURE — 99239 HOSP IP/OBS DSCHRG MGMT >30: CPT | Performed by: STUDENT IN AN ORGANIZED HEALTH CARE EDUCATION/TRAINING PROGRAM

## 2024-11-20 PROCEDURE — 25010000002 ENOXAPARIN PER 10 MG: Performed by: INTERNAL MEDICINE

## 2024-11-20 PROCEDURE — 94799 UNLISTED PULMONARY SVC/PX: CPT

## 2024-11-20 PROCEDURE — 63710000001 PREDNISONE PER 1 MG: Performed by: INTERNAL MEDICINE

## 2024-11-20 PROCEDURE — 94664 DEMO&/EVAL PT USE INHALER: CPT

## 2024-11-20 RX ADMIN — Medication 10 ML: at 09:15

## 2024-11-20 RX ADMIN — AMOXICILLIN AND CLAVULANATE POTASSIUM 1 TABLET: 875; 125 TABLET, FILM COATED ORAL at 09:15

## 2024-11-20 RX ADMIN — ENOXAPARIN SODIUM 40 MG: 100 INJECTION SUBCUTANEOUS at 09:15

## 2024-11-20 RX ADMIN — ASPIRIN 81 MG: 81 TABLET, COATED ORAL at 09:15

## 2024-11-20 RX ADMIN — PREDNISONE 40 MG: 20 TABLET ORAL at 09:15

## 2024-11-20 RX ADMIN — ARFORMOTEROL TARTRATE 15 MCG: 15 SOLUTION RESPIRATORY (INHALATION) at 09:33

## 2024-11-20 RX ADMIN — BUDESONIDE 0.5 MG: 0.5 INHALANT ORAL at 09:33

## 2024-11-20 NOTE — PLAN OF CARE
Goal Outcome Evaluation:  Plan of Care Reviewed With: patient                  Outcome Evaluation: Pt alert and oriented x4. BP slightly elevated otherwise  VSS.. Pt remains on 2L N/C. Unable to obtain stool sample due to pt  no BM during this shift. Daugther at bedside throughout. Continue Plan f care .

## 2024-11-20 NOTE — SIGNIFICANT NOTE
11/20/24 1128   Physical Therapy Time and Intention   Session Not Performed   (Pt has orders for discharge today.)

## 2024-11-20 NOTE — DISCHARGE SUMMARY
[facility]         HOSPITALIST  DISCHARGE SUMMARY    Patient Name: Cris Agudelo  : 1940  MRN: [id number]    Date of Admission: 11/15/2024  Date of Discharge:  2024    Primary Care Physician: Ramirez Maurice MD    Consults       Date and Time Order Name Status Description    11/15/2024  2:20 PM Inpatient Hospitalist Consult              Active and Resolved Hospital Problems:  Active Hospital Problems    Diagnosis POA   • **Pneumonia [J18.9] Yes      Resolved Hospital Problems   No resolved problems to display.       Hospital Course     Hospital Course:  Cris Agudelo is a 84 y.o. female  with medical history of COPD, coronary artery disease, dyslipidemia, and hypertension who presents to the emergency department after not feeling well. The patient was seen in pulmonary clinic day prior to presentation and started on antibiotic.  Appears she received 2 doses of Augmentin. States she feels much worse in the morning. She received called about her chest x-ray showed pneumonia, therefore instructed to present to the emergency room.      In the emergency department patient noted to be 81% on room air.  Leukocytosis 23.  Chest x-ray shows diffuse interstitial airspace opacities present throughout the lung bases most pronounced with upper lobes, likely multifocal pneumonia of unknown bacterial origin.  Patient of chest shows extensive bronchocentric alveolar airspace disease consistent with pneumonia.  Microbiology negative.  Initiated on Rocephin and azithromycin, subsequently de-escalated to Augmentin.  Supplemental O2 was weaned back down to her baseline 2 L, she was ambulating without any increased shortness of breath.    Patient discharged in stable condition.    DISCHARGE Follow Up Recommendations for labs and diagnostics: Follow-up with PCP in 1 week.  Follow-up with pulmonology in 2 to 4 weeks.      Day of Discharge     Vital Signs:  Temp:  [97.7 °F (36.5 °C)-98.2 °F (36.8 °C)] 98.1 °F  (36.7 °C)  Heart Rate:  [70-80] 75  Resp:  [16-20] 18  BP: (130-182)/(54-85) 130/58  Flow (L/min) (Oxygen Therapy):  [2-3] 3    Physical Exam:   Gen: NAD, Alert and Oriented  Cards: RRR, no murmur   Pulm: CTA b/l, no wheezing  Abd: soft, nondistended  Extremities: no pitting edema      Discharge Details        Discharge Medications        Changes to Medications        Instructions Start Date   amoxicillin-clavulanate 875-125 MG per tablet  Commonly known as: AUGMENTIN  What changed: when to take this   1 tablet, Oral, Every 12 Hours Scheduled             Continue These Medications        Instructions Start Date   arformoterol 15 MCG/2ML nebulizer solution  Commonly known as: BROVANA   15 mcg, Nebulization, 2 Times Daily - RT      aspirin 81 MG EC tablet   81 mg, Oral, Daily      atorvastatin 20 MG tablet  Commonly known as: LIPITOR   20 mg, Oral, Nightly      budesonide 0.5 MG/2ML nebulizer solution  Commonly known as: PULMICORT   0.5 mg, Nebulization, 2 Times Daily - RT      carvedilol 6.25 MG tablet  Commonly known as: COREG   6.25 mg, Oral, 2 Times Daily      Farxiga 10 MG tablet  Generic drug: dapagliflozin Propanediol   10 mg, Oral, Daily      furosemide 20 MG tablet  Commonly known as: LASIX   20 mg, Oral, Every Other Day      guaiFENesin 600 MG 12 hr tablet  Commonly known as: MUCINEX   600 mg, Oral, Every 12 Hours Scheduled      ipratropium-albuterol 0.5-2.5 mg/3 ml nebulizer  Commonly known as: DUO-NEB   3 mL, Nebulization, Every 6 Hours PRN      lisinopril 20 MG tablet  Commonly known as: PRINIVIL,ZESTRIL   20 mg, Oral, Daily      nitroglycerin 0.4 MG SL tablet  Commonly known as: NITROSTAT   Place 1 tablet under the tongue Every 5 (Five) Minutes As Needed for Chest Pain.               Allergies   Allergen Reactions   • Aleve [Naproxen] Hives       Discharge Disposition:  Home or Self Care    Diet:  Hospital:  Diet Order   Procedures   • Diet: Cardiac; Healthy Heart (2-3 Na+); Fluid Consistency: Thin  (IDDSI 0)       Discharge Activity:       CODE STATUS:  Code Status and Medical Interventions: CPR (Attempt to Resuscitate); Full Support   Ordered at: 11/15/24 1527     Level Of Support Discussed With:    Patient     Code Status (Patient has no pulse and is not breathing):    CPR (Attempt to Resuscitate)     Medical Interventions (Patient has pulse or is breathing):    Full Support         Future Appointments   Date Time Provider Department Center   12/3/2024  2:00 PM Nazia aSm APRN [facility]   12/11/2024 11:15 AM [facility] SATINDER CT 1 [facility]   1/7/2025 11:15 AM Nazia Sam APRKETAN [facility]   4/11/2025  1:30 PM Izabella Oro [facility]       Additional Instructions for the Follow-ups that You Need to Schedule       Discharge Follow-up with PCP   As directed       Currently Documented PCP:    Ramirez Maurice MD    PCP Phone Number:    [phone]     Follow Up Details: one week        Discharge Follow-up with Specified Provider: pulmonology; 2 Weeks   As directed      To: pulmonology   Follow Up: 2 Weeks                Pertinent  and/or Most Recent Results         LAB RESULTS:      Lab 11/19/24  0514 11/18/24  0546 11/17/24  0602 11/16/24  0610 11/15/24  1348 11/14/24  1620   WBC 11.41* 8.60 10.29 14.61* 23.52* 21.04*   HEMOGLOBIN 12.5 12.3 11.6* 12.5 14.0 13.8   HEMATOCRIT 38.9 39.1 37.6 39.5 43.4 42.3   PLATELETS 493* 472* 394 420 503* 485*   NEUTROS ABS  --   --   --  11.45* 20.54* 17.25*   IMMATURE GRANS (ABS)  --   --   --  0.15* 0.22* 0.18*   LYMPHS ABS  --   --   --  1.31 1.03 2.02   MONOS ABS  --   --   --  0.93* 1.07* 1.26*   EOS ABS  --   --   --  0.71* 0.58* 0.27   MCV 87.8 88.7 88.7 89.0 87.7 86.5   PROCALCITONIN  --   --   --  0.14 0.17  --    LACTATE  --   --   --   --  1.5  --          Lab 11/19/24  0514 11/18/24  0546 11/17/24  0602 11/16/24  0610 11/15/24  1348   SODIUM 139 137 139 139 134*   POTASSIUM 3.6 4.1 3.3* 3.6 3.2*   CHLORIDE 105 103 104 107 98    CO2 26.8 24.1 27.0 23.5 25.9   ANION GAP 7.2 9.9 8.0 8.5 10.1   BUN 8 6* 6* 9 11   CREATININE 0.45* 0.49* 0.52* 0.60 0.82   EGFR 95.6 93.7 92.3 89.2 71.1   GLUCOSE 123* 143* 134* 96 192*   CALCIUM 8.4* 8.6 8.1* 7.9* 8.9   MAGNESIUM 2.0 2.0 1.9 1.8  --    PHOSPHORUS  --   --  2.9  --   --          Lab 11/17/24  0602 11/16/24  0610 11/15/24  1348 11/14/24  1620   TOTAL PROTEIN 5.9* 5.8* 7.4 7.1   ALBUMIN 2.2* 2.2* 3.0* 2.7*   GLOBULIN 3.7 3.6 4.4 4.4   ALT (SGPT) 21 20 27 28   AST (SGOT) 21 17 27 32   BILIRUBIN 0.4 0.5 0.7 0.4   ALK PHOS 92 94 120* 111         Lab 11/15/24  1348 11/14/24  1620   PROBNP 870.5 677.0   HSTROP T 15*  --                  Brief Urine Lab Results       None          Microbiology Results (last 10 days)       Procedure Component Value - Date/Time    Clostridioides difficile Toxin, PCR - Stool, Per Rectum [[id number]] Collected: 11/20/24 0839    Lab Status: Final result Specimen: Stool from Per Rectum Updated: 11/20/24 0941     Toxigenic C. difficile by PCR Negative     027 Toxin Presumptive Negative    Narrative:      The result indicates the absence of toxigenic C. difficile from stool specimen.     S. Pneumo Ag Urine or CSF - Urine, Urine, Clean Catch [[id number]]  (Normal) Collected: 11/17/24 1127    Lab Status: Final result Specimen: Urine, Clean Catch Updated: 11/17/24 1411     Strep Pneumo Ag Negative    Legionella Antigen, Urine - Urine, Urine, Clean Catch [[id number]]  (Normal) Collected: 11/17/24 1127    Lab Status: Final result Specimen: Urine, Clean Catch Updated: 11/17/24 1411     LEGIONELLA ANTIGEN, URINE Negative    Respiratory Culture - Sputum, Cough [[id number]] Collected: 11/17/24 1126    Lab Status: Final result Specimen: Sputum from Cough Updated: 11/19/24 0935     Respiratory Culture Light growth (2+) Normal Respiratory Ramila     Gram Stain Rare (1+) Epithelial cells per low power field      Moderate (3+) WBCs per low power field      Rare (1+) Budding yeast    Blood Culture  - Blood, Arm, Right [[id number]]  (Normal) Collected: 11/15/24 1458    Lab Status: Preliminary result Specimen: Blood from Arm, Right Updated: 11/19/24 1515     Blood Culture No growth at 4 days    Blood Culture - Blood, Arm, Left [[id number]]  (Normal) Collected: 11/15/24 1458    Lab Status: Preliminary result Specimen: Blood from Arm, Left Updated: 11/19/24 1515     Blood Culture No growth at 4 days    COVID-19, FLU A/B, RSV PCR 1 HR TAT - Swab, Nasopharynx [[id number]]  (Normal) Collected: 11/15/24 1458    Lab Status: Final result Specimen: Swab from Nasopharynx Updated: 11/15/24 1546     COVID19 Not Detected     Influenza A PCR Not Detected     Influenza B PCR Not Detected     RSV, PCR Not Detected    Narrative:      Fact sheet for providers: https://www.fda.gov/media/118596/download    Fact sheet for patients: https://www.fda.gov/media/903263/download    Test performed by PCR.    Mycoplasma Pneumoniae Antibody, IgM - Blood, [[id number]]  (Normal) Collected: 11/15/24 1348    Lab Status: Final result Specimen: Blood Updated: 11/15/24 1739     Mycoplasma pneumo IgM Negative            CT Chest Without Contrast Diagnostic    Result Date: 11/16/2024  Impression: CT scan of the chest without contrast demonstrating extensive bronchocentric alveolar airspace disease consistent with pneumonia. Electronically Signed: Srikanth Feng MD  11/16/2024 9:45 AM EST  Workstation ID: [id number]    XR Chest 1 View    Result Date: 11/15/2024  Impression: Increased opacity in the right hilum could be seen with pneumonia or malignancy. Upper lobe predominant airspace opacities with bronchiectasis and septal thickening. Worse appearance the chest when compared to 1 day prior. Electronically Signed: Monika Garcia MD  11/15/2024 2:12 PM EST  Workstation ID: [id number]    XR Chest 2 View    Result Date: 11/14/2024  Impression: Diffuse interstitial and airspace opacities present throughout the lungs bilaterally, most pronounced within the upper  lobes, likely related to a multifocal pneumonia. Follow-up to resolution recommended. Electronically Signed: Anusha Marie MD  11/14/2024 8:17 PM EST  Workstation ID: [id number]                    Time spent on Discharge including face to face service:  >30 minutes    Electronically signed by Maddie Fisher DO, 11/20/24, 12:52 PM EST.

## 2024-11-20 NOTE — PLAN OF CARE
Goal Outcome Evaluation:  Plan of Care Reviewed With: patient, family        Progress: improving  Outcome Evaluation: Pt discharged home with daughter.  Discharge paperwork complete and reviewed with pt and stephanie.

## 2024-11-20 NOTE — CONSULTS
"SW provided Advance Care Planning educations. A \"Conversation that matters\" pamphlet was provided to pt and pt daughter.   "

## 2024-11-20 NOTE — PLAN OF CARE
Goal Outcome Evaluation:  Plan of Care Reviewed With: patient           Outcome Evaluation: Pt is alert and oriented.  VSS.  No c/o pain this shift.  Daughter is at bedside.  Pt complained of having diarrhea episodes throughout the night and before she arrived at the hospital.  Stool sample is requested for CDiff rule out.  Pt has no episodes this shift.  Continue POC.

## 2024-11-21 NOTE — OUTREACH NOTE
Prep Survey      Flowsheet Row Responses   [facility] patient discharged from? [facility]   Is LACE score < 7 ? No   Eligibility Readm Mgmt   Discharge diagnosis Pneumonia   Does the patient have one of the following disease processes/diagnoses(primary or secondary)? Pneumonia   Does the patient have Home health ordered? No   Is there a DME ordered? No   Prep survey completed? Yes            Sheila ANDERSEN - Registered Nurse

## 2024-11-25 ENCOUNTER — APPOINTMENT (OUTPATIENT)
Dept: GENERAL RADIOLOGY | Facility: HOSPITAL | Age: 84
DRG: 193 | End: 2024-11-25
Payer: MEDICARE

## 2024-11-25 ENCOUNTER — TELEPHONE (OUTPATIENT)
Dept: PULMONOLOGY | Facility: CLINIC | Age: 84
End: 2024-11-25
Payer: MEDICARE

## 2024-11-25 ENCOUNTER — HOSPITAL ENCOUNTER (INPATIENT)
Facility: HOSPITAL | Age: 84
LOS: 9 days | DRG: 193 | End: 2024-12-04
Attending: EMERGENCY MEDICINE | Admitting: FAMILY MEDICINE
Payer: MEDICARE

## 2024-11-25 ENCOUNTER — READMISSION MANAGEMENT (OUTPATIENT)
Dept: CALL CENTER | Facility: HOSPITAL | Age: 84
End: 2024-11-25
Payer: MEDICARE

## 2024-11-25 DIAGNOSIS — R26.2 DIFFICULTY WALKING: ICD-10-CM

## 2024-11-25 DIAGNOSIS — Z78.9 DECREASED ACTIVITIES OF DAILY LIVING (ADL): ICD-10-CM

## 2024-11-25 DIAGNOSIS — J18.9 MULTIFOCAL PNEUMONIA: Primary | ICD-10-CM

## 2024-11-25 PROBLEM — J96.21 ACUTE ON CHRONIC RESPIRATORY FAILURE WITH HYPOXIA: Status: ACTIVE | Noted: 2024-11-25

## 2024-11-25 LAB
ALBUMIN SERPL-MCNC: 3 G/DL (ref 3.5–5.2)
ALBUMIN/GLOB SERPL: 0.7 G/DL
ALP SERPL-CCNC: 129 U/L (ref 39–117)
ALT SERPL W P-5'-P-CCNC: 20 U/L (ref 1–33)
ANION GAP SERPL CALCULATED.3IONS-SCNC: 10.7 MMOL/L (ref 5–15)
AST SERPL-CCNC: 18 U/L (ref 1–32)
BASOPHILS # BLD AUTO: 0.13 10*3/MM3 (ref 0–0.2)
BASOPHILS NFR BLD AUTO: 0.5 % (ref 0–1.5)
BILIRUB SERPL-MCNC: 0.5 MG/DL (ref 0–1.2)
BUN SERPL-MCNC: 13 MG/DL (ref 8–23)
BUN/CREAT SERPL: 18.6 (ref 7–25)
CALCIUM SPEC-SCNC: 8.9 MG/DL (ref 8.6–10.5)
CHLORIDE SERPL-SCNC: 95 MMOL/L (ref 98–107)
CO2 SERPL-SCNC: 31.3 MMOL/L (ref 22–29)
CREAT SERPL-MCNC: 0.7 MG/DL (ref 0.57–1)
D-LACTATE SERPL-SCNC: 1.7 MMOL/L (ref 0.5–2)
DEPRECATED RDW RBC AUTO: 43.3 FL (ref 37–54)
EGFRCR SERPLBLD CKD-EPI 2021: 85.4 ML/MIN/1.73
EOSINOPHIL # BLD AUTO: 0.25 10*3/MM3 (ref 0–0.4)
EOSINOPHIL NFR BLD AUTO: 0.9 % (ref 0.3–6.2)
ERYTHROCYTE [DISTWIDTH] IN BLOOD BY AUTOMATED COUNT: 13.3 % (ref 12.3–15.4)
FLUAV SUBTYP SPEC NAA+PROBE: NOT DETECTED
FLUBV RNA ISLT QL NAA+PROBE: NOT DETECTED
GLOBULIN UR ELPH-MCNC: 4.5 GM/DL
GLUCOSE SERPL-MCNC: 182 MG/DL (ref 65–99)
HCT VFR BLD AUTO: 45.3 % (ref 34–46.6)
HGB BLD-MCNC: 14.2 G/DL (ref 12–15.9)
HOLD SPECIMEN: NORMAL
HOLD SPECIMEN: NORMAL
IMM GRANULOCYTES # BLD AUTO: 0.32 10*3/MM3 (ref 0–0.05)
IMM GRANULOCYTES NFR BLD AUTO: 1.2 % (ref 0–0.5)
LYMPHOCYTES # BLD AUTO: 1.7 10*3/MM3 (ref 0.7–3.1)
LYMPHOCYTES NFR BLD AUTO: 6.4 % (ref 19.6–45.3)
MCH RBC QN AUTO: 27.6 PG (ref 26.6–33)
MCHC RBC AUTO-ENTMCNC: 31.3 G/DL (ref 31.5–35.7)
MCV RBC AUTO: 88 FL (ref 79–97)
MONOCYTES # BLD AUTO: 1.62 10*3/MM3 (ref 0.1–0.9)
MONOCYTES NFR BLD AUTO: 6.1 % (ref 5–12)
MRSA DNA SPEC QL NAA+PROBE: NORMAL
NEUTROPHILS NFR BLD AUTO: 22.49 10*3/MM3 (ref 1.7–7)
NEUTROPHILS NFR BLD AUTO: 84.9 % (ref 42.7–76)
NRBC BLD AUTO-RTO: 0 /100 WBC (ref 0–0.2)
NT-PROBNP SERPL-MCNC: 1987 PG/ML (ref 0–1800)
PLATELET # BLD AUTO: 612 10*3/MM3 (ref 140–450)
PMV BLD AUTO: 10.2 FL (ref 6–12)
POTASSIUM SERPL-SCNC: 3.7 MMOL/L (ref 3.5–5.2)
PROT SERPL-MCNC: 7.5 G/DL (ref 6–8.5)
RBC # BLD AUTO: 5.15 10*6/MM3 (ref 3.77–5.28)
RSV RNA NPH QL NAA+NON-PROBE: NOT DETECTED
SARS-COV-2 RNA RESP QL NAA+PROBE: NOT DETECTED
SODIUM SERPL-SCNC: 137 MMOL/L (ref 136–145)
TROPONIN T SERPL HS-MCNC: 15 NG/L
WBC NRBC COR # BLD AUTO: 26.51 10*3/MM3 (ref 3.4–10.8)
WHOLE BLOOD HOLD COAG: NORMAL
WHOLE BLOOD HOLD SPECIMEN: NORMAL

## 2024-11-25 PROCEDURE — 99223 1ST HOSP IP/OBS HIGH 75: CPT | Performed by: FAMILY MEDICINE

## 2024-11-25 PROCEDURE — 84484 ASSAY OF TROPONIN QUANT: CPT | Performed by: EMERGENCY MEDICINE

## 2024-11-25 PROCEDURE — 71045 X-RAY EXAM CHEST 1 VIEW: CPT

## 2024-11-25 PROCEDURE — 87040 BLOOD CULTURE FOR BACTERIA: CPT | Performed by: EMERGENCY MEDICINE

## 2024-11-25 PROCEDURE — 87641 MR-STAPH DNA AMP PROBE: CPT | Performed by: FAMILY MEDICINE

## 2024-11-25 PROCEDURE — 25010000002 VANCOMYCIN 5 G RECONSTITUTED SOLUTION: Performed by: EMERGENCY MEDICINE

## 2024-11-25 PROCEDURE — 82103 ALPHA-1-ANTITRYPSIN TOTAL: CPT

## 2024-11-25 PROCEDURE — 99285 EMERGENCY DEPT VISIT HI MDM: CPT

## 2024-11-25 PROCEDURE — 25810000003 SODIUM CHLORIDE 0.9 % SOLUTION: Performed by: EMERGENCY MEDICINE

## 2024-11-25 PROCEDURE — 86738 MYCOPLASMA ANTIBODY: CPT | Performed by: FAMILY MEDICINE

## 2024-11-25 PROCEDURE — 25010000002 CEFEPIME PER 500 MG: Performed by: EMERGENCY MEDICINE

## 2024-11-25 PROCEDURE — 80053 COMPREHEN METABOLIC PANEL: CPT | Performed by: EMERGENCY MEDICINE

## 2024-11-25 PROCEDURE — 25810000003 SODIUM CHLORIDE 0.9 % SOLUTION: Performed by: FAMILY MEDICINE

## 2024-11-25 PROCEDURE — 94799 UNLISTED PULMONARY SVC/PX: CPT

## 2024-11-25 PROCEDURE — 87637 SARSCOV2&INF A&B&RSV AMP PRB: CPT | Performed by: EMERGENCY MEDICINE

## 2024-11-25 PROCEDURE — 85025 COMPLETE CBC W/AUTO DIFF WBC: CPT | Performed by: EMERGENCY MEDICINE

## 2024-11-25 PROCEDURE — 36415 COLL VENOUS BLD VENIPUNCTURE: CPT

## 2024-11-25 PROCEDURE — 94640 AIRWAY INHALATION TREATMENT: CPT

## 2024-11-25 PROCEDURE — 83605 ASSAY OF LACTIC ACID: CPT | Performed by: EMERGENCY MEDICINE

## 2024-11-25 PROCEDURE — 94660 CPAP INITIATION&MGMT: CPT

## 2024-11-25 PROCEDURE — 83880 ASSAY OF NATRIURETIC PEPTIDE: CPT | Performed by: EMERGENCY MEDICINE

## 2024-11-25 PROCEDURE — 25010000002 METHYLPREDNISOLONE PER 125 MG: Performed by: EMERGENCY MEDICINE

## 2024-11-25 RX ORDER — AMOXICILLIN 250 MG
2 CAPSULE ORAL 2 TIMES DAILY PRN
Status: DISCONTINUED | OUTPATIENT
Start: 2024-11-25 | End: 2024-12-04 | Stop reason: HOSPADM

## 2024-11-25 RX ORDER — GUAIFENESIN 600 MG/1
600 TABLET, EXTENDED RELEASE ORAL EVERY 12 HOURS SCHEDULED
Status: DISCONTINUED | OUTPATIENT
Start: 2024-11-25 | End: 2024-12-04 | Stop reason: HOSPADM

## 2024-11-25 RX ORDER — SODIUM CHLORIDE 9 MG/ML
75 INJECTION, SOLUTION INTRAVENOUS CONTINUOUS
Status: DISCONTINUED | OUTPATIENT
Start: 2024-11-25 | End: 2024-11-26

## 2024-11-25 RX ORDER — CARVEDILOL 6.25 MG/1
6.25 TABLET ORAL 2 TIMES DAILY
Status: DISCONTINUED | OUTPATIENT
Start: 2024-11-25 | End: 2024-12-04 | Stop reason: HOSPADM

## 2024-11-25 RX ORDER — SODIUM CHLORIDE 9 MG/ML
40 INJECTION, SOLUTION INTRAVENOUS AS NEEDED
Status: DISCONTINUED | OUTPATIENT
Start: 2024-11-25 | End: 2024-12-04 | Stop reason: HOSPADM

## 2024-11-25 RX ORDER — IPRATROPIUM BROMIDE AND ALBUTEROL SULFATE 2.5; .5 MG/3ML; MG/3ML
3 SOLUTION RESPIRATORY (INHALATION) EVERY 4 HOURS PRN
Status: DISCONTINUED | OUTPATIENT
Start: 2024-11-25 | End: 2024-12-04 | Stop reason: HOSPADM

## 2024-11-25 RX ORDER — IPRATROPIUM BROMIDE AND ALBUTEROL SULFATE 2.5; .5 MG/3ML; MG/3ML
3 SOLUTION RESPIRATORY (INHALATION) 3 TIMES DAILY
Status: DISCONTINUED | OUTPATIENT
Start: 2024-11-25 | End: 2024-12-04 | Stop reason: HOSPADM

## 2024-11-25 RX ORDER — ASPIRIN 81 MG/1
81 TABLET ORAL DAILY
Status: DISCONTINUED | OUTPATIENT
Start: 2024-11-26 | End: 2024-12-04 | Stop reason: HOSPADM

## 2024-11-25 RX ORDER — SODIUM CHLORIDE 0.9 % (FLUSH) 0.9 %
10 SYRINGE (ML) INJECTION AS NEEDED
Status: DISCONTINUED | OUTPATIENT
Start: 2024-11-25 | End: 2024-12-04 | Stop reason: HOSPADM

## 2024-11-25 RX ORDER — BISACODYL 5 MG/1
5 TABLET, DELAYED RELEASE ORAL DAILY PRN
Status: DISCONTINUED | OUTPATIENT
Start: 2024-11-25 | End: 2024-12-04 | Stop reason: HOSPADM

## 2024-11-25 RX ORDER — LISINOPRIL 20 MG/1
20 TABLET ORAL DAILY
Status: DISCONTINUED | OUTPATIENT
Start: 2024-11-26 | End: 2024-12-04 | Stop reason: HOSPADM

## 2024-11-25 RX ORDER — SACCHAROMYCES BOULARDII 250 MG
250 CAPSULE ORAL 2 TIMES DAILY
Status: DISCONTINUED | OUTPATIENT
Start: 2024-11-25 | End: 2024-12-04 | Stop reason: HOSPADM

## 2024-11-25 RX ORDER — SODIUM CHLORIDE 0.9 % (FLUSH) 0.9 %
10 SYRINGE (ML) INJECTION EVERY 12 HOURS SCHEDULED
Status: DISCONTINUED | OUTPATIENT
Start: 2024-11-25 | End: 2024-12-04 | Stop reason: HOSPADM

## 2024-11-25 RX ORDER — ONDANSETRON 2 MG/ML
4 INJECTION INTRAMUSCULAR; INTRAVENOUS EVERY 6 HOURS PRN
Status: DISCONTINUED | OUTPATIENT
Start: 2024-11-25 | End: 2024-12-04 | Stop reason: HOSPADM

## 2024-11-25 RX ORDER — METHYLPREDNISOLONE SODIUM SUCCINATE 40 MG/ML
40 INJECTION, POWDER, LYOPHILIZED, FOR SOLUTION INTRAMUSCULAR; INTRAVENOUS EVERY 24 HOURS
Status: DISCONTINUED | OUTPATIENT
Start: 2024-11-26 | End: 2024-11-26

## 2024-11-25 RX ORDER — METHYLPREDNISOLONE SODIUM SUCCINATE 125 MG/2ML
125 INJECTION, POWDER, LYOPHILIZED, FOR SOLUTION INTRAMUSCULAR; INTRAVENOUS ONCE
Status: COMPLETED | OUTPATIENT
Start: 2024-11-25 | End: 2024-11-25

## 2024-11-25 RX ORDER — ENOXAPARIN SODIUM 100 MG/ML
40 INJECTION SUBCUTANEOUS DAILY
Status: DISCONTINUED | OUTPATIENT
Start: 2024-11-26 | End: 2024-12-04 | Stop reason: HOSPADM

## 2024-11-25 RX ORDER — POLYETHYLENE GLYCOL 3350 17 G/17G
17 POWDER, FOR SOLUTION ORAL DAILY PRN
Status: DISCONTINUED | OUTPATIENT
Start: 2024-11-25 | End: 2024-12-04 | Stop reason: HOSPADM

## 2024-11-25 RX ORDER — GUAIFENESIN/DEXTROMETHORPHAN 100-10MG/5
10 SYRUP ORAL EVERY 6 HOURS PRN
Status: DISCONTINUED | OUTPATIENT
Start: 2024-11-25 | End: 2024-12-04 | Stop reason: HOSPADM

## 2024-11-25 RX ORDER — IPRATROPIUM BROMIDE AND ALBUTEROL SULFATE 2.5; .5 MG/3ML; MG/3ML
3 SOLUTION RESPIRATORY (INHALATION) ONCE
Status: COMPLETED | OUTPATIENT
Start: 2024-11-25 | End: 2024-11-25

## 2024-11-25 RX ORDER — BUDESONIDE 0.5 MG/2ML
0.5 INHALANT ORAL
Status: DISCONTINUED | OUTPATIENT
Start: 2024-11-25 | End: 2024-12-04 | Stop reason: HOSPADM

## 2024-11-25 RX ORDER — BISACODYL 10 MG
10 SUPPOSITORY, RECTAL RECTAL DAILY PRN
Status: DISCONTINUED | OUTPATIENT
Start: 2024-11-25 | End: 2024-12-04 | Stop reason: HOSPADM

## 2024-11-25 RX ADMIN — Medication 250 MG: at 22:59

## 2024-11-25 RX ADMIN — CARVEDILOL 6.25 MG: 6.25 TABLET, FILM COATED ORAL at 22:59

## 2024-11-25 RX ADMIN — SODIUM CHLORIDE 75 ML/HR: 9 INJECTION, SOLUTION INTRAVENOUS at 22:00

## 2024-11-25 RX ADMIN — IPRATROPIUM BROMIDE AND ALBUTEROL SULFATE 3 ML: .5; 3 SOLUTION RESPIRATORY (INHALATION) at 16:43

## 2024-11-25 RX ADMIN — BUDESONIDE 0.5 MG: 0.5 INHALANT RESPIRATORY (INHALATION) at 21:27

## 2024-11-25 RX ADMIN — GUAIFENESIN 600 MG: 600 TABLET ORAL at 22:59

## 2024-11-25 RX ADMIN — Medication 10 ML: at 22:00

## 2024-11-25 RX ADMIN — METHYLPREDNISOLONE SODIUM SUCCINATE 125 MG: 125 INJECTION, POWDER, FOR SOLUTION INTRAMUSCULAR; INTRAVENOUS at 16:01

## 2024-11-25 RX ADMIN — IPRATROPIUM BROMIDE AND ALBUTEROL SULFATE 3 ML: .5; 3 SOLUTION RESPIRATORY (INHALATION) at 21:27

## 2024-11-25 RX ADMIN — CEFEPIME 2000 MG: 2 INJECTION, POWDER, FOR SOLUTION INTRAVENOUS at 16:48

## 2024-11-25 RX ADMIN — VANCOMYCIN HYDROCHLORIDE 1250 MG: 5 INJECTION, POWDER, LYOPHILIZED, FOR SOLUTION INTRAVENOUS at 17:24

## 2024-11-25 NOTE — TELEPHONE ENCOUNTER
PATIENT'S DAUGHTER IS CONCERNED AS THE PATIENT JUST GOT OUT OF THE HOSPITAL AND HAS LEFT FOOT SWELLING AND STILL DOES NOT FEEL LIKE SHE IS BREATHING WELL.    SHE ALSO STATES THE PATIENT WOULD LIKE A HUMIDIFIER FOR HER OXYGEN.    PLEASE ADVISE AND CALL THE DAUGHTER IF YOU HAVE ANY MORE QUESTIONS.

## 2024-11-25 NOTE — ED PROVIDER NOTES
Time: 3:51 PM EST  Date of encounter:  11/25/2024  Independent Historian/Clinical History and Information was obtained by:   Patient and Family    History is limited by: N/A    Chief Complaint: Short of breath      History of Present Illness:  Patient is a 84 y.o. year old female who presents to the emergency department for evaluation of shortness of breath.      Patient Care Team  Primary Care Provider: Ramirez Maurice MD    Past Medical History:     Allergies   Allergen Reactions    Aleve [Naproxen] Hives     Past Medical History:   Diagnosis Date    COPD (chronic obstructive pulmonary disease)     History of non-ST elevation myocardial infarction (NSTEMI) 08/23/2023    Hyperlipidemia     Hypertension      Past Surgical History:   Procedure Laterality Date    CARDIAC CATHETERIZATION N/A 8/6/2023    Procedure: Left Heart Cath;  Surgeon: Mitch Correia MD;  Location: [facility] CATH INVASIVE LOCATION;  Service: Cardiology;  Laterality: N/A;    CARDIAC CATHETERIZATION N/A 8/6/2023    Procedure: Coronary angiography;  Surgeon: Mitch Coreria MD;  Location: [facility] CATH INVASIVE LOCATION;  Service: Cardiology;  Laterality: N/A;     Family History   Problem Relation Age of Onset    Heart failure Mother         She had congestive heart failure       Home Medications:  Prior to Admission medications    Medication Sig Start Date End Date Taking? Authorizing Provider   arformoterol (BROVANA) 15 MCG/2ML nebulizer solution Take 2 mL by nebulization 2 (Two) Times a Day. 6/27/24   Nazia Sam APRN   aspirin 81 MG EC tablet Take 1 tablet by mouth Daily. 8/9/23   Kevyn Sommer DO   atorvastatin (LIPITOR) 20 MG tablet Take 1 tablet by mouth Every Night.  Patient taking differently: Take 2 tablets by mouth Every Night. 8/8/23   Kevyn Sommer,    budesonide (PULMICORT) 0.5 MG/2ML nebulizer solution Take 2 mL by nebulization 2 (Two) Times a Day. 6/27/24   Nazia Sam APRN   carvedilol (COREG) 6.25 MG tablet Take 1 tablet by mouth 2  "(Two) Times a Day. 24   Leon Hernandez MD   dapagliflozin Propanediol (Farxiga) 10 MG tablet Take 10 mg by mouth Daily. 23   Izabella Oro APRN   furosemide (LASIX) 20 MG tablet TAKE 1 TABLET BY MOUTH EVERY OTHER DAY 11/15/24   Leon Hernandez MD   guaiFENesin (MUCINEX) 600 MG 12 hr tablet Take 1 tablet by mouth Every 12 (Twelve) Hours. 23   Kevyn Sommer DO   ipratropium-albuterol (DUO-NEB) 0.5-2.5 mg/3 ml nebulizer Take 3 mL by nebulization Every 6 (Six) Hours As Needed for Shortness of Air. 24   Nazia Sam APRN   lisinopril (PRINIVIL,ZESTRIL) 20 MG tablet Take 1 tablet by mouth Daily. 24   Leon Hernandez MD   nitroglycerin (NITROSTAT) 0.4 MG SL tablet Place 1 tablet under the tongue Every 5 (Five) Minutes As Needed for Chest Pain. 23   Provider, MD Chelle        Social History:   Social History     Tobacco Use    Smoking status: Former     Current packs/day: 0.00     Types: Cigarettes     Quit date: 2023     Years since quittin.5     Passive exposure: Past    Smokeless tobacco: Never    Tobacco comments:     Pt was a social smoker   Vaping Use    Vaping status: Never Used   Substance Use Topics    Alcohol use: Yes     Alcohol/week: 2.0 standard drinks of alcohol     Types: 2 Glasses of wine per week    Drug use: Never         Review of Systems:  Review of Systems   Respiratory:  Positive for shortness of breath.         Physical Exam:  /50 (BP Location: Left arm, Patient Position: Lying)   Pulse 87   Temp 97.2 °F (36.2 °C) (Oral)   Resp 20   Ht 152.4 cm (60\")   Wt 67 kg (147 lb 11.3 oz)   SpO2 97%   BMI 28.85 kg/m²     Physical Exam  Vitals and nursing note reviewed.   Constitutional:       General: She is not in acute distress.  HENT:      Head: Normocephalic and atraumatic.   Cardiovascular:      Rate and Rhythm: Normal rate and regular rhythm.      Heart sounds: Normal heart sounds.   Pulmonary:      Effort: Pulmonary effort is normal. No " respiratory distress.      Breath sounds: Normal breath sounds.   Abdominal:      General: Abdomen is flat.      Palpations: Abdomen is soft.      Tenderness: There is no abdominal tenderness.   Musculoskeletal:         General: Normal range of motion.      Cervical back: Normal range of motion.   Skin:     General: Skin is warm and dry.   Neurological:      Mental Status: She is alert and oriented to person, place, and time. Mental status is at baseline.                  Procedures:  Procedures      Medical Decision Making:      Comorbidities that affect care:    COPD, Hypertension    External Notes reviewed:    Hospital Discharge Summary: Patient discharged from this hospital on 1524 after admission for pneumonia.      The following orders were placed and all results were independently analyzed by me:  Orders Placed This Encounter   Procedures    Miscellaneous DME    Blood Culture - Blood,    Blood Culture - Blood,    COVID-19, FLU A/B, RSV PCR 1 HR TAT - Swab, Nasopharynx    MRSA Screen, PCR (Inpatient) - Swab, Nares    S. Pneumo Ag Urine or CSF - Urine, Urine, Clean Catch    Mycoplasma Pneumoniae Antibody, IgM - Blood, Arm, Left    Respiratory Culture - Sputum, Cough    Legionella Antigen, Urine - Urine, Urine, Clean Catch    Respiratory Panel PCR w/COVID-19(SARS-CoV-2) YASMINE/MONICA/LUCIA/PAD/COR/MARY ANN In-House, NP Swab in UTM/VTM, 2 HR TAT - Swab, Nasopharynx    Fungus Culture - Wash, Lung, Right Lower Lobe    Virus Culture - Wash, Lung, Right Lower Lobe    Respiratory Culture - Wash, Lung, Right Lower Lobe    Cytomegalovirus, PCR - Wash, Lung, Right Lower Lobe    Fungus Culture - Lavage, Lung, Right Lower Lobe    Virus Culture - Lavage, Lung, Right Lower Lobe    AFB Culture - Lavage, Lung, Right Lower Lobe    BAL Culture, Quantitative - Lavage, Lung, Right Lower Lobe    Pneumonia Panel - Lavage, Lung, Right Lower Lobe    Cytomegalovirus, PCR - Lavage, Lung, Right Lower Lobe    XR Chest 1 View    XR Chest 1 View     [place] Draw    Comprehensive Metabolic Panel    BNP    Single High Sensitivity Troponin T    CBC Auto Differential    Lactic Acid, Plasma    Basic Metabolic Panel    Magnesium    Phosphorus    Hepatic Function Panel    Procalcitonin    C-reactive Protein    Alpha - 1 - Antitrypsin    CBC Auto Differential    Basic Metabolic Panel    Magnesium    Phosphorus    Hepatic Function Panel    CBC Auto Differential    Basic Metabolic Panel    Magnesium    Phosphorus    Hepatic Function Panel    BNP    CBC Auto Differential    Basic Metabolic Panel    Magnesium    Phosphorus    Hepatic Function Panel    Bronch Wash/BAL Differential - Lavage, Lung, Right Lower Lobe    CBC Auto Differential    Basic Metabolic Panel    Magnesium    Phosphorus    Hepatic Function Panel    Diet: Cardiac; Healthy Heart (2-3 Na+); Texture: Regular (IDDSI 7); Fluid Consistency: Thin (IDDSI 0)    Undress & Gown    Vital Signs    Reason for COPD Admission: Pneumonia, New Oxygen Requirements; Indicate COPD Diagnosis For Problem List: Acute on chronic respiratory failure with hypoxia    Respiratory Treatment Education (MDI / Spacer / Nebulizer)    Cough / Deep Breathe    Vital Signs    Intake & Output    Weigh Patient    Oral Care    Saline Lock & Maintain IV Access    Activity - Ad Kelly    Continuous Pulse Oximetry    Follow Anesthesia Guidlines / Standing Orders    Obtain Informed Consent    Notify Provider Prior to Administration of Antidote - Extravasation Standing Orders    Apply Cold Compress to Affected Area for 20 Minutes    Code Status and Medical Interventions: CPR (Attempt to Resuscitate); Full Support    Inpatient Hospitalist Consult    Inpatient Pulmonology Consult    Inpatient Nutrition Consult    Dietary Nutrition Supplements Other (See Comment); Boost Original    OT Consult: Eval & Treat ADL Performance Below Baseline    PT Consult: Eval & Treat As Tolerated; Functional Mobility Below Baseline, Discharge Placement Assessment    Oxygen  Therapy- Nasal Cannula; Titrate 1-6 LPM Per SpO2; 90 - 95%    NIPPV - Provider Settings    Document Pulse Oximetry - On Room Air / Home O2 Level    Oscillating Positive Expiratory Pressure (OPEP)    Incentive Spirometry    Oxygen Therapy- Nasal Cannula; Titrate 1-6 LPM Per SpO2; 88 - 92%    RT to Initiate Bronchopulmonary Hygiene Protocol    Chest Physiotherapy (PD & P)    Adult Transthoracic Echo Complete W/ Cont if Necessary Per Protocol    Insert Peripheral IV    Insert Peripheral IV    Inpatient Admission    CBC & Differential    Green Top (Gel)    Lavender Top    Gold Top - SST    Light Blue Top    CBC & Differential    CBC & Differential    CBC & Differential    CBC & Differential    CBC & Differential       Medications Given in the Emergency Department:  Medications   sodium chloride 0.9 % flush 10 mL (has no administration in time range)   saccharomyces boulardii (FLORASTOR) capsule 250 mg (250 mg Oral Given 11/30/24 0905)   aspirin EC tablet 81 mg (81 mg Oral Given 11/30/24 0905)   budesonide (PULMICORT) nebulizer solution 0.5 mg (0.5 mg Nebulization Given 11/30/24 1839)   carvedilol (COREG) tablet 6.25 mg (6.25 mg Oral Given 11/30/24 0905)   lisinopril (PRINIVIL,ZESTRIL) tablet 20 mg (20 mg Oral Given 11/30/24 0905)   sodium chloride 0.9 % flush 10 mL (10 mL Intravenous Given 11/30/24 0906)   sodium chloride 0.9 % flush 10 mL (has no administration in time range)   sodium chloride 0.9 % infusion 40 mL (has no administration in time range)   Enoxaparin Sodium (LOVENOX) syringe 40 mg (40 mg Subcutaneous Given 11/30/24 0906)   ipratropium-albuterol (DUO-NEB) nebulizer solution 3 mL (3 mL Nebulization Given 11/30/24 1555)   ipratropium-albuterol (DUO-NEB) nebulizer solution 3 mL (has no administration in time range)   sennosides-docusate (PERICOLACE) 8.6-50 MG per tablet 2 tablet (has no administration in time range)     And   polyethylene glycol (MIRALAX) packet 17 g (has no administration in time range)      And   bisacodyl (DULCOLAX) EC tablet 5 mg (has no administration in time range)     And   bisacodyl (DULCOLAX) suppository 10 mg (has no administration in time range)   ondansetron (ZOFRAN) injection 4 mg (has no administration in time range)   guaiFENesin (MUCINEX) 12 hr tablet 600 mg (600 mg Oral Given 11/30/24 0905)   guaiFENesin-dextromethorphan (ROBITUSSIN DM) 100-10 MG/5ML syrup 10 mL (10 mL Oral Given 11/28/24 1635)   arformoterol (BROVANA) nebulizer solution 15 mcg (15 mcg Nebulization Given 11/30/24 1839)   predniSONE (DELTASONE) tablet 40 mg (40 mg Oral Given 11/30/24 0904)   acetaminophen (TYLENOL) tablet 650 mg (650 mg Oral Not Given 11/28/24 1644)   furosemide (LASIX) tablet 40 mg (40 mg Oral Given 11/30/24 1745)   cefepime 2000 mg IVPB in 100 mL NS (VTB) (2,000 mg Intravenous New Bag 11/30/24 1235)   nystatin (MYCOSTATIN) powder ( Topical Given 11/30/24 1545)   ipratropium-albuterol (DUO-NEB) nebulizer solution 3 mL (3 mL Nebulization Given 11/25/24 1643)   methylPREDNISolone sodium succinate (SOLU-Medrol) injection 125 mg (125 mg Intravenous Given 11/25/24 1601)   vancomycin 1250 mg/250 mL 0.9% NS IVPB ([facility]) (1,250 mg Intravenous New Bag 11/25/24 1724)   cefepime 2000 mg IVPB in 100 mL NS (VTB) (0 mg Intravenous Stopped 11/25/24 1723)   potassium chloride (MICRO-K/KLOR-CON) CR capsule (40 mEq Oral Given 11/27/24 1733)   potassium phosphate 15 mmol in 0.9% normal saline 250 mL IVPB (15 mmol Intravenous New Bag 11/29/24 1204)   potassium chloride (MICRO-K/KLOR-CON) CR capsule (40 mEq Oral Given 11/30/24 0906)        ED Course:         Labs:    Lab Results (last 24 hours)       Procedure Component Value Units Date/Time    Basic Metabolic Panel [[id number]]  (Abnormal) Collected: 11/30/24 0420    Specimen: Blood Updated: 11/30/24 0529     Glucose 184 mg/dL      BUN 18 mg/dL      Creatinine 0.57 mg/dL      Sodium 137 mmol/L      Potassium 3.3 mmol/L      Chloride 97 mmol/L      CO2 35.8 mmol/L      Calcium  8.0 mg/dL      BUN/Creatinine Ratio 31.6     Anion Gap 4.2 mmol/L      eGFR 89.7 mL/min/1.73     Narrative:      GFR Normal >60  Chronic Kidney Disease <60  Kidney Failure <15    The GFR formula is only valid for adults with stable renal function between ages 18 and 70.    CBC & Differential [[id number]]  (Abnormal) Collected: 11/30/24 0420    Specimen: Blood Updated: 11/30/24 0504    Narrative:      The following orders were created for panel order CBC & Differential.  Procedure                               Abnormality         Status                     ---------                               -----------         ------                     CBC Auto Differential[[id number]]        Abnormal            Final result                 Please view results for these tests on the individual orders.    Magnesium [[id number]]  (Normal) Collected: 11/30/24 0420    Specimen: Blood Updated: 11/30/24 0529     Magnesium 1.9 mg/dL     Phosphorus [[id number]]  (Abnormal) Collected: 11/30/24 0420    Specimen: Blood Updated: 11/30/24 0555     Phosphorus 2.4 mg/dL     Hepatic Function Panel [[id number]]  (Abnormal) Collected: 11/30/24 0420    Specimen: Blood Updated: 11/30/24 0529     Total Protein 5.6 g/dL      Albumin 2.6 g/dL      ALT (SGPT) 19 U/L      AST (SGOT) 13 U/L      Alkaline Phosphatase 81 U/L      Total Bilirubin 0.4 mg/dL      Bilirubin, Direct 0.1 mg/dL      Bilirubin, Indirect 0.3 mg/dL      Comment: Unable to calculate       CBC Auto Differential [[id number]]  (Abnormal) Collected: 11/30/24 0420    Specimen: Blood Updated: 11/30/24 0504     WBC 10.51 10*3/mm3      RBC 4.41 10*6/mm3      Hemoglobin 11.9 g/dL      Hematocrit 38.2 %      MCV 86.6 fL      MCH 27.0 pg      MCHC 31.2 g/dL      RDW 13.4 %      RDW-SD 43.1 fl      MPV 10.5 fL      Platelets 459 10*3/mm3      Neutrophil % 76.9 %      Lymphocyte % 16.1 %      Monocyte % 4.8 %      Eosinophil % 0.4 %      Basophil % 0.3 %      Immature Grans % 1.5 %      Neutrophils,  Absolute 8.09 10*3/mm3      Lymphocytes, Absolute 1.69 10*3/mm3      Monocytes, Absolute 0.50 10*3/mm3      Eosinophils, Absolute 0.04 10*3/mm3      Basophils, Absolute 0.03 10*3/mm3      Immature Grans, Absolute 0.16 10*3/mm3      nRBC 0.0 /100 WBC              Imaging:    XR Chest 1 View    Result Date: 11/30/2024  XR CHEST 1 VW Date of Exam: 11/30/2024 11:59 AM EST Indication: pna Comparison: 11/25/2024 radiographs Findings: Unchanged cardiomediastinal silhouette. There is mildly improved aeration of the lung apices. There are persistent bilateral airspace opacities. No new consolidation. No new pleural effusion or pneumothorax. No acute osseous abnormality.     Impression: Improved aeration of the lung apices with persistent bilateral airspace opacities. Electronically Signed: Ralph Kenyon MD  11/30/2024 12:25 PM EST  Workstation ID: [id number]       Differential Diagnosis and Discussion:    Dyspnea: Differential diagnosis includes but is not limited to metabolic acidosis, neurological disorders, psychogenic, asthma, pneumothorax, upper airway obstruction, COPD, pneumonia, noncardiogenic pulmonary edema, interstitial lung disease, anemia, congestive heart failure, and pulmonary embolism    All labs were reviewed and interpreted by me.  All X-rays impressions were independently interpreted by me.  EKG was interpreted by me.    MDM               Patient Care Considerations:    SEPSIS was considered but is NOT present in the emergency department as SIRS criteria is not present.      Consultants/Shared Management Plan:    Hospitalist: I have discussed the case with Dr. Randall who agrees to accept the patient for admission.    Social Determinants of Health:    Patient has presented with family members who are responsible, reliable and will ensure follow up care.      Disposition and Care Coordination:    Admit:   Through independent evaluation of the patient's history, physical, and imperical data, the patient meets  criteria for inpatient admission to the hospital.      Final diagnoses:   Multifocal pneumonia        ED Disposition       ED Disposition   Decision to Admit    Condition   --    Comment   Level of Care: Telemetry [5]   Diagnosis: Multifocal pneumonia [[id number]]   Admitting Physician: JW MADRIGAL [[id number]]   Certification: I Certify That Inpatient Hospital Services Are Medically Necessary For Greater Than 2 Midnights                 This medical record created using voice recognition software.             Derek Casey DO  11/30/24 1912

## 2024-11-25 NOTE — CASE MANAGEMENT/SOCIAL WORK
Discharge Planning Assessment   Jadyn     Patient Name: Cris Agudelo  MRN: [id number]  Today's Date: 11/25/2024    Admit Date: 11/25/2024        Discharge Needs Assessment       Row Name 11/25/24 1841       Living Environment    People in Home alone    Current Living Arrangements home    Potentially Unsafe Housing Conditions none    In the past 12 months has the electric, gas, oil, or water company threatened to shut off services in your home? No    Primary Care Provided by self;child(joel)    Provides Primary Care For no one, unable/limited ability to care for self    Family Caregiver if Needed child(joel), adult    Quality of Family Relationships involved;helpful;supportive    Able to Return to Prior Arrangements yes       Resource/Environmental Concerns    Resource/Environmental Concerns none    Transportation Concerns none       Transportation Needs    In the past 12 months, has lack of transportation kept you from medical appointments or from getting medications? no    In the past 12 months, has lack of transportation kept you from meetings, work, or from getting things needed for daily living? No       Food Insecurity    Within the past 12 months, you worried that your food would run out before you got the money to buy more. Never true    Within the past 12 months, the food you bought just didn't last and you didn't have money to get more. Never true       Transition Planning    Patient/Family Anticipates Transition to home with family;home    Patient/Family Anticipated Services at Transition none    Transportation Anticipated family or friend will provide       Discharge Needs Assessment    Readmission Within the Last 30 Days previous discharge plan unsuccessful    Equipment Currently Used at Home oxygen;walker, standard;cane, straight    Concerns to be Addressed denies needs/concerns at this time    Do you want help finding or keeping work or a job? I do not need or want help    Do you want help with school  or training? For example, starting or completing job training or getting a high school diploma, GED or equivalent No    Anticipated Changes Related to Illness none    Equipment Needed After Discharge none                   Discharge Plan    No documentation.                 Continued Care and Services - Admitted Since 11/25/2024    No active coordination exists for this encounter.          Demographic Summary       Row Name 11/25/24 1838       General Information    Admission Type inpatient    Arrived From home    Referral Source emergency department    Reason for Consult discharge planning    Preferred Language English       Contact Information    Permission Granted to Share Info With family/designee                   Functional Status       Row Name 11/25/24 1839       Functional Status    Usual Activity Tolerance good    Current Activity Tolerance fair       Physical Activity    On average, how many days per week do you engage in moderate to strenuous exercise (like a brisk walk)? 0 days    On average, how many minutes do you engage in exercise at this level? 0 min    Number of minutes of exercise per week 0       Assessment of Health Literacy    How often do you have someone help you read hospital materials? Sometimes    How often do you have problems learning about your medical condition because of difficulty understanding written information? Sometimes    How often do you have a problem understanding what is told to you about your medical condition? Sometimes    How confident are you filling out medical forms by yourself? Somewhat    Health Literacy Moderate       Functional Status, IADL    Medications independent;assistive equipment and person    Meal Preparation independent;assistive equipment and person    Housekeeping independent;assistive equipment and person    Laundry independent;assistive equipment and person    Shopping independent;assistive equipment and person    If for any reason you need help with  day-to-day activities such as bathing, preparing meals, shopping, managing finances, etc., do you get the help you need? I get all the help I need       Mental Status    General Appearance WDL WDL       Mental Status Summary    Recent Changes in Mental Status/Cognitive Functioning memory (remote)       Employment/    Employment Status retired    Current or Previous Occupation self-employed    Employment/ Comments prior bar owner for 42 years                   Psychosocial    No documentation.                  Abuse/Neglect       Row Name 11/25/24 1840       Personal Safety    Feels Unsafe at Home or Work/School no    Feels Threatened by Someone no    Does Anyone Try to Keep You From Having Contact with Others or Doing Things Outside Your Home? no    Physical Signs of Abuse Present no                   Legal       Row Name 11/25/24 1840       Financial Resource Strain    How hard is it for you to pay for the very basics like food, housing, medical care, and heating? Not very       Financial/Legal    Source of Income pension/group home                   Substance Abuse    No documentation.                  Patient Forms    No documentation.                 SW met with pt at bedside this date to complete discharge needs assessment. Pts daughter was at bedside and was the historian. Pt does have home health currently from prior admission. Pts daughter lives close to pt and is able to assist her as needed. Pts daughter denied discharge needs at this time. Pts PCP is Ramirez Maurice and her pharmacy of choice is [facility].     LASHAWN Perez

## 2024-11-25 NOTE — H&P
[facility]   HISTORY AND PHYSICAL    Patient Name: Cris Agudelo  : 1940  MRN: [id number]  Primary Care Physician:  Ramirez Maurice MD  Date of admission: 2024    Subjective   Subjective     Chief Complaint: Shortness of breath, cough    HPI:    Cris Agudelo is a 84 y.o. female with past medical history of hypertension, COPD, CAD, prior MI, hyponatremia, hyperlipidemia, and GERD presented to the ED for evaluation of shortness of breath and cough.  Patient was discharged from this hospital 5 days prior after inpatient management of pneumonia.  Patient was feeling relatively well after discharge but over the last 2 to 3 days he has been having worsening shortness of breath, a productive cough, intermittent chills, night sweats, lightheadedness, decreased appetite, and generalized weakness.  Patient denied any sick contacts but went home health came to check on her they noted that she was saturating in the high 50s low 60s.  Family then brought her immediately to the ED for further evaluation.  In the ED patient was tachypneic and significantly hypoxic on arrival requiring oxygen supplementation via high flow nasal cannula.  Labs showed that she had significant leukocytosis with elevated troponins, and compensated hypercapnia.  Her lactic acid was within normal limits.  Chest x-ray showed extensive bilateral airspace opacity consistent with multifocal pneumonia.  When seen patient was ill-appearing and lethargic but when asked she denied any  headaches, focal weakness, chest pain, abdominal pain, nausea, vomiting, diarrhea, constipation, dysuria, hematuria, hematochezia, melena, or anxiety.  Patient admitted for further evaluation and treatment    Review of Systems   All systems were reviewed and negative except for: As per HPI    Personal History     Past Medical History:   Diagnosis Date    COPD (chronic obstructive pulmonary disease)     History of non-ST elevation myocardial infarction (NSTEMI)  08/23/2023    Hyperlipidemia     Hypertension        Past Surgical History:   Procedure Laterality Date    CARDIAC CATHETERIZATION N/A 8/6/2023    Procedure: Left Heart Cath;  Surgeon: Mitch Correia MD;  Location: [facility] INVASIVE LOCATION;  Service: Cardiology;  Laterality: N/A;    CARDIAC CATHETERIZATION N/A 8/6/2023    Procedure: Coronary angiography;  Surgeon: Mitch Correia MD;  Location: [facility] INVASIVE LOCATION;  Service: Cardiology;  Laterality: N/A;       Family History: family history includes Heart failure in her mother. Otherwise pertinent FHx was reviewed and not pertinent to current issue.    Social History:  reports that she quit smoking about 18 months ago. Her smoking use included cigarettes. She has been exposed to tobacco smoke. She has never used smokeless tobacco. She reports current alcohol use of about 2.0 standard drinks of alcohol per week. She reports that she does not use drugs.    Home Medications:  arformoterol, aspirin, atorvastatin, budesonide, carvedilol, dapagliflozin Propanediol, furosemide, guaiFENesin, ipratropium-albuterol, lisinopril, and nitroglycerin      Allergies:  Allergies   Allergen Reactions    Aleve [Naproxen] Hives       Objective   Objective     Vitals:   Temp:  [97.9 °F (36.6 °C)] 97.9 °F (36.6 °C)  Heart Rate:  [] 81  Resp:  [26-41] 41  BP: (118-161)/(44-75) 118/47  Flow (L/min) (Oxygen Therapy):  [2-40] 9  Physical Exam    Constitutional: Awake, alert, ill-appearing   Eyes: PERRLA, sclerae anicteric, no conjunctival injection   HENT: NCAT, mucous membranes dry   Neck: Supple, no thyromegaly, no lymphadenopathy, trachea midline   Respiratory: Clear to auscultation bilaterally, nonlabored respirations    Cardiovascular: Rales, rhonchi, wheezing, on high flow nasal cannula, tachypneic   Gastrointestinal: Positive bowel sounds, soft, nontender, nondistended   Musculoskeletal: No bilateral ankle edema, no clubbing or cyanosis to extremities   Psychiatric:  Appropriate affect, cooperative   Neurologic: Oriented x 3, strength symmetric in all extremities, Cranial Nerves grossly intact to confrontation, speech clear   Skin: No rashes     Result Review    Result Review:  I have personally reviewed the results from the time of this admission to 11/25/2024 18:37 EST and agree with these findings:  [x]  Laboratory list / accordion  []  Microbiology  [x]  Radiology  [x]  EKG/Telemetry   []  Cardiology/Vascular   []  Pathology  []  Old records  []  Other:  Most notable findings include: Leukocytosis, elevated proBNP, normal lactic acid, x-ray with multifocal pneumonia      Assessment & Plan   Assessment / Plan     Brief Patient Summary:  Cris Agudelo is a 84 y.o. female with past medical history of hypertension, COPD, CAD, prior MI, hyponatremia, hyperlipidemia, and GERD presented to the ED for evaluation of shortness of breath and cough.    Active Hospital Problems:  Active Hospital Problems    Diagnosis     **Multifocal pneumonia     Acute on chronic respiratory failure with hypoxia     HCAP (healthcare-associated pneumonia)     Hyperlipidemia     Primary hypertension     Hyponatremia      Plan:     HCAP  -Admit to telemetry  -Hx COPD  -Imaging reviewed  -Supplemental oxygen as needed, wean down as tolerated  -IV Solu-Medrol  -Duo nebs 3 times daily and as needed  -Empiric antibiotics with cefepime and vancomycin  -Mucinex, Tessalon Perles  -Incentive spirometry  -Consult pulmonology if warranted  -Supportive care    HTN  -Currently well controlled  -PRN BP meds  -Resume home meds when available  -Titrate if needed    CAD    GI ppx  DVT ppx        VTE Prophylaxis:  Pharmacologic VTE prophylaxis orders are signed & held.          CODE STATUS:    Level Of Support Discussed With: Patient  Code Status (Patient has no pulse and is not breathing): CPR (Attempt to Resuscitate)  Medical Interventions (Patient has pulse or is breathing): Full Support    Admission Status:  I believe  this patient meets inpatient status.      Electronically signed by Gold Lorenz MD, 11/25/24, 6:37 PM EST.

## 2024-11-25 NOTE — TELEPHONE ENCOUNTER
Called and spoke with patients daughter and she stated the DME company told her she needs script from the doctor that gave her the oxygen

## 2024-11-25 NOTE — SIGNIFICANT NOTE
Patient was placed on NIPPV due to her tachypnea and low oxygen saturation. Patient arrived to the ED on her home oxygen of 5lpm pulse dose via nasal cannula with saturations in the 60's. Patient was placed on a NRBM where saturation was obtained 90%. Patient remained tachypenic and was placed on BIPAP 12/8 40%.

## 2024-11-26 ENCOUNTER — TRANSCRIBE ORDERS (OUTPATIENT)
Dept: CARDIOLOGY | Facility: HOSPITAL | Age: 84
End: 2024-11-26
Payer: MEDICARE

## 2024-11-26 ENCOUNTER — APPOINTMENT (OUTPATIENT)
Dept: CARDIOLOGY | Facility: HOSPITAL | Age: 84
DRG: 193 | End: 2024-11-26
Payer: MEDICARE

## 2024-11-26 DIAGNOSIS — R07.9 CHEST PAIN, UNSPECIFIED TYPE: Primary | ICD-10-CM

## 2024-11-26 LAB
ALPHA1 GLOB MFR UR ELPH: 305 MG/DL (ref 90–200)
ANION GAP SERPL CALCULATED.3IONS-SCNC: 10.8 MMOL/L (ref 5–15)
B PARAPERT DNA SPEC QL NAA+PROBE: NOT DETECTED
B PERT DNA SPEC QL NAA+PROBE: NOT DETECTED
BUN SERPL-MCNC: 19 MG/DL (ref 8–23)
BUN/CREAT SERPL: 31.1 (ref 7–25)
C PNEUM DNA NPH QL NAA+NON-PROBE: NOT DETECTED
CALCIUM SPEC-SCNC: 8.6 MG/DL (ref 8.6–10.5)
CHLORIDE SERPL-SCNC: 102 MMOL/L (ref 98–107)
CO2 SERPL-SCNC: 25.2 MMOL/L (ref 22–29)
CREAT SERPL-MCNC: 0.61 MG/DL (ref 0.57–1)
CRP SERPL-MCNC: 28.64 MG/DL (ref 0–0.5)
DEPRECATED RDW RBC AUTO: 45.1 FL (ref 37–54)
EGFRCR SERPLBLD CKD-EPI 2021: 88.3 ML/MIN/1.73
ERYTHROCYTE [DISTWIDTH] IN BLOOD BY AUTOMATED COUNT: 13.5 % (ref 12.3–15.4)
FLUAV SUBTYP SPEC NAA+PROBE: NOT DETECTED
FLUBV RNA ISLT QL NAA+PROBE: NOT DETECTED
GLUCOSE SERPL-MCNC: 132 MG/DL (ref 65–99)
HADV DNA SPEC NAA+PROBE: NOT DETECTED
HCOV 229E RNA SPEC QL NAA+PROBE: NOT DETECTED
HCOV HKU1 RNA SPEC QL NAA+PROBE: NOT DETECTED
HCOV NL63 RNA SPEC QL NAA+PROBE: NOT DETECTED
HCOV OC43 RNA SPEC QL NAA+PROBE: NOT DETECTED
HCT VFR BLD AUTO: 43.6 % (ref 34–46.6)
HGB BLD-MCNC: 13.1 G/DL (ref 12–15.9)
HMPV RNA NPH QL NAA+NON-PROBE: NOT DETECTED
HPIV1 RNA ISLT QL NAA+PROBE: NOT DETECTED
HPIV2 RNA SPEC QL NAA+PROBE: NOT DETECTED
HPIV3 RNA NPH QL NAA+PROBE: NOT DETECTED
HPIV4 P GENE NPH QL NAA+PROBE: NOT DETECTED
M PNEUMO IGG SER IA-ACNC: NOT DETECTED
M PNEUMO IGM SER QL: NEGATIVE
MCH RBC QN AUTO: 27.4 PG (ref 26.6–33)
MCHC RBC AUTO-ENTMCNC: 30 G/DL (ref 31.5–35.7)
MCV RBC AUTO: 91.2 FL (ref 79–97)
PLATELET # BLD AUTO: 474 10*3/MM3 (ref 140–450)
PMV BLD AUTO: 10.7 FL (ref 6–12)
POTASSIUM SERPL-SCNC: 4.3 MMOL/L (ref 3.5–5.2)
PROCALCITONIN SERPL-MCNC: 0.17 NG/ML (ref 0–0.25)
RBC # BLD AUTO: 4.78 10*6/MM3 (ref 3.77–5.28)
RHINOVIRUS RNA SPEC NAA+PROBE: NOT DETECTED
RSV RNA NPH QL NAA+NON-PROBE: NOT DETECTED
SARS-COV-2 RNA RESP QL NAA+PROBE: NOT DETECTED
SODIUM SERPL-SCNC: 138 MMOL/L (ref 136–145)
WBC NRBC COR # BLD AUTO: 13.38 10*3/MM3 (ref 3.4–10.8)

## 2024-11-26 PROCEDURE — 99222 1ST HOSP IP/OBS MODERATE 55: CPT | Performed by: INTERNAL MEDICINE

## 2024-11-26 PROCEDURE — 25010000002 ENOXAPARIN PER 10 MG: Performed by: FAMILY MEDICINE

## 2024-11-26 PROCEDURE — 25010000002 METHYLPREDNISOLONE PER 40 MG

## 2024-11-26 PROCEDURE — 25010000002 CEFEPIME PER 500 MG: Performed by: FAMILY MEDICINE

## 2024-11-26 PROCEDURE — 94799 UNLISTED PULMONARY SVC/PX: CPT

## 2024-11-26 PROCEDURE — 0202U NFCT DS 22 TRGT SARS-COV-2: CPT

## 2024-11-26 PROCEDURE — 94667 MNPJ CHEST WALL 1ST: CPT

## 2024-11-26 PROCEDURE — 86140 C-REACTIVE PROTEIN: CPT

## 2024-11-26 PROCEDURE — 85027 COMPLETE CBC AUTOMATED: CPT | Performed by: FAMILY MEDICINE

## 2024-11-26 PROCEDURE — 97161 PT EVAL LOW COMPLEX 20 MIN: CPT

## 2024-11-26 PROCEDURE — 84145 PROCALCITONIN (PCT): CPT | Performed by: PHYSICIAN ASSISTANT

## 2024-11-26 PROCEDURE — 36415 COLL VENOUS BLD VENIPUNCTURE: CPT | Performed by: FAMILY MEDICINE

## 2024-11-26 PROCEDURE — 93005 ELECTROCARDIOGRAM TRACING: CPT

## 2024-11-26 PROCEDURE — 99232 SBSQ HOSP IP/OBS MODERATE 35: CPT | Performed by: FAMILY MEDICINE

## 2024-11-26 PROCEDURE — 93306 TTE W/DOPPLER COMPLETE: CPT

## 2024-11-26 PROCEDURE — 25010000002 FUROSEMIDE PER 20 MG: Performed by: FAMILY MEDICINE

## 2024-11-26 PROCEDURE — 93306 TTE W/DOPPLER COMPLETE: CPT | Performed by: INTERNAL MEDICINE

## 2024-11-26 PROCEDURE — 94761 N-INVAS EAR/PLS OXIMETRY MLT: CPT

## 2024-11-26 PROCEDURE — 25810000003 SODIUM CHLORIDE 0.9 % SOLUTION: Performed by: FAMILY MEDICINE

## 2024-11-26 PROCEDURE — 80048 BASIC METABOLIC PNL TOTAL CA: CPT | Performed by: FAMILY MEDICINE

## 2024-11-26 RX ORDER — ARFORMOTEROL TARTRATE 15 UG/2ML
15 SOLUTION RESPIRATORY (INHALATION)
Status: DISCONTINUED | OUTPATIENT
Start: 2024-11-26 | End: 2024-12-04 | Stop reason: HOSPADM

## 2024-11-26 RX ORDER — FUROSEMIDE 10 MG/ML
40 INJECTION INTRAMUSCULAR; INTRAVENOUS EVERY 12 HOURS
Status: DISCONTINUED | OUTPATIENT
Start: 2024-11-26 | End: 2024-11-29

## 2024-11-26 RX ORDER — METHYLPREDNISOLONE SODIUM SUCCINATE 40 MG/ML
40 INJECTION, POWDER, LYOPHILIZED, FOR SOLUTION INTRAMUSCULAR; INTRAVENOUS EVERY 12 HOURS
Status: DISCONTINUED | OUTPATIENT
Start: 2024-11-26 | End: 2024-11-27

## 2024-11-26 RX ADMIN — ASPIRIN 81 MG: 81 TABLET, COATED ORAL at 08:37

## 2024-11-26 RX ADMIN — ARFORMOTEROL TARTRATE 15 MCG: 15 SOLUTION RESPIRATORY (INHALATION) at 06:54

## 2024-11-26 RX ADMIN — BUDESONIDE 0.5 MG: 0.5 INHALANT RESPIRATORY (INHALATION) at 18:39

## 2024-11-26 RX ADMIN — METHYLPREDNISOLONE SODIUM SUCCINATE 40 MG: 40 INJECTION, POWDER, FOR SOLUTION INTRAMUSCULAR; INTRAVENOUS at 16:15

## 2024-11-26 RX ADMIN — GUAIFENESIN 600 MG: 600 TABLET ORAL at 20:01

## 2024-11-26 RX ADMIN — SODIUM CHLORIDE 75 ML/HR: 9 INJECTION, SOLUTION INTRAVENOUS at 12:23

## 2024-11-26 RX ADMIN — FUROSEMIDE 40 MG: 10 INJECTION, SOLUTION INTRAMUSCULAR; INTRAVENOUS at 14:07

## 2024-11-26 RX ADMIN — BUDESONIDE 0.5 MG: 0.5 INHALANT RESPIRATORY (INHALATION) at 06:54

## 2024-11-26 RX ADMIN — GUAIFENESIN 600 MG: 600 TABLET ORAL at 08:37

## 2024-11-26 RX ADMIN — CEFEPIME 2000 MG: 2 INJECTION, POWDER, FOR SOLUTION INTRAVENOUS at 04:14

## 2024-11-26 RX ADMIN — CARVEDILOL 6.25 MG: 6.25 TABLET, FILM COATED ORAL at 08:37

## 2024-11-26 RX ADMIN — Medication 250 MG: at 20:01

## 2024-11-26 RX ADMIN — LISINOPRIL 20 MG: 20 TABLET ORAL at 08:37

## 2024-11-26 RX ADMIN — IPRATROPIUM BROMIDE AND ALBUTEROL SULFATE 3 ML: .5; 3 SOLUTION RESPIRATORY (INHALATION) at 16:16

## 2024-11-26 RX ADMIN — ENOXAPARIN SODIUM 40 MG: 100 INJECTION SUBCUTANEOUS at 08:37

## 2024-11-26 RX ADMIN — Medication 10 ML: at 08:37

## 2024-11-26 RX ADMIN — CEFEPIME 2000 MG: 2 INJECTION, POWDER, FOR SOLUTION INTRAVENOUS at 16:14

## 2024-11-26 RX ADMIN — IPRATROPIUM BROMIDE AND ALBUTEROL SULFATE 3 ML: .5; 3 SOLUTION RESPIRATORY (INHALATION) at 18:51

## 2024-11-26 RX ADMIN — Medication 10 ML: at 20:02

## 2024-11-26 RX ADMIN — ARFORMOTEROL TARTRATE 15 MCG: 15 SOLUTION RESPIRATORY (INHALATION) at 18:39

## 2024-11-26 RX ADMIN — Medication 250 MG: at 08:37

## 2024-11-26 RX ADMIN — IPRATROPIUM BROMIDE AND ALBUTEROL SULFATE 3 ML: .5; 3 SOLUTION RESPIRATORY (INHALATION) at 06:54

## 2024-11-26 NOTE — CONSULTS
Pulmonary / Critical Care Consult Note      Patient Name: Cris Agudelo  : 1940  MRN: [id number]  Primary Care Physician:  Ramirez Maurice MD  Referring Physician: Marco Del Toro MD  Date of admission: 2024    Subjective   Subjective     Reason for Consult/ Chief Complaint:   Concern for pneumonia from unknown organism, acute on chronic hypoxic respiratory failure    HPI:  Cris Agudelo is a 84 y.o. female with past medical history of hypoxia on 2 L at baseline, COPD, CAD, history of prior MI, presented to the ED for evaluation of shortness of breath and cough with sputum production.  In the ED, proBNP was 1987, white count 26.  Patient was placed on NIPPV but has been weaned down to 7 L high flow.  CXR demonstrating similar appearance of extensive bilateral airspace opacity suspicious for persistent multifocal infectious/inflammatory process.  The service was consulted to assist in management treatment of patient's acute issues.  Upon assessment, patient lying in bed on 7 L high flow nasal cannula with SpO2 of 95%.  Findings and plan were discussed the patient.  Unfortunately, patient appears to be confused at this time.  Did not participate much in the interview        Personal History     Past Medical History:   Diagnosis Date    COPD (chronic obstructive pulmonary disease)     History of non-ST elevation myocardial infarction (NSTEMI) 2023    Hyperlipidemia     Hypertension        Past Surgical History:   Procedure Laterality Date    CARDIAC CATHETERIZATION N/A 2023    Procedure: Left Heart Cath;  Surgeon: Mitch Correia MD;  Location: [facility] INVASIVE LOCATION;  Service: Cardiology;  Laterality: N/A;    CARDIAC CATHETERIZATION N/A 2023    Procedure: Coronary angiography;  Surgeon: Mitch Correia MD;  Location: [facility] INVASIVE LOCATION;  Service: Cardiology;  Laterality: N/A;       Family History: family history includes Heart failure in her mother. Otherwise pertinent FHx was  reviewed and not pertinent to current issue.    Social History:  reports that she quit smoking about 18 months ago. Her smoking use included cigarettes. She has been exposed to tobacco smoke. She has never used smokeless tobacco. She reports current alcohol use of about 2.0 standard drinks of alcohol per week. She reports that she does not use drugs.    Home Medications:  arformoterol, aspirin, atorvastatin, budesonide, carvedilol, dapagliflozin Propanediol, furosemide, guaiFENesin, ipratropium-albuterol, lisinopril, and nitroglycerin    Allergies:  Allergies   Allergen Reactions    Aleve [Naproxen] Hives       Objective    Objective     Vitals:   Temp:  [97.7 °F (36.5 °C)-97.9 °F (36.6 °C)] 97.7 °F (36.5 °C)  Heart Rate:  [] 84  Resp:  [18-41] 18  BP: (118-161)/(44-75) 144/61  Flow (L/min) (Oxygen Therapy):  [2-40] 7    Physical Exam:  Vital Signs Reviewed   General:  WDWN, Alert, NAD.  Chronically ill-appearing female, lying in bed  HEENT:  PERRL, EOMI.  OP, nares clear, no sinus tenderness  Neck:  Supple, no JVD, no thyromegaly  Lymph: no axillary, cervical, supraclavicular lymphadenopathy noted bilaterally  Chest:  good aeration, diminished breath sounds bilaterally with Velcro-like crackles, no increased work of breathing noted on 7 L high flow nasal cannula while at rest  CV: RRR, no MGR, pulses 2+, equal.  Abd:  Soft, NT, ND, + BS, no HSM, obese  EXT:  no clubbing, no cyanosis, BLE edema, no joint tenderness  Neuro:  A&Ox3, CN grossly intact, no focal deficits.  Skin: No rashes or lesions noted      Result Review    Result Review:  I have personally reviewed the results from the time of this admission to 11/26/2024 13:55 EST and agree with these findings:  [x]  Laboratory  [x]  Microbiology  [x]  Radiology  []  EKG/Telemetry   []  Cardiology/Vascular   []  Pathology  []  Old records  []  Other:  Most notable findings include:         Lab 11/26/24  0506 11/25/24  1553   WBC 13.38* 26.51*   HEMOGLOBIN  13.1 14.2   HEMATOCRIT 43.6 45.3   PLATELETS 474* 612*   SODIUM 138 137   POTASSIUM 4.3 3.7   CHLORIDE 102 95*   CO2 25.2 31.3*   BUN 19 13   CREATININE 0.61 0.70   GLUCOSE 132* 182*   CALCIUM 8.6 8.9   TOTAL PROTEIN  --  7.5   ALBUMIN  --  3.0*   GLOBULIN  --  4.5       Assessment & Plan   Assessment / Plan     Active Hospital Problems:  Active Hospital Problems    Diagnosis     **Multifocal pneumonia     Acute on chronic respiratory failure with hypoxia     HCAP (healthcare-associated pneumonia)     Hyperlipidemia     Primary hypertension     Hyponatremia    Impression:  Acute on chronic hypoxic respiratory failure, 2 L at baseline  Concern for pneumonia, organism versus Cryptogenic organizing pneumonia  Question ILD  Acute on chronic COPD exacerbation, FEV1 44%  Acute on chronic congestive diastolic heart failure, grade 1 DD  Elevated proBNP, 1987  Tobacco abuse of cigarettes in remission    Plan:  -Continue to wean supplemental oxygen to maintain SpO2 greater than 90%  -Patient had recent chest CT on 11/16/24 on previous admission demonstrating extensive bronchocentric alveolar airspace disease throughout both lungs consistent with pneumonia.  Demonstrating extensive groundglass opacities that may represent bronchopneumonia or cryptogenic organizing pneumonia  -Patient had PFT in 2023 concerning for pneumonitis or interstitial lung disease  -Cefepime and vancomycin for now.  Continue to de-escalate based on cultures.  Micro negative to date.  Procalcitonin normal, white count downtrending  -Check alpha-1 antitrypsin  -Check CRP, 28.84  -Continue Brovana, Pulmicort, DuoNebs  -Crease Solu-Medrol from 40 mg IV daily to twice daily  -Continue Lasix 40 mg IV twice daily  -Continue to monitor renal panel and electrolytes.  Replace electrolytes necessary  -Start bronchopulmonary hygiene.  Encourage I-S and flutter  -Chest physiotherapy available  -Echo pending  -Able to wean supplemental oxygen, the service will  consider bronchoscopy    IVTE Prophylaxis:  Pharmacologic VTE prophylaxis orders are present.    Code Status and Medical Interventions: CPR (Attempt to Resuscitate); Full Support   Ordered at: 11/25/24 8922     Level Of Support Discussed With:    Patient     Code Status (Patient has no pulse and is not breathing):    CPR (Attempt to Resuscitate)     Medical Interventions (Patient has pulse or is breathing):    Full Support      Labs, imaging, notes and medications personally reviewed.  Discussed with primary    Thank you for involving me in the care of this patient.    Electronically signed by JES Hogue, 11/26/24, 2:04 PM EST.  This visit was performed by BOTH a physician and an APC. I personally evaluated and examined the patient. I performed all aspects of MDM as documented. , I have reviewed and confirmed the accuracy of the patient's history as documented in this note., and I have reexamined the patient and the results are consistent with the previously documented exam. I have updated the documentation as necessary. Electronically signed by Adan Brown MD, 11/26/24, 3:12 PM EST.     Electronically signed by Adan Brown MD, 11/26/24, 3:12 PM EST.

## 2024-11-26 NOTE — OUTREACH NOTE
COPD/PN Week 1 Survey      Flowsheet Row Responses   [facility] facility patient discharged from? [facility]   Does the patient have one of the following disease processes/diagnoses(primary or secondary)? Pneumonia   Week 1 attempt successful? No   Unsuccessful attempts Attempt 1   Revoke Readmitted            Sheila ANDERSEN - Registered Nurse

## 2024-11-26 NOTE — THERAPY EVALUATION
Acute Care - Physical Therapy Initial Evaluation   [facility]     Patient Name: Cris Agudelo  : 1940  MRN: [id number]  Today's Date: 2024      Visit Dx:     ICD-10-CM ICD-9-CM   1. Multifocal pneumonia  J18.9 486   2. Difficulty walking  R26.2 719.7     Patient Active Problem List   Diagnosis    Hyponatremia    Stress-induced cardiomyopathy    Hyperlipidemia    Primary hypertension    Herpes zoster    Shingles    Non-occlusive coronary artery disease    Pneumonia    Multifocal pneumonia    Acute on chronic respiratory failure with hypoxia    HCAP (healthcare-associated pneumonia)     Past Medical History:   Diagnosis Date    COPD (chronic obstructive pulmonary disease)     History of non-ST elevation myocardial infarction (NSTEMI) 2023    Hyperlipidemia     Hypertension      Past Surgical History:   Procedure Laterality Date    CARDIAC CATHETERIZATION N/A 2023    Procedure: Left Heart Cath;  Surgeon: Mitch Correia MD;  Location: [facility] CATH INVASIVE LOCATION;  Service: Cardiology;  Laterality: N/A;    CARDIAC CATHETERIZATION N/A 2023    Procedure: Coronary angiography;  Surgeon: Mitch Correia MD;  Location: [facility] CATH INVASIVE LOCATION;  Service: Cardiology;  Laterality: N/A;     PT Assessment (Last 12 Hours)       PT Evaluation and Treatment       Row Name 24 1400          Physical Therapy Time and Intention    Subjective Information no complaints  -DP     Document Type evaluation  -DP     Mode of Treatment individual therapy;physical therapy  -DP     Patient Effort good  -DP       Row Name 24 1400          General Information    Patient Profile Reviewed yes  -DP     Patient Observations alert;cooperative  -DP     Prior Level of Function independent:;gait;transfer;bed mobility;ADL's  -DP     Equipment Currently Used at Home cane, straight  -DP     Existing Precautions/Restrictions fall  -DP     Barriers to Rehab none identified  -DP       Row Name 24 1400          Living  Environment    Current Living Arrangements home  -DP     People in Home alone  -DP     Primary Care Provided by self;child(joel)  -DP       Row Name 11/26/24 1400          Home Use of Assistive/Adaptive Equipment    Equipment Currently Used at Home oxygen  -DP       Row Name 11/26/24 1400          Range of Motion (ROM)    Range of Motion bilateral lower extremities;ROM is WFL  -DP       Row Name 11/26/24 1400          Strength Comprehensive (MMT)    General Manual Muscle Testing (MMT) Assessment lower extremity strength deficits identified  -DP     Comment, General Manual Muscle Testing (MMT) Assessment BLE: 4/5  -DP       Row Name 11/26/24 1400          Bed Mobility    Bed Mobility supine-sit-supine  -DP     Supine-Sit-Supine Emily (Bed Mobility) standby assist  -DP     Assistive Device (Bed Mobility) head of bed elevated;bed rails  -DP       Row Name 11/26/24 1400          Transfers    Transfers sit-stand transfer  -DP       Row Name 11/26/24 1400          Sit-Stand Transfer    Sit-Stand Emily (Transfers) contact guard  -DP       Row Name 11/26/24 1400          Gait/Stairs (Locomotion)    Gait/Stairs Locomotion gait/ambulation assistive device  -DP     Emily Level (Gait) contact guard  -DP     Assistive Device (Gait) walker, front-wheeled  -DP     Patient was able to Ambulate yes  -DP     Distance in Feet (Gait) 6  -DP       Row Name 11/26/24 1400          Balance    Balance Assessment standing dynamic balance  -DP     Dynamic Standing Balance contact guard  -DP     Position/Device Used, Standing Balance supported;walker, front-wheeled  -DP       Row Name 11/26/24 1400          Plan of Care Review    Plan of Care Reviewed With patient  -DP     Outcome Evaluation Pt presents with decreased strength, transfers and functional mobility. Will benefit from inpatient PT services and continued PT services upon discharge.  -DP       Row Name 11/26/24 1400          Therapy Assessment/Plan (PT)     Criteria for Skilled Interventions Met (PT) yes;meets criteria  -DP     Therapy Frequency (PT) daily  -DP     Predicted Duration of Therapy Intervention (PT) 10 days  -DP     Problem List (PT) problems related to;mobility  -DP     Activity Limitations Related to Problem List (PT) unable to transfer safely;unable to ambulate safely  -DP       Row Name 11/26/24 1400          PT Evaluation Complexity    History, PT Evaluation Complexity no personal factors and/or comorbidities  -DP     Examination of Body Systems (PT Eval Complexity) total of 4 or more elements  -DP     Clinical Presentation (PT Evaluation Complexity) stable  -DP     Clinical Decision Making (PT Evaluation Complexity) low complexity  -DP     Overall Complexity (PT Evaluation Complexity) low complexity  -DP       Row Name 11/26/24 1400          Physical Therapy Goals    Transfer Goal Selection (PT) transfer, PT goal 1  -DP     Gait Training Goal Selection (PT) gait training, PT goal 1  -DP       Row Name 11/26/24 1400          Transfer Goal 1 (PT)    Activity/Assistive Device (Transfer Goal 1, PT) sit-to-stand/stand-to-sit  -DP     [place] Level/Cues Needed (Transfer Goal 1, PT) supervision required  -DP     Time Frame (Transfer Goal 1, PT) 10 days  -DP       Row Name 11/26/24 1400          Gait Training Goal 1 (PT)    Activity/Assistive Device (Gait Training Goal 1, PT) assistive device use;walker, rolling  -DP     [place] Level (Gait Training Goal 1, PT) supervision required  -DP     Distance (Gait Training Goal 1, PT) 200  -DP     Time Frame (Gait Training Goal 1, PT) 10 days  -DP               User Key  (r) = Recorded By, (t) = Taken By, (c) = Cosigned By      Initials Name Provider Type    Monica Leahy, PT Physical Therapist                    Physical Therapy Education        No education to display                  PT Recommendation and Plan  Anticipated Discharge Disposition (PT): home with home health (Daughter reported that she  will assist her upon return home)  Planned Therapy Interventions (PT): balance training, bed mobility training, gait training, strengthening, transfer training  Therapy Frequency (PT): daily  Plan of Care Reviewed With: patient  Outcome Evaluation: Pt presents with decreased strength, transfers and functional mobility. Will benefit from inpatient PT services and continued PT services upon discharge.   Outcome Measures       Row Name 11/26/24 1400             How much help from another person do you currently need...    Turning from your back to your side while in flat bed without using bedrails? 4  -DP      Moving from lying on back to sitting on the side of a flat bed without bedrails? 3  -DP      Moving to and from a bed to a chair (including a wheelchair)? 3  -DP      Standing up from a chair using your arms (e.g., wheelchair, bedside chair)? 3  -DP      Climbing 3-5 steps with a railing? 3  -DP      To walk in hospital room? 3  -DP      AM-PAC 6 Clicks Score (PT) 19  -DP         Functional Assessment    Outcome Measure Options AM-PAC 6 Clicks Basic Mobility (PT)  -DP                User Key  (r) = Recorded By, (t) = Taken By, (c) = Cosigned By      Initials Name Provider Type    Monica Leahy, PT Physical Therapist                     Time Calculation:    PT Charges       Row Name 11/26/24 1419             Time Calculation    PT Received On 11/26/24  -DP      PT Goal Re-Cert Due Date 12/05/24  -DP         Untimed Charges    PT Eval/Re-eval Minutes 40  -DP         Total Minutes    Untimed Charges Total Minutes 40  -DP       Total Minutes 40  -DP                User Key  (r) = Recorded By, (t) = Taken By, (c) = Cosigned By      Initials Name Provider Type    Monica Leahy, PT Physical Therapist                      PT G-Codes  Outcome Measure Options: AM-PAC 6 Clicks Basic Mobility (PT)  AM-PAC 6 Clicks Score (PT): 19    Dhamandeepi Kemal, PT  11/26/2024

## 2024-11-26 NOTE — PROGRESS NOTES
[facility]   Hospitalist Progress Note  Date: 2024  Patient Name: Cris Agudelo  : 1940  MRN: [id number]  Date of admission: 2024      Subjective   Subjective     Chief complaint: Shortness of breath, cough    Summary:  84-year-old female with history of hypertension, CAD, COPD, hyponatremia, dyslipidemia, GERD without esophagitis, hospitalized on 2024 with chief complaint of shortness of breath and cough, found to be hypoxemic, hypercapnic, found to have multifocal pneumonia with hypoxemia on imaging, with recent hospitalization for pneumonia discharged on Augmentin, concern for volume overload, discontinued IV fluids, pulmonary consulted for further evaluation    Interval follow-up: Seen and examined this morning, remains short of air on 6 to 7 L, sats are better, blood pressures are trending better, heart rate in the 80s, continues to have cough and shortness of breath symptoms, recent hospitalization, pulmonary consulted with the current hypoxemia worsening, there is concern for volume overload, patient's BNP is 1900, stopped IV fluids, ordered diuretics, no chest pain or palpitations.  Telemetry reviewed sinus rhythm in the 70s.    Review of systems:  All systems reviewed and negative set for weakness, fatigue, cough, shortness of breath    Objective   Objective     Vitals:   Temp:  [97.3 °F (36.3 °C)-97.9 °F (36.6 °C)] 97.3 °F (36.3 °C)  Heart Rate:  [] 73  Resp:  [18-41] 18  BP: (118-161)/(44-75) 133/49  Flow (L/min) (Oxygen Therapy):  [2-40] 7  Physical Exam    Constitutional: Awake, alert, no acute distress laying in bed   Eyes: Pupils equal, sclerae anicteric, no conjunctival injection   HENT: NCAT, mucous membranes moist   Neck: Supple, no thyromegaly, no lymphadenopathy, trachea midline   Respiratory: Diminished and coarse to auscultation bilaterally, nonlabored respirations    Cardiovascular: RRR, no murmurs, rubs, or gallops, palpable pedal pulses  bilaterally   Gastrointestinal: Positive bowel sounds, soft, nontender, nondistended   Musculoskeletal: Positive bilateral ankle edema, no clubbing or cyanosis to extremities   Psychiatric: Appropriate affect, cooperative   Neurologic: Oriented x 3, strength symmetric in all extremities, Cranial Nerves grossly intact to confrontation, speech clear   Skin: No rashes visible on exposed skin      Result Review    Result Review:  I have personally reviewed the pertinent results from the past 24 hours to 11/26/2024 13:59 EST and agree with these findings:  [x]  Laboratory   CBC          11/19/2024    05:14 11/25/2024    15:53 11/26/2024    05:06   CBC   WBC 11.41  26.51  13.38    RBC 4.43  5.15  4.78    Hemoglobin 12.5  14.2  13.1    Hematocrit 38.9  45.3  43.6    MCV 87.8  88.0  91.2    MCH 28.2  27.6  27.4    MCHC 32.1  31.3  30.0    RDW 13.2  13.3  13.5    Platelets 493  612  474      BMP          11/19/2024    05:14 11/25/2024    15:53 11/26/2024    05:06   BMP   BUN 8  13  19    Creatinine 0.45  0.70  0.61    Sodium 139  137  138    Potassium 3.6  3.7  4.3    Chloride 105  95  102    CO2 26.8  31.3  25.2    Calcium 8.4  8.9  8.6      LIVER FUNCTION TESTS:      Lab 11/25/24  1553   TOTAL PROTEIN 7.5   ALBUMIN 3.0*   GLOBULIN 4.5   ALT (SGPT) 20   AST (SGOT) 18   BILIRUBIN 0.5   ALK PHOS 129*       [x]  Microbiology   Microbiology Results (last 10 days)       Procedure Component Value - Date/Time    COVID-19, FLU A/B, RSV PCR 1 HR TAT - Swab, Nasopharynx [[id number]]  (Normal) Collected: 11/25/24 2051    Lab Status: Final result Specimen: Swab from Nasopharynx Updated: 11/25/24 2247     COVID19 Not Detected     Influenza A PCR Not Detected     Influenza B PCR Not Detected     RSV, PCR Not Detected    Narrative:      Fact sheet for providers: https://www.fda.gov/media/510210/download    Fact sheet for patients: https://www.fda.gov/media/465462/download    Test performed by PCR.    MRSA Screen, PCR (Inpatient) - Swab,  Nares [[id number]]  (Normal) Collected: 11/25/24 2051    Lab Status: Final result Specimen: Swab from Nares Updated: 11/25/24 2321     MRSA PCR No MRSA Detected    Narrative:      The negative predictive value of this diagnostic test is high and should only be used to consider de-escalating anti-MRSA therapy. A positive result may indicate colonization with MRSA and must be correlated clinically.    Mycoplasma Pneumoniae Antibody, IgM - Blood, [[id number]]  (Normal) Collected: 11/25/24 1553    Lab Status: Final result Specimen: Blood Updated: 11/26/24 0756     Mycoplasma pneumo IgM Negative    Clostridioides difficile Toxin, PCR - Stool, Per Rectum [[id number]] Collected: 11/20/24 0839    Lab Status: Final result Specimen: Stool from Per Rectum Updated: 11/20/24 0941     Toxigenic C. difficile by PCR Negative     027 Toxin Presumptive Negative    Narrative:      The result indicates the absence of toxigenic C. difficile from stool specimen.     S. Pneumo Ag Urine or CSF - Urine, Urine, Clean Catch [[id number]]  (Normal) Collected: 11/17/24 1127    Lab Status: Final result Specimen: Urine, Clean Catch Updated: 11/17/24 1411     Strep Pneumo Ag Negative    Legionella Antigen, Urine - Urine, Urine, Clean Catch [[id number]]  (Normal) Collected: 11/17/24 1127    Lab Status: Final result Specimen: Urine, Clean Catch Updated: 11/17/24 1411     LEGIONELLA ANTIGEN, URINE Negative    Respiratory Culture - Sputum, Cough [[id number]] Collected: 11/17/24 1126    Lab Status: Final result Specimen: Sputum from Cough Updated: 11/19/24 0935     Respiratory Culture Light growth (2+) Normal Respiratory Ramila     Gram Stain Rare (1+) Epithelial cells per low power field      Moderate (3+) WBCs per low power field      Rare (1+) Budding yeast              [x]  Radiology XR Chest 1 View    Result Date: 11/25/2024  Impression: Similar-appearing extensive bilateral airspace opacities, suspicious for persistent multifocal infectious/inflammatory  process. 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 labs  Full code  DVT prophylaxis with Lovenox  Clinical course dictate further management  Discussed with nurse at the bedside      VTE Prophylaxis:  Pharmacologic VTE prophylaxis orders are present.        CODE STATUS:   Level Of Support Discussed With: Patient  Code Status (Patient has no pulse and is not breathing): CPR (Attempt to Resuscitate)  Medical Interventions (Patient has pulse or is breathing): Full Support        Electronically signed by Marco Del Toro MD, 11/26/2024, 13:59 EST.    Portions of this documentation were transcribed electronically from a voice recognition software.  I confirm all data accurately represents the service(s) I performed at today's visit.

## 2024-11-26 NOTE — PAYOR COMM NOTE
"Elsie Rodas (84 y.o. Female)       Date of Birth   1940    Social Security Number       Address   [address] [place] [id number]    Home Phone   [phone]    MRN   [id number]       Voodoo   Bahai    Marital Status                               Admission Date   24    Admission Type   Emergency    Admitting Provider   Gold Lorenz MD    Attending Provider   Marco Del Toro MD    Department, Room/Bed   [facility] 4TH FLOOR MEDICAL TELEMETRY UNIT, 404/       Discharge Date       Discharge Disposition       Discharge Destination                                 Attending Provider: Marco Del Toro MD    Allergies: Aleve [Naproxen]    Isolation: None   Infection: None   Code Status: CPR    Ht: 152.4 cm (60\")   Wt: 67 kg (147 lb 11.3 oz)    Admission Cmt: None   Principal Problem: Multifocal pneumonia [J18.9]                   Active Insurance as of 2024       Primary Coverage       Payor Plan Insurance Group Employer/Plan Group    ANTHEM MEDICARE REPLACEMENT ANTHEM MEDICARE ADVANTAGE [id number]       Payor Plan Address Payor Plan Phone Number Payor Plan Fax Number Effective Dates    [address] [phone]  2022 - None Entered    [facility] [id number]         Subscriber Name Subscriber Birth Date Member ID       ELSIE RODAS 1940 [id number]                     Emergency Contacts        (Rel.) Home Phone Work Phone Mobile Phone    ALEJANDRA ARGUETA (Daughter) [phone] -- [phone]                 History & Physical        Gold Lorenz MD at [address] [place] [address]   HISTORY AND PHYSICAL    Patient Name: Elsie Rodas  : 1940  MRN: [id number]  Primary Care Physician:  Ramirez Maurice MD  Date of admission: 2024    Subjective  Subjective     Chief Complaint: Shortness of breath, cough    HPI:    Elsie Rodas is a 84 y.o. female with past medical history of hypertension, COPD, CAD, prior MI, " hyponatremia, hyperlipidemia, and GERD presented to the ED for evaluation of shortness of breath and cough.  Patient was discharged from this hospital 5 days prior after inpatient management of pneumonia.  Patient was feeling relatively well after discharge but over the last 2 to 3 days he has been having worsening shortness of breath, a productive cough, intermittent chills, night sweats, lightheadedness, decreased appetite, and generalized weakness.  Patient denied any sick contacts but went home health came to check on her they noted that she was saturating in the high 50s low 60s.  Family then brought her immediately to the ED for further evaluation.  In the ED patient was tachypneic and significantly hypoxic on arrival requiring oxygen supplementation via high flow nasal cannula.  Labs showed that she had significant leukocytosis with elevated troponins, and compensated hypercapnia.  Her lactic acid was within normal limits.  Chest x-ray showed extensive bilateral airspace opacity consistent with multifocal pneumonia.  When seen patient was ill-appearing and lethargic but when asked she denied any  headaches, focal weakness, chest pain, abdominal pain, nausea, vomiting, diarrhea, constipation, dysuria, hematuria, hematochezia, melena, or anxiety.  Patient admitted for further evaluation and treatment    Review of Systems   All systems were reviewed and negative except for: As per HPI    Personal History     Past Medical History:   Diagnosis Date    COPD (chronic obstructive pulmonary disease)     History of non-ST elevation myocardial infarction (NSTEMI) 08/23/2023    Hyperlipidemia     Hypertension        Past Surgical History:   Procedure Laterality Date    CARDIAC CATHETERIZATION N/A 8/6/2023    Procedure: Left Heart Cath;  Surgeon: Mitch Correia MD;  Location: formerly [facility] CATH INVASIVE LOCATION;  Service: Cardiology;  Laterality: N/A;    CARDIAC CATHETERIZATION N/A 8/6/2023    Procedure: Coronary angiography;   Surgeon: Mitch Correia MD;  Location: [facility] INVASIVE LOCATION;  Service: Cardiology;  Laterality: N/A;       Family History: family history includes Heart failure in her mother. Otherwise pertinent FHx was reviewed and not pertinent to current issue.    Social History:  reports that she quit smoking about 18 months ago. Her smoking use included cigarettes. She has been exposed to tobacco smoke. She has never used smokeless tobacco. She reports current alcohol use of about 2.0 standard drinks of alcohol per week. She reports that she does not use drugs.    Home Medications:  arformoterol, aspirin, atorvastatin, budesonide, carvedilol, dapagliflozin Propanediol, furosemide, guaiFENesin, ipratropium-albuterol, lisinopril, and nitroglycerin      Allergies:  Allergies   Allergen Reactions    Aleve [Naproxen] Hives       Objective  Objective     Vitals:   Temp:  [97.9 °F (36.6 °C)] 97.9 °F (36.6 °C)  Heart Rate:  [] 81  Resp:  [26-41] 41  BP: (118-161)/(44-75) 118/47  Flow (L/min) (Oxygen Therapy):  [2-40] 9  Physical Exam    Constitutional: Awake, alert, ill-appearing   Eyes: PERRLA, sclerae anicteric, no conjunctival injection   HENT: NCAT, mucous membranes dry   Neck: Supple, no thyromegaly, no lymphadenopathy, trachea midline   Respiratory: Clear to auscultation bilaterally, nonlabored respirations    Cardiovascular: Rales, rhonchi, wheezing, on high flow nasal cannula, tachypneic   Gastrointestinal: Positive bowel sounds, soft, nontender, nondistended   Musculoskeletal: No bilateral ankle edema, no clubbing or cyanosis to extremities   Psychiatric: Appropriate affect, cooperative   Neurologic: Oriented x 3, strength symmetric in all extremities, Cranial Nerves grossly intact to confrontation, speech clear   Skin: No rashes     Result Review   Result Review:  I have personally reviewed the results from the time of this admission to 11/25/2024 18:37 EST and agree with these findings:  [x]  Laboratory list /  accordion  []  Microbiology  [x]  Radiology  [x]  EKG/Telemetry   []  Cardiology/Vascular   []  Pathology  []  Old records  []  Other:  Most notable findings include: Leukocytosis, elevated proBNP, normal lactic acid, x-ray with multifocal pneumonia      Assessment & Plan  Assessment / Plan     Brief Patient Summary:  Cris Agudelo is a 84 y.o. female with past medical history of hypertension, COPD, CAD, prior MI, hyponatremia, hyperlipidemia, and GERD presented to the ED for evaluation of shortness of breath and cough.    Active Hospital Problems:  Active Hospital Problems    Diagnosis     **Multifocal pneumonia     Acute on chronic respiratory failure with hypoxia     HCAP (healthcare-associated pneumonia)     Hyperlipidemia     Primary hypertension     Hyponatremia      Plan:     HCAP  -Admit to telemetry  -Hx COPD  -Imaging reviewed  -Supplemental oxygen as needed, wean down as tolerated  -IV Solu-Medrol  -Duo nebs 3 times daily and as needed  -Empiric antibiotics with cefepime and vancomycin  -Mucinex, Tessalon Perles  -Incentive spirometry  -Consult pulmonology if warranted  -Supportive care    HTN  -Currently well controlled  -PRN BP meds  -Resume home meds when available  -Titrate if needed    CAD    GI ppx  DVT ppx        VTE Prophylaxis:  Pharmacologic VTE prophylaxis orders are signed & held.          CODE STATUS:    Level Of Support Discussed With: Patient  Code Status (Patient has no pulse and is not breathing): CPR (Attempt to Resuscitate)  Medical Interventions (Patient has pulse or is breathing): Full Support    Admission Status:  I believe this patient meets inpatient status.      Electronically signed by Gold Lorenz MD, 11/25/24, 6:37 PM EST.    Electronically signed by Gold Lorenz MD at 11/25/24 3522       Vital Signs (last day)       Date/Time Temp Temp src Pulse Resp BP Patient Position SpO2    11/26/24 0750 97.7 (36.5) Oral 84 18 144/61 Lying 92    11/26/24 0702 -- -- 72 -- -- -- 94     11/26/24 0658 -- -- 73 -- -- -- 94    11/26/24 0654 -- -- 74 20 -- -- 94    11/26/24 0420 -- -- 71 18 135/52 Lying 96    11/26/24 0131 -- -- 65 -- -- -- 98    11/26/24 0130 -- -- 64 -- -- -- 98    11/25/24 2345 -- -- 71 20 118/51 Lying 97    11/25/24 2131 -- -- 76 18 -- -- 97    11/25/24 2127 -- -- 77 18 -- -- 95    11/25/24 1955 97.9 (36.6) Oral 83 22 138/58 Lying 96    11/25/24 1850 -- -- 81 -- -- -- 94    11/25/24 1800 -- -- 81 -- 118/47 -- 94    11/25/24 1745 -- -- 85 -- 138/44 -- 93    11/25/24 1744 -- -- 87 -- -- -- 95    11/25/24 1730 -- -- 90 -- 131/52 -- 92    11/25/24 1644 -- -- 86 41 132/52 -- 90    11/25/24 1630 -- -- 87 -- 133/56 -- 90    11/25/24 1615 -- -- 92 -- 145/56 -- 91    11/25/24 1604 97.9 (36.6) -- -- -- -- -- --    11/25/24 1556 -- -- -- -- 161/75 -- 86    11/25/24 1551 -- -- -- 26 -- -- 83    11/25/24 1547 -- -- 106 -- -- -- 63          Oxygen Therapy (last day)       Date/Time SpO2 Device (Oxygen Therapy) Flow (L/min) (Oxygen Therapy) Oxygen Concentration (%) ETCO2 (mmHg)    11/26/24 0750 92 nasal cannula 7 -- --    11/26/24 0730 -- humidified;high-flow nasal cannula 7 -- --    11/26/24 0702 94 high-flow nasal cannula;humidified 7 -- --    11/26/24 0658 94 humidified;high-flow nasal cannula 7 -- --    11/26/24 0654 94 humidified;high-flow nasal cannula 7 -- --    11/26/24 0420 96 high-flow nasal cannula 8 -- --    11/26/24 0131 98 -- 8 -- --    11/26/24 0130 98 high-flow nasal cannula 9 -- --    11/25/24 2345 97 humidified;high-flow nasal cannula 9 -- --    11/25/24 2131 97 -- -- -- --    11/25/24 2127 95 high-flow nasal cannula 9 -- --    11/25/24 1955 96 humidified;high-flow nasal cannula -- -- --    11/25/24 1850 94 humidified;high-flow nasal cannula 10 -- --    11/25/24 1800 94 -- -- -- --    11/25/24 1745 93 -- -- -- --    11/25/24 1744 95 humidified;high-flow nasal cannula 9 -- --    11/25/24 1730 92 -- -- -- --    11/25/24 1644 90 NPPV/NIV 40 40 --    11/25/24 1630 90 -- -- -- --     11/25/24 1615 91 -- -- -- --    11/25/24 1604 -- CPAP -- -- --    11/25/24 1556 86 nonrebreather mask -- -- --    11/25/24 1551 83 nonrebreather mask -- -- --    11/25/24 1547 63 nasal cannula 2 -- --          Facility-Administered Medications as of 11/26/2024   Medication Dose Route Frequency Provider Last Rate Last Admin    arformoterol (BROVANA) nebulizer solution 15 mcg  15 mcg Nebulization BID - RT Marco Del Toro MD   15 mcg at 11/26/24 0654    aspirin EC tablet 81 mg  81 mg Oral Daily Gold Lorenz MD   81 mg at 11/26/24 0837    sennosides-docusate (PERICOLACE) 8.6-50 MG per tablet 2 tablet  2 tablet Oral BID PRN Gold Lorenz MD        And    polyethylene glycol (MIRALAX) packet 17 g  17 g Oral Daily PRN Gold Lorenz MD        And    bisacodyl (DULCOLAX) EC tablet 5 mg  5 mg Oral Daily PRN Gold Lorenz MD        And    bisacodyl (DULCOLAX) suppository 10 mg  10 mg Rectal Daily PRN Gold Lorenz MD        budesonide (PULMICORT) nebulizer solution 0.5 mg  0.5 mg Nebulization BID - RT Gold Lorenz MD   0.5 mg at 11/26/24 0654    carvedilol (COREG) tablet 6.25 mg  6.25 mg Oral BID Gold Lorenz MD   6.25 mg at 11/26/24 0837    [COMPLETED] cefepime 2000 mg IVPB in 100 mL NS (VTB)  2,000 mg Intravenous Once Lepora, Derek, DO   Stopped at 11/25/24 1723    cefepime 2000 mg IVPB in 100 mL NS (VTB)  2,000 mg Intravenous Q12H Gold Lorenz MD   2,000 mg at 11/26/24 0414    Enoxaparin Sodium (LOVENOX) syringe 40 mg  40 mg Subcutaneous Daily Gold Lorenz MD   40 mg at 11/26/24 0837    guaiFENesin (MUCINEX) 12 hr tablet 600 mg  600 mg Oral Q12H Gold Lorenz MD   600 mg at 11/26/24 0837    guaiFENesin-dextromethorphan (ROBITUSSIN DM) 100-10 MG/5ML syrup 10 mL  10 mL Oral Q6H PRN Gold Lorenz MD        [COMPLETED] ipratropium-albuterol (DUO-NEB) nebulizer solution 3 mL  3 mL Nebulization Once Derek Casey DO   3 mL at 11/25/24 1643    ipratropium-albuterol (DUO-NEB) nebulizer  solution 3 mL  3 mL Nebulization TID Gold Lorenz MD   3 mL at 11/26/24 0654    ipratropium-albuterol (DUO-NEB) nebulizer solution 3 mL  3 mL Nebulization Q4H PRN Gold Lorenz MD        lisinopril (PRINIVIL,ZESTRIL) tablet 20 mg  20 mg Oral Daily Gold Lorenz MD   20 mg at 11/26/24 0837    [COMPLETED] methylPREDNISolone sodium succinate (SOLU-Medrol) injection 125 mg  125 mg Intravenous Once Derek Casey DO   125 mg at 11/25/24 1601    methylPREDNISolone sodium succinate (SOLU-Medrol) injection 40 mg  40 mg Intravenous Q24H Gold Lorenz MD        ondansetron (ZOFRAN) injection 4 mg  4 mg Intravenous Q6H PRN Gold Lorenz MD        saccharomyces boulardii (FLORASTOR) capsule 250 mg  250 mg Oral BID Gold Lorenz MD   250 mg at 11/26/24 0837    sodium chloride 0.9 % flush 10 mL  10 mL Intravenous PRN Derek Casey DO        sodium chloride 0.9 % flush 10 mL  10 mL Intravenous Q12H Gold Lorenz MD   10 mL at 11/26/24 0837    sodium chloride 0.9 % flush 10 mL  10 mL Intravenous PRN Gold Lorenz MD        sodium chloride 0.9 % infusion 40 mL  40 mL Intravenous PRN Gold Lorenz MD        sodium chloride 0.9 % infusion  75 mL/hr Intravenous Continuous Gold Lorenz MD 75 mL/hr at 11/25/24 2200 75 mL/hr at 11/25/24 2200    [COMPLETED] vancomycin 1250 mg/250 mL 0.9% NS IVPB ([facility])  20 mg/kg Intravenous Once Derek Casey DO   1,250 mg at 11/25/24 1724     Lab Results (last 24 hours)       Procedure Component Value Units Date/Time    Mycoplasma Pneumoniae Antibody, IgM - Blood, [[id number]]  (Normal) Collected: 11/25/24 1553    Specimen: Blood Updated: 11/26/24 0756     Mycoplasma pneumo IgM Negative    Basic Metabolic Panel [[id number]]  (Abnormal) Collected: 11/26/24 0506    Specimen: Blood from Hand, Right Updated: 11/26/24 0619     Glucose 132 mg/dL      BUN 19 mg/dL      Creatinine 0.61 mg/dL      Sodium 138 mmol/L      Potassium 4.3 mmol/L      Chloride 102 mmol/L      CO2 25.2 mmol/L       Calcium 8.6 mg/dL      BUN/Creatinine Ratio 31.1     Anion Gap 10.8 mmol/L      eGFR 88.3 mL/min/1.73     Narrative:      GFR Normal >60  Chronic Kidney Disease <60  Kidney Failure <15    The GFR formula is only valid for adults with stable renal function between ages 18 and 70.    CBC (No Diff) [[id number]]  (Abnormal) Collected: 11/26/24 0506    Specimen: Blood from Hand, Right Updated: 11/26/24 0556     WBC 13.38 10*3/mm3      RBC 4.78 10*6/mm3      Hemoglobin 13.1 g/dL      Hematocrit 43.6 %      MCV 91.2 fL      MCH 27.4 pg      MCHC 30.0 g/dL      RDW 13.5 %      RDW-SD 45.1 fl      MPV 10.7 fL      Platelets 474 10*3/mm3     MRSA Screen, PCR (Inpatient) - Swab, Nares [[id number]]  (Normal) Collected: 11/25/24 2051    Specimen: Swab from Nares Updated: 11/25/24 2321     MRSA PCR No MRSA Detected    Narrative:      The negative predictive value of this diagnostic test is high and should only be used to consider de-escalating anti-MRSA therapy. A positive result may indicate colonization with MRSA and must be correlated clinically.    COVID-19, FLU A/B, RSV PCR 1 HR TAT - Swab, Nasopharynx [[id number]]  (Normal) Collected: 11/25/24 2051    Specimen: Swab from Nasopharynx Updated: 11/25/24 2247     COVID19 Not Detected     Influenza A PCR Not Detected     Influenza B PCR Not Detected     RSV, PCR Not Detected    Narrative:      Fact sheet for providers: https://www.fda.gov/media/280691/download    Fact sheet for patients: https://www.fda.gov/media/021769/download    Test performed by PCR.    Comprehensive Metabolic Panel [[id number]]  (Abnormal) Collected: 11/25/24 1553    Specimen: Blood Updated: 11/25/24 1628     Glucose 182 mg/dL      BUN 13 mg/dL      Creatinine 0.70 mg/dL      Sodium 137 mmol/L      Potassium 3.7 mmol/L      Comment: Slight hemolysis detected by analyzer. Result may be falsely elevated.        Chloride 95 mmol/L      CO2 31.3 mmol/L      Calcium 8.9 mg/dL      Total Protein 7.5 g/dL       Albumin 3.0 g/dL      ALT (SGPT) 20 U/L      AST (SGOT) 18 U/L      Alkaline Phosphatase 129 U/L      Total Bilirubin 0.5 mg/dL      Globulin 4.5 gm/dL      A/G Ratio 0.7 g/dL      BUN/Creatinine Ratio 18.6     Anion Gap 10.7 mmol/L      eGFR 85.4 mL/min/1.73     Narrative:      GFR Normal >60  Chronic Kidney Disease <60  Kidney Failure <15    The GFR formula is only valid for adults with stable renal function between ages 18 and 70.    Single High Sensitivity Troponin T [[id number]]  (Abnormal) Collected: 11/25/24 1553    Specimen: Blood Updated: 11/25/24 1627     HS Troponin T 15 ng/L     Narrative:      High Sensitive Troponin T Reference Range:  <14.0 ng/L- Negative Female for AMI  <22.0 ng/L- Negative Male for AMI  >=14 - Abnormal Female indicating possible myocardial injury.  >=22 - Abnormal Male indicating possible myocardial injury.   Clinicians would have to utilize clinical acumen, EKG, Troponin, and serial changes to determine if it is an Acute Myocardial Infarction or myocardial injury due to an underlying chronic condition.         CBC & Differential [[id number]]  (Abnormal) Collected: 11/25/24 1553    Specimen: Blood Updated: 11/25/24 1624    Narrative:      The following orders were created for panel order CBC & Differential.  Procedure                               Abnormality         Status                     ---------                               -----------         ------                     CBC Auto Differential[[id number]]        Abnormal            Final result                 Please view results for these tests on the individual orders.    CBC Auto Differential [[id number]]  (Abnormal) Collected: 11/25/24 1553    Specimen: Blood Updated: 11/25/24 1624     WBC 26.51 10*3/mm3      RBC 5.15 10*6/mm3      Hemoglobin 14.2 g/dL      Hematocrit 45.3 %      MCV 88.0 fL      MCH 27.6 pg      MCHC 31.3 g/dL      RDW 13.3 %      RDW-SD 43.3 fl      MPV 10.2 fL      Platelets 612 10*3/mm3      Neutrophil %  84.9 %      Lymphocyte % 6.4 %      Monocyte % 6.1 %      Eosinophil % 0.9 %      Basophil % 0.5 %      Immature Grans % 1.2 %      Neutrophils, Absolute 22.49 10*3/mm3      Lymphocytes, Absolute 1.70 10*3/mm3      Monocytes, Absolute 1.62 10*3/mm3      Eosinophils, Absolute 0.25 10*3/mm3      Basophils, Absolute 0.13 10*3/mm3      Immature Grans, Absolute 0.32 10*3/mm3      nRBC 0.0 /100 WBC     BNP [[id number]]  (Abnormal) Collected: 11/25/24 1553    Specimen: Blood Updated: 11/25/24 1624     proBNP 1,987.0 pg/mL     Narrative:      This assay is used as an aid in the diagnosis of individuals suspected of having heart failure. It can be used as an aid in the diagnosis of acute decompensated heart failure (ADHF) in patients presenting with signs and symptoms of ADHF to the emergency department (ED). In addition, NT-proBNP of <300 pg/mL indicates ADHF is not likely.    Age Range Result Interpretation  NT-proBNP Concentration (pg/mL:      <50             Positive            >450                   Gray                 300-450                    Negative             <300    50-75           Positive            >900                  Gray                300-900                  Negative            <300      >75             Positive            >1800                  Gray                300-1800                  Negative            <300    Lactic Acid, Plasma [[id number]]  (Normal) Collected: 11/25/24 1555    Specimen: Blood Updated: 11/25/24 1623     Lactate 1.7 mmol/L     [facility] Draw [[id number]] Collected: 11/25/24 1553    Specimen: Blood Updated: 11/25/24 1615    Narrative:      The following orders were created for panel order [facility] Draw.  Procedure                               Abnormality         Status                     ---------                               -----------         ------                     Green Top (Gel)[[id number]]                                  Final result               Lavender Top[[id number]]                                      Final result               Gold Top - SST[[id number]]                                   Final result               Light Blue Top[[id number]]                                   Final result                 Please view results for these tests on the individual orders.    Green Top (Gel) [[id number]] Collected: 11/25/24 1553    Specimen: Blood Updated: 11/25/24 1615     Extra Tube Hold for add-ons.     Comment: Auto resulted.       Lavender Top [[id number]] Collected: 11/25/24 1553    Specimen: Blood Updated: 11/25/24 1615     Extra Tube hold for add-on     Comment: Auto resulted       Gold Top - SST [[id number]] Collected: 11/25/24 1553    Specimen: Blood Updated: 11/25/24 1615     Extra Tube Hold for add-ons.     Comment: Auto resulted.       Light Blue Top [[id number]] Collected: 11/25/24 1553    Specimen: Blood Updated: 11/25/24 1615     Extra Tube Hold for add-ons.     Comment: Auto resulted       Blood Culture - Blood, Arm, Left [[id number]] Collected: 11/25/24 1555    Specimen: Blood from Arm, Left Updated: 11/25/24 1609    Blood Culture - Blood, Arm, Left [[id number]] Collected: 11/25/24 1555    Specimen: Blood from Arm, Left Updated: 11/25/24 1602          Imaging Results (Last 24 Hours)       Procedure Component Value Units Date/Time    XR Chest 1 View [[id number]] Collected: 11/25/24 1625     Updated: 11/25/24 1631    Narrative:      XR CHEST 1 VW    Date of Exam: 11/25/2024 3:55 PM EST    Indication: SOA Triage Protocol    Comparison: Chest radiograph 11/15/2024    Findings:      Mediastinum: Cardiomediastinal silhouette appears unchanged.    Lungs: Accounting for differences in technique/positioning, similar-appearing extensive bilateral airspace opacities.    Pleura: No pleural effusion or pneumothorax.    Bones and soft tissues: No acute osseous abnormality.        Impression:      Impression:  Similar-appearing extensive bilateral airspace opacities, suspicious for persistent  multifocal infectious/inflammatory process.        Electronically Signed: Jared Nelson    11/25/2024 4:29 PM EST    Workstation ID: [id number]              Orders (active)        Start     Ordered    11/27/24 0600  Document Pulse Oximetry - On Room Air / Home O2 Level  Daily      Comments: Room Air Oxygen Levels Should Only Be Measured if Patient Oxygen Needs are <4L  Home O2 Oxygen Levels Should Only Be Measured Once Patient Within 2L of Home O2 Baseline  Reapply Oxygen if O2 Sat Drops Below 88%    11/25/24 2039 11/27/24 0600  Basic Metabolic Panel  Morning Draw         11/26/24 0648    11/27/24 0600  CBC & Differential  Morning Draw         11/26/24 0648    11/27/24 0600  Magnesium  Morning Draw         11/26/24 0648    11/27/24 0600  Phosphorus  Morning Draw         11/26/24 0648    11/27/24 0600  Hepatic Function Panel  Morning Draw         11/26/24 0648    11/26/24 1700  methylPREDNISolone sodium succinate (SOLU-Medrol) injection 40 mg  Every 24 Hours         11/25/24 2119 11/26/24 0900  aspirin EC tablet 81 mg  Daily         11/25/24 2228 11/26/24 0900  lisinopril (PRINIVIL,ZESTRIL) tablet 20 mg  Daily         11/25/24 2039 11/26/24 0900  Enoxaparin Sodium (LOVENOX) syringe 40 mg  Daily         11/25/24 2039 11/26/24 0800  Oral Care  2 Times Daily       11/25/24 2039 11/26/24 0700  arformoterol (BROVANA) nebulizer solution 15 mcg  2 Times Daily - RT         11/26/24 0648    11/26/24 0649  S. Pneumo Ag Urine or CSF - Urine, Urine, Clean Catch  Once         11/26/24 0648    11/26/24 0649  Respiratory Culture - Sputum, Cough  Once         11/26/24 0648    11/26/24 0649  Legionella Antigen, Urine - Urine, Urine, Clean Catch  Once         11/26/24 0648    11/26/24 0600  Respiratory Treatment Education (MDI / Spacer / Nebulizer)  Daily      Comments: Document in Education Activity    11/25/24 2039 11/26/24 0430  cefepime 2000 mg IVPB in 100 mL NS (VTB)  Every 12 Hours         11/25/24 0431     11/26/24 0250  Continuous Pulse Oximetry  Continuous         11/26/24 0249    11/26/24 0000  Cough / Deep Breathe  Every 4 Hours       11/25/24 2039 11/26/24 0000  Vital Signs  Every 4 Hours       11/25/24 2039 11/25/24 2229  Diet: Cardiac; Healthy Heart (2-3 Na+); Fluid Consistency: Thin (IDDSI 0)  Diet Effective Now         11/25/24 2228 11/25/24 2200  Incentive Spirometry  Every 4 Hours While Awake       11/25/24 2039 11/25/24 2130  budesonide (PULMICORT) nebulizer solution 0.5 mg  2 Times Daily - RT         11/25/24 2039 11/25/24 2130  carvedilol (COREG) tablet 6.25 mg  2 Times Daily         11/25/24 2039 11/25/24 2130  Oscillating Positive Expiratory Pressure (OPEP)  2 Times Daily - RT       11/25/24 2039 11/25/24 2130  sodium chloride 0.9 % flush 10 mL  Every 12 Hours Scheduled         11/25/24 2039 11/25/24 2130  sodium chloride 0.9 % infusion  Continuous         11/25/24 2039 11/25/24 2130  ipratropium-albuterol (DUO-NEB) nebulizer solution 3 mL  3 times daily         11/25/24 2039 11/25/24 2130  guaiFENesin (MUCINEX) 12 hr tablet 600 mg  Every 12 Hours Scheduled         11/25/24 2039 11/25/24 2120  PT Consult: Eval & Treat Functional Mobility Below Baseline  Once         11/25/24 2119 11/25/24 2100  saccharomyces boulardii (FLORASTOR) capsule 250 mg  2 Times Daily         11/25/24 1821 11/25/24 2040  Intake & Output  Every Shift       11/25/24 2039 11/25/24 2040  Weigh Patient  Once         11/25/24 2039 11/25/24 2040  Insert Peripheral IV  Once         11/25/24 2039 11/25/24 2040  Saline Lock & Maintain IV Access  Continuous         11/25/24 2039 11/25/24 2040  Activity - Ad Kelly  Until Discontinued         11/25/24 2039 11/25/24 2039  sodium chloride 0.9 % flush 10 mL  As Needed         11/25/24 2039 11/25/24 2039  sodium chloride 0.9 % infusion 40 mL  As Needed         11/25/24 2039 11/25/24 2039  ipratropium-albuterol (DUO-NEB) nebulizer  "solution 3 mL  Every 4 Hours PRN         11/25/24 2039 11/25/24 2039  sennosides-docusate (PERICOLACE) 8.6-50 MG per tablet 2 tablet  2 Times Daily PRN        Placed in \"And\" Linked Group    11/25/24 2039 11/25/24 2039  polyethylene glycol (MIRALAX) packet 17 g  Daily PRN        Placed in \"And\" Linked Group    11/25/24 2039 11/25/24 2039  bisacodyl (DULCOLAX) EC tablet 5 mg  Daily PRN        Placed in \"And\" Linked Group    11/25/24 2039 11/25/24 2039  bisacodyl (DULCOLAX) suppository 10 mg  Daily PRN        Placed in \"And\" Linked Group    11/25/24 2039 11/25/24 2039  ondansetron (ZOFRAN) injection 4 mg  Every 6 Hours PRN         11/25/24 2039 11/25/24 2039  guaiFENesin-dextromethorphan (ROBITUSSIN DM) 100-10 MG/5ML syrup 10 mL  Every 6 Hours PRN         11/25/24 2039 11/25/24 1823  Code Status and Medical Interventions: CPR (Attempt to Resuscitate); Full Support  Continuous         11/25/24 1837    11/25/24 1703  NIPPV - Provider Settings  As Needed-RT         11/25/24 1702    11/25/24 1658  Inpatient Hospitalist Consult  Once        Specialty:  Hospitalist  Provider:  Gold Lorenz MD    11/25/24 1657    11/25/24 1556  Blood Culture - Blood, Arm, Left  Once         11/25/24 1555    11/25/24 1556  Blood Culture - Blood, Arm, Left  Once         11/25/24 1555    11/25/24 1549  Cardiac Monitoring  Continuous        Comments: Follow Standing Orders As Outlined in Process Instructions (Open Order Report to View Full Instructions)    11/25/24 1548    11/25/24 1549  Insert Peripheral IV  Once         11/25/24 1548    11/25/24 1548  sodium chloride 0.9 % flush 10 mL  As Needed         11/25/24 1548    Unscheduled  Oxygen Therapy- Nasal Cannula; Titrate 1-6 LPM Per SpO2; 90 - 95%  Continuous PRN       11/25/24 1548    Unscheduled  Oxygen Therapy- Nasal Cannula; Titrate 1-6 LPM Per SpO2; 88 - 92%  Continuous PRN       11/25/24 4829                     Respiratory Therapy Notes (last 24 hours)    "     Nicki Sifuentes, HARDEEP at 11/25/24 1747          Transitioned patient to HFNC. Will continue to titrate as tolerated.     Electronically signed by Nicki Sifuentes, HARDEEP at 11/25/24 1742       Nicki Sifuentes, HARDEEP at 11/25/24 1720          Patient was placed on NIPPV due to her tachypnea and low oxygen saturation. Patient arrived to the ED on her home oxygen of 5lpm pulse dose via nasal cannula with saturations in the 60's. Patient was placed on a NRBM where saturation was obtained 90%. Patient remained tachypenic and was placed on BIPAP 12/8 40%.      Electronically signed by iNcki Sifuentes, HARDEEP at 11/25/24 9945

## 2024-11-26 NOTE — PLAN OF CARE
Goal Outcome Evaluation:  Plan of Care Reviewed With: patient           Outcome Evaluation: pt alert and oriented x4. pt on 6L NC, no s/s of distress. Pt up to chair on shift. No complaints of pain. will continue with pt plan of care.

## 2024-11-26 NOTE — PLAN OF CARE
Goal Outcome Evaluation:  Plan of Care Reviewed With: patient           Outcome Evaluation: Pt presents with decreased strength, transfers and functional mobility. Will benefit from inpatient PT services and continued PT services upon discharge.    Anticipated Discharge Disposition (PT): home with home health (Daughter reported that she will assist her upon return home)

## 2024-11-26 NOTE — PROGRESS NOTES
"[facility] Clinical [facility]acy Services: Vancomycin Pharmacokinetic Initial Consult Note    Cris Agudelo is a 84 y.o. female who is on day 1 of pharmacy to dose vancomycin for Empiric and Pneumonia.    Consult Information  Consulting Provider: Lorenz  Planned Duration of Therapy: 7 days  Was Patient Receiving Prior to Admission/Consult?: No  Loading Dose Ordered or Given: 1250 mg on  at 1724  PK/PD Target: -600 mg/L.hr   Relevant ID History: admission and antibiotics past 90 days  Other Antimicrobials: Cefepime    Imaging Reviewed?: Yes    Microbiology Data  MRSA PCR performed: 24; Result: Pending  Culture/Source:    BCx2 IP    Vitals/Labs  Ht: 152.4 cm (60\"); Wt: 67 kg (147 lb 11.3 oz)  Temp (24hrs), Av.9 °F (36.6 °C), Min:97.9 °F (36.6 °C), Max:97.9 °F (36.6 °C)   Estimated Creatinine Clearance: 51.1 mL/min (by C-G formula based on SCr of 0.7 mg/dL).  Renal: no significant issues at time of note     Results from last 7 days   Lab Units 24  1553 24  0514   CREATININE mg/dL 0.70 0.45*   WBC 10*3/mm3 26.51* 11.41*     Assessment/Plan:    Vancomycin Dose:  1500 mg IV every 24 hours; which provides the following predicted parameters:  AUC24,ss: 538 mg/L.hr  Probability of AUC24 > 400: 81 %  Ctrough,ss: 15.1 mg/L  Probability of Ctrough,ss > 20: 26 %  Probability of nephrotoxicity (Lodise KOFI ): 10 %  Vanc Random ordered for  at 0600  Patient has order for Basic Metabolic Panel x 3d    Pharmacy will follow patient's kidney function and will adjust doses and obtain levels as necessary. Thank you for involving pharmacy in this patient's care. Please contact pharmacy with any questions or concerns.                           Kanu Talley, PharmD  Clinical Pharmacist    "

## 2024-11-27 LAB
ALBUMIN SERPL-MCNC: 2.3 G/DL (ref 3.5–5.2)
ALP SERPL-CCNC: 105 U/L (ref 39–117)
ALT SERPL W P-5'-P-CCNC: 19 U/L (ref 1–33)
ANION GAP SERPL CALCULATED.3IONS-SCNC: 9.2 MMOL/L (ref 5–15)
ASCENDING AORTA: 3.5 CM
AST SERPL-CCNC: 16 U/L (ref 1–32)
BASOPHILS # BLD AUTO: 0.02 10*3/MM3 (ref 0–0.2)
BASOPHILS NFR BLD AUTO: 0.1 % (ref 0–1.5)
BH CV ECHO MEAS - ACS: 2.2 CM
BH CV ECHO MEAS - AI P1/2T: 654.6 MSEC
BH CV ECHO MEAS - AO MAX PG: 14 MMHG
BH CV ECHO MEAS - AO MEAN PG: 7 MMHG
BH CV ECHO MEAS - AO ROOT DIAM: 3.3 CM
BH CV ECHO MEAS - AO V2 MAX: 187 CM/SEC
BH CV ECHO MEAS - AO V2 VTI: 38.9 CM
BH CV ECHO MEAS - AVA(I,D): 2.47 CM2
BH CV ECHO MEAS - EDV(CUBED): 68.9 ML
BH CV ECHO MEAS - EDV(MOD-SP2): 98.2 ML
BH CV ECHO MEAS - EDV(MOD-SP4): 84.8 ML
BH CV ECHO MEAS - EF(MOD-BP): 45.4 %
BH CV ECHO MEAS - EF(MOD-SP2): 41.4 %
BH CV ECHO MEAS - EF(MOD-SP4): 45 %
BH CV ECHO MEAS - ESV(CUBED): 27 ML
BH CV ECHO MEAS - ESV(MOD-SP2): 57.5 ML
BH CV ECHO MEAS - ESV(MOD-SP4): 46.6 ML
BH CV ECHO MEAS - FS: 26.8 %
BH CV ECHO MEAS - IVS/LVPW: 0.9 CM
BH CV ECHO MEAS - IVSD: 0.9 CM
BH CV ECHO MEAS - LA DIMENSION: 3.8 CM
BH CV ECHO MEAS - LAT PEAK E' VEL: 7.9 CM/SEC
BH CV ECHO MEAS - LV MASS(C)D: 123 GRAMS
BH CV ECHO MEAS - LV MAX PG: 4.5 MMHG
BH CV ECHO MEAS - LV MEAN PG: 2 MMHG
BH CV ECHO MEAS - LV V1 MAX: 106 CM/SEC
BH CV ECHO MEAS - LV V1 VTI: 23.1 CM
BH CV ECHO MEAS - LVIDD: 4.1 CM
BH CV ECHO MEAS - LVIDS: 3 CM
BH CV ECHO MEAS - LVOT AREA: 4.2 CM2
BH CV ECHO MEAS - LVOT DIAM: 2.3 CM
BH CV ECHO MEAS - LVPWD: 1 CM
BH CV ECHO MEAS - MED PEAK E' VEL: 6.3 CM/SEC
BH CV ECHO MEAS - MV A MAX VEL: 116 CM/SEC
BH CV ECHO MEAS - MV DEC SLOPE: 323 CM/SEC2
BH CV ECHO MEAS - MV DEC TIME: 0.24 SEC
BH CV ECHO MEAS - MV E MAX VEL: 78.6 CM/SEC
BH CV ECHO MEAS - MV E/A: 0.68
BH CV ECHO MEAS - PA V2 MAX: 114 CM/SEC
BH CV ECHO MEAS - RV MAX PG: 2.7 MMHG
BH CV ECHO MEAS - RV V1 MAX: 81.1 CM/SEC
BH CV ECHO MEAS - RV V1 VTI: 16.4 CM
BH CV ECHO MEAS - SV(LVOT): 96 ML
BH CV ECHO MEAS - SV(MOD-SP2): 40.7 ML
BH CV ECHO MEAS - SV(MOD-SP4): 38.2 ML
BH CV ECHO MEAS - TR MAX PG: 30.9 MMHG
BH CV ECHO MEAS - TR MAX VEL: 278 CM/SEC
BH CV ECHO MEASUREMENTS AVERAGE E/E' RATIO: 11.07
BH CV XLRA - TDI S': 7.9 CM/SEC
BILIRUB CONJ SERPL-MCNC: 0.1 MG/DL (ref 0–0.3)
BILIRUB INDIRECT SERPL-MCNC: 0.2 MG/DL
BILIRUB SERPL-MCNC: 0.3 MG/DL (ref 0–1.2)
BUN SERPL-MCNC: 22 MG/DL (ref 8–23)
BUN/CREAT SERPL: 37.3 (ref 7–25)
CALCIUM SPEC-SCNC: 8.3 MG/DL (ref 8.6–10.5)
CHLORIDE SERPL-SCNC: 98 MMOL/L (ref 98–107)
CO2 SERPL-SCNC: 30.8 MMOL/L (ref 22–29)
CREAT SERPL-MCNC: 0.59 MG/DL (ref 0.57–1)
DEPRECATED RDW RBC AUTO: 43.5 FL (ref 37–54)
EGFRCR SERPLBLD CKD-EPI 2021: 89 ML/MIN/1.73
EOSINOPHIL # BLD AUTO: 0.03 10*3/MM3 (ref 0–0.4)
EOSINOPHIL NFR BLD AUTO: 0.2 % (ref 0.3–6.2)
ERYTHROCYTE [DISTWIDTH] IN BLOOD BY AUTOMATED COUNT: 13.5 % (ref 12.3–15.4)
GLUCOSE SERPL-MCNC: 227 MG/DL (ref 65–99)
HCT VFR BLD AUTO: 39.7 % (ref 34–46.6)
HGB BLD-MCNC: 12.4 G/DL (ref 12–15.9)
IMM GRANULOCYTES # BLD AUTO: 0.15 10*3/MM3 (ref 0–0.05)
IMM GRANULOCYTES NFR BLD AUTO: 0.9 % (ref 0–0.5)
IVRT: 85 MS
LYMPHOCYTES # BLD AUTO: 1.03 10*3/MM3 (ref 0.7–3.1)
LYMPHOCYTES NFR BLD AUTO: 6.4 % (ref 19.6–45.3)
MAGNESIUM SERPL-MCNC: 2 MG/DL (ref 1.6–2.4)
MCH RBC QN AUTO: 27.4 PG (ref 26.6–33)
MCHC RBC AUTO-ENTMCNC: 31.2 G/DL (ref 31.5–35.7)
MCV RBC AUTO: 87.8 FL (ref 79–97)
MONOCYTES # BLD AUTO: 0.43 10*3/MM3 (ref 0.1–0.9)
MONOCYTES NFR BLD AUTO: 2.7 % (ref 5–12)
NEUTROPHILS NFR BLD AUTO: 14.38 10*3/MM3 (ref 1.7–7)
NEUTROPHILS NFR BLD AUTO: 89.7 % (ref 42.7–76)
NRBC BLD AUTO-RTO: 0 /100 WBC (ref 0–0.2)
PHOSPHATE SERPL-MCNC: 3 MG/DL (ref 2.5–4.5)
PLATELET # BLD AUTO: 532 10*3/MM3 (ref 140–450)
PMV BLD AUTO: 10.8 FL (ref 6–12)
POTASSIUM SERPL-SCNC: 3.1 MMOL/L (ref 3.5–5.2)
PROT SERPL-MCNC: 6.5 G/DL (ref 6–8.5)
QT INTERVAL: 369 MS
QTC INTERVAL: 463 MS
RBC # BLD AUTO: 4.52 10*6/MM3 (ref 3.77–5.28)
SODIUM SERPL-SCNC: 138 MMOL/L (ref 136–145)
WBC NRBC COR # BLD AUTO: 16.04 10*3/MM3 (ref 3.4–10.8)

## 2024-11-27 PROCEDURE — 63710000001 PREDNISONE PER 1 MG: Performed by: FAMILY MEDICINE

## 2024-11-27 PROCEDURE — 80076 HEPATIC FUNCTION PANEL: CPT | Performed by: FAMILY MEDICINE

## 2024-11-27 PROCEDURE — 99232 SBSQ HOSP IP/OBS MODERATE 35: CPT | Performed by: FAMILY MEDICINE

## 2024-11-27 PROCEDURE — 94664 DEMO&/EVAL PT USE INHALER: CPT

## 2024-11-27 PROCEDURE — 94799 UNLISTED PULMONARY SVC/PX: CPT

## 2024-11-27 PROCEDURE — 25010000002 FUROSEMIDE PER 20 MG: Performed by: FAMILY MEDICINE

## 2024-11-27 PROCEDURE — 85025 COMPLETE CBC W/AUTO DIFF WBC: CPT | Performed by: FAMILY MEDICINE

## 2024-11-27 PROCEDURE — 94669 MECHANICAL CHEST WALL OSCILL: CPT

## 2024-11-27 PROCEDURE — 99232 SBSQ HOSP IP/OBS MODERATE 35: CPT | Performed by: INTERNAL MEDICINE

## 2024-11-27 PROCEDURE — 94761 N-INVAS EAR/PLS OXIMETRY MLT: CPT

## 2024-11-27 PROCEDURE — 83735 ASSAY OF MAGNESIUM: CPT | Performed by: FAMILY MEDICINE

## 2024-11-27 PROCEDURE — 25010000002 CEFEPIME PER 500 MG: Performed by: FAMILY MEDICINE

## 2024-11-27 PROCEDURE — 25010000002 METHYLPREDNISOLONE PER 40 MG

## 2024-11-27 PROCEDURE — 84100 ASSAY OF PHOSPHORUS: CPT | Performed by: FAMILY MEDICINE

## 2024-11-27 PROCEDURE — 80048 BASIC METABOLIC PNL TOTAL CA: CPT | Performed by: FAMILY MEDICINE

## 2024-11-27 PROCEDURE — 25010000002 ENOXAPARIN PER 10 MG: Performed by: FAMILY MEDICINE

## 2024-11-27 RX ORDER — POTASSIUM CHLORIDE 750 MG/1
40 CAPSULE, EXTENDED RELEASE ORAL 2 TIMES DAILY WITH MEALS
Status: COMPLETED | OUTPATIENT
Start: 2024-11-27 | End: 2024-11-27

## 2024-11-27 RX ORDER — PREDNISONE 20 MG/1
40 TABLET ORAL
Status: COMPLETED | OUTPATIENT
Start: 2024-11-27 | End: 2024-12-01

## 2024-11-27 RX ADMIN — Medication 10 ML: at 09:09

## 2024-11-27 RX ADMIN — IPRATROPIUM BROMIDE AND ALBUTEROL SULFATE 3 ML: .5; 3 SOLUTION RESPIRATORY (INHALATION) at 20:09

## 2024-11-27 RX ADMIN — IPRATROPIUM BROMIDE AND ALBUTEROL SULFATE 3 ML: .5; 3 SOLUTION RESPIRATORY (INHALATION) at 13:29

## 2024-11-27 RX ADMIN — BUDESONIDE 0.5 MG: 0.5 INHALANT RESPIRATORY (INHALATION) at 06:31

## 2024-11-27 RX ADMIN — GUAIFENESIN 600 MG: 600 TABLET ORAL at 08:34

## 2024-11-27 RX ADMIN — GUAIFENESIN 600 MG: 600 TABLET ORAL at 20:38

## 2024-11-27 RX ADMIN — CEFEPIME 2000 MG: 2 INJECTION, POWDER, FOR SOLUTION INTRAVENOUS at 05:14

## 2024-11-27 RX ADMIN — CEFEPIME 2000 MG: 2 INJECTION, POWDER, FOR SOLUTION INTRAVENOUS at 14:44

## 2024-11-27 RX ADMIN — Medication 250 MG: at 20:39

## 2024-11-27 RX ADMIN — PREDNISONE 40 MG: 20 TABLET ORAL at 08:33

## 2024-11-27 RX ADMIN — POTASSIUM CHLORIDE 40 MEQ: 750 CAPSULE, EXTENDED RELEASE ORAL at 08:34

## 2024-11-27 RX ADMIN — LISINOPRIL 20 MG: 20 TABLET ORAL at 08:34

## 2024-11-27 RX ADMIN — BUDESONIDE 0.5 MG: 0.5 INHALANT RESPIRATORY (INHALATION) at 20:09

## 2024-11-27 RX ADMIN — CEFEPIME 2000 MG: 2 INJECTION, POWDER, FOR SOLUTION INTRAVENOUS at 20:39

## 2024-11-27 RX ADMIN — IPRATROPIUM BROMIDE AND ALBUTEROL SULFATE 3 ML: .5; 3 SOLUTION RESPIRATORY (INHALATION) at 06:31

## 2024-11-27 RX ADMIN — ARFORMOTEROL TARTRATE 15 MCG: 15 SOLUTION RESPIRATORY (INHALATION) at 20:09

## 2024-11-27 RX ADMIN — CARVEDILOL 6.25 MG: 6.25 TABLET, FILM COATED ORAL at 08:34

## 2024-11-27 RX ADMIN — FUROSEMIDE 40 MG: 10 INJECTION, SOLUTION INTRAMUSCULAR; INTRAVENOUS at 14:44

## 2024-11-27 RX ADMIN — Medication 250 MG: at 08:34

## 2024-11-27 RX ADMIN — ASPIRIN 81 MG: 81 TABLET, COATED ORAL at 08:33

## 2024-11-27 RX ADMIN — Medication 10 ML: at 20:39

## 2024-11-27 RX ADMIN — METHYLPREDNISOLONE SODIUM SUCCINATE 40 MG: 40 INJECTION, POWDER, FOR SOLUTION INTRAMUSCULAR; INTRAVENOUS at 05:14

## 2024-11-27 RX ADMIN — POTASSIUM CHLORIDE 40 MEQ: 750 CAPSULE, EXTENDED RELEASE ORAL at 17:33

## 2024-11-27 RX ADMIN — ARFORMOTEROL TARTRATE 15 MCG: 15 SOLUTION RESPIRATORY (INHALATION) at 06:31

## 2024-11-27 RX ADMIN — FUROSEMIDE 40 MG: 10 INJECTION, SOLUTION INTRAMUSCULAR; INTRAVENOUS at 01:50

## 2024-11-27 RX ADMIN — ENOXAPARIN SODIUM 40 MG: 100 INJECTION SUBCUTANEOUS at 08:33

## 2024-11-27 NOTE — CASE MANAGEMENT/SOCIAL WORK
"COPD Education    Pulmonologist:  Pily  Last outpatient pulmonary visit:  24  Last Hospital stay for COPD? 11.15.24  COPD related ED visits in past year: 3    Age: 84 y.o.  Sex: female    BMI:Body mass index is 28.85 kg/m².     Past Medical Hx: hypertension, CAD, COPD, hyponatremia, dyslipidemia, GERD     Smoking Hx: Social History    Tobacco Use      Smoking status: Former        Packs/day: 0.00        Types: Cigarettes        Quit date: 2023        Years since quittin.5        Passive exposure: Past      Smokeless tobacco: Never      Tobacco comments: Pt was a social smoker    Social History     Substance and Sexual Activity   Drug Use Never      Home Equipment Used: O2 NEB rollator DME: [employer] [facility]      Daily symptoms: SOA at  w/exertion, productive cough, wheeze    Airway Clearance methods utilized:aerobika at home     Have you ever attended Pulmonary Rehab? no    Last PFT: 9.1.23  PARAMETER BEST % PREDICTED   FVC L 1.20 52   FEV1 L 0.81 48   FEV1/FVC % 68    TLC 2.90 60   RV 1.77 75   DLCO 7.8 42     Classification of Airflow Limitation Severity in COPD (Based on Post-Bronchodilator FEV1)  Gold 1: Mild FEV1 >= 80% predicted   Gold 2:  Moderate 50% <= FEV1 < 80% predicted   Gold 3: Severe 30% >= FEV1 < 50% predicted   Gold 4: Very Severe FEV1 < 30% predicted     Have you ever had a sleep study/PSG? No    proBNP: 1,987    Eosinophils Absolute:                      IgE: 449 ()    Last Arterial Blood Gas: No results found for: \"PHART\", \"WLN6FLE\", \"PO2ART\", \"XSL1GII\", \"BASEEXCESS\", \"T0VGVDGU\"     Chest CT findings: 24  bronchocentric alveolar airspace disease consistent with pneumonia         Home Medications:  Medications Prior to Admission   Medication Sig Dispense Refill Last Dose/Taking    arformoterol (BROVANA) 15 MCG/2ML nebulizer solution Take 2 mL by nebulization 2 (Two) Times a Day.   2024    aspirin 81 MG EC tablet Take 1 tablet by mouth Daily. 30 " tablet 1 11/25/2024    atorvastatin (LIPITOR) 20 MG tablet Take 1 tablet by mouth Every Night. (Patient taking differently: Take 2 tablets by mouth Every Night.) 90 tablet 1 11/24/2024    budesonide (PULMICORT) 0.5 MG/2ML nebulizer solution Take 2 mL by nebulization 2 (Two) Times a Day.   11/25/2024    carvedilol (COREG) 6.25 MG tablet Take 1 tablet by mouth 2 (Two) Times a Day. 180 tablet 3 11/25/2024    dapagliflozin Propanediol (Farxiga) 10 MG tablet Take 10 mg by mouth Daily. 90 tablet 3 11/25/2024    furosemide (LASIX) 20 MG tablet TAKE 1 TABLET BY MOUTH EVERY OTHER DAY 45 tablet 0 11/25/2024    guaiFENesin (MUCINEX) 600 MG 12 hr tablet Take 1 tablet by mouth Every 12 (Twelve) Hours. 15 tablet 0 11/25/2024    ipratropium-albuterol (DUO-NEB) 0.5-2.5 mg/3 ml nebulizer Take 3 mL by nebulization Every 6 (Six) Hours As Needed for Shortness of Air. 360 mL 5 Taking As Needed    lisinopril (PRINIVIL,ZESTRIL) 20 MG tablet Take 1 tablet by mouth Daily. 90 tablet 3 11/25/2024    nitroglycerin (NITROSTAT) 0.4 MG SL tablet Place 1 tablet under the tongue Every 5 (Five) Minutes As Needed for Chest Pain.   Taking As Needed     Do you use a Spacer/Holding Chamber with your MDI?: NA; uses nebulized rescue    COPD disease management discussed with patient and daughter who is at the bedside.  Patient is adherent with arformoterol, budesonide and duoneb treatments at home. Patient recently qualified for home oxygen.  Patient has aerobika at home but it has failed to mobilize secretions; consider escalation of airway clearance. I will continue to follow.     Low dose lung CT annually  COPD clinic f/u  Consider Volara for lung expansion and airway clearance  Patient is COPD readmission; Optimize COPD maintenance medication as outpatient; consider adding ICS, roflumilast/ohtuvarye  Encourage shingles, RSV, TDAP, FLU, COVID vaccines    Velma Garcia, RRT   RT   11/27/24  14:25 EST

## 2024-11-27 NOTE — PLAN OF CARE
Goal Outcome Evaluation:  Plan of Care Reviewed With: patient           Outcome Evaluation: pt alert and oriented x4. pt on 6L NC. no complaints of pain throughout shift. pt family remained at bedside. will continue ith pt plan of care.

## 2024-11-27 NOTE — PROGRESS NOTES
Pulmonary / Critical Care Progress Note      Patient Name: Cris Agudelo  : 1940  MRN: [id number]  Primary Care Physician:  Ramirez Maurice MD  Date of admission: 2024    Subjective   Subjective   Follow-up for concern for pneumonia, acute on chronic hypoxic respiratory failure    No acute events overnight.    This morning,  Resting in bed  Currently on 6 L HFNC with SpO2 95%  Decreased to 4 L HFNC  Family present at bedside  Overall feeling better  Dyspnea improving  Congested but nonproductive cough  Unable to tolerate chest percussion  No fever or chills  Remains weak and fatigued    Objective   Objective     Vitals:   Temp:  [97.2 °F (36.2 °C)-97.9 °F (36.6 °C)] 97.9 °F (36.6 °C)  Heart Rate:  [68-78] 74  Resp:  [18-28] 22  BP: (116-147)/(41-68) 127/51  Flow (L/min) (Oxygen Therapy):  [6-7] 6    Physical Exam   Vital Signs Reviewed   General: Chronically ill-appearing, elderly female, Alert, NAD, lying in bed.    Chest: Decreased aeration, scattered rhonchi to auscultation bilaterally, no work of breathing noted on 4 L NC  CV: regular rate and rhythm regular  EXT:  no clubbing, no cyanosis, no edema  Neuro:  A&Ox3, moving all 4 extremities spontaneously  Skin: No rashes or lesions noted    Result Review    Result Review:  I have personally reviewed the results from the time of this admission to 2024 12:47 EST and agree with these findings:  [x]  Laboratory  [x]  Microbiology  [x]  Radiology  [x]  EKG/Telemetry   []  Cardiology/Vascular   []  Pathology  []  Old records  []  Other:  Most notable findings include:   proBNP 1987      Lab 24  0451 24  0506 24  1553   WBC 16.04* 13.38* 26.51*   HEMOGLOBIN 12.4 13.1 14.2   HEMATOCRIT 39.7 43.6 45.3   PLATELETS 532* 474* 612*   SODIUM 138 138 137   POTASSIUM 3.1* 4.3 3.7   CHLORIDE 98 102 95*   CO2 30.8* 25.2 31.3*   BUN 22 19 13   CREATININE 0.59 0.61 0.70   GLUCOSE 227* 132* 182*   CALCIUM 8.3* 8.6 8.9   PHOSPHORUS 3.0  --   --     TOTAL PROTEIN 6.5  --  7.5   ALBUMIN 2.3*  --  3.0*   GLOBULIN  --   --  4.5         Assessment & Plan   Assessment / Plan     Active Hospital Problems:  Active Hospital Problems    Diagnosis     **Multifocal pneumonia     Acute on chronic respiratory failure with hypoxia     HCAP (healthcare-associated pneumonia)     Hyperlipidemia     Primary hypertension     Hyponatremia    Impression:  Acute on chronic hypoxic respiratory failure, 2 L at baseline  Concern for pneumonia, organism versus Cryptogenic organizing pneumonia  Concern for ILD  Acute on chronic COPD exacerbation, FEV1 44%  Acute on chronic congestive diastolic heart failure, grade 1 DD  Tobacco abuse of cigarettes in remission    Plan:  -Currently on 4 L HFNC.  Will transition to regular nasal cannula.  Continue to wean O2 to maintain SpO2 greater than 90%.  -Recent chest CT on 11/16/24 on previous admission demonstrating extensive bronchocentric alveolar airspace disease throughout both lungs consistent with pneumonia.  Demonstrating extensive groundglass opacities that may represent bronchopneumonia or cryptogenic organizing pneumonia  -Patient had PFT in 2023 concerning for pneumonitis or interstitial lung disease  -Continue cefepime for pneumonia.  De-escalate pending cultures.    -Vancomycin discontinued.  MRSA PCR negative.  -Micro negative to date.  Respiratory viral panel negative.  -Alpha 1 antitrypsin 305, CRP 28.84  -Continue Brovana, Pulmicort, DuoNebs  -Solu-Medrol transition to prednisone 40 mg daily per primary  -Continue Lasix 40 mg IV twice daily  -Continue to monitor renal panel and electrolytes.  Replace electrolytes necessary  -Continue bronchopulmonary hygiene.  Encourage I-S and flutter  -Unable to tolerate CPT.  Recommend use of Volera as alternative.  -Echo with normal EF of 56 to 60%, grade 1 diastolic dysfunction noted.  -Able to wean supplemental oxygen, the service will consider bronchoscopy      VTE  Prophylaxis:  Pharmacologic VTE prophylaxis orders are present.        CODE STATUS:   Level Of Support Discussed With: Patient  Code Status (Patient has no pulse and is not breathing): CPR (Attempt to Resuscitate)  Medical Interventions (Patient has pulse or is breathing): Full Support      Electronically signed by JES Robins, 11/27/24, 12:47 PM EST.  This visit was performed by BOTH a physician and an APC. I personally evaluated and examined the patient. I performed all aspects of MDM as documented. , I have reviewed and confirmed the accuracy of the patient's history as documented in this note., and I have reexamined the patient and the results are consistent with the previously documented exam. I have updated the documentation as necessary. Electronically signed by Adan Brown MD, 11/27/24, 1:16 PM EST.     Electronically signed by Adan Brown MD, 11/27/24, 1:16 PM EST.

## 2024-11-27 NOTE — PLAN OF CARE
Goal Outcome Evaluation:  Plan of Care Reviewed With: patient, child        Progress: improving  Outcome Evaluation: pt alert and oriented x4 on 6L NC. pt has no complaints of pain, no s/s of distress. will continue with POC.

## 2024-11-27 NOTE — PROGRESS NOTES
[facility]   Hospitalist Progress Note  Date: 2024  Patient Name: Cris Agudelo  : 1940  MRN: [id number]  Date of admission: 2024      Subjective   Subjective   Chief complaint: Shortness of breath, cough    Summary:  84-year-old female with history of hypertension, CAD, COPD, hyponatremia, dyslipidemia, GERD without esophagitis, hospitalized on 2024 with chief complaint of shortness of breath and cough, found to be hypoxemic, hypercapnic, found to have multifocal pneumonia with hypoxemia on imaging, with recent hospitalization for pneumonia discharged on Augmentin, concern for volume overload, discontinued IV fluids, pulmonary consulted for further evaluation, concern for underlying ILD, previous PFTs were indicative    Interval follow-up: Seen and examined this morning, remains short of air on 6 L able to go down to 4 L, continues to have cough and shortness of breath symptoms, no chest pain or palpitations.  CRP elevated 28, white blood cell count 16,000, hemoglobin 12.4, creatinine 0.59, potassium 3.1, sodium 138.  1.6 L of urine output over the past 24 hours.  Echo shows diastolic dysfunction.    Review of systems:  All systems reviewed and negative set for weakness, fatigue, cough, shortness of breath      Objective   Objective     Vitals:   Temp:  [97.2 °F (36.2 °C)-97.9 °F (36.6 °C)] 97.9 °F (36.6 °C)  Heart Rate:  [68-78] 73  Resp:  [18-28] 20  BP: (116-147)/(41-68) 127/51  Flow (L/min) (Oxygen Therapy):  [4-7] 4  Physical Exam    Constitutional: Awake, alert, no acute distress laying in bed wearing high flow nasal cannula   Eyes: Pupils equal, sclerae anicteric, no conjunctival injection   HENT: NCAT, mucous membranes moist   Neck: Supple, full range of motion   Respiratory: Diminished and coarse to auscultation bilaterally, nonlabored respirations    Cardiovascular: RRR, no murmurs, rubs, or gallops, palpable pedal pulses bilaterally   Gastrointestinal: Positive bowel sounds,  soft, nontender, nondistended   Musculoskeletal: Positive bilateral ankle edema, no clubbing or cyanosis to extremities   Psychiatric: Appropriate affect, cooperative   Neurologic: Oriented x 3, strength symmetric in all extremities, Cranial Nerves grossly intact to confrontation, speech clear   Skin: No rashes visible on exposed skin      Result Review    Result Review:  I have personally reviewed the pertinent results from the past 24 hours to 11/27/2024 13:57 EST and agree with these findings:  [x]  Laboratory   CBC          11/25/2024    15:53 11/26/2024    05:06 11/27/2024    04:51   CBC   WBC 26.51  13.38  16.04    RBC 5.15  4.78  4.52    Hemoglobin 14.2  13.1  12.4    Hematocrit 45.3  43.6  39.7    MCV 88.0  91.2  87.8    MCH 27.6  27.4  27.4    MCHC 31.3  30.0  31.2    RDW 13.3  13.5  13.5    Platelets 612  474  532      BMP          11/25/2024    15:53 11/26/2024    05:06 11/27/2024    04:51   BMP   BUN 13  19  22    Creatinine 0.70  0.61  0.59    Sodium 137  138  138    Potassium 3.7  4.3  3.1    Chloride 95  102  98    CO2 31.3  25.2  30.8    Calcium 8.9  8.6  8.3      LIVER FUNCTION TESTS:      Lab 11/27/24  0451 11/25/24  1553   TOTAL PROTEIN 6.5 7.5   ALBUMIN 2.3* 3.0*   GLOBULIN  --  4.5   ALT (SGPT) 19 20   AST (SGOT) 16 18   BILIRUBIN 0.3 0.5   INDIRECT BILIRUBIN 0.2  --    BILIRUBIN DIRECT 0.1  --    ALK PHOS 105 129*       [x]  Microbiology   Microbiology Results (last 10 days)       Procedure Component Value - Date/Time    Respiratory Panel PCR w/COVID-19(SARS-CoV-2) YASMINE/MONICA/LUCIA/PAD/COR/MARY ANN In-House, NP Swab in UTM/VTM, 2 HR TAT - Swab, Nasopharynx [[id number]]  (Normal) Collected: 11/26/24 1409    Lab Status: Final result Specimen: Swab from Nasopharynx Updated: 11/26/24 8477     ADENOVIRUS, PCR Not Detected     Coronavirus 229E Not Detected     Coronavirus HKU1 Not Detected     Coronavirus NL63 Not Detected     Coronavirus OC43 Not Detected     COVID19 Not Detected     Human Metapneumovirus Not  Detected     Human Rhinovirus/Enterovirus Not Detected     Influenza A PCR Not Detected     Influenza B PCR Not Detected     Parainfluenza Virus 1 Not Detected     Parainfluenza Virus 2 Not Detected     Parainfluenza Virus 3 Not Detected     Parainfluenza Virus 4 Not Detected     RSV, PCR Not Detected     Bordetella pertussis pcr Not Detected     Bordetella parapertussis PCR Not Detected     Chlamydophila pneumoniae PCR Not Detected     Mycoplasma pneumo by PCR Not Detected    Narrative:      In the setting of a positive respiratory panel with a viral infection PLUS a negative procalcitonin without other underlying concern for bacterial infection, consider observing off antibiotics or discontinuation of antibiotics and continue supportive care. If the respiratory panel is positive for atypical bacterial infection (Bordetella pertussis, Chlamydophila pneumoniae, or Mycoplasma pneumoniae), consider antibiotic de-escalation to target atypical bacterial infection.    COVID-19, FLU A/B, RSV PCR 1 HR TAT - Swab, Nasopharynx [[id number]]  (Normal) Collected: 11/25/24 2051    Lab Status: Final result Specimen: Swab from Nasopharynx Updated: 11/25/24 2247     COVID19 Not Detected     Influenza A PCR Not Detected     Influenza B PCR Not Detected     RSV, PCR Not Detected    Narrative:      Fact sheet for providers: https://www.fda.gov/media/744281/download    Fact sheet for patients: https://www.fda.gov/media/456029/download    Test performed by PCR.    MRSA Screen, PCR (Inpatient) - Swab, Nares [[id number]]  (Normal) Collected: 11/25/24 2051    Lab Status: Final result Specimen: Swab from Nares Updated: 11/25/24 2321     MRSA PCR No MRSA Detected    Narrative:      The negative predictive value of this diagnostic test is high and should only be used to consider de-escalating anti-MRSA therapy. A positive result may indicate colonization with MRSA and must be correlated clinically.    Blood Culture - Blood, Arm, Left  [[id number]]  (Normal) Collected: 11/25/24 1555    Lab Status: Preliminary result Specimen: Blood from Arm, Left Updated: 11/26/24 1615     Blood Culture No growth at 24 hours    Blood Culture - Blood, Arm, Left [[id number]]  (Normal) Collected: 11/25/24 1555    Lab Status: Preliminary result Specimen: Blood from Arm, Left Updated: 11/26/24 1615     Blood Culture No growth at 24 hours    Mycoplasma Pneumoniae Antibody, IgM - Blood, [[id number]]  (Normal) Collected: 11/25/24 1553    Lab Status: Final result Specimen: Blood Updated: 11/26/24 0756     Mycoplasma pneumo IgM Negative    Clostridioides difficile Toxin, PCR - Stool, Per Rectum [[id number]] Collected: 11/20/24 0839    Lab Status: Final result Specimen: Stool from Per Rectum Updated: 11/20/24 0941     Toxigenic C. difficile by PCR Negative     027 Toxin Presumptive Negative    Narrative:      The result indicates the absence of toxigenic C. difficile from stool specimen.               [x]  Radiology XR Chest 1 View    Result Date: 11/25/2024  Impression: Similar-appearing extensive bilateral airspace opacities, suspicious for persistent multifocal infectious/inflammatory process. 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 labs  Full code  DVT prophylaxis with Lovenox  Clinical course dictate further management  Discussed with nurse at the bedside      VTE Prophylaxis:  Pharmacologic VTE prophylaxis orders are present.        CODE STATUS:   Level Of Support Discussed With: Patient  Code Status (Patient has no pulse and is not breathing): CPR (Attempt to Resuscitate)  Medical Interventions (Patient has pulse or is breathing): Full Support        Electronically signed by Marco Del Toro MD, 11/27/2024, 13:57 EST.    Portions of this documentation were transcribed electronically from a voice recognition software.  I confirm all data accurately represents the service(s) I performed at today's visit.

## 2024-11-28 LAB
ALBUMIN SERPL-MCNC: 2.4 G/DL (ref 3.5–5.2)
ALP SERPL-CCNC: 93 U/L (ref 39–117)
ALT SERPL W P-5'-P-CCNC: 17 U/L (ref 1–33)
ANION GAP SERPL CALCULATED.3IONS-SCNC: 8.4 MMOL/L (ref 5–15)
AST SERPL-CCNC: 11 U/L (ref 1–32)
BASOPHILS # BLD AUTO: 0.02 10*3/MM3 (ref 0–0.2)
BASOPHILS NFR BLD AUTO: 0.1 % (ref 0–1.5)
BILIRUB CONJ SERPL-MCNC: 0.1 MG/DL (ref 0–0.3)
BILIRUB INDIRECT SERPL-MCNC: 0.2 MG/DL
BILIRUB SERPL-MCNC: 0.3 MG/DL (ref 0–1.2)
BUN SERPL-MCNC: 24 MG/DL (ref 8–23)
BUN/CREAT SERPL: 39.3 (ref 7–25)
CALCIUM SPEC-SCNC: 8.2 MG/DL (ref 8.6–10.5)
CHLORIDE SERPL-SCNC: 99 MMOL/L (ref 98–107)
CO2 SERPL-SCNC: 29.6 MMOL/L (ref 22–29)
CREAT SERPL-MCNC: 0.61 MG/DL (ref 0.57–1)
DEPRECATED RDW RBC AUTO: 44.3 FL (ref 37–54)
EGFRCR SERPLBLD CKD-EPI 2021: 88.3 ML/MIN/1.73
EOSINOPHIL # BLD AUTO: 0 10*3/MM3 (ref 0–0.4)
EOSINOPHIL NFR BLD AUTO: 0 % (ref 0.3–6.2)
ERYTHROCYTE [DISTWIDTH] IN BLOOD BY AUTOMATED COUNT: 13.7 % (ref 12.3–15.4)
GLUCOSE SERPL-MCNC: 212 MG/DL (ref 65–99)
HCT VFR BLD AUTO: 38.8 % (ref 34–46.6)
HGB BLD-MCNC: 12 G/DL (ref 12–15.9)
IMM GRANULOCYTES # BLD AUTO: 0.15 10*3/MM3 (ref 0–0.05)
IMM GRANULOCYTES NFR BLD AUTO: 1 % (ref 0–0.5)
LYMPHOCYTES # BLD AUTO: 1.45 10*3/MM3 (ref 0.7–3.1)
LYMPHOCYTES NFR BLD AUTO: 9.9 % (ref 19.6–45.3)
MAGNESIUM SERPL-MCNC: 2 MG/DL (ref 1.6–2.4)
MCH RBC QN AUTO: 27.2 PG (ref 26.6–33)
MCHC RBC AUTO-ENTMCNC: 30.9 G/DL (ref 31.5–35.7)
MCV RBC AUTO: 88 FL (ref 79–97)
MONOCYTES # BLD AUTO: 0.62 10*3/MM3 (ref 0.1–0.9)
MONOCYTES NFR BLD AUTO: 4.2 % (ref 5–12)
NEUTROPHILS NFR BLD AUTO: 12.48 10*3/MM3 (ref 1.7–7)
NEUTROPHILS NFR BLD AUTO: 84.8 % (ref 42.7–76)
NRBC BLD AUTO-RTO: 0 /100 WBC (ref 0–0.2)
NT-PROBNP SERPL-MCNC: 527.6 PG/ML (ref 0–1800)
PHOSPHATE SERPL-MCNC: 2 MG/DL (ref 2.5–4.5)
PLATELET # BLD AUTO: 528 10*3/MM3 (ref 140–450)
PMV BLD AUTO: 10.6 FL (ref 6–12)
POTASSIUM SERPL-SCNC: 3.6 MMOL/L (ref 3.5–5.2)
PROT SERPL-MCNC: 5.8 G/DL (ref 6–8.5)
RBC # BLD AUTO: 4.41 10*6/MM3 (ref 3.77–5.28)
SODIUM SERPL-SCNC: 137 MMOL/L (ref 136–145)
WBC NRBC COR # BLD AUTO: 14.72 10*3/MM3 (ref 3.4–10.8)

## 2024-11-28 PROCEDURE — 83880 ASSAY OF NATRIURETIC PEPTIDE: CPT | Performed by: FAMILY MEDICINE

## 2024-11-28 PROCEDURE — 84100 ASSAY OF PHOSPHORUS: CPT | Performed by: FAMILY MEDICINE

## 2024-11-28 PROCEDURE — 99232 SBSQ HOSP IP/OBS MODERATE 35: CPT | Performed by: FAMILY MEDICINE

## 2024-11-28 PROCEDURE — 94799 UNLISTED PULMONARY SVC/PX: CPT

## 2024-11-28 PROCEDURE — 94669 MECHANICAL CHEST WALL OSCILL: CPT

## 2024-11-28 PROCEDURE — 25010000002 ENOXAPARIN PER 10 MG: Performed by: FAMILY MEDICINE

## 2024-11-28 PROCEDURE — 25010000002 CEFEPIME PER 500 MG: Performed by: FAMILY MEDICINE

## 2024-11-28 PROCEDURE — 85025 COMPLETE CBC W/AUTO DIFF WBC: CPT | Performed by: FAMILY MEDICINE

## 2024-11-28 PROCEDURE — 94761 N-INVAS EAR/PLS OXIMETRY MLT: CPT

## 2024-11-28 PROCEDURE — 80076 HEPATIC FUNCTION PANEL: CPT | Performed by: FAMILY MEDICINE

## 2024-11-28 PROCEDURE — 99232 SBSQ HOSP IP/OBS MODERATE 35: CPT | Performed by: INTERNAL MEDICINE

## 2024-11-28 PROCEDURE — 63710000001 PREDNISONE PER 1 MG: Performed by: FAMILY MEDICINE

## 2024-11-28 PROCEDURE — 83735 ASSAY OF MAGNESIUM: CPT | Performed by: FAMILY MEDICINE

## 2024-11-28 PROCEDURE — 80048 BASIC METABOLIC PNL TOTAL CA: CPT | Performed by: FAMILY MEDICINE

## 2024-11-28 PROCEDURE — 25010000002 FUROSEMIDE PER 20 MG: Performed by: FAMILY MEDICINE

## 2024-11-28 PROCEDURE — 94664 DEMO&/EVAL PT USE INHALER: CPT

## 2024-11-28 RX ORDER — ACETAMINOPHEN 325 MG/1
650 TABLET ORAL EVERY 6 HOURS PRN
Status: DISCONTINUED | OUTPATIENT
Start: 2024-11-28 | End: 2024-12-04 | Stop reason: HOSPADM

## 2024-11-28 RX ADMIN — CARVEDILOL 6.25 MG: 6.25 TABLET, FILM COATED ORAL at 20:37

## 2024-11-28 RX ADMIN — CEFEPIME 2000 MG: 2 INJECTION, POWDER, FOR SOLUTION INTRAVENOUS at 04:44

## 2024-11-28 RX ADMIN — ASPIRIN 81 MG: 81 TABLET, COATED ORAL at 09:56

## 2024-11-28 RX ADMIN — IPRATROPIUM BROMIDE AND ALBUTEROL SULFATE 3 ML: .5; 3 SOLUTION RESPIRATORY (INHALATION) at 15:46

## 2024-11-28 RX ADMIN — Medication 250 MG: at 20:37

## 2024-11-28 RX ADMIN — Medication 10 ML: at 09:57

## 2024-11-28 RX ADMIN — ARFORMOTEROL TARTRATE 15 MCG: 15 SOLUTION RESPIRATORY (INHALATION) at 06:50

## 2024-11-28 RX ADMIN — Medication 250 MG: at 09:56

## 2024-11-28 RX ADMIN — IPRATROPIUM BROMIDE AND ALBUTEROL SULFATE 3 ML: .5; 3 SOLUTION RESPIRATORY (INHALATION) at 06:50

## 2024-11-28 RX ADMIN — BUDESONIDE 0.5 MG: 0.5 INHALANT RESPIRATORY (INHALATION) at 18:33

## 2024-11-28 RX ADMIN — PREDNISONE 40 MG: 20 TABLET ORAL at 09:47

## 2024-11-28 RX ADMIN — Medication 10 ML: at 20:38

## 2024-11-28 RX ADMIN — ENOXAPARIN SODIUM 40 MG: 100 INJECTION SUBCUTANEOUS at 09:57

## 2024-11-28 RX ADMIN — FUROSEMIDE 40 MG: 10 INJECTION, SOLUTION INTRAMUSCULAR; INTRAVENOUS at 16:34

## 2024-11-28 RX ADMIN — GUAIFENESIN 600 MG: 600 TABLET ORAL at 09:56

## 2024-11-28 RX ADMIN — CEFEPIME 2000 MG: 2 INJECTION, POWDER, FOR SOLUTION INTRAVENOUS at 16:35

## 2024-11-28 RX ADMIN — BUDESONIDE 0.5 MG: 0.5 INHALANT RESPIRATORY (INHALATION) at 06:50

## 2024-11-28 RX ADMIN — GUAIFENESIN AND DEXTROMETHORPHAN 10 ML: 100; 10 SYRUP ORAL at 16:35

## 2024-11-28 RX ADMIN — IPRATROPIUM BROMIDE AND ALBUTEROL SULFATE 3 ML: .5; 3 SOLUTION RESPIRATORY (INHALATION) at 18:37

## 2024-11-28 RX ADMIN — FUROSEMIDE 40 MG: 10 INJECTION, SOLUTION INTRAMUSCULAR; INTRAVENOUS at 02:41

## 2024-11-28 RX ADMIN — ARFORMOTEROL TARTRATE 15 MCG: 15 SOLUTION RESPIRATORY (INHALATION) at 18:33

## 2024-11-28 RX ADMIN — GUAIFENESIN 600 MG: 600 TABLET ORAL at 20:37

## 2024-11-28 NOTE — PLAN OF CARE
Goal Outcome Evaluation:              Outcome Evaluation: Pt. alert and oriented x4. VSS. No complaints of pain noted throughout shift. Pt. stayed throughout the night. Continue care per Plan of Care.

## 2024-11-28 NOTE — PLAN OF CARE
Goal Outcome Evaluation:  Plan of Care Reviewed With: patient, caregiver        Progress: improving  Outcome Evaluation: Patient A+Ox3, with periods of intermittent confusion during early parts of shift, patient feeling much better at end of shift after being able to sleep for several hours uninterrupted.  Patient able to ambulated to/from bathroom with assist x1, oxygen tubing extended in order to accomadate her being able to ambulate.  Patient sit up in recliner for about 2 hours.  Instructed on Incentive Spirometer, oxygenation at 94-97% on 3L/NC.  This morning patient c/o nasal irritation, fullness in ears, orders for tylenol obtained but patient declined.  Patient given informed consent with layman terms applied for patient to have bronchscopy 11/29/2024.  Daughter at bedside.  Daughter plans to call back tomorrow morning to inquire about procedure time to ensure that she is present for procedure as patient request her to be present before procedure is performed.

## 2024-11-28 NOTE — PROGRESS NOTES
Pulmonary / Critical Care Progress Note      Patient Name: Cris Agudelo  : 1940  MRN: [id number]  Primary Care Physician:  Ramirez Maurice MD  Date of admission: 2024    Subjective   Subjective   Follow-up for concern for pneumonia, acute on chronic hypoxic respiratory failure    No acute events overnight.    This morning,  Resting in bed  Family at bedside  Feeling somewhat better  Currently on 3 L nasal cannula  Continues to have weak congested cough  Difficulty tolerating chest percussion  Continues to have issues with airway clearance  No fever or chills  Remains weak and fatigued    Objective   Objective     Vitals:   Temp:  [97.2 °F (36.2 °C)-98.1 °F (36.7 °C)] 97.3 °F (36.3 °C)  Heart Rate:  [65-81] 81  Resp:  [18-22] 18  BP: (110-135)/(50-68) 135/53  Flow (L/min) (Oxygen Therapy):  [3-4] 3    Physical Exam   Vital Signs Reviewed   General: Chronically ill-appearing, elderly female, Alert, NAD, lying in bed.    Chest: Decreased aeration, scattered rhonchi to auscultation bilaterally, no work of breathing noted on 3 L NC  CV: regular rate and rhythm regular  EXT:  no clubbing, no cyanosis, no edema  Neuro:  A&Ox3, moving all 4 extremities spontaneously  Skin: No rashes or lesions noted    Result Review    Result Review:  I have personally reviewed the results from the time of this admission to 2024 11:06 EST and agree with these findings:  [x]  Laboratory  [x]  Microbiology  [x]  Radiology  [x]  EKG/Telemetry   []  Cardiology/Vascular   []  Pathology  []  Old records  []  Other:  Most notable findings include:   proBNP       Lab 24  0516 24  0451 24  0506 24  1553   WBC 14.72* 16.04* 13.38* 26.51*   HEMOGLOBIN 12.0 12.4 13.1 14.2   HEMATOCRIT 38.8 39.7 43.6 45.3   PLATELETS 528* 532* 474* 612*   SODIUM 137 138 138 137   POTASSIUM 3.6 3.1* 4.3 3.7   CHLORIDE 99 98 102 95*   CO2 29.6* 30.8* 25.2 31.3*   BUN 24* 22 19 13   CREATININE 0.61 0.59 0.61 0.70   GLUCOSE  212* 227* 132* 182*   CALCIUM 8.2* 8.3* 8.6 8.9   PHOSPHORUS 2.0* 3.0  --   --    TOTAL PROTEIN 5.8* 6.5  --  7.5   ALBUMIN 2.4* 2.3*  --  3.0*   GLOBULIN  --   --   --  4.5         Assessment & Plan   Assessment / Plan     Active Hospital Problems:  Active Hospital Problems    Diagnosis     **Multifocal pneumonia     Acute on chronic respiratory failure with hypoxia     HCAP (healthcare-associated pneumonia)     Hyperlipidemia     Primary hypertension     Hyponatremia    Impression:  Acute on chronic hypoxic respiratory failure, 2 L at baseline  Concern for pneumonia, organism versus Cryptogenic organizing pneumonia  Concern for ILD  Acute on chronic COPD exacerbation, FEV1 44%  Issues with airway clearance/mucous plugging  Acute on chronic congestive diastolic heart failure, grade 1 DD  Tobacco abuse of cigarettes in remission    Plan:  -Continue to wean O2 to maintain SpO2 greater than 90%.  Baseline 2 L NC.  -Recent chest CT on 11/16/24 on previous admission demonstrating extensive bronchocentric alveolar airspace disease throughout both lungs consistent with pneumonia.  Demonstrating extensive groundglass opacities that may represent bronchopneumonia or cryptogenic organizing pneumonia  -Will take for bronchoscopy tomorrow with airway inspection, brushings, biopsies, bronchoalveolar lavage. I have discussed the risks of the procedure with the patient including pneumothorax, hemothorax, bleeding, hypoxia, required mechanical ventilation and death. The patient recognizes these findings, acknowledges these findings and is agreeable to the procedure.  -N.p.o. after midnight for bronchoscopy tomorrow  -Continue cefepime for pneumonia.  De-escalate pending cultures.    -Vancomycin discontinued.  MRSA PCR negative.  -Micro negative to date.  Respiratory viral panel negative.  -Alpha 1 antitrypsin 305, CRP 28.84  -Continue Brovana, Pulmicort, DuoNebs  -Continue prednisone 40 mg daily per primary  -Continue Lasix 40 mg  IV twice daily  -Continue to monitor renal panel and electrolytes.  Replace electrolytes necessary  -Continue bronchopulmonary hygiene.  Encourage I-S and flutter.  Chest vest available.  -Echo with normal EF of 56 to 60%, grade 1 diastolic dysfunction noted.        VTE Prophylaxis:  Pharmacologic VTE prophylaxis orders are present.        CODE STATUS:   Level Of Support Discussed With: Patient  Code Status (Patient has no pulse and is not breathing): CPR (Attempt to Resuscitate)  Medical Interventions (Patient has pulse or is breathing): Full Support    Electronically signed by JES Robins, 11/28/24, 11:07 AM EST.    This visit was performed by BOTH a physician and an APC. I personally evaluated and examined the patient. I performed all aspects of MDM as documented. , I have reviewed and confirmed the accuracy of the patient's history as documented in this note., and I have reexamined the patient and the results are consistent with the previously documented exam. I have updated the documentation as necessary. Electronically signed by Adan Brown MD, 11/28/24, 11:46 AM EST.

## 2024-11-28 NOTE — PROGRESS NOTES
[facility]   Hospitalist Progress Note  Date: 2024  Patient Name: Cris Agudelo  : 1940  MRN: [id number]  Date of admission: 2024      Subjective   Subjective     Chief complaint: Shortness of breath, cough    Summary:  84-year-old female with history of hypertension, CAD, COPD, hyponatremia, dyslipidemia, GERD without esophagitis, hospitalized on 2024 with chief complaint of shortness of breath and cough, found to be hypoxemic, hypercapnic, found to have multifocal pneumonia with hypoxemia on imaging, with recent hospitalization for pneumonia discharged on Augmentin, concern for volume overload, discontinued IV fluids, pulmonary consulted for further evaluation, concern for underlying ILD, previous PFTs were indicative    Interval follow-up: Seen and examined this morning, no acute distress, resting comfortably, continues to show signs of improvement, down to 3 L with sats in the 90s with the room to wean, BNP down to 500, creatinine 0.61, BUN 24, bicarb 29, potassium 3.6, sodium 137, white blood cell count 14,000, hemoglobin 12.0.  Telemetry reviewed, no acute major events on telemetry.  Still short of air with exertional activity    Review of systems:  All systems reviewed and negative set for weakness, fatigue, cough, shortness of breath with exertional activity    Objective   Objective     Vitals:   Temp:  [97.2 °F (36.2 °C)-98.1 °F (36.7 °C)] 97.9 °F (36.6 °C)  Heart Rate:  [65-81] 66  Resp:  [18-20] 18  BP: (110-140)/(50-68) 140/50  Flow (L/min) (Oxygen Therapy):  [3-4] 3  Physical Exam    Constitutional: Awake, alert, no acute distress laying in bed wearing nasal cannula oxygen   Eyes: Pupils equal, sclerae anicteric, no conjunctival injection   HENT: NCAT, mucous membranes moist   Neck: Supple, full range of motion   Respiratory: Diminished and coarse to auscultation bilaterally, nonlabored respirations    Cardiovascular: RRR, no murmurs, rubs, or gallops, palpable pedal pulses  bilaterally   Gastrointestinal: Positive bowel sounds, soft, nontender, nondistended   Musculoskeletal: Positive bilateral ankle edema, no clubbing or cyanosis to extremities   Psychiatric: Appropriate affect, cooperative   Neurologic: Oriented x 3, strength symmetric in all extremities, Cranial Nerves grossly intact to confrontation, speech clear   Skin: No rashes visible on exposed skin    Result Review    Result Review:  I have personally reviewed the pertinent results from the past 24 hours to 11/28/2024 14:32 EST and agree with these findings:  [x]  Laboratory   CBC          11/26/2024    05:06 11/27/2024    04:51 11/28/2024    05:16   CBC   WBC 13.38  16.04  14.72    RBC 4.78  4.52  4.41    Hemoglobin 13.1  12.4  12.0    Hematocrit 43.6  39.7  38.8    MCV 91.2  87.8  88.0    MCH 27.4  27.4  27.2    MCHC 30.0  31.2  30.9    RDW 13.5  13.5  13.7    Platelets 474  532  528      BMP          11/26/2024    05:06 11/27/2024    04:51 11/28/2024    05:16   BMP   BUN 19  22  24    Creatinine 0.61  0.59  0.61    Sodium 138  138  137    Potassium 4.3  3.1  3.6    Chloride 102  98  99    CO2 25.2  30.8  29.6    Calcium 8.6  8.3  8.2      LIVER FUNCTION TESTS:      Lab 11/28/24  0516 11/27/24  0451 11/25/24  1553   TOTAL PROTEIN 5.8* 6.5 7.5   ALBUMIN 2.4* 2.3* 3.0*   GLOBULIN  --   --  4.5   ALT (SGPT) 17 19 20   AST (SGOT) 11 16 18   BILIRUBIN 0.3 0.3 0.5   INDIRECT BILIRUBIN 0.2 0.2  --    BILIRUBIN DIRECT 0.1 0.1  --    ALK PHOS [phone]*       [x]  Microbiology   Microbiology Results (last 10 days)       Procedure Component Value - Date/Time    Respiratory Panel PCR w/COVID-19(SARS-CoV-2) YASMINE/MONICA/LUCIA/PAD/COR/MARY ANN In-House, NP Swab in UTM/VTM, 2 HR TAT - Swab, Nasopharynx [[id number]]  (Normal) Collected: 11/26/24 1405    Lab Status: Final result Specimen: Swab from Nasopharynx Updated: 11/26/24 1539     ADENOVIRUS, PCR Not Detected     Coronavirus 229E Not Detected     Coronavirus HKU1 Not Detected     Coronavirus  NL63 Not Detected     Coronavirus OC43 Not Detected     COVID19 Not Detected     Human Metapneumovirus Not Detected     Human Rhinovirus/Enterovirus Not Detected     Influenza A PCR Not Detected     Influenza B PCR Not Detected     Parainfluenza Virus 1 Not Detected     Parainfluenza Virus 2 Not Detected     Parainfluenza Virus 3 Not Detected     Parainfluenza Virus 4 Not Detected     RSV, PCR Not Detected     Bordetella pertussis pcr Not Detected     Bordetella parapertussis PCR Not Detected     Chlamydophila pneumoniae PCR Not Detected     Mycoplasma pneumo by PCR Not Detected    Narrative:      In the setting of a positive respiratory panel with a viral infection PLUS a negative procalcitonin without other underlying concern for bacterial infection, consider observing off antibiotics or discontinuation of antibiotics and continue supportive care. If the respiratory panel is positive for atypical bacterial infection (Bordetella pertussis, Chlamydophila pneumoniae, or Mycoplasma pneumoniae), consider antibiotic de-escalation to target atypical bacterial infection.    COVID-19, FLU A/B, RSV PCR 1 HR TAT - Swab, Nasopharynx [[id number]]  (Normal) Collected: 11/25/24 2051    Lab Status: Final result Specimen: Swab from Nasopharynx Updated: 11/25/24 2247     COVID19 Not Detected     Influenza A PCR Not Detected     Influenza B PCR Not Detected     RSV, PCR Not Detected    Narrative:      Fact sheet for providers: https://www.fda.gov/media/282834/download    Fact sheet for patients: https://www.fda.gov/media/162898/download    Test performed by PCR.    MRSA Screen, PCR (Inpatient) - Swab, Nares [[id number]]  (Normal) Collected: 11/25/24 2051    Lab Status: Final result Specimen: Swab from Nares Updated: 11/25/24 2321     MRSA PCR No MRSA Detected    Narrative:      The negative predictive value of this diagnostic test is high and should only be used to consider de-escalating anti-MRSA therapy. A positive result may  indicate colonization with MRSA and must be correlated clinically.    Blood Culture - Blood, Arm, Left [[id number]]  (Normal) Collected: 11/25/24 1555    Lab Status: Preliminary result Specimen: Blood from Arm, Left Updated: 11/27/24 1615     Blood Culture No growth at 2 days    Blood Culture - Blood, Arm, Left [[id number]]  (Normal) Collected: 11/25/24 1555    Lab Status: Preliminary result Specimen: Blood from Arm, Left Updated: 11/27/24 1615     Blood Culture No growth at 2 days    Mycoplasma Pneumoniae Antibody, IgM - Blood, [[id number]]  (Normal) Collected: 11/25/24 1553    Lab Status: Final result Specimen: Blood Updated: 11/26/24 0756     Mycoplasma pneumo IgM Negative    Clostridioides difficile Toxin, PCR - Stool, Per Rectum [[id number]] Collected: 11/20/24 0839    Lab Status: Final result Specimen: Stool from Per Rectum Updated: 11/20/24 0941     Toxigenic C. difficile by PCR Negative     027 Toxin Presumptive Negative    Narrative:      The result indicates the absence of toxigenic C. difficile from stool specimen.               [x]  Radiology XR Chest 1 View    Result Date: 11/25/2024  Impression: Similar-appearing extensive bilateral airspace opacities, suspicious for persistent multifocal infectious/inflammatory process. 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    Plan:  Labs and imaging reviewed  Plan to switch to diuretics to oral form tomorrow  Continue noninvasive measures  to mobilize mucus  Continue Lasix 40 mg IV twice daily for 1 more day  Strict I's and O's  Daily weights  Continue prednisone 40 mg daily  Continue cefepime ordered by pulmonary  Consideration towards bronchoscopy  Pulmonary consulted discussed with ANEL Nash, recommendations appreciated  Reconcile home medications, resumed accordingly  Continue Brovana Pulmicort nebs twice daily  Wean oxygen per tolerance  May need reimaging of her chest  Will determine next steps after response with diuresis  A.m. labs  Full code  DVT prophylaxis with Lovenox  Clinical course dictate further management  Discussed with nurse at the bedside        VTE Prophylaxis:  Pharmacologic VTE prophylaxis orders are present.        CODE STATUS:   Level Of Support Discussed With: Patient  Code Status (Patient has no pulse and is not breathing): CPR (Attempt to Resuscitate)  Medical Interventions (Patient has pulse or is breathing): Full Support        Electronically signed by Marco Del Toro MD, 11/28/2024, 14:32 EST.    Portions of this documentation were transcribed electronically from a voice recognition software.  I confirm all data accurately represents the service(s) I performed at today's visit.

## 2024-11-29 ENCOUNTER — ANESTHESIA (OUTPATIENT)
Dept: GASTROENTEROLOGY | Facility: HOSPITAL | Age: 84
End: 2024-11-29
Payer: MEDICARE

## 2024-11-29 ENCOUNTER — ANESTHESIA EVENT (OUTPATIENT)
Dept: GASTROENTEROLOGY | Facility: HOSPITAL | Age: 84
End: 2024-11-29
Payer: MEDICARE

## 2024-11-29 LAB
ACB CMPLX DNA BAL NAA+NON-PRB-NCNCRNG: NOT DETECTED
ALBUMIN SERPL-MCNC: 2.3 G/DL (ref 3.5–5.2)
ALP SERPL-CCNC: 90 U/L (ref 39–117)
ALT SERPL W P-5'-P-CCNC: 19 U/L (ref 1–33)
ANION GAP SERPL CALCULATED.3IONS-SCNC: 7 MMOL/L (ref 5–15)
AST SERPL-CCNC: 12 U/L (ref 1–32)
BASOPHILS # BLD AUTO: 0.02 10*3/MM3 (ref 0–0.2)
BASOPHILS NFR BLD AUTO: 0.2 % (ref 0–1.5)
BILIRUB CONJ SERPL-MCNC: 0.1 MG/DL (ref 0–0.3)
BILIRUB INDIRECT SERPL-MCNC: 0.2 MG/DL
BILIRUB SERPL-MCNC: 0.3 MG/DL (ref 0–1.2)
BLACTX-M ISLT/SPM QL: NORMAL
BLAIMP ISLT/SPM QL: NORMAL
BLAKPC ISLT/SPM QL: NORMAL
BLAOXA-48-LIKE ISLT/SPM QL: NORMAL
BLAVIM ISLT/SPM QL: NORMAL
BUN SERPL-MCNC: 19 MG/DL (ref 8–23)
BUN/CREAT SERPL: 32.8 (ref 7–25)
C PNEUM DNA NPH QL NAA+NON-PROBE: NOT DETECTED
CALCIUM SPEC-SCNC: 8.1 MG/DL (ref 8.6–10.5)
CHLORIDE SERPL-SCNC: 100 MMOL/L (ref 98–107)
CILIATED BAL QL: 9 %
CO2 SERPL-SCNC: 31 MMOL/L (ref 22–29)
CREAT SERPL-MCNC: 0.58 MG/DL (ref 0.57–1)
DEPRECATED RDW RBC AUTO: 42.9 FL (ref 37–54)
E CLOAC COMP DNA BAL NAA+NON-PRB-NCNCRNG: NOT DETECTED
E COLI DNA BAL NAA+NON-PRB-NCNCRNG: NOT DETECTED
EGFRCR SERPLBLD CKD-EPI 2021: 89.4 ML/MIN/1.73
EOSINOPHIL # BLD AUTO: 0.03 10*3/MM3 (ref 0–0.4)
EOSINOPHIL NFR BLD AUTO: 0.3 % (ref 0.3–6.2)
ERYTHROCYTE [DISTWIDTH] IN BLOOD BY AUTOMATED COUNT: 13.5 % (ref 12.3–15.4)
FLUAV SUBTYP SPEC NAA+PROBE: NOT DETECTED
FLUBV RNA ISLT QL NAA+PROBE: NOT DETECTED
GLUCOSE SERPL-MCNC: 207 MG/DL (ref 65–99)
GP B STREP DNA BAL NAA+NON-PRB-NCNCRNG: NOT DETECTED
HADV DNA SPEC NAA+PROBE: NOT DETECTED
HAEM INFLU DNA BAL NAA+NON-PRB-NCNCRNG: NOT DETECTED
HCOV RNA LOWER RESP QL NAA+NON-PROBE: NOT DETECTED
HCT VFR BLD AUTO: 38.3 % (ref 34–46.6)
HGB BLD-MCNC: 11.9 G/DL (ref 12–15.9)
HMPV RNA NPH QL NAA+NON-PROBE: NOT DETECTED
HPIV RNA LOWER RESP QL NAA+NON-PROBE: NOT DETECTED
IMM GRANULOCYTES # BLD AUTO: 0.15 10*3/MM3 (ref 0–0.05)
IMM GRANULOCYTES NFR BLD AUTO: 1.5 % (ref 0–0.5)
K AEROGENES DNA BAL NAA+NON-PRB-NCNCRNG: NOT DETECTED
K OXYTOCA DNA BAL NAA+NON-PRB-NCNCRNG: NOT DETECTED
K PNEU GRP DNA BAL NAA+NON-PRB-NCNCRNG: NOT DETECTED
L PNEUMO DNA LOWER RESP QL NAA+NON-PROBE: NOT DETECTED
L PNEUMO1 AG UR QL IA: NEGATIVE
LYMPHOCYTES # BLD AUTO: 1.77 10*3/MM3 (ref 0.7–3.1)
LYMPHOCYTES NFR BLD AUTO: 17.5 % (ref 19.6–45.3)
LYMPHOCYTES NFR FLD MANUAL: 9 %
M CATARRHALIS DNA BAL NAA+NON-PRB-NCNCRNG: NOT DETECTED
M PNEUMO IGG SER IA-ACNC: NOT DETECTED
MACROPHAGE FLUID: 12 %
MAGNESIUM SERPL-MCNC: 2 MG/DL (ref 1.6–2.4)
MCH RBC QN AUTO: 27.1 PG (ref 26.6–33)
MCHC RBC AUTO-ENTMCNC: 31.1 G/DL (ref 31.5–35.7)
MCV RBC AUTO: 87.2 FL (ref 79–97)
MECA+MECC ISLT/SPM QL: NORMAL
MONOCYTES # BLD AUTO: 0.58 10*3/MM3 (ref 0.1–0.9)
MONOCYTES NFR BLD AUTO: 5.7 % (ref 5–12)
NDM GENE: NORMAL
NEUTROPHILS NFR BLD AUTO: 7.57 10*3/MM3 (ref 1.7–7)
NEUTROPHILS NFR BLD AUTO: 74.8 % (ref 42.7–76)
NEUTROPHILS NFR FLD MANUAL: 70 %
NRBC BLD AUTO-RTO: 0 /100 WBC (ref 0–0.2)
P AERUGINOSA DNA BAL NAA+NON-PRB-NCNCRNG: NOT DETECTED
PHOSPHATE SERPL-MCNC: 1.8 MG/DL (ref 2.5–4.5)
PLATELET # BLD AUTO: 448 10*3/MM3 (ref 140–450)
PMV BLD AUTO: 10.3 FL (ref 6–12)
POTASSIUM SERPL-SCNC: 3.6 MMOL/L (ref 3.5–5.2)
PROT SERPL-MCNC: 5.5 G/DL (ref 6–8.5)
PROTEUS SP DNA BAL NAA+NON-PRB-NCNCRNG: NOT DETECTED
RBC # BLD AUTO: 4.39 10*6/MM3 (ref 3.77–5.28)
RHINOVIRUS RNA SPEC NAA+PROBE: NOT DETECTED
RSV RNA NPH QL NAA+NON-PROBE: NOT DETECTED
S AUREUS DNA BAL NAA+NON-PRB-NCNCRNG: NOT DETECTED
S MARCESCENS DNA BAL NAA+NON-PRB-NCNCRNG: NOT DETECTED
S PNEUM AG SPEC QL LA: NEGATIVE
S PNEUM DNA BAL NAA+NON-PRB-NCNCRNG: NOT DETECTED
S PYO DNA BAL NAA+NON-PRB-NCNCRNG: NOT DETECTED
SODIUM SERPL-SCNC: 138 MMOL/L (ref 136–145)
VISUAL PRESENCE OF BLOOD: NORMAL
WBC NRBC COR # BLD AUTO: 10.12 10*3/MM3 (ref 3.4–10.8)

## 2024-11-29 PROCEDURE — 83735 ASSAY OF MAGNESIUM: CPT | Performed by: FAMILY MEDICINE

## 2024-11-29 PROCEDURE — 87899 AGENT NOS ASSAY W/OPTIC: CPT | Performed by: FAMILY MEDICINE

## 2024-11-29 PROCEDURE — 87252 VIRUS INOCULATION TISSUE: CPT | Performed by: INTERNAL MEDICINE

## 2024-11-29 PROCEDURE — 89051 BODY FLUID CELL COUNT: CPT | Performed by: INTERNAL MEDICINE

## 2024-11-29 PROCEDURE — 88104 CYTOPATH FL NONGYN SMEARS: CPT | Performed by: INTERNAL MEDICINE

## 2024-11-29 PROCEDURE — 87071 CULTURE AEROBIC QUANT OTHER: CPT | Performed by: INTERNAL MEDICINE

## 2024-11-29 PROCEDURE — 94669 MECHANICAL CHEST WALL OSCILL: CPT

## 2024-11-29 PROCEDURE — 80076 HEPATIC FUNCTION PANEL: CPT | Performed by: FAMILY MEDICINE

## 2024-11-29 PROCEDURE — 94761 N-INVAS EAR/PLS OXIMETRY MLT: CPT

## 2024-11-29 PROCEDURE — 63710000001 PREDNISONE PER 1 MG: Performed by: FAMILY MEDICINE

## 2024-11-29 PROCEDURE — 25010000002 PROPOFOL 10 MG/ML EMULSION

## 2024-11-29 PROCEDURE — 31645 BRNCHSC W/THER ASPIR 1ST: CPT | Performed by: INTERNAL MEDICINE

## 2024-11-29 PROCEDURE — 99232 SBSQ HOSP IP/OBS MODERATE 35: CPT | Performed by: FAMILY MEDICINE

## 2024-11-29 PROCEDURE — 84100 ASSAY OF PHOSPHORUS: CPT | Performed by: FAMILY MEDICINE

## 2024-11-29 PROCEDURE — 94799 UNLISTED PULMONARY SVC/PX: CPT

## 2024-11-29 PROCEDURE — 99232 SBSQ HOSP IP/OBS MODERATE 35: CPT | Performed by: INTERNAL MEDICINE

## 2024-11-29 PROCEDURE — 87205 SMEAR GRAM STAIN: CPT | Performed by: INTERNAL MEDICINE

## 2024-11-29 PROCEDURE — 0B9F8ZX DRAINAGE OF RIGHT LOWER LUNG LOBE, VIA NATURAL OR ARTIFICIAL OPENING ENDOSCOPIC, DIAGNOSTIC: ICD-10-PCS | Performed by: INTERNAL MEDICINE

## 2024-11-29 PROCEDURE — 25010000002 ENOXAPARIN PER 10 MG: Performed by: INTERNAL MEDICINE

## 2024-11-29 PROCEDURE — 25810000003 SODIUM CHLORIDE 0.9 % SOLUTION: Performed by: INTERNAL MEDICINE

## 2024-11-29 PROCEDURE — 25010000002 FUROSEMIDE PER 20 MG: Performed by: FAMILY MEDICINE

## 2024-11-29 PROCEDURE — 25010000002 CEFEPIME PER 500 MG: Performed by: FAMILY MEDICINE

## 2024-11-29 PROCEDURE — 87206 SMEAR FLUORESCENT/ACID STAI: CPT | Performed by: INTERNAL MEDICINE

## 2024-11-29 PROCEDURE — 87633 RESP VIRUS 12-25 TARGETS: CPT | Performed by: INTERNAL MEDICINE

## 2024-11-29 PROCEDURE — 88312 SPECIAL STAINS GROUP 1: CPT | Performed by: INTERNAL MEDICINE

## 2024-11-29 PROCEDURE — 87496 CYTOMEG DNA AMP PROBE: CPT | Performed by: INTERNAL MEDICINE

## 2024-11-29 PROCEDURE — 87449 NOS EACH ORGANISM AG IA: CPT | Performed by: FAMILY MEDICINE

## 2024-11-29 PROCEDURE — 80048 BASIC METABOLIC PNL TOTAL CA: CPT | Performed by: FAMILY MEDICINE

## 2024-11-29 PROCEDURE — 87116 MYCOBACTERIA CULTURE: CPT | Performed by: INTERNAL MEDICINE

## 2024-11-29 PROCEDURE — 25810000003 LACTATED RINGERS PER 1000 ML

## 2024-11-29 PROCEDURE — 85025 COMPLETE CBC W/AUTO DIFF WBC: CPT | Performed by: FAMILY MEDICINE

## 2024-11-29 PROCEDURE — 88108 CYTOPATH CONCENTRATE TECH: CPT | Performed by: INTERNAL MEDICINE

## 2024-11-29 PROCEDURE — 0BD68ZX EXTRACTION OF RIGHT LOWER LOBE BRONCHUS, VIA NATURAL OR ARTIFICIAL OPENING ENDOSCOPIC, DIAGNOSTIC: ICD-10-PCS | Performed by: INTERNAL MEDICINE

## 2024-11-29 PROCEDURE — 25010000002 LIDOCAINE PF 2% 2 % SOLUTION

## 2024-11-29 PROCEDURE — 87070 CULTURE OTHR SPECIMN AEROBIC: CPT | Performed by: INTERNAL MEDICINE

## 2024-11-29 PROCEDURE — 25010000002 LIDOCAINE HCL (PF) 4 % SOLUTION: Performed by: INTERNAL MEDICINE

## 2024-11-29 PROCEDURE — 87102 FUNGUS ISOLATION CULTURE: CPT | Performed by: INTERNAL MEDICINE

## 2024-11-29 PROCEDURE — 94664 DEMO&/EVAL PT USE INHALER: CPT

## 2024-11-29 RX ORDER — FENTANYL/ROPIVACAINE/NS/PF 2-625MCG/1
15 PLASTIC BAG, INJECTION (ML) EPIDURAL ONCE
Status: COMPLETED | OUTPATIENT
Start: 2024-11-29 | End: 2024-11-29

## 2024-11-29 RX ORDER — PROPOFOL 10 MG/ML
VIAL (ML) INTRAVENOUS AS NEEDED
Status: DISCONTINUED | OUTPATIENT
Start: 2024-11-29 | End: 2024-11-29 | Stop reason: SURG

## 2024-11-29 RX ORDER — SODIUM CHLORIDE, SODIUM LACTATE, POTASSIUM CHLORIDE, CALCIUM CHLORIDE 600; 310; 30; 20 MG/100ML; MG/100ML; MG/100ML; MG/100ML
INJECTION, SOLUTION INTRAVENOUS CONTINUOUS PRN
Status: DISCONTINUED | OUTPATIENT
Start: 2024-11-29 | End: 2024-11-29 | Stop reason: SURG

## 2024-11-29 RX ORDER — NYSTATIN 100000 [USP'U]/G
POWDER TOPICAL EVERY 12 HOURS SCHEDULED
Status: DISCONTINUED | OUTPATIENT
Start: 2024-11-29 | End: 2024-12-04 | Stop reason: HOSPADM

## 2024-11-29 RX ORDER — MAGNESIUM HYDROXIDE 1200 MG/15ML
LIQUID ORAL AS NEEDED
Status: DISCONTINUED | OUTPATIENT
Start: 2024-11-29 | End: 2024-11-29 | Stop reason: HOSPADM

## 2024-11-29 RX ORDER — LIDOCAINE HYDROCHLORIDE 20 MG/ML
INJECTION, SOLUTION EPIDURAL; INFILTRATION; INTRACAUDAL; PERINEURAL AS NEEDED
Status: DISCONTINUED | OUTPATIENT
Start: 2024-11-29 | End: 2024-11-29 | Stop reason: SURG

## 2024-11-29 RX ORDER — FENTANYL/ROPIVACAINE/NS/PF 2-625MCG/1
15 PLASTIC BAG, INJECTION (ML) EPIDURAL ONCE
Status: DISCONTINUED | OUTPATIENT
Start: 2024-11-29 | End: 2024-11-29

## 2024-11-29 RX ORDER — FUROSEMIDE 40 MG/1
40 TABLET ORAL
Status: DISCONTINUED | OUTPATIENT
Start: 2024-11-29 | End: 2024-11-29

## 2024-11-29 RX ORDER — FUROSEMIDE 40 MG/1
40 TABLET ORAL
Status: DISCONTINUED | OUTPATIENT
Start: 2024-11-29 | End: 2024-12-04 | Stop reason: HOSPADM

## 2024-11-29 RX ORDER — LIDOCAINE HYDROCHLORIDE 40 MG/ML
INJECTION, SOLUTION RETROBULBAR AS NEEDED
Status: DISCONTINUED | OUTPATIENT
Start: 2024-11-29 | End: 2024-11-29 | Stop reason: HOSPADM

## 2024-11-29 RX ADMIN — Medication 10 ML: at 12:04

## 2024-11-29 RX ADMIN — IPRATROPIUM BROMIDE AND ALBUTEROL SULFATE 3 ML: .5; 3 SOLUTION RESPIRATORY (INHALATION) at 06:26

## 2024-11-29 RX ADMIN — CARVEDILOL 6.25 MG: 6.25 TABLET, FILM COATED ORAL at 12:03

## 2024-11-29 RX ADMIN — POTASSIUM PHOSPHATE, MONOBASIC AND POTASSIUM PHOSPHATE, DIBASIC 15 MMOL: 224; 236 INJECTION, SOLUTION, CONCENTRATE INTRAVENOUS at 12:04

## 2024-11-29 RX ADMIN — ASPIRIN 81 MG: 81 TABLET, COATED ORAL at 12:03

## 2024-11-29 RX ADMIN — IPRATROPIUM BROMIDE AND ALBUTEROL SULFATE 3 ML: .5; 3 SOLUTION RESPIRATORY (INHALATION) at 23:09

## 2024-11-29 RX ADMIN — CARVEDILOL 6.25 MG: 6.25 TABLET, FILM COATED ORAL at 20:27

## 2024-11-29 RX ADMIN — CEFEPIME 2000 MG: 2 INJECTION, POWDER, FOR SOLUTION INTRAVENOUS at 04:10

## 2024-11-29 RX ADMIN — GUAIFENESIN 600 MG: 600 TABLET ORAL at 20:27

## 2024-11-29 RX ADMIN — PREDNISONE 40 MG: 20 TABLET ORAL at 12:03

## 2024-11-29 RX ADMIN — FUROSEMIDE 40 MG: 10 INJECTION, SOLUTION INTRAMUSCULAR; INTRAVENOUS at 06:02

## 2024-11-29 RX ADMIN — Medication 10 ML: at 20:27

## 2024-11-29 RX ADMIN — NYSTATIN: 100000 POWDER TOPICAL at 20:28

## 2024-11-29 RX ADMIN — Medication 250 MG: at 12:04

## 2024-11-29 RX ADMIN — ENOXAPARIN SODIUM 40 MG: 100 INJECTION SUBCUTANEOUS at 12:03

## 2024-11-29 RX ADMIN — GUAIFENESIN 600 MG: 600 TABLET ORAL at 12:03

## 2024-11-29 RX ADMIN — Medication 250 MG: at 20:30

## 2024-11-29 RX ADMIN — FUROSEMIDE 40 MG: 40 TABLET ORAL at 17:32

## 2024-11-29 RX ADMIN — ARFORMOTEROL TARTRATE 15 MCG: 15 SOLUTION RESPIRATORY (INHALATION) at 18:44

## 2024-11-29 RX ADMIN — BUDESONIDE 0.5 MG: 0.5 INHALANT RESPIRATORY (INHALATION) at 06:26

## 2024-11-29 RX ADMIN — LIDOCAINE HYDROCHLORIDE 50 MG: 20 INJECTION, SOLUTION INTRAVENOUS at 10:30

## 2024-11-29 RX ADMIN — PROPOFOL 175 MCG/KG/MIN: 10 INJECTION, EMULSION INTRAVENOUS at 10:30

## 2024-11-29 RX ADMIN — PROPOFOL 60 MG: 10 INJECTION, EMULSION INTRAVENOUS at 10:30

## 2024-11-29 RX ADMIN — SODIUM CHLORIDE, POTASSIUM CHLORIDE, SODIUM LACTATE AND CALCIUM CHLORIDE: 600; 310; 30; 20 INJECTION, SOLUTION INTRAVENOUS at 10:24

## 2024-11-29 RX ADMIN — IPRATROPIUM BROMIDE AND ALBUTEROL SULFATE 3 ML: .5; 3 SOLUTION RESPIRATORY (INHALATION) at 14:33

## 2024-11-29 RX ADMIN — CEFEPIME 2000 MG: 2 INJECTION, POWDER, FOR SOLUTION INTRAVENOUS at 15:24

## 2024-11-29 RX ADMIN — LISINOPRIL 20 MG: 20 TABLET ORAL at 12:03

## 2024-11-29 RX ADMIN — ARFORMOTEROL TARTRATE 15 MCG: 15 SOLUTION RESPIRATORY (INHALATION) at 06:26

## 2024-11-29 RX ADMIN — BUDESONIDE 0.5 MG: 0.5 INHALANT RESPIRATORY (INHALATION) at 18:44

## 2024-11-29 NOTE — ANESTHESIA POSTPROCEDURE EVALUATION
Patient: Cris Agudelo    Procedure Summary       Date: 11/29/24 Room / Location: [facility] ENDOSCOPY 3 / [facility] ENDOSCOPY    Anesthesia Start: 1023 Anesthesia Stop: 1054    Procedure: BRONCHOSCOPY with BAL, Brushings and washings (Bronchus) Diagnosis:       Multifocal pneumonia      (Multifocal pneumonia [J18.9])    Surgeons: Adan Brown MD Provider: Harsh Medina CRNA    Anesthesia Type: general ASA Status: 3            Anesthesia Type: general    Vitals  Vitals Value Taken Time   /62 11/29/24 1106   Temp 36.9 °C (98.4 °F) 11/29/24 1105   Pulse 66 11/29/24 1108   Resp 25 11/29/24 1105   SpO2 93 % 11/29/24 1108   Vitals shown include unfiled device data.        Post Anesthesia Care and Evaluation    Post-procedure mental status: acceptable.  Pain management: satisfactory to patient    Airway patency: patent  Anesthetic complications: No anesthetic complications    Cardiovascular status: acceptable  Respiratory status: acceptable    Comments: Per chart review

## 2024-11-29 NOTE — ANESTHESIA PREPROCEDURE EVALUATION
Anesthesia Evaluation     Patient summary reviewed and Nursing notes reviewed   NPO Solid Status: > 8 hours  NPO Liquid Status: > 2 hours           Airway   Mallampati: II  TM distance: >3 FB  Neck ROM: full  No difficulty expected  Dental    (+) edentulous    Pulmonary    (+) pneumonia , a smoker Former, COPD,home oxygen (REQUIRED BiPAP ON ADMISSION, NOW 2L NC), decreased breath sounds  Cardiovascular - normal exam  Exercise tolerance: poor (<4 METS)    ECG reviewed  Patient on routine beta blocker    (+) hypertension, CAD, hyperlipidemia    ROS comment:     EKG 11/27/2024  - ABNORMAL ECG -  Sinus rhythm  LVH with secondary repolarization abnormality  Anterior  infarct, old      ECHO 11/26/2024  ·  Left ventricular ejection fraction appears to be 56 - 60%.  ·  Left ventricular diastolic function is consistent with (grade I) impaired relaxation and age.  ·  Mild aortic insufficiency.      CATH 08/2023  IMPRESSIONS  Patent LAD, left circumflex and RCA.  Obstructive coronary disease involving third diagonal and mid segment of PDA.  Elevated LVEDP  RECOMMENDATIONS  -Medical management for obstructive branch disease with dual antiplatelet therapy, statin and beta-blocker  -Obtain an echocardiogram  -Start diuretics  -GDMT based on findings of echocardiogram      Neuro/Psych  GI/Hepatic/Renal/Endo    (+) obesity    Musculoskeletal     Abdominal    Substance History      OB/GYN          Other                      Anesthesia Plan    ASA 3     general     (Total IV Anesthesia    Patient understands anesthesia not responsible for dental damage.  )  intravenous induction     Anesthetic plan, risks, benefits, and alternatives have been provided, discussed and informed consent has been obtained with: patient.    Plan discussed with CRNA.    CODE STATUS:    Level Of Support Discussed With: Patient  Code Status (Patient has no pulse and is not breathing): CPR (Attempt to Resuscitate)  Medical Interventions (Patient has pulse or  is breathing): Full Support

## 2024-11-29 NOTE — PROGRESS NOTES
[facility]   Hospitalist Progress Note  Date: 2024  Patient Name: Cris Agudelo  : 1940  MRN: [id number]  Date of admission: 2024      Subjective   Subjective     Chief complaint: Shortness of breath, cough    Summary:  84-year-old female with history of hypertension, CAD, COPD, hyponatremia, dyslipidemia, GERD without esophagitis, hospitalized on 2024 with chief complaint of shortness of breath and cough, found to be hypoxemic, hypercapnic, found to have multifocal pneumonia with hypoxemia on imaging, with recent hospitalization for pneumonia discharged on Augmentin, concern for volume overload, discontinued IV fluids, pulmonary consulted for further evaluation, concern for underlying ILD, previous PFTs were indicative, bronchoscopy 2024.    Interval follow-up: Seen and examined this morning, no acute distress, resting comfortably, was able to wean her down to 2-1/2 L nasal cannula, bronchoscopy today, post bronchoscopy, O2 requirement went up to 5 L.  Remains weak and fatigued.  Phosphorus 1.8, bicarb 31, potassium 3.6, sodium 138, creatinine 0.58, white blood cell count down to 12,000.    Review of systems:  All systems reviewed and negative set for weakness, fatigue, cough, shortness of breath with exertional activity    Objective   Objective     Vitals:   Temp:  [97.2 °F (36.2 °C)-98.4 °F (36.9 °C)] 98.4 °F (36.9 °C)  Heart Rate:  [67-91] 67  Resp:  [16-29] 25  BP: ()/(48-72) 114/62  Flow (L/min) (Oxygen Therapy):  [2-5] 4  Physical Exam     Constitutional: Awake, alert, no acute distress laying in bed wearing nasal cannula oxygen appearing comfortable   Eyes: Pupils equal, sclerae anicteric, no conjunctival injection   HENT: NCAT, mucous membranes moist   Neck: Supple, full range of motion   Respiratory: Diminished and coarse to auscultation bilaterally, nonlabored respirations    Cardiovascular: RRR, no murmurs, rubs, or gallops, palpable pedal pulses bilaterally    Gastrointestinal: Positive bowel sounds, soft, nontender, nondistended   Musculoskeletal: Positive bilateral ankle edema, no clubbing or cyanosis to extremities   Psychiatric: Appropriate affect, cooperative   Neurologic: Oriented x 3, strength symmetric in all extremities, Cranial Nerves grossly intact to confrontation, speech clear   Skin: No rashes visible on exposed skin    Result Review    Result Review:  I have personally reviewed the pertinent results from the past 24 hours to 11/29/2024 12:37 EST and agree with these findings:  [x]  Laboratory   CBC          11/27/2024    04:51 11/28/2024    05:16 11/29/2024    04:02   CBC   WBC 16.04  14.72  10.12    RBC 4.52  4.41  4.39    Hemoglobin 12.4  12.0  11.9    Hematocrit 39.7  38.8  38.3    MCV 87.8  88.0  87.2    MCH 27.4  27.2  27.1    MCHC 31.2  30.9  31.1    RDW 13.5  13.7  13.5    Platelets 532  528  448      BMP          11/27/2024    04:51 11/28/2024    05:16 11/29/2024    04:02   BMP   BUN 22  24  19    Creatinine 0.59  0.61  0.58    Sodium 138  137  138    Potassium 3.1  3.6  3.6    Chloride 98  99  100    CO2 30.8  29.6  31.0    Calcium 8.3  8.2  8.1      LIVER FUNCTION TESTS:      Lab 11/29/24  0402 11/28/24  0516 11/27/24  0451 11/25/24  1553   TOTAL PROTEIN 5.5* 5.8* 6.5 7.5   ALBUMIN 2.3* 2.4* 2.3* 3.0*   GLOBULIN  --   --   --  4.5   ALT (SGPT) 19 17 19 20   AST (SGOT) 12 11 16 18   BILIRUBIN 0.3 0.3 0.3 0.5   INDIRECT BILIRUBIN 0.2 0.2 0.2  --    BILIRUBIN DIRECT 0.1 0.1 0.1  --    ALK PHOS [phone]*       [x]  Microbiology   Microbiology Results (last 10 days)       Procedure Component Value - Date/Time    Respiratory Culture - Wash, Lung, Right Lower Lobe [[id number]] Collected: 11/29/24 1040    Lab Status: Preliminary result Specimen: Wash from Lung, Right Lower Lobe Updated: 11/29/24 1146     Gram Stain Few (2+) WBCs seen      No organisms seen    BAL Culture, Quantitative - Lavage, Lung, Right Lower Lobe [[id number]] Collected: 11/29/24  1037    Lab Status: Preliminary result Specimen: Lavage from Lung, Right Lower Lobe Updated: 11/29/24 1147     Gram Stain Rare (1+) WBCs seen      No organisms seen    Pneumonia Panel - Lavage, Lung, Right Lower Lobe [[id number]]  (Normal) Collected: 11/29/24 1037    Lab Status: Final result Specimen: Lavage from Lung, Right Lower Lobe Updated: 11/29/24 1231     Escherichia coli PCR Not Detected     Acinetobacter calcoaceticus-baumannii complex PCR Not Detected     Enterobacter cloacae PCR Not Detected     Klebsiella oxytoca PCR Not Detected     Klebsiella pneumoniae group PCR Not Detected     Klebsiella aerogenes PCR Not Detected     Moraxella catarrhalis PCR Not Detected     Proteus species PCR Not Detected     Pseudomonas aeroginosa PCR Not Detected     Serratia marcescens PCR Not Detected     Staphylococcus aureus PCR Not Detected     Streptococcus pyogenes PCR Not Detected     Haemophilus influenzae PCR Not Detected     Streptococcus agalactiae PCR Not Detected     Streptococcus pneumoniae PCR Not Detected     Chlamydophila pneumoniae PCR Not Detected     Legionella pneumophilia PCR Not Detected     Mycoplasma pneumo by PCR Not Detected     ADENOVIRUS, PCR Not Detected     CTX-M Gene N/A     IMP Gene N/A     KPC Gene N/A     mecA/C and MREJ Gene N/A     NDM Gene N/A     OXA-48-like Gene N/A     VIM Gene N/A     Coronavirus Not Detected     Human Metapneumovirus Not Detected     Human Rhinovirus/Enterovirus Not Detected     Influenza A PCR Not Detected     Influenza B PCR Not Detected     RSV, PCR Not Detected     Parainfluenza virus PCR Not Detected    S. Pneumo Ag Urine or CSF - Urine, Urine, Clean Catch [[id number]]  (Normal) Collected: 11/29/24 0409    Lab Status: Final result Specimen: Urine, Clean Catch Updated: 11/29/24 0640     Strep Pneumo Ag Negative    Legionella Antigen, Urine - Urine, Urine, Clean Catch [[id number]]  (Normal) Collected: 11/29/24 0409    Lab Status: Final result Specimen: Urine, Clean  Catch Updated: 11/29/24 0640     LEGIONELLA ANTIGEN, URINE Negative    Respiratory Panel PCR w/COVID-19(SARS-CoV-2) YASMINE/MONICA/LUCIA/PAD/COR/MARY ANN In-House, NP Swab in UTM/VTM, 2 HR TAT - Swab, Nasopharynx [[id number]]  (Normal) Collected: 11/26/24 1405    Lab Status: Final result Specimen: Swab from Nasopharynx Updated: 11/26/24 1539     ADENOVIRUS, PCR Not Detected     Coronavirus 229E Not Detected     Coronavirus HKU1 Not Detected     Coronavirus NL63 Not Detected     Coronavirus OC43 Not Detected     COVID19 Not Detected     Human Metapneumovirus Not Detected     Human Rhinovirus/Enterovirus Not Detected     Influenza A PCR Not Detected     Influenza B PCR Not Detected     Parainfluenza Virus 1 Not Detected     Parainfluenza Virus 2 Not Detected     Parainfluenza Virus 3 Not Detected     Parainfluenza Virus 4 Not Detected     RSV, PCR Not Detected     Bordetella pertussis pcr Not Detected     Bordetella parapertussis PCR Not Detected     Chlamydophila pneumoniae PCR Not Detected     Mycoplasma pneumo by PCR Not Detected    Narrative:      In the setting of a positive respiratory panel with a viral infection PLUS a negative procalcitonin without other underlying concern for bacterial infection, consider observing off antibiotics or discontinuation of antibiotics and continue supportive care. If the respiratory panel is positive for atypical bacterial infection (Bordetella pertussis, Chlamydophila pneumoniae, or Mycoplasma pneumoniae), consider antibiotic de-escalation to target atypical bacterial infection.    COVID-19, FLU A/B, RSV PCR 1 HR TAT - Swab, Nasopharynx [[id number]]  (Normal) Collected: 11/25/24 2051    Lab Status: Final result Specimen: Swab from Nasopharynx Updated: 11/25/24 2247     COVID19 Not Detected     Influenza A PCR Not Detected     Influenza B PCR Not Detected     RSV, PCR Not Detected    Narrative:      Fact sheet for providers: https://www.fda.gov/media/880222/download    Fact sheet for patients:  https://www.fda.gov/media/143201/download    Test performed by PCR.    MRSA Screen, PCR (Inpatient) - Swab, Nares [[id number]]  (Normal) Collected: 11/25/24 2051    Lab Status: Final result Specimen: Swab from Nares Updated: 11/25/24 2321     MRSA PCR No MRSA Detected    Narrative:      The negative predictive value of this diagnostic test is high and should only be used to consider de-escalating anti-MRSA therapy. A positive result may indicate colonization with MRSA and must be correlated clinically.    Blood Culture - Blood, Arm, Left [[id number]]  (Normal) Collected: 11/25/24 1555    Lab Status: Preliminary result Specimen: Blood from Arm, Left Updated: 11/28/24 1615     Blood Culture No growth at 3 days    Blood Culture - Blood, Arm, Left [[id number]]  (Normal) Collected: 11/25/24 1555    Lab Status: Preliminary result Specimen: Blood from Arm, Left Updated: 11/28/24 1615     Blood Culture No growth at 3 days    Mycoplasma Pneumoniae Antibody, IgM - Blood, [[id number]]  (Normal) Collected: 11/25/24 1553    Lab Status: Final result Specimen: Blood Updated: 11/26/24 0756     Mycoplasma pneumo IgM Negative    Clostridioides difficile Toxin, PCR - Stool, Per Rectum [[id number]] Collected: 11/20/24 0839    Lab Status: Final result Specimen: Stool from Per Rectum Updated: 11/20/24 0941     Toxigenic C. difficile by PCR Negative     027 Toxin Presumptive Negative    Narrative:      The result indicates the absence of toxigenic C. difficile from stool specimen.               [x]  Radiology XR Chest 1 View    Result Date: 11/25/2024  Impression: Similar-appearing extensive bilateral airspace opacities, suspicious for persistent multifocal infectious/inflammatory process. 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 40 mg twice a day  Bronchoscopy today  Strict I's and O's  Daily weights  Continue prednisone 40 mg daily  Continue cefepime ordered by pulmonary  Pulmonary consulted discussed with ANEL Nash, recommendations appreciated  Reconcile home medications, resumed accordingly  Continue Brovana Pulmicort nebs twice daily  Wean oxygen per tolerance  A.m. labs  Full code  DVT prophylaxis with Lovenox  Clinical course dictate further management  Discussed with nurse at the bedside        VTE Prophylaxis:  Pharmacologic VTE prophylaxis orders are present.        CODE STATUS:   Level Of Support Discussed With: Patient  Code Status (Patient has no pulse and is not breathing): CPR (Attempt to Resuscitate)  Medical Interventions (Patient has pulse or is breathing): Full Support        Electronically signed by Marco Del Toro MD, 11/29/2024, 12:37 EST.    Portions of this documentation were transcribed electronically from a voice recognition software.  I confirm all data accurately represents the service(s) I performed at today's visit.

## 2024-11-29 NOTE — PLAN OF CARE
Goal Outcome Evaluation:      Patient alert and orientated, confused on date. Family at bedside. Bronch today. No complaints of SOB, on 4LNC. No other complaints at this time.

## 2024-11-29 NOTE — PROGRESS NOTES
Pulmonary / Critical Care Progress Note      Patient Name: Cris Agudelo  : 1940  MRN: [id number]  Primary Care Physician:  Ramirez Maurice MD  Date of admission: 2024    Subjective   Subjective   Follow-up for concern for pneumonia, acute on chronic hypoxic respiratory failure    No acute events overnight.    This morning,  Resting in bed  Family at bedside  Feeling about the same  Remains on 3 L nasal cannula  Continues to have weak congested cough  Tolerating chest vest  Continues to have issues with airway clearance  No fever or chills  Remains weak and fatigued  Scheduled for bronchoscopy today    Objective   Objective     Vitals:   Temp:  [97.2 °F (36.2 °C)-97.9 °F (36.6 °C)] 97.2 °F (36.2 °C)  Heart Rate:  [66-91] 69  Resp:  [18-20] 20  BP: (119-163)/(48-72) 119/52  Flow (L/min) (Oxygen Therapy):  [2-3] 3    Physical Exam   Vital Signs Reviewed   General: Chronically ill-appearing, elderly female, somnolent but arousable, NAD, lying in bed.    Chest: Decreased aeration, scattered rhonchi to auscultation bilaterally, no work of breathing noted on 3 L NC  CV: regular rate and rhythm regular  EXT:  no clubbing, no cyanosis, no edema  Neuro:  A&Ox3, moving all 4 extremities spontaneously  Skin: No rashes or lesions noted    Result Review    Result Review:  I have personally reviewed the results from the time of this admission to 2024 09:25 EST and agree with these findings:  [x]  Laboratory  [x]  Microbiology  [x]  Radiology  [x]  EKG/Telemetry   []  Cardiology/Vascular   []  Pathology  []  Old records  []  Other:  Most notable findings include:   proBNP   Alpha 1 antitrypsin 305  CRP 28.84      Lab 24  0402 24  0516 24  0451 24  0506 24  1553   WBC 10.12 14.72* 16.04* 13.38* 26.51*   HEMOGLOBIN 11.9* 12.0 12.4 13.1 14.2   HEMATOCRIT 38.3 38.8 39.7 43.6 45.3   PLATELETS 448 528* 532* 474* 612*   SODIUM 138 137 138 138 137   POTASSIUM 3.6 3.6 3.1* 4.3 3.7    CHLORIDE 100 99 98 102 95*   CO2 31.0* 29.6* 30.8* 25.2 31.3*   BUN 19 24* 22 19 13   CREATININE 0.58 0.61 0.59 0.61 0.70   GLUCOSE 207* 212* 227* 132* 182*   CALCIUM 8.1* 8.2* 8.3* 8.6 8.9   PHOSPHORUS 1.8* 2.0* 3.0  --   --    TOTAL PROTEIN 5.5* 5.8* 6.5  --  7.5   ALBUMIN 2.3* 2.4* 2.3*  --  3.0*   GLOBULIN  --   --   --   --  4.5       Assessment & Plan   Assessment / Plan     Active Hospital Problems:  Active Hospital Problems    Diagnosis     **Multifocal pneumonia     Acute on chronic respiratory failure with hypoxia     HCAP (healthcare-associated pneumonia)     Hyperlipidemia     Primary hypertension     Hyponatremia    Impression:  Acute on chronic hypoxic respiratory failure, 2 L at baseline  Concern for pneumonia, organism versus Cryptogenic organizing pneumonia  Concern for ILD  Acute on chronic COPD exacerbation, FEV1 44%  Issues with airway clearance/mucous plugging  Acute on chronic congestive diastolic heart failure, grade 1 DD  Tobacco abuse of cigarettes in remission    Plan:  -Continue to wean O2 to maintain SpO2 greater than 90%.  Baseline 2 L NC.  -Recent chest CT on 11/16/24 on previous admission demonstrating extensive bronchocentric alveolar airspace disease throughout both lungs consistent with pneumonia.  Demonstrating extensive groundglass opacities that may represent bronchopneumonia or cryptogenic organizing pneumonia  -Will take for bronchoscopy today with airway inspection, brushings, biopsies, bronchoalveolar lavage. I have discussed the risks of the procedure with the patient including pneumothorax, hemothorax, bleeding, hypoxia, required mechanical ventilation and death. The patient recognizes these findings, acknowledges these findings and is agreeable to the procedure.  -N.p.o. after midnight for bronchoscopy today  -Continue cefepime for pneumonia at this time.  De-escalate pending cultures.    -Vancomycin discontinued.  MRSA PCR negative.  -Micro negative to date.   Respiratory viral panel negative.  -Continue Brovana, Pulmicort, DuoNebs  -Continue bronchopulmonary hygiene.  Encourage I-S and flutter.  Chest vest available.  -Continue prednisone 40 mg daily per primary  -Continue Lasix 40 mg IV twice daily  -Trend electrolytes and renal panel.  Replace electrolytes necessary  -Echo with normal EF of 56 to 60%, grade 1 diastolic dysfunction noted.        VTE Prophylaxis:  Pharmacologic VTE prophylaxis orders are present.        CODE STATUS:   Level Of Support Discussed With: Patient  Code Status (Patient has no pulse and is not breathing): CPR (Attempt to Resuscitate)  Medical Interventions (Patient has pulse or is breathing): Full Support    Electronically signed by JES Robins, 11/29/24, 9:26 AM EST.    This visit was performed by BOTH a physician and an APC. I personally evaluated and examined the patient. I performed all aspects of MDM as documented. , I have reviewed and confirmed the accuracy of the patient's history as documented in this note., and I have reexamined the patient and the results are consistent with the previously documented exam. I have updated the documentation as necessary.   Electronically signed by Adan Brown MD, 11/29/24, 9:39 AM EST.

## 2024-11-29 NOTE — PLAN OF CARE
Goal Outcome Evaluation:  Plan of Care Reviewed With: patient           Outcome Evaluation: Patient rested well overnight.  Ambulatory to BR multiple times without difficulty.  Patient tolerating O2 and completing IS with encouragement.  Antibiotics and Lasix given this shift.  Patient denies any pain or discomfort. NPO after midnight for Bronch today.

## 2024-11-29 NOTE — OP NOTE
Bronchoscopy Procedure Note    Patient Identification:  Cris Agudelo  84 y.o.  female  1940  [id number]        Procedure:  Bronchoscopy, Therapeutic    Pre-Operative Diagnosis: Bilateral pneumonia with mucous plugging and respiratory failure    Post-Operative Diagnosis: Same    Indication: Bilateral pneumonia with mucous plugging    Anesthesia: Monitored Anesthesia Care (MAC)    Procedure Details: Patient was consented for the procedure with all risk and benefit of the procedure explained in detail.  Patient was given the opportunity to ask questions and all concerns were answered.  The bronchocope was inserted into the main airway via the oropharynx. An anatomical survey was done of the main airways and the subsegmental bronchus to at least the first subsegmental level of all five lobes of both lungs.  The findings are reported below.  A bronchialalveolar lavage was performed using aliquots of normal saline instilled into the airways then aspirated back.    Findings:  Bronchoscope passed through LMA to the level of the vocal cords.  Lidocaine used for local anesthetic over vocal cords.  Bronchoscope was passed between the vocal cords into the trachea.  All airways were visualized to at least the first subsegment level of all 5 lobes of both lungs.  Patient was noted to have thick mucous plugs with purulent secretions both in the right and left lower lobe bronchi along with upper lobe bronchi washings were taken and secretions were aspirated  Lavage was done in the right lower lobe using 50 mL of normal saline about 15 cc recovered  Brushings were taken from the right lower lobe  Diffuse hyperemia and congestion with edema of the bronchial airway was noted  No discrete endobronchial lesion or foreign body was noted       Estimated Blood Loss:  Minimal           Specimens:  Sent purulent fluid washings for sent for Gram stain and culture cytology AFB and fungus and viral stains along with cultures                 Complications:  None; patient tolerated the procedure well.           Disposition: PACU - hemodynamically stable.      Patient tolerated the procedure well.        This document has been electronically signed by Adan Brown MD on November 29, 2024 10:42 EST

## 2024-11-30 ENCOUNTER — APPOINTMENT (OUTPATIENT)
Dept: GENERAL RADIOLOGY | Facility: HOSPITAL | Age: 84
DRG: 193 | End: 2024-11-30
Payer: MEDICARE

## 2024-11-30 LAB
ALBUMIN SERPL-MCNC: 2.6 G/DL (ref 3.5–5.2)
ALP SERPL-CCNC: 81 U/L (ref 39–117)
ALT SERPL W P-5'-P-CCNC: 19 U/L (ref 1–33)
ANION GAP SERPL CALCULATED.3IONS-SCNC: 4.2 MMOL/L (ref 5–15)
AST SERPL-CCNC: 13 U/L (ref 1–32)
BACTERIA SPEC AEROBE CULT: NORMAL
BACTERIA SPEC AEROBE CULT: NORMAL
BASOPHILS # BLD AUTO: 0.03 10*3/MM3 (ref 0–0.2)
BASOPHILS NFR BLD AUTO: 0.3 % (ref 0–1.5)
BILIRUB CONJ SERPL-MCNC: 0.1 MG/DL (ref 0–0.3)
BILIRUB INDIRECT SERPL-MCNC: 0.3 MG/DL
BILIRUB SERPL-MCNC: 0.4 MG/DL (ref 0–1.2)
BUN SERPL-MCNC: 18 MG/DL (ref 8–23)
BUN/CREAT SERPL: 31.6 (ref 7–25)
CALCIUM SPEC-SCNC: 8 MG/DL (ref 8.6–10.5)
CHLORIDE SERPL-SCNC: 97 MMOL/L (ref 98–107)
CO2 SERPL-SCNC: 35.8 MMOL/L (ref 22–29)
CREAT SERPL-MCNC: 0.57 MG/DL (ref 0.57–1)
DEPRECATED RDW RBC AUTO: 43.1 FL (ref 37–54)
EGFRCR SERPLBLD CKD-EPI 2021: 89.7 ML/MIN/1.73
EOSINOPHIL # BLD AUTO: 0.04 10*3/MM3 (ref 0–0.4)
EOSINOPHIL NFR BLD AUTO: 0.4 % (ref 0.3–6.2)
ERYTHROCYTE [DISTWIDTH] IN BLOOD BY AUTOMATED COUNT: 13.4 % (ref 12.3–15.4)
GLUCOSE SERPL-MCNC: 184 MG/DL (ref 65–99)
HCT VFR BLD AUTO: 38.2 % (ref 34–46.6)
HGB BLD-MCNC: 11.9 G/DL (ref 12–15.9)
IMM GRANULOCYTES # BLD AUTO: 0.16 10*3/MM3 (ref 0–0.05)
IMM GRANULOCYTES NFR BLD AUTO: 1.5 % (ref 0–0.5)
LYMPHOCYTES # BLD AUTO: 1.69 10*3/MM3 (ref 0.7–3.1)
LYMPHOCYTES NFR BLD AUTO: 16.1 % (ref 19.6–45.3)
MAGNESIUM SERPL-MCNC: 1.9 MG/DL (ref 1.6–2.4)
MCH RBC QN AUTO: 27 PG (ref 26.6–33)
MCHC RBC AUTO-ENTMCNC: 31.2 G/DL (ref 31.5–35.7)
MCV RBC AUTO: 86.6 FL (ref 79–97)
MONOCYTES # BLD AUTO: 0.5 10*3/MM3 (ref 0.1–0.9)
MONOCYTES NFR BLD AUTO: 4.8 % (ref 5–12)
NEUTROPHILS NFR BLD AUTO: 76.9 % (ref 42.7–76)
NEUTROPHILS NFR BLD AUTO: 8.09 10*3/MM3 (ref 1.7–7)
NRBC BLD AUTO-RTO: 0 /100 WBC (ref 0–0.2)
PHOSPHATE SERPL-MCNC: 2.4 MG/DL (ref 2.5–4.5)
PLATELET # BLD AUTO: 459 10*3/MM3 (ref 140–450)
PMV BLD AUTO: 10.5 FL (ref 6–12)
POTASSIUM SERPL-SCNC: 3.3 MMOL/L (ref 3.5–5.2)
PROT SERPL-MCNC: 5.6 G/DL (ref 6–8.5)
QT INTERVAL: 392 MS
QTC INTERVAL: 452 MS
RBC # BLD AUTO: 4.41 10*6/MM3 (ref 3.77–5.28)
SODIUM SERPL-SCNC: 137 MMOL/L (ref 136–145)
WBC NRBC COR # BLD AUTO: 10.51 10*3/MM3 (ref 3.4–10.8)

## 2024-11-30 PROCEDURE — 63710000001 PREDNISONE PER 1 MG: Performed by: FAMILY MEDICINE

## 2024-11-30 PROCEDURE — 80048 BASIC METABOLIC PNL TOTAL CA: CPT | Performed by: INTERNAL MEDICINE

## 2024-11-30 PROCEDURE — 84100 ASSAY OF PHOSPHORUS: CPT | Performed by: INTERNAL MEDICINE

## 2024-11-30 PROCEDURE — 71045 X-RAY EXAM CHEST 1 VIEW: CPT

## 2024-11-30 PROCEDURE — 25010000002 ENOXAPARIN PER 10 MG: Performed by: INTERNAL MEDICINE

## 2024-11-30 PROCEDURE — 83735 ASSAY OF MAGNESIUM: CPT | Performed by: INTERNAL MEDICINE

## 2024-11-30 PROCEDURE — 85025 COMPLETE CBC W/AUTO DIFF WBC: CPT | Performed by: INTERNAL MEDICINE

## 2024-11-30 PROCEDURE — 94664 DEMO&/EVAL PT USE INHALER: CPT

## 2024-11-30 PROCEDURE — 25010000002 CEFEPIME PER 500 MG: Performed by: FAMILY MEDICINE

## 2024-11-30 PROCEDURE — 94669 MECHANICAL CHEST WALL OSCILL: CPT

## 2024-11-30 PROCEDURE — 80076 HEPATIC FUNCTION PANEL: CPT | Performed by: INTERNAL MEDICINE

## 2024-11-30 PROCEDURE — 94799 UNLISTED PULMONARY SVC/PX: CPT

## 2024-11-30 PROCEDURE — 94761 N-INVAS EAR/PLS OXIMETRY MLT: CPT

## 2024-11-30 PROCEDURE — 99232 SBSQ HOSP IP/OBS MODERATE 35: CPT | Performed by: INTERNAL MEDICINE

## 2024-11-30 PROCEDURE — 99232 SBSQ HOSP IP/OBS MODERATE 35: CPT | Performed by: FAMILY MEDICINE

## 2024-11-30 RX ORDER — POTASSIUM CHLORIDE 750 MG/1
40 CAPSULE, EXTENDED RELEASE ORAL ONCE
Status: COMPLETED | OUTPATIENT
Start: 2024-11-30 | End: 2024-11-30

## 2024-11-30 RX ADMIN — BUDESONIDE 0.5 MG: 0.5 INHALANT RESPIRATORY (INHALATION) at 18:39

## 2024-11-30 RX ADMIN — Medication 10 ML: at 09:06

## 2024-11-30 RX ADMIN — GUAIFENESIN 600 MG: 600 TABLET ORAL at 09:05

## 2024-11-30 RX ADMIN — BUDESONIDE 0.5 MG: 0.5 INHALANT RESPIRATORY (INHALATION) at 06:51

## 2024-11-30 RX ADMIN — PREDNISONE 40 MG: 20 TABLET ORAL at 09:04

## 2024-11-30 RX ADMIN — CEFEPIME 2000 MG: 2 INJECTION, POWDER, FOR SOLUTION INTRAVENOUS at 12:35

## 2024-11-30 RX ADMIN — ENOXAPARIN SODIUM 40 MG: 100 INJECTION SUBCUTANEOUS at 09:06

## 2024-11-30 RX ADMIN — IPRATROPIUM BROMIDE AND ALBUTEROL SULFATE 3 ML: .5; 3 SOLUTION RESPIRATORY (INHALATION) at 15:55

## 2024-11-30 RX ADMIN — ASPIRIN 81 MG: 81 TABLET, COATED ORAL at 09:05

## 2024-11-30 RX ADMIN — NYSTATIN: 100000 POWDER TOPICAL at 15:45

## 2024-11-30 RX ADMIN — ARFORMOTEROL TARTRATE 15 MCG: 15 SOLUTION RESPIRATORY (INHALATION) at 06:51

## 2024-11-30 RX ADMIN — CEFEPIME 2000 MG: 2 INJECTION, POWDER, FOR SOLUTION INTRAVENOUS at 20:25

## 2024-11-30 RX ADMIN — FUROSEMIDE 40 MG: 40 TABLET ORAL at 09:05

## 2024-11-30 RX ADMIN — Medication 10 MG: at 21:26

## 2024-11-30 RX ADMIN — IPRATROPIUM BROMIDE AND ALBUTEROL SULFATE 3 ML: .5; 3 SOLUTION RESPIRATORY (INHALATION) at 23:08

## 2024-11-30 RX ADMIN — CARVEDILOL 6.25 MG: 6.25 TABLET, FILM COATED ORAL at 09:05

## 2024-11-30 RX ADMIN — NYSTATIN: 100000 POWDER TOPICAL at 20:26

## 2024-11-30 RX ADMIN — POTASSIUM CHLORIDE 40 MEQ: 750 CAPSULE, EXTENDED RELEASE ORAL at 09:06

## 2024-11-30 RX ADMIN — IPRATROPIUM BROMIDE AND ALBUTEROL SULFATE 3 ML: .5; 3 SOLUTION RESPIRATORY (INHALATION) at 06:51

## 2024-11-30 RX ADMIN — FUROSEMIDE 40 MG: 40 TABLET ORAL at 17:45

## 2024-11-30 RX ADMIN — Medication 250 MG: at 09:05

## 2024-11-30 RX ADMIN — Medication 250 MG: at 20:25

## 2024-11-30 RX ADMIN — ARFORMOTEROL TARTRATE 15 MCG: 15 SOLUTION RESPIRATORY (INHALATION) at 18:39

## 2024-11-30 RX ADMIN — CEFEPIME 2000 MG: 2 INJECTION, POWDER, FOR SOLUTION INTRAVENOUS at 04:15

## 2024-11-30 RX ADMIN — CARVEDILOL 6.25 MG: 6.25 TABLET, FILM COATED ORAL at 20:26

## 2024-11-30 RX ADMIN — LISINOPRIL 20 MG: 20 TABLET ORAL at 09:05

## 2024-11-30 RX ADMIN — Medication 10 ML: at 20:26

## 2024-11-30 RX ADMIN — GUAIFENESIN 600 MG: 600 TABLET ORAL at 20:26

## 2024-11-30 NOTE — PROGRESS NOTES
Pulmonary / Critical Care Progress Note      Patient Name: Cris Agudelo  : 1940  MRN: [id number]  Primary Care Physician:  Ramirez Maurice MD  Date of admission: 2024    Subjective   Subjective   Follow-up for concern for pneumonia, acute on chronic hypoxic respiratory failure    No acute events overnight.    This morning,  Resting in bed  Overall feeling better  S/p bronchoscopy yesterday  Pneumonia panel negative  Remains on 3 L nasal cannula  Cough improved  No fever or chills  Remains weak and fatigued    Objective   Objective     Vitals:   Temp:  [97.2 °F (36.2 °C)-98.2 °F (36.8 °C)] 97.2 °F (36.2 °C)  Heart Rate:  [64-80] 70  Resp:  [16-20] 18  BP: ()/(39-89) 104/56  Flow (L/min) (Oxygen Therapy):  [3] 3    Physical Exam   Vital Signs Reviewed   General: Chronically ill-appearing, elderly female, somnolent but arousable, NAD, sitting up in bed  Chest: Improved aeration, clear  to auscultation bilaterally, no work of breathing noted on 3 L NC  CV: regular rate and rhythm regular  EXT:  no clubbing, no cyanosis, no edema  Neuro:  A&Ox3, moving all 4 extremities spontaneously  Skin: No rashes or lesions noted    Result Review    Result Review:  I have personally reviewed the results from the time of this admission to 2024 12:09 EST and agree with these findings:  [x]  Laboratory  [x]  Microbiology  [x]  Radiology  [x]  EKG/Telemetry   []  Cardiology/Vascular   []  Pathology  []  Old records  []  Other:  Most notable findings include:   proBNP   Alpha 1 antitrypsin 305  CRP 28.84      Lab 24  0420 24  0402 24  0516 24  0451 24  0506 24  1553   WBC 10.51 10.12 14.72* 16.04* 13.38* 26.51*   HEMOGLOBIN 11.9* 11.9* 12.0 12.4 13.1 14.2   HEMATOCRIT 38.2 38.3 38.8 39.7 43.6 45.3   PLATELETS 459* 448 528* 532* 474* 612*   SODIUM 137 138 137 138 138 137   POTASSIUM 3.3* 3.6 3.6 3.1* 4.3 3.7   CHLORIDE 97* 100 99 98 102 95*   CO2 35.8* 31.0* 29.6* 30.8*  25.2 31.3*   BUN 18 19 24* 22 19 13   CREATININE 0.57 0.58 0.61 0.59 0.61 0.70   GLUCOSE 184* 207* 212* 227* 132* 182*   CALCIUM 8.0* 8.1* 8.2* 8.3* 8.6 8.9   PHOSPHORUS 2.4* 1.8* 2.0* 3.0  --   --    TOTAL PROTEIN 5.6* 5.5* 5.8* 6.5  --  7.5   ALBUMIN 2.6* 2.3* 2.4* 2.3*  --  3.0*   GLOBULIN  --   --   --   --   --  4.5   -Recent chest CT on 11/16/24 on previous admission demonstrating extensive bronchocentric alveolar airspace disease throughout both lungs consistent with pneumonia.  Demonstrating extensive groundglass opacities that may represent bronchopneumonia or cryptogenic organizing pneumonia    Assessment & Plan   Assessment / Plan     Active Hospital Problems:  Active Hospital Problems    Diagnosis     **Multifocal pneumonia     Acute on chronic respiratory failure with hypoxia     HCAP (healthcare-associated pneumonia)     Hyperlipidemia     Primary hypertension     Hyponatremia    Impression:  Acute on chronic hypoxic respiratory failure, 2 L at baseline  Concern for pneumonia, organism versus Cryptogenic organizing pneumonia  Concern for ILD  Acute on chronic COPD exacerbation, FEV1 44%  Issues with airway clearance/mucous plugging  Acute on chronic congestive diastolic heart failure, grade 1 DD  Tobacco abuse of cigarettes in remission    Plan:  -Continue to wean O2 to maintain SpO2 greater than 90%.  Baseline 2 L NC.  -Repeat CXR pending  -S/p bronchoscopy, pneumonia panel negative, cultures and cytology pending.  -Continue cefepime for pneumonia at this time.  De-escalate pending cultures.    -Vancomycin discontinued.  MRSA PCR negative.  -Micro negative to date.  Respiratory viral panel negative.  -Continue Brovana, Pulmicort, DuoNebs  -Continue bronchopulmonary hygiene.  Encourage I-S and flutter.  Chest vest available.  -Continue prednisone 40 mg daily x 5 days  -Continue Lasix 40 mg p.o. twice daily  -Trend electrolytes and renal panel.  Replace electrolytes necessary  -Echo with normal EF of 56  to 60%, grade 1 diastolic dysfunction noted.  -Encourage mobilization.  Out of bed to chair.        VTE Prophylaxis:  Pharmacologic VTE prophylaxis orders are present.      CODE STATUS:   Level Of Support Discussed With: Patient  Code Status (Patient has no pulse and is not breathing): CPR (Attempt to Resuscitate)  Medical Interventions (Patient has pulse or is breathing): Full Support    Electronically signed by JES Robins, 11/30/24, 12:09 PM EST.    This visit was performed by BOTH a physician and an APC. I personally evaluated and examined the patient. I performed all aspects of MDM as documented. , I have reviewed and confirmed the accuracy of the patient's history as documented in this note., and I have reexamined the patient and the results are consistent with the previously documented exam. I have updated the documentation as necessary.   Electronically signed by Adan Brown MD, 11/30/24, 1:40 PM EST.

## 2024-11-30 NOTE — PROGRESS NOTES
Respiratory Therapist Broncho-Pulmonary Hygiene Progress Note      Patient Name:  Cris Agudelo  YOB: 1940    Cris Agudelo meets the qualification for Level 4 of the Bronco-Pulmonary Hygiene Protocol. This was based on my daily patient assessment and includes review of chest x-ray results, cough ability and quality, oxygenation, secretions or risk for secretion development and patient mobility.     Broncho-Pulmonary Hygiene Assessment:    Level of Movement: Actively changing positions without assistance  Alert/ oriented/ cooperative  1/2  Breath Sounds: Diminished and/or coarse rhonchi  2  Cough: Ineffective, weak, frequent  3  Chest X-Ray: Presence of atelectasis and/or consolidation  THIS CXR is 5 DAYS AGO AND TAKEN BEFORE BRONCHOSCOPY  Similar-appearing extensive bilateral airspace opacities, suspicious for persistent multifocalinfectious/inflammatory process.    3    Sputum Productions: Thick, tenacious retained secretions with difffiulty clearing     4    From bronchoscopy report:    Patient was noted to have thick mucous plugs with purulent secretions both in the right and left lower lobe bronchi along with upper lobe bronchi washings were taken and secretions were aspirated     History and Physical: New onset of bronchitis or existing chronic pulmonary conditions.  **(not in an exacerbation) and Chronic condition  Cris Agudelo is a 84 y.o. female with past medical history of hypertension, COPD, CAD, prior MI, hyponatremia, hyperlipidemia, and GERD presented to the ED for evaluation of shortness of breath and cough.     1/2    SpO2 to Oxygen Need: greater than 92% on room air or  less than 3L nasal canula  1  Current SpO2 is: 96 on 3 L nasal cannula (2 L at home)    Based on this information, I have completed the following interventions: CPT (vest)      Electronically signed by Annia Millan RRT, 11/30/24, 6:58 AM EST.

## 2024-11-30 NOTE — PLAN OF CARE
Goal Outcome Evaluation:              Outcome Evaluation: Patient on 3 L NC. AMbulated to rest room. Antibiotics given per MAR. No complaints of pain. VSS.

## 2024-11-30 NOTE — PROGRESS NOTES
[facility]   Hospitalist Progress Note  Date: 2024  Patient Name: Cris Agudelo  : 1940  MRN: [id number]  Date of admission: 2024      Subjective   Subjective     Chief complaint: Shortness of breath, cough    Summary:  84-year-old female with history of hypertension, CAD, COPD, hyponatremia, dyslipidemia, GERD without esophagitis, hospitalized on 2024 with chief complaint of shortness of breath and cough, found to be hypoxemic, hypercapnic, found to have multifocal pneumonia with hypoxemia on imaging, with recent hospitalization for pneumonia discharged on Augmentin, concern for volume overload, discontinued IV fluids, pulmonary consulted for further evaluation, concern for underlying ILD, previous PFTs were indicative, bronchoscopy 2024.    Interval follow-up: Seen and examined this morning, no acute distress, resting comfortably, doing better from a breathing standpoint after bronchoscopy, down to 3 L nasal cannula with sats in the 90s.  Phosphorus 2.4, creatinine 0.57, bicarb 35, potassium 3.3, sodium 137.  So far cultures are pending from bronchoscopy.  Remains short of air with exertional activity.    Review of systems:  All systems reviewed and negative except for weakness, fatigue, cough, shortness of breath with exertional activity    Objective   Objective     Vitals:   Temp:  [97.2 °F (36.2 °C)-98.4 °F (36.9 °C)] 97.2 °F (36.2 °C)  Heart Rate:  [64-80] 75  Resp:  [16-29] 20  BP: ()/(46-89) 121/89  Flow (L/min) (Oxygen Therapy):  [3-5] 3  Physical Exam       Constitutional: Awake, alert, no acute distress laying in bed wearing nasal cannula oxygen appearing comfortable   Eyes: Pupils equal, sclerae anicteric, no conjunctival injection   HENT: NCAT, mucous membranes moist   Neck: Supple, full range of motion   Respiratory: Diminished and coarse to auscultation bilaterally, nonlabored respirations    Cardiovascular: RRR, no murmurs, rubs, or gallops, palpable pedal  pulses bilaterally   Gastrointestinal: Positive bowel sounds, soft, nontender, nondistended   Musculoskeletal: Positive bilateral ankle edema, no clubbing or cyanosis to extremities   Psychiatric: Appropriate affect, cooperative   Neurologic: Oriented x 3, strength symmetric in all extremities, Cranial Nerves grossly intact to confrontation, speech clear   Skin: No rashes visible on exposed skin    Result Review    Result Review:  I have personally reviewed the pertinent results from the past 24 hours to 11/30/2024 10:41 EST and agree with these findings:  [x]  Laboratory   CBC          11/28/2024    05:16 11/29/2024    04:02 11/30/2024    04:20   CBC   WBC 14.72  10.12  10.51    RBC 4.41  4.39  4.41    Hemoglobin 12.0  11.9  11.9    Hematocrit 38.8  38.3  38.2    MCV 88.0  87.2  86.6    MCH 27.2  27.1  27.0    MCHC 30.9  31.1  31.2    RDW 13.7  13.5  13.4    Platelets 528  448  459      BMP          11/28/2024    05:16 11/29/2024    04:02 11/30/2024    04:20   BMP   BUN 24  19  18    Creatinine 0.61  0.58  0.57    Sodium 137  138  137    Potassium 3.6  3.6  3.3    Chloride 99  100  97    CO2 29.6  31.0  35.8    Calcium 8.2  8.1  8.0      LIVER FUNCTION TESTS:      Lab 11/30/24  0420 11/29/24  0402 11/28/24  0516 11/27/24  0451 11/25/24  1553   TOTAL PROTEIN 5.6* 5.5* 5.8* 6.5 7.5   ALBUMIN 2.6* 2.3* 2.4* 2.3* 3.0*   GLOBULIN  --   --   --   --  4.5   ALT (SGPT) 19 19 17 19 20   AST (SGOT) 13 12 11 16 18   BILIRUBIN 0.4 0.3 0.3 0.3 0.5   INDIRECT BILIRUBIN 0.3 0.2 0.2 0.2  --    BILIRUBIN DIRECT 0.1 0.1 0.1 0.1  --    ALK PHOS 81 90 93 105 129*       [x]  Microbiology   Microbiology Results (last 10 days)       Procedure Component Value - Date/Time    Respiratory Culture - Wash, Lung, Right Lower Lobe [[id number]] Collected: 11/29/24 1040    Lab Status: Preliminary result Specimen: Wash from Lung, Right Lower Lobe Updated: 11/29/24 1146     Gram Stain Few (2+) WBCs seen      No organisms seen    AFB Culture - Lavage,  Lung, Right Lower Lobe [[id number]] Collected: 11/29/24 1037    Lab Status: Preliminary result Specimen: Lavage from Lung, Right Lower Lobe Updated: 11/29/24 1504     AFB Stain No acid fast bacilli seen on direct smear    BAL Culture, Quantitative - Lavage, Lung, Right Lower Lobe [[id number]] Collected: 11/29/24 1037    Lab Status: Preliminary result Specimen: Lavage from Lung, Right Lower Lobe Updated: 11/29/24 1147     Gram Stain Rare (1+) WBCs seen      No organisms seen    Pneumonia Panel - Lavage, Lung, Right Lower Lobe [[id number]]  (Normal) Collected: 11/29/24 1037    Lab Status: Final result Specimen: Lavage from Lung, Right Lower Lobe Updated: 11/29/24 1231     Escherichia coli PCR Not Detected     Acinetobacter calcoaceticus-baumannii complex PCR Not Detected     Enterobacter cloacae PCR Not Detected     Klebsiella oxytoca PCR Not Detected     Klebsiella pneumoniae group PCR Not Detected     Klebsiella aerogenes PCR Not Detected     Moraxella catarrhalis PCR Not Detected     Proteus species PCR Not Detected     Pseudomonas aeroginosa PCR Not Detected     Serratia marcescens PCR Not Detected     Staphylococcus aureus PCR Not Detected     Streptococcus pyogenes PCR Not Detected     Haemophilus influenzae PCR Not Detected     Streptococcus agalactiae PCR Not Detected     Streptococcus pneumoniae PCR Not Detected     Chlamydophila pneumoniae PCR Not Detected     Legionella pneumophilia PCR Not Detected     Mycoplasma pneumo by PCR Not Detected     ADENOVIRUS, PCR Not Detected     CTX-M Gene N/A     IMP Gene N/A     KPC Gene N/A     mecA/C and MREJ Gene N/A     NDM Gene N/A     OXA-48-like Gene N/A     VIM Gene N/A     Coronavirus Not Detected     Human Metapneumovirus Not Detected     Human Rhinovirus/Enterovirus Not Detected     Influenza A PCR Not Detected     Influenza B PCR Not Detected     RSV, PCR Not Detected     Parainfluenza virus PCR Not Detected    S. Pneumo Ag Urine or CSF - Urine, Urine, Clean Catch  [[id number]]  (Normal) Collected: 11/29/24 0409    Lab Status: Final result Specimen: Urine, Clean Catch Updated: 11/29/24 0640     Strep Pneumo Ag Negative    Legionella Antigen, Urine - Urine, Urine, Clean Catch [[id number]]  (Normal) Collected: 11/29/24 0409    Lab Status: Final result Specimen: Urine, Clean Catch Updated: 11/29/24 0640     LEGIONELLA ANTIGEN, URINE Negative    Respiratory Panel PCR w/COVID-19(SARS-CoV-2) YASMINE/MONICA/LUCIA/PAD/COR/MARY ANN In-House, NP Swab in UTM/VTM, 2 HR TAT - Swab, Nasopharynx [[id number]]  (Normal) Collected: 11/26/24 1405    Lab Status: Final result Specimen: Swab from Nasopharynx Updated: 11/26/24 1539     ADENOVIRUS, PCR Not Detected     Coronavirus 229E Not Detected     Coronavirus HKU1 Not Detected     Coronavirus NL63 Not Detected     Coronavirus OC43 Not Detected     COVID19 Not Detected     Human Metapneumovirus Not Detected     Human Rhinovirus/Enterovirus Not Detected     Influenza A PCR Not Detected     Influenza B PCR Not Detected     Parainfluenza Virus 1 Not Detected     Parainfluenza Virus 2 Not Detected     Parainfluenza Virus 3 Not Detected     Parainfluenza Virus 4 Not Detected     RSV, PCR Not Detected     Bordetella pertussis pcr Not Detected     Bordetella parapertussis PCR Not Detected     Chlamydophila pneumoniae PCR Not Detected     Mycoplasma pneumo by PCR Not Detected    Narrative:      In the setting of a positive respiratory panel with a viral infection PLUS a negative procalcitonin without other underlying concern for bacterial infection, consider observing off antibiotics or discontinuation of antibiotics and continue supportive care. If the respiratory panel is positive for atypical bacterial infection (Bordetella pertussis, Chlamydophila pneumoniae, or Mycoplasma pneumoniae), consider antibiotic de-escalation to target atypical bacterial infection.    COVID-19, FLU A/B, RSV PCR 1 HR TAT - Swab, Nasopharynx [[id number]]  (Normal) Collected: 11/25/24 1371     Lab Status: Final result Specimen: Swab from Nasopharynx Updated: 11/25/24 2247     COVID19 Not Detected     Influenza A PCR Not Detected     Influenza B PCR Not Detected     RSV, PCR Not Detected    Narrative:      Fact sheet for providers: https://www.fda.gov/media/601271/download    Fact sheet for patients: https://www.fda.gov/media/827587/download    Test performed by PCR.    MRSA Screen, PCR (Inpatient) - Swab, Nares [[id number]]  (Normal) Collected: 11/25/24 2051    Lab Status: Final result Specimen: Swab from Nares Updated: 11/25/24 2321     MRSA PCR No MRSA Detected    Narrative:      The negative predictive value of this diagnostic test is high and should only be used to consider de-escalating anti-MRSA therapy. A positive result may indicate colonization with MRSA and must be correlated clinically.    Blood Culture - Blood, Arm, Left [[id number]]  (Normal) Collected: 11/25/24 1555    Lab Status: Preliminary result Specimen: Blood from Arm, Left Updated: 11/29/24 1615     Blood Culture No growth at 4 days    Blood Culture - Blood, Arm, Left [[id number]]  (Normal) Collected: 11/25/24 1555    Lab Status: Preliminary result Specimen: Blood from Arm, Left Updated: 11/29/24 1615     Blood Culture No growth at 4 days    Mycoplasma Pneumoniae Antibody, IgM - Blood, [[id number]]  (Normal) Collected: 11/25/24 1553    Lab Status: Final result Specimen: Blood Updated: 11/26/24 0756     Mycoplasma pneumo IgM Negative              [x]  Radiology XR Chest 1 View    Result Date: 11/25/2024  Impression: Similar-appearing extensive bilateral airspace opacities, suspicious for persistent multifocal infectious/inflammatory process. 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 labs  Full code  DVT prophylaxis with Lovenox  Clinical course dictate further management  Discussed with nurse at the bedside        VTE Prophylaxis:  Pharmacologic VTE prophylaxis orders are present.        CODE STATUS:   Level Of Support Discussed With: Patient  Code Status (Patient has no pulse and is not breathing): CPR (Attempt to Resuscitate)  Medical Interventions (Patient has pulse or is breathing): Full Support        Electronically signed by Marco Del Toro MD, 11/30/2024, 10:41 EST.    Portions of this documentation were transcribed electronically from a voice recognition software.  I confirm all data accurately represents the service(s) I performed at today's visit.

## 2024-11-30 NOTE — PLAN OF CARE
Goal Outcome Evaluation:              Outcome Evaluation: Patient was disoriented to time during shift. Patient currently on 2L NC. No complaints of pain. IV ABX administered per MAR. Patient ambulated outside of room with cane. Patient ambulating to the bathroom with stand-by assistance and cane. Chest x-ray obtained.

## 2024-12-01 LAB
ALBUMIN SERPL-MCNC: 2.5 G/DL (ref 3.5–5.2)
ALP SERPL-CCNC: 80 U/L (ref 39–117)
ALT SERPL W P-5'-P-CCNC: 19 U/L (ref 1–33)
ANION GAP SERPL CALCULATED.3IONS-SCNC: 7.5 MMOL/L (ref 5–15)
AST SERPL-CCNC: 12 U/L (ref 1–32)
BACTERIA SPEC AEROBE CULT: NO GROWTH
BACTERIA SPEC RESP CULT: NORMAL
BASOPHILS # BLD AUTO: 0.03 10*3/MM3 (ref 0–0.2)
BASOPHILS NFR BLD AUTO: 0.3 % (ref 0–1.5)
BILIRUB CONJ SERPL-MCNC: 0.1 MG/DL (ref 0–0.3)
BILIRUB INDIRECT SERPL-MCNC: 0.3 MG/DL
BILIRUB SERPL-MCNC: 0.4 MG/DL (ref 0–1.2)
BUN SERPL-MCNC: 17 MG/DL (ref 8–23)
BUN/CREAT SERPL: 30.9 (ref 7–25)
CALCIUM SPEC-SCNC: 7.8 MG/DL (ref 8.6–10.5)
CHLORIDE SERPL-SCNC: 97 MMOL/L (ref 98–107)
CO2 SERPL-SCNC: 32.5 MMOL/L (ref 22–29)
CREAT SERPL-MCNC: 0.55 MG/DL (ref 0.57–1)
DEPRECATED RDW RBC AUTO: 43.9 FL (ref 37–54)
EGFRCR SERPLBLD CKD-EPI 2021: 90.5 ML/MIN/1.73
EOSINOPHIL # BLD AUTO: 0.29 10*3/MM3 (ref 0–0.4)
EOSINOPHIL NFR BLD AUTO: 2.5 % (ref 0.3–6.2)
ERYTHROCYTE [DISTWIDTH] IN BLOOD BY AUTOMATED COUNT: 13.5 % (ref 12.3–15.4)
GLUCOSE SERPL-MCNC: 147 MG/DL (ref 65–99)
GRAM STN SPEC: NORMAL
HCT VFR BLD AUTO: 38.9 % (ref 34–46.6)
HGB BLD-MCNC: 11.8 G/DL (ref 12–15.9)
IMM GRANULOCYTES # BLD AUTO: 0.2 10*3/MM3 (ref 0–0.05)
IMM GRANULOCYTES NFR BLD AUTO: 1.7 % (ref 0–0.5)
LYMPHOCYTES # BLD AUTO: 2.02 10*3/MM3 (ref 0.7–3.1)
LYMPHOCYTES NFR BLD AUTO: 17.6 % (ref 19.6–45.3)
MAGNESIUM SERPL-MCNC: 1.8 MG/DL (ref 1.6–2.4)
MCH RBC QN AUTO: 26.9 PG (ref 26.6–33)
MCHC RBC AUTO-ENTMCNC: 30.3 G/DL (ref 31.5–35.7)
MCV RBC AUTO: 88.6 FL (ref 79–97)
MONOCYTES # BLD AUTO: 0.52 10*3/MM3 (ref 0.1–0.9)
MONOCYTES NFR BLD AUTO: 4.5 % (ref 5–12)
NEUTROPHILS NFR BLD AUTO: 73.4 % (ref 42.7–76)
NEUTROPHILS NFR BLD AUTO: 8.42 10*3/MM3 (ref 1.7–7)
NRBC BLD AUTO-RTO: 0 /100 WBC (ref 0–0.2)
PHOSPHATE SERPL-MCNC: 2 MG/DL (ref 2.5–4.5)
PLATELET # BLD AUTO: 373 10*3/MM3 (ref 140–450)
PMV BLD AUTO: 10.6 FL (ref 6–12)
POTASSIUM SERPL-SCNC: 3.1 MMOL/L (ref 3.5–5.2)
PROT SERPL-MCNC: 5.4 G/DL (ref 6–8.5)
RBC # BLD AUTO: 4.39 10*6/MM3 (ref 3.77–5.28)
SODIUM SERPL-SCNC: 137 MMOL/L (ref 136–145)
WBC NRBC COR # BLD AUTO: 11.48 10*3/MM3 (ref 3.4–10.8)

## 2024-12-01 PROCEDURE — 25010000002 CEFEPIME PER 500 MG: Performed by: FAMILY MEDICINE

## 2024-12-01 PROCEDURE — 94664 DEMO&/EVAL PT USE INHALER: CPT

## 2024-12-01 PROCEDURE — 80076 HEPATIC FUNCTION PANEL: CPT | Performed by: FAMILY MEDICINE

## 2024-12-01 PROCEDURE — 94761 N-INVAS EAR/PLS OXIMETRY MLT: CPT

## 2024-12-01 PROCEDURE — 94799 UNLISTED PULMONARY SVC/PX: CPT

## 2024-12-01 PROCEDURE — 94669 MECHANICAL CHEST WALL OSCILL: CPT

## 2024-12-01 PROCEDURE — 83735 ASSAY OF MAGNESIUM: CPT | Performed by: FAMILY MEDICINE

## 2024-12-01 PROCEDURE — 25010000002 ENOXAPARIN PER 10 MG: Performed by: INTERNAL MEDICINE

## 2024-12-01 PROCEDURE — 99232 SBSQ HOSP IP/OBS MODERATE 35: CPT | Performed by: FAMILY MEDICINE

## 2024-12-01 PROCEDURE — 99232 SBSQ HOSP IP/OBS MODERATE 35: CPT | Performed by: INTERNAL MEDICINE

## 2024-12-01 PROCEDURE — 85025 COMPLETE CBC W/AUTO DIFF WBC: CPT | Performed by: FAMILY MEDICINE

## 2024-12-01 PROCEDURE — 63710000001 PREDNISONE PER 1 MG: Performed by: FAMILY MEDICINE

## 2024-12-01 PROCEDURE — 80048 BASIC METABOLIC PNL TOTAL CA: CPT | Performed by: FAMILY MEDICINE

## 2024-12-01 PROCEDURE — 84100 ASSAY OF PHOSPHORUS: CPT | Performed by: FAMILY MEDICINE

## 2024-12-01 RX ADMIN — ARFORMOTEROL TARTRATE 15 MCG: 15 SOLUTION RESPIRATORY (INHALATION) at 06:48

## 2024-12-01 RX ADMIN — IPRATROPIUM BROMIDE AND ALBUTEROL SULFATE 3 ML: .5; 3 SOLUTION RESPIRATORY (INHALATION) at 06:48

## 2024-12-01 RX ADMIN — BUDESONIDE 0.5 MG: 0.5 INHALANT RESPIRATORY (INHALATION) at 06:48

## 2024-12-01 RX ADMIN — Medication 10 ML: at 09:13

## 2024-12-01 RX ADMIN — CEFEPIME 2000 MG: 2 INJECTION, POWDER, FOR SOLUTION INTRAVENOUS at 20:54

## 2024-12-01 RX ADMIN — GUAIFENESIN 600 MG: 600 TABLET ORAL at 09:11

## 2024-12-01 RX ADMIN — ARFORMOTEROL TARTRATE 15 MCG: 15 SOLUTION RESPIRATORY (INHALATION) at 19:46

## 2024-12-01 RX ADMIN — CEFEPIME 2000 MG: 2 INJECTION, POWDER, FOR SOLUTION INTRAVENOUS at 04:15

## 2024-12-01 RX ADMIN — Medication 10 ML: at 20:55

## 2024-12-01 RX ADMIN — Medication 10 MG: at 20:55

## 2024-12-01 RX ADMIN — FUROSEMIDE 40 MG: 40 TABLET ORAL at 09:11

## 2024-12-01 RX ADMIN — CEFEPIME 2000 MG: 2 INJECTION, POWDER, FOR SOLUTION INTRAVENOUS at 14:30

## 2024-12-01 RX ADMIN — CARVEDILOL 6.25 MG: 6.25 TABLET, FILM COATED ORAL at 20:55

## 2024-12-01 RX ADMIN — POTASSIUM & SODIUM PHOSPHATES POWDER PACK 280-160-250 MG 1 PACKET: 280-160-250 PACK at 13:11

## 2024-12-01 RX ADMIN — POTASSIUM & SODIUM PHOSPHATES POWDER PACK 280-160-250 MG 1 PACKET: 280-160-250 PACK at 09:11

## 2024-12-01 RX ADMIN — PREDNISONE 40 MG: 20 TABLET ORAL at 09:11

## 2024-12-01 RX ADMIN — CARVEDILOL 6.25 MG: 6.25 TABLET, FILM COATED ORAL at 09:11

## 2024-12-01 RX ADMIN — ASPIRIN 81 MG: 81 TABLET, COATED ORAL at 09:11

## 2024-12-01 RX ADMIN — ENOXAPARIN SODIUM 40 MG: 100 INJECTION SUBCUTANEOUS at 09:11

## 2024-12-01 RX ADMIN — Medication 250 MG: at 20:55

## 2024-12-01 RX ADMIN — NYSTATIN: 100000 POWDER TOPICAL at 21:00

## 2024-12-01 RX ADMIN — IPRATROPIUM BROMIDE AND ALBUTEROL SULFATE 3 ML: .5; 3 SOLUTION RESPIRATORY (INHALATION) at 23:12

## 2024-12-01 RX ADMIN — FUROSEMIDE 40 MG: 40 TABLET ORAL at 17:20

## 2024-12-01 RX ADMIN — GUAIFENESIN 600 MG: 600 TABLET ORAL at 20:55

## 2024-12-01 RX ADMIN — POTASSIUM & SODIUM PHOSPHATES POWDER PACK 280-160-250 MG 1 PACKET: 280-160-250 PACK at 17:19

## 2024-12-01 RX ADMIN — IPRATROPIUM BROMIDE AND ALBUTEROL SULFATE 3 ML: .5; 3 SOLUTION RESPIRATORY (INHALATION) at 15:27

## 2024-12-01 RX ADMIN — Medication 250 MG: at 09:11

## 2024-12-01 RX ADMIN — BUDESONIDE 0.5 MG: 0.5 INHALANT RESPIRATORY (INHALATION) at 19:46

## 2024-12-01 RX ADMIN — NYSTATIN: 100000 POWDER TOPICAL at 13:12

## 2024-12-01 NOTE — PLAN OF CARE
Goal Outcome Evaluation:              Outcome Evaluation: Patient was flat and disoreinted to time throughout shift. Patient ambulated well to bathroom with minimal assistance. Rested well throughout night after taking melatonin.

## 2024-12-01 NOTE — PROGRESS NOTES
Pulmonary / Critical Care Progress Note      Patient Name: Cris Agudelo  : 1940  MRN: [id number]  Primary Care Physician:  Ramirez Maurice MD  Date of admission: 2024    Subjective   Subjective   Follow-up for concern for pneumonia, acute on chronic hypoxic respiratory failure    No acute events overnight.    This morning,  Resting in bed  Intermittently confused  No family at bedside  Currently on 2 L nasal cannula  Overall feeling better  No fever or chills  Remains weak and fatigued    Objective   Objective     Vitals:   Temp:  [97.2 °F (36.2 °C)-98.2 °F (36.8 °C)] 98.2 °F (36.8 °C)  Heart Rate:  [67-87] 78  Resp:  [16-22] 18  BP: (106-128)/(43-54) 106/43  Flow (L/min) (Oxygen Therapy):  [2] 2    Physical Exam   Vital Signs Reviewed   General: Chronically ill-appearing, elderly female, alert, NAD, sitting up in bed  Chest: Improved aeration, clear  to auscultation bilaterally, no work of breathing noted on 2 L NC  CV: regular rate and rhythm regular  EXT:  no clubbing, no cyanosis, no edema  Neuro:  A&Ox3, moving all 4 extremities spontaneously  Skin: No rashes or lesions noted    Result Review    Result Review:  I have personally reviewed the results from the time of this admission to 2024 13:50 EST and agree with these findings:  [x]  Laboratory  [x]  Microbiology  [x]  Radiology  [x]  EKG/Telemetry   []  Cardiology/Vascular   []  Pathology  []  Old records  []  Other:  Most notable findings include:   proBNP   Alpha 1 antitrypsin 305  CRP 28.84      Lab 24  0526 24  0420 24  0402 24  0516 24  0451 24  0506 24  1553   WBC 11.48* 10.51 10.12 14.72* 16.04* 13.38* 26.51*   HEMOGLOBIN 11.8* 11.9* 11.9* 12.0 12.4 13.1 14.2   HEMATOCRIT 38.9 38.2 38.3 38.8 39.7 43.6 45.3   PLATELETS 373 459* 448 528* 532* 474* 612*   SODIUM 137 137 138 137 138 138 137   POTASSIUM 3.1* 3.3* 3.6 3.6 3.1* 4.3 3.7   CHLORIDE 97* 97* 100 99 98 102 95*   CO2 32.5* 35.8*  31.0* 29.6* 30.8* 25.2 31.3*   BUN 17 18 19 24* 22 19 13   CREATININE 0.55* 0.57 0.58 0.61 0.59 0.61 0.70   GLUCOSE 147* 184* 207* 212* 227* 132* 182*   CALCIUM 7.8* 8.0* 8.1* 8.2* 8.3* 8.6 8.9   PHOSPHORUS 2.0* 2.4* 1.8* 2.0* 3.0  --   --    TOTAL PROTEIN 5.4* 5.6* 5.5* 5.8* 6.5  --  7.5   ALBUMIN 2.5* 2.6* 2.3* 2.4* 2.3*  --  3.0*   GLOBULIN  --   --   --   --   --   --  4.5   -Recent chest CT on 11/16/24 on previous admission demonstrating extensive bronchocentric alveolar airspace disease throughout both lungs consistent with pneumonia.  Demonstrating extensive groundglass opacities that may represent bronchopneumonia or cryptogenic organizing pneumonia    Assessment & Plan   Assessment / Plan     Active Hospital Problems:  Active Hospital Problems    Diagnosis     **Multifocal pneumonia     Acute on chronic respiratory failure with hypoxia     HCAP (healthcare-associated pneumonia)     Hyperlipidemia     Primary hypertension     Hyponatremia    Impression:  Acute on chronic hypoxic respiratory failure, 2 L at baseline  Concern for pneumonia, organism versus Cryptogenic organizing pneumonia  Concern for ILD  Acute on chronic COPD exacerbation, FEV1 44%  Issues with airway clearance/mucous plugging  Acute on chronic congestive diastolic heart failure, grade 1 DD  Tobacco abuse of cigarettes in remission    Plan:  -Continue to wean O2 to maintain SpO2 greater than 90%.  Baseline 2 L NC.  -Repeat CXR with improved aeration with persistent bilateral airspace opacities  -S/p bronchoscopy, pneumonia panel negative, cultures no growth to date, cytology pending.  -Continue cefepime for pneumonia at this time.  De-escalate pending cultures.    -Vancomycin discontinued.  MRSA PCR negative.  -Micro negative to date.  Respiratory viral panel negative.  -Continue Brovana, Pulmicort, DuoNebs  -Continue bronchopulmonary hygiene.  Encourage I-S and flutter.  Chest vest available.  -Completed prednisone x 5 days  -Continue Lasix  40 mg p.o. twice daily  -Trend electrolytes and renal panel.  Replace electrolytes necessary  -Echo with normal EF of 56 to 60%, grade 1 diastolic dysfunction noted.  -Encourage mobilization.  Out of bed to chair.  -Follow-up with pulmonology in 1 to 2 weeks        VTE Prophylaxis:  Pharmacologic VTE prophylaxis orders are present.      CODE STATUS:   Level Of Support Discussed With: Patient  Code Status (Patient has no pulse and is not breathing): CPR (Attempt to Resuscitate)  Medical Interventions (Patient has pulse or is breathing): Full Support    Electronically signed by JES Robins, 12/01/24, 1:50 PM EST.    This visit was performed by BOTH a physician and an APC. I personally evaluated and examined the patient. I performed all aspects of MDM as documented. , I have reviewed and confirmed the accuracy of the patient's history as documented in this note., and I have reexamined the patient and the results are consistent with the previously documented exam. I have updated the documentation as necessary. Electronically signed by Adan Brown MD, 12/01/24, 2:13 PM EST.

## 2024-12-01 NOTE — PROGRESS NOTES
[facility]   Hospitalist Progress Note  Date: 2024  Patient Name: Cris Agudelo  : 1940  MRN: [id number]  Date of admission: 2024      Subjective   Subjective     Chief complaint: Shortness of breath, cough    Summary:  84-year-old female with history of hypertension, CAD, COPD, hyponatremia, dyslipidemia, GERD without esophagitis, hospitalized on 2024 with chief complaint of shortness of breath and cough, found to be hypoxemic, hypercapnic, found to have multifocal pneumonia with hypoxemia on imaging, with recent hospitalization for pneumonia discharged on Augmentin, concern for volume overload, discontinued IV fluids, pulmonary consulted for further evaluation, concern for underlying ILD, previous PFTs were indicative, bronchoscopy 2024.  Breathing improving bronchoscopy.    Interval follow-up: Seen and examined this morning, no acute distress, resting comfortably, no chest pain or palpitations.  Down to 2 L with 100% O2 sats with room to wean down further, creatinine 0.55, BUN 17, potassium 3.1, sodium 137, phosphorus 2.0, white blood scan 11,000, hemoglobin 11.8.  Still has exertional dyspnea.    Review of systems:  All systems reviewed and negative except for weakness, fatigue, cough, shortness of breath with exertional activity    Objective   Objective     Vitals:   Temp:  [97.2 °F (36.2 °C)-98.2 °F (36.8 °C)] 98.2 °F (36.8 °C)  Heart Rate:  [67-87] 78  Resp:  [16-22] 18  BP: (106-128)/(43-54) 106/43  Flow (L/min) (Oxygen Therapy):  [2] 2  Physical Exam     Constitutional: Awake, alert, no acute distress laying in bed wearing nasal cannula oxygen appearing comfortable   Eyes: Pupils equal, sclerae anicteric, no conjunctival injection   HENT: NCAT, mucous membranes moist   Neck: Supple, full range of motion   Respiratory: Diminished and coarse to auscultation bilaterally, nonlabored respirations    Cardiovascular: RRR, no murmurs, rubs, or gallops, palpable pedal pulses  bilaterally   Gastrointestinal: Positive bowel sounds, soft, nontender, nondistended   Musculoskeletal: Positive bilateral ankle edema, no clubbing or cyanosis to extremities   Psychiatric: Appropriate affect, cooperative   Neurologic: Oriented x 3, strength symmetric in all extremities, Cranial Nerves grossly intact to confrontation, speech clear   Skin: No rashes visible on exposed skin      Result Review    Result Review:  I have personally reviewed the pertinent results from the past 24 hours to 12/1/2024 14:18 EST and agree with these findings:  [x]  Laboratory   CBC          11/29/2024    04:02 11/30/2024    04:20 12/1/2024    05:26   CBC   WBC 10.12  10.51  11.48    RBC 4.39  4.41  4.39    Hemoglobin 11.9  11.9  11.8    Hematocrit 38.3  38.2  38.9    MCV 87.2  86.6  88.6    MCH 27.1  27.0  26.9    MCHC 31.1  31.2  30.3    RDW 13.5  13.4  13.5    Platelets 448  459  373      BMP          11/29/2024    04:02 11/30/2024    04:20 12/1/2024    05:26   BMP   BUN 19  18  17    Creatinine 0.58  0.57  0.55    Sodium 138  137  137    Potassium 3.6  3.3  3.1    Chloride 100  97  97    CO2 31.0  35.8  32.5    Calcium 8.1  8.0  7.8      LIVER FUNCTION TESTS:      Lab 12/01/24  0526 11/30/24  0420 11/29/24  0402 11/28/24  0516 11/27/24  0451 11/25/24  1553   TOTAL PROTEIN 5.4* 5.6* 5.5* 5.8* 6.5 7.5   ALBUMIN 2.5* 2.6* 2.3* 2.4* 2.3* 3.0*   GLOBULIN  --   --   --   --   --  4.5   ALT (SGPT) 19 19 19 17 19 20   AST (SGOT) 12 13 12 11 16 18   BILIRUBIN 0.4 0.4 0.3 0.3 0.3 0.5   INDIRECT BILIRUBIN 0.3 0.3 0.2 0.2 0.2  --    BILIRUBIN DIRECT 0.1 0.1 0.1 0.1 0.1  --    ALK PHOS 80 81 90 93 105 129*       [x]  Microbiology   Microbiology Results (last 10 days)       Procedure Component Value - Date/Time    Respiratory Culture - Wash, Lung, Right Lower Lobe [[id number]] Collected: 11/29/24 1040    Lab Status: Final result Specimen: Wash from Lung, Right Lower Lobe Updated: 12/01/24 0818     Respiratory Culture Scant growth (1+)  Normal respiratory colleen. No S. aureus or Pseudomonas aeruginosa detected. Final report.     Gram Stain Few (2+) WBCs seen      No organisms seen    AFB Culture - Lavage, Lung, Right Lower Lobe [[id number]] Collected: 11/29/24 1037    Lab Status: Preliminary result Specimen: Lavage from Lung, Right Lower Lobe Updated: 11/29/24 1504     AFB Stain No acid fast bacilli seen on direct smear    BAL Culture, Quantitative - Lavage, Lung, Right Lower Lobe [[id number]] Collected: 11/29/24 1037    Lab Status: Final result Specimen: Lavage from Lung, Right Lower Lobe Updated: 12/01/24 0819     BAL Culture No growth     Gram Stain Rare (1+) WBCs seen      No organisms seen    Pneumonia Panel - Lavage, Lung, Right Lower Lobe [[id number]]  (Normal) Collected: 11/29/24 1037    Lab Status: Final result Specimen: Lavage from Lung, Right Lower Lobe Updated: 11/29/24 1231     Escherichia coli PCR Not Detected     Acinetobacter calcoaceticus-baumannii complex PCR Not Detected     Enterobacter cloacae PCR Not Detected     Klebsiella oxytoca PCR Not Detected     Klebsiella pneumoniae group PCR Not Detected     Klebsiella aerogenes PCR Not Detected     Moraxella catarrhalis PCR Not Detected     Proteus species PCR Not Detected     Pseudomonas aeroginosa PCR Not Detected     Serratia marcescens PCR Not Detected     Staphylococcus aureus PCR Not Detected     Streptococcus pyogenes PCR Not Detected     Haemophilus influenzae PCR Not Detected     Streptococcus agalactiae PCR Not Detected     Streptococcus pneumoniae PCR Not Detected     Chlamydophila pneumoniae PCR Not Detected     Legionella pneumophilia PCR Not Detected     Mycoplasma pneumo by PCR Not Detected     ADENOVIRUS, PCR Not Detected     CTX-M Gene N/A     IMP Gene N/A     KPC Gene N/A     mecA/C and MREJ Gene N/A     NDM Gene N/A     OXA-48-like Gene N/A     VIM Gene N/A     Coronavirus Not Detected     Human Metapneumovirus Not Detected     Human Rhinovirus/Enterovirus Not Detected      Influenza A PCR Not Detected     Influenza B PCR Not Detected     RSV, PCR Not Detected     Parainfluenza virus PCR Not Detected    S. Pneumo Ag Urine or CSF - Urine, Urine, Clean Catch [[id number]]  (Normal) Collected: 11/29/24 0409    Lab Status: Final result Specimen: Urine, Clean Catch Updated: 11/29/24 0640     Strep Pneumo Ag Negative    Legionella Antigen, Urine - Urine, Urine, Clean Catch [[id number]]  (Normal) Collected: 11/29/24 0409    Lab Status: Final result Specimen: Urine, Clean Catch Updated: 11/29/24 0640     LEGIONELLA ANTIGEN, URINE Negative    Respiratory Panel PCR w/COVID-19(SARS-CoV-2) YASMINE/MONICA/LUCIA/PAD/COR/MARY ANN In-House, NP Swab in UTM/VTM, 2 HR TAT - Swab, Nasopharynx [[id number]]  (Normal) Collected: 11/26/24 1405    Lab Status: Final result Specimen: Swab from Nasopharynx Updated: 11/26/24 1539     ADENOVIRUS, PCR Not Detected     Coronavirus 229E Not Detected     Coronavirus HKU1 Not Detected     Coronavirus NL63 Not Detected     Coronavirus OC43 Not Detected     COVID19 Not Detected     Human Metapneumovirus Not Detected     Human Rhinovirus/Enterovirus Not Detected     Influenza A PCR Not Detected     Influenza B PCR Not Detected     Parainfluenza Virus 1 Not Detected     Parainfluenza Virus 2 Not Detected     Parainfluenza Virus 3 Not Detected     Parainfluenza Virus 4 Not Detected     RSV, PCR Not Detected     Bordetella pertussis pcr Not Detected     Bordetella parapertussis PCR Not Detected     Chlamydophila pneumoniae PCR Not Detected     Mycoplasma pneumo by PCR Not Detected    Narrative:      In the setting of a positive respiratory panel with a viral infection PLUS a negative procalcitonin without other underlying concern for bacterial infection, consider observing off antibiotics or discontinuation of antibiotics and continue supportive care. If the respiratory panel is positive for atypical bacterial infection (Bordetella pertussis, Chlamydophila pneumoniae, or Mycoplasma  pneumoniae), consider antibiotic de-escalation to target atypical bacterial infection.    COVID-19, FLU A/B, RSV PCR 1 HR TAT - Swab, Nasopharynx [[id number]]  (Normal) Collected: 11/25/24 2051    Lab Status: Final result Specimen: Swab from Nasopharynx Updated: 11/25/24 2247     COVID19 Not Detected     Influenza A PCR Not Detected     Influenza B PCR Not Detected     RSV, PCR Not Detected    Narrative:      Fact sheet for providers: https://www.fda.gov/media/492522/download    Fact sheet for patients: https://www.fda.gov/media/412541/download    Test performed by PCR.    MRSA Screen, PCR (Inpatient) - Swab, Nares [[id number]]  (Normal) Collected: 11/25/24 2051    Lab Status: Final result Specimen: Swab from Nares Updated: 11/25/24 2321     MRSA PCR No MRSA Detected    Narrative:      The negative predictive value of this diagnostic test is high and should only be used to consider de-escalating anti-MRSA therapy. A positive result may indicate colonization with MRSA and must be correlated clinically.    Blood Culture - Blood, Arm, Left [[id number]]  (Normal) Collected: 11/25/24 1555    Lab Status: Final result Specimen: Blood from Arm, Left Updated: 11/30/24 1616     Blood Culture No growth at 5 days    Blood Culture - Blood, Arm, Left [[id number]]  (Normal) Collected: 11/25/24 1555    Lab Status: Final result Specimen: Blood from Arm, Left Updated: 11/30/24 1616     Blood Culture No growth at 5 days    Mycoplasma Pneumoniae Antibody, IgM - Blood, [[id number]]  (Normal) Collected: 11/25/24 1553    Lab Status: Final result Specimen: Blood Updated: 11/26/24 0756     Mycoplasma pneumo IgM Negative              [x]  Radiology XR Chest 1 View    Result Date: 11/30/2024  Impression: Improved aeration of the lung apices with persistent bilateral airspace opacities. Electronically Signed: Ralph Kenyon MD  11/30/2024 12:25 PM EST  Workstation ID: [id number]    XR Chest 1 View    Result Date: 11/25/2024  Impression:  Similar-appearing extensive bilateral airspace opacities, suspicious for persistent multifocal infectious/inflammatory process. 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 labs  Full code  DVT prophylaxis with Lovenox  Clinical course dictate further management  Discussed with nurse at the bedside  Either home with home health or rehab in the next 24 to 48 hours        VTE Prophylaxis:  Pharmacologic VTE prophylaxis orders are present.        CODE STATUS:   Level Of Support Discussed With: Patient  Code Status (Patient has no pulse and is not breathing): CPR (Attempt to Resuscitate)  Medical Interventions (Patient has pulse or is breathing): Full Support        Electronically signed by Marco Del Toro MD, 12/1/2024, 14:18 EST.    Portions of this documentation were transcribed electronically from a voice recognition software.  I confirm all data accurately represents the service(s) I performed at today's visit.

## 2024-12-02 PROCEDURE — 97165 OT EVAL LOW COMPLEX 30 MIN: CPT

## 2024-12-02 PROCEDURE — 94799 UNLISTED PULMONARY SVC/PX: CPT

## 2024-12-02 PROCEDURE — 99233 SBSQ HOSP IP/OBS HIGH 50: CPT | Performed by: INTERNAL MEDICINE

## 2024-12-02 PROCEDURE — 63710000001 PREDNISONE PER 5 MG: Performed by: FAMILY MEDICINE

## 2024-12-02 PROCEDURE — 94669 MECHANICAL CHEST WALL OSCILL: CPT

## 2024-12-02 PROCEDURE — 97530 THERAPEUTIC ACTIVITIES: CPT

## 2024-12-02 PROCEDURE — 25010000002 ENOXAPARIN PER 10 MG: Performed by: INTERNAL MEDICINE

## 2024-12-02 PROCEDURE — 25010000002 CEFEPIME PER 500 MG: Performed by: FAMILY MEDICINE

## 2024-12-02 PROCEDURE — 63710000001 PREDNISONE PER 1 MG: Performed by: FAMILY MEDICINE

## 2024-12-02 PROCEDURE — 99232 SBSQ HOSP IP/OBS MODERATE 35: CPT | Performed by: FAMILY MEDICINE

## 2024-12-02 PROCEDURE — 94761 N-INVAS EAR/PLS OXIMETRY MLT: CPT

## 2024-12-02 PROCEDURE — 94664 DEMO&/EVAL PT USE INHALER: CPT

## 2024-12-02 PROCEDURE — 94618 PULMONARY STRESS TESTING: CPT

## 2024-12-02 PROCEDURE — 97116 GAIT TRAINING THERAPY: CPT

## 2024-12-02 RX ADMIN — Medication 10 MG: at 21:48

## 2024-12-02 RX ADMIN — ARFORMOTEROL TARTRATE 15 MCG: 15 SOLUTION RESPIRATORY (INHALATION) at 18:46

## 2024-12-02 RX ADMIN — FUROSEMIDE 40 MG: 40 TABLET ORAL at 17:17

## 2024-12-02 RX ADMIN — FUROSEMIDE 40 MG: 40 TABLET ORAL at 08:43

## 2024-12-02 RX ADMIN — PREDNISONE 30 MG: 20 TABLET ORAL at 08:43

## 2024-12-02 RX ADMIN — GUAIFENESIN 600 MG: 600 TABLET ORAL at 08:43

## 2024-12-02 RX ADMIN — Medication 250 MG: at 21:48

## 2024-12-02 RX ADMIN — IPRATROPIUM BROMIDE AND ALBUTEROL SULFATE 3 ML: .5; 3 SOLUTION RESPIRATORY (INHALATION) at 06:53

## 2024-12-02 RX ADMIN — IPRATROPIUM BROMIDE AND ALBUTEROL SULFATE 3 ML: .5; 3 SOLUTION RESPIRATORY (INHALATION) at 12:51

## 2024-12-02 RX ADMIN — Medication 250 MG: at 08:43

## 2024-12-02 RX ADMIN — Medication 10 ML: at 08:43

## 2024-12-02 RX ADMIN — NYSTATIN: 100000 POWDER TOPICAL at 08:43

## 2024-12-02 RX ADMIN — GUAIFENESIN 600 MG: 600 TABLET ORAL at 21:48

## 2024-12-02 RX ADMIN — LISINOPRIL 20 MG: 20 TABLET ORAL at 08:43

## 2024-12-02 RX ADMIN — ARFORMOTEROL TARTRATE 15 MCG: 15 SOLUTION RESPIRATORY (INHALATION) at 06:53

## 2024-12-02 RX ADMIN — CARVEDILOL 6.25 MG: 6.25 TABLET, FILM COATED ORAL at 21:48

## 2024-12-02 RX ADMIN — IPRATROPIUM BROMIDE AND ALBUTEROL SULFATE 3 ML: .5; 3 SOLUTION RESPIRATORY (INHALATION) at 18:46

## 2024-12-02 RX ADMIN — NYSTATIN: 100000 POWDER TOPICAL at 21:48

## 2024-12-02 RX ADMIN — CEFEPIME 2000 MG: 2 INJECTION, POWDER, FOR SOLUTION INTRAVENOUS at 12:16

## 2024-12-02 RX ADMIN — CARVEDILOL 6.25 MG: 6.25 TABLET, FILM COATED ORAL at 08:43

## 2024-12-02 RX ADMIN — ASPIRIN 81 MG: 81 TABLET, COATED ORAL at 08:43

## 2024-12-02 RX ADMIN — BUDESONIDE 0.5 MG: 0.5 INHALANT RESPIRATORY (INHALATION) at 06:53

## 2024-12-02 RX ADMIN — CEFEPIME 2000 MG: 2 INJECTION, POWDER, FOR SOLUTION INTRAVENOUS at 04:27

## 2024-12-02 RX ADMIN — BUDESONIDE 0.5 MG: 0.5 INHALANT RESPIRATORY (INHALATION) at 18:46

## 2024-12-02 RX ADMIN — ENOXAPARIN SODIUM 40 MG: 100 INJECTION SUBCUTANEOUS at 08:43

## 2024-12-02 RX ADMIN — Medication 10 ML: at 21:48

## 2024-12-02 NOTE — PLAN OF CARE
Goal Outcome Evaluation:  Plan of Care Reviewed With: patient, family        Progress: improving  Outcome Evaluation: Patient disoriented to time throughout shift. On 2L NC. No complaints of pain. IV antibiotics continued.VSS. Will continue with POC

## 2024-12-02 NOTE — THERAPY EVALUATION
Patient Name: Cris Agudelo  : 1940    MRN: [id number]                              Today's Date: 2024       Admit Date: 2024    Visit Dx:     ICD-10-CM ICD-9-CM   1. Multifocal pneumonia  J18.9 486   2. Difficulty walking  R26.2 719.7   3. Decreased activities of daily living (ADL)  Z78.9 V49.89     Patient Active Problem List   Diagnosis    Hyponatremia    Stress-induced cardiomyopathy    Hyperlipidemia    Primary hypertension    Herpes zoster    Shingles    Non-occlusive coronary artery disease    Pneumonia    Multifocal pneumonia    Acute on chronic respiratory failure with hypoxia    HCAP (healthcare-associated pneumonia)     Past Medical History:   Diagnosis Date    COPD (chronic obstructive pulmonary disease)     History of non-ST elevation myocardial infarction (NSTEMI) 2023    Hyperlipidemia     Hypertension      Past Surgical History:   Procedure Laterality Date    BRONCHOSCOPY N/A 2024    Procedure: BRONCHOSCOPY with BAL, Brushings and washings;  Surgeon: Adan Brown MD;  Location: [facility] ENDOSCOPY;  Service: Pulmonary;  Laterality: N/A;  Bilateral PNA with mucus pluggins    CARDIAC CATHETERIZATION N/A 2023    Procedure: Left Heart Cath;  Surgeon: Mitch Correia MD;  Location: [facility] CATH INVASIVE LOCATION;  Service: Cardiology;  Laterality: N/A;    CARDIAC CATHETERIZATION N/A 2023    Procedure: Coronary angiography;  Surgeon: Mitch Correia MD;  Location: [facility] CATH INVASIVE LOCATION;  Service: Cardiology;  Laterality: N/A;      General Information       Row Name 24 0951          OT Time and Intention    Document Type evaluation  -AV     Mode of Treatment individual therapy;occupational therapy  -AV       Row Name 24 0951          General Information    Patient Profile Reviewed yes  -AV     Prior Level of Function independent:;ADL's;transfer;all household mobility  Independent ADL/light home management tasks.  Stands to shower (walk-in shower w/ chair  available).  Stands at sink to groom.  Standard commode.  Ambulates with STC.  Recent use of 2 L continuous home oxygen.  -AV     Existing Precautions/Restrictions fall;oxygen therapy device and L/min  -AV     Barriers to Rehab none identified  -AV       Row Name 12/02/24 0951          Occupational Profile    Reason for Services/Referral (Occupational Profile) Patient is a 84 year old female admitted to [facility] on 11/25/2024 with shortness of air and cough.  She is currently on fourth floor/ 2L O2 with sats 95%, HR 78.  OT consulted due to recent decline in ADL/transfer independence.  No previous OT services for current condition.  -AV       Row Name 12/02/24 0951          Living Environment    People in Home alone  -AV       Row Name 12/02/24 0951          Home Main Entrance    Number of Stairs, Main Entrance one  -AV       Row Name 12/02/24 0951          Stairs Within Home, Primary    Number of Stairs, Within Home, Primary none  -AV       Row Name 12/02/24 0951          Cognition    Orientation Status (Cognition) --  Alert.  Flat affect.  Looks to daughter to answering questions with OT.  Able to follow commands.  Questionable ability to retain information/safety awareness.  -AV       Row Name 12/02/24 0951          Safety Issues/Impairments Affecting Functional Mobility    Impairments Affecting Function (Mobility) balance;endurance/activity tolerance;cognition;strength  -AV               User Key  (r) = Recorded By, (t) = Taken By, (c) = Cosigned By      Initials Name Provider Type    Ryan Murillo, OT Occupational Therapist                     Mobility/ADL's       Row Name 12/02/24 0955          Transfers    Comment, (Transfers) CGA/RW  -AV       Row Name 12/02/24 0955          Activities of Daily Living    BADL Assessment/Intervention --  Independent feeding with set up.  Min assist grooming.  Mod assist bathing/dressing.  CGA toilet hygiene (ambulates to bathroom instead of BSC use).  -AV                User Key  (r) = Recorded By, (t) = Taken By, (c) = Cosigned By      Initials Name Provider Type    Ryan Murillo OT Occupational Therapist                   Obj/Interventions       Row Name 12/02/24 0956          Sensory Assessment (Somatosensory)    Sensory Assessment (Somatosensory) UE sensation intact  -AV       Row Name 12/02/24 0956          Vision Assessment/Intervention    Visual Impairment/Limitations WFL  Able to retrieve items accurately from bedside table  -AV       Row Name 12/02/24 0956          Range of Motion Comprehensive    General Range of Motion bilateral upper extremity ROM WFL  -AV     Comment, General Range of Motion AROM  -AV       Row Name 12/02/24 0956          Strength Comprehensive (MMT)    Comment, General Manual Muscle Testing (MMT) Assessment 4(-)/5 bilateral biceps, triceps and   -AV       Row Name 12/02/24 0956          Motor Skills    Motor Skills coordination;functional endurance  -AV     Coordination WFL  Right dominant  -AV     Functional Endurance Fair minus  -AV       Row Name 12/02/24 0956          Balance    Comment, Balance CGA/RW  -AV               User Key  (r) = Recorded By, (t) = Taken By, (c) = Cosigned By      Initials Name Provider Type    Ryna Murillo OT Occupational Therapist                   Goals/Plan       Row Name 12/02/24 0958          Transfer Goal 1 (OT)    Activity/Assistive Device (Transfer Goal 1, OT) transfers, all;walker, rolling  -AV     [place] Level/Cues Needed (Transfer Goal 1, OT) supervision required;verbal cues required  -AV     Time Frame (Transfer Goal 1, OT) long term goal (LTG);10 days  -AV       Row Name 12/02/24 0958          Bathing Goal 1 (OT)    Activity/Device (Bathing Goal 1, OT) bathing skills, all;shower chair  -AV     [place] Level/Cues Needed (Bathing Goal 1, OT) supervision required;set-up required;verbal cues required  -AV     Time Frame (Bathing Goal 1, OT) long term goal (LTG);10 days  -AV        Row Name 12/02/24 0958          Dressing Goal 1 (OT)    Activity/Device (Dressing Goal 1, OT) dressing skills, all  -AV     [place]/Cues Needed (Dressing Goal 1, OT) supervision required;set-up required;verbal cues required  -AV     Time Frame (Dressing Goal 1, OT) long term goal (LTG);10 days  -AV       Row Name 12/02/24 0958          Toileting Goal 1 (OT)    Activity/Device (Toileting Goal 1, OT) toileting skills, all;raised toilet seat  -AV     [place] Level/Cues Needed (Toileting Goal 1, OT) supervision required;set-up required;verbal cues required  -AV     Time Frame (Toileting Goal 1, OT) long term goal (LTG);10 days  -AV       Row Name 12/02/24 0958          Grooming Goal 1 (OT)    Activity/Device (Grooming Goal 1, OT) grooming skills, all  Standing at sink  -AV     [place] (Grooming Goal 1, OT) supervision required;set-up required;verbal cues required  -AV     Time Frame (Grooming Goal 1, OT) long term goal (LTG);10 days  -AV       Row Name 12/02/24 0958          Strength Goal 1 (OT)    Strength Goal 1 (OT) Patient will demonstrate 4/5 bilateral biceps, triceps and  to increase ADL and transfer independence.  -AV     Time Frame (Strength Goal 1, OT) long term goal (LTG);10 days  -AV       Row Name 12/02/24 0958          Problem Specific Goal 1 (OT)    Problem Specific Goal 1 (OT) Patient will demonstrate fair endurance/activity tolerance needed to support ADLs.  -AV     Time Frame (Problem Specific Goal 1, OT) long term goal (LTG);10 days  -AV       Row Name 12/02/24 0958          Therapy Assessment/Plan (OT)    Planned Therapy Interventions (OT) activity tolerance training;BADL retraining;functional balance retraining;occupation/activity based interventions;patient/caregiver education/training;strengthening exercise;transfer/mobility retraining  -AV               User Key  (r) = Recorded By, (t) = Taken By, (c) = Cosigned By      Initials Name Provider Type    Ryan Murillo, OT  Occupational Therapist                   Clinical Impression       Row Name 12/02/24 0957          Pain Assessment    Additional Documentation Pain Scale: FACES Pre/Post-Treatment (Group)  -AV       Row Name 12/02/24 0957          Pain Scale: FACES Pre/Post-Treatment    Pain: FACES Scale, Pretreatment 0-->no hurt  -AV     Posttreatment Pain Rating 0-->no hurt  -AV       Row Name 12/02/24 0957          Plan of Care Review    Plan of Care Reviewed With patient;daughter  -AV     Progress no change  First session for evaluation  -AV     Outcome Evaluation Patient presents with limitations of balance, strength, endurance/activity tolerance and cognition/safety awareness which impede her ability to perform ADL and transfers as prior.  The skills of a therapist will be required to safely and effectively implement treatment plan to restore maximal level of function.  -AV       Row Name 12/02/24 0957          Therapy Assessment/Plan (OT)    Patient/Family Therapy Goal Statement (OT) Regain independence  -AV     Rehab Potential (OT) good  -AV     Criteria for Skilled Therapeutic Interventions Met (OT) yes;meets criteria;skilled treatment is necessary  -AV     Therapy Frequency (OT) 5 times/wk  -AV       Row Name 12/02/24 0957          Therapy Plan Review/Discharge Plan (OT)    Equipment Needs Upon Discharge (OT) walker, rolling  -AV     Anticipated Discharge Disposition (OT) sub acute care setting  -AV       Row Name 12/02/24 0957          Vital Signs    O2 Delivery Pre Treatment nasal cannula  2  -AV     O2 Delivery Intra Treatment nasal cannula  2  -AV     O2 Delivery Post Treatment nasal cannula  2  -AV       Row Name 12/02/24 0957          Positioning and Restraints    Pre-Treatment Position in bed  -AV     Post Treatment Position bed  -AV     In Bed call light within reach;encouraged to call for assist;with family/caregiver  Daughter  -AV               User Key  (r) = Recorded By, (t) = Taken By, (c) = Cosigned By       Initials Name Provider Type    Ryan Murillo OT Occupational Therapist                   Outcome Measures       Row Name 12/02/24 0959          How much help from another is currently needed...    Putting on and taking off regular lower body clothing? 2  -AV     Bathing (including washing, rinsing, and drying) 2  -AV     Toileting (which includes using toilet bed pan or urinal) 3  -AV     Putting on and taking off regular upper body clothing 3  -AV     Taking care of personal grooming (such as brushing teeth) 3  -AV     Eating meals 4  -AV     AM-PAC 6 Clicks Score (OT) 17  -AV       Row Name 12/02/24 0959          Functional Assessment    Outcome Measure Options AM-PAC 6 Clicks Daily Activity (OT);Optimal Instrument  -AV       Row Name 12/02/24 0959          Optimal Instrument    Optimal Instrument Optimal - 3  -AV     Bending/Stooping 2  -AV     Standing 2  -AV     Reaching 1  -AV     From the list, choose the 3 activities you would most like to be able to do without any difficulty Bending/stooping;Standing;Reaching  -AV     Total Score Optimal - 3 5  -AV               User Key  (r) = Recorded By, (t) = Taken By, (c) = Cosigned By      Initials Name Provider Type    Ryan Murillo OT Occupational Therapist                    Occupational Therapy Education       Title: PT OT SLP Therapies (Done)       Topic: Occupational Therapy (Done)       Point: ADL training (Done)       Description:   Instruct learner(s) on proper safety adaptation and remediation techniques during self care or transfers.   Instruct in proper use of assistive devices.                  Learning Progress Summary            Patient Acceptance, E, VU by RIC at 12/2/2024 1000    Comment: Daughter present   Family Acceptance, E, VU by RIC at 12/2/2024 1000    Comment: Daughter present                      Point: Home exercise program (Done)       Description:   Instruct learner(s) on appropriate technique for monitoring, assisting and/or  progressing therapeutic exercises/activities.                  Learning Progress Summary            Patient Acceptance, E, VU by AV at 12/2/2024 1000    Comment: Daughter present   Family Acceptance, E, VU by AV at 12/2/2024 1000    Comment: Daughter present                      Point: Precautions (Done)       Description:   Instruct learner(s) on prescribed precautions during self-care and functional transfers.                  Learning Progress Summary            Patient Acceptance, E, VU by AV at 12/2/2024 1000    Comment: Daughter present   Family Acceptance, E, VU by AV at 12/2/2024 1000    Comment: Daughter present                      Point: Body mechanics (Done)       Description:   Instruct learner(s) on proper positioning and spine alignment during self-care, functional mobility activities and/or exercises.                  Learning Progress Summary            Patient Acceptance, E, VU by AV at 12/2/2024 1000    Comment: Daughter present   Family Acceptance, E, VU by AV at 12/2/2024 1000    Comment: Daughter present                                      User Key       Initials Effective Dates Name Provider Type Discipline     06/16/21 -  Ryan David OT Occupational Therapist OT                  OT Recommendation and Plan  Planned Therapy Interventions (OT): activity tolerance training, BADL retraining, functional balance retraining, occupation/activity based interventions, patient/caregiver education/training, strengthening exercise, transfer/mobility retraining  Therapy Frequency (OT): 5 times/wk  Plan of Care Review  Plan of Care Reviewed With: patient, daughter  Progress: no change (First session for evaluation)  Outcome Evaluation: Patient presents with limitations of balance, strength, endurance/activity tolerance and cognition/safety awareness which impede her ability to perform ADL and transfers as prior.  The skills of a therapist will be required to safely and effectively implement treatment  plan to restore maximal level of function.     Time Calculation:   Evaluation Complexity (OT)  Review Occupational Profile/Medical/Therapy History Complexity: expanded/moderate complexity  Assessment, Occupational Performance/Identification of Deficit Complexity: 3-5 performance deficits  Clinical Decision Making Complexity (OT): problem focused assessment/low complexity  Overall Complexity of Evaluation (OT): low complexity     Time Calculation- OT       Row Name 12/02/24 1001             Time Calculation- OT    OT Received On 12/02/24  -AV      OT Goal Re-Cert Due Date 12/11/24  -AV         Untimed Charges    OT Eval/Re-eval Minutes 35  -AV         Total Minutes    Untimed Charges Total Minutes 35  -AV       Total Minutes 35  -AV                User Key  (r) = Recorded By, (t) = Taken By, (c) = Cosigned By      Initials Name Provider Type    AV Ryan David OT Occupational Therapist                  Therapy Charges for Today       Code Description Service Date Service Provider Modifiers Qty    [id number] HC OT EVAL LOW COMPLEXITY 3 12/2/2024 Ryan David OT GO 1                 Ryan David OT  12/2/2024

## 2024-12-02 NOTE — PLAN OF CARE
Goal Outcome Evaluation:              Outcome Evaluation: Patient disoriented to time throughout shift. On 2L NC. No complaints of pain. IV ABX administered. New IV placed.

## 2024-12-02 NOTE — PROGRESS NOTES
Pulmonary / Critical Care Progress Note      Patient Name: Cris Agudelo  : 1940  MRN: [id number]  Primary Care Physician:  Ramirez Maurice MD  Date of admission: 2024    Subjective   Subjective   Follow-up for concern for pneumonia, acute on chronic hypoxic respiratory failure    On 2 L of oxygen  Bronchoscopy cultures so far negative  Slowly improving  Has a very weak cough but does not feel like secretions are stuck  Secretions are thin and clear  Dyspnea better  Trying to go to rehab  Very weak and fatigue  No fevers or chills    Objective   Objective     Vitals:   Temp:  [97.7 °F (36.5 °C)-98.1 °F (36.7 °C)] 98.1 °F (36.7 °C)  Heart Rate:  [70-89] 75  Resp:  [16-20] 18  BP: (115-135)/(48-76) 116/48  Flow (L/min) (Oxygen Therapy):  [2] 2    Physical Exam   Vital Signs Reviewed   General: Chronically ill-appearing, elderly female, alert, NAD, sitting up in bed  Chest: Improved aeration, diminished with rhonchi bilaterally, no work of breathing noted on 2 L NC  CV: regular rate and rhythm regular  EXT:  no clubbing, no cyanosis, no edema  Neuro:  A&Ox3, moving all 4 extremities spontaneously  Skin: No rashes or lesions noted    Result Review    Result Review:  I have personally reviewed the results from the time of this admission to 2024 16:08 EST and agree with these findings:  [x]  Laboratory  [x]  Microbiology  [x]  Radiology  [x]  EKG/Telemetry   []  Cardiology/Vascular   []  Pathology  []  Old records  []  Other:  Most notable findings include:   proBNP   Alpha 1 antitrypsin 305  CRP 28.84      Lab 24  0526 24  0420 24  0402 24  0516 24  0451 24  0506   WBC 11.48* 10.51 10.12 14.72* 16.04* 13.38*   HEMOGLOBIN 11.8* 11.9* 11.9* 12.0 12.4 13.1   HEMATOCRIT 38.9 38.2 38.3 38.8 39.7 43.6   PLATELETS 373 459* 448 528* 532* 474*   SODIUM 137 137 138 137 138 138   POTASSIUM 3.1* 3.3* 3.6 3.6 3.1* 4.3   CHLORIDE 97* 97* 100 99 98 102   CO2 32.5* 35.8* 31.0*  29.6* 30.8* 25.2   BUN 17 18 19 24* 22 19   CREATININE 0.55* 0.57 0.58 0.61 0.59 0.61   GLUCOSE 147* 184* 207* 212* 227* 132*   CALCIUM 7.8* 8.0* 8.1* 8.2* 8.3* 8.6   PHOSPHORUS 2.0* 2.4* 1.8* 2.0* 3.0  --    TOTAL PROTEIN 5.4* 5.6* 5.5* 5.8* 6.5  --    ALBUMIN 2.5* 2.6* 2.3* 2.4* 2.3*  --    -Recent chest CT on 11/16/24 on previous admission demonstrating extensive bronchocentric alveolar airspace disease throughout both lungs consistent with pneumonia.  Demonstrating extensive groundglass opacities that may represent bronchopneumonia or cryptogenic organizing pneumonia    Bronchoscopy cultures all negative      Assessment & Plan   Assessment / Plan     Active Hospital Problems:  Active Hospital Problems    Diagnosis     **Multifocal pneumonia     Acute on chronic respiratory failure with hypoxia     HCAP (healthcare-associated pneumonia)     Hyperlipidemia     Primary hypertension     Hyponatremia    Impression:  Acute on chronic hypoxic respiratory failure, 2 L at baseline  Concern for pneumonia, organism versus Cryptogenic organizing pneumonia  Concern for ILD  Acute on chronic COPD exacerbation, FEV1 44%  Issues with airway clearance/mucous plugging  Acute on chronic congestive diastolic heart failure, grade 1 DD  Tobacco abuse of cigarettes in remission    Plan:  -Continue to wean O2 to maintain SpO2 greater than 90%.  Baseline 2 L NC.  -Repeat CXR with improved aeration with persistent bilateral airspace opacities  -S/p bronchoscopy, pneumonia panel negative, cultures no growth to date, cytology pending.  Unfortunately transbronchial biopsy is not performed.  If patient does not improve, may require repeat bronchoscopy with transbronchial lung biopsies.  -Repeat noncontrast chest CT in 8 weeks as an outpatient  -Complete cefepime for pneumonia.  So far all cultures are negative  -Vancomycin discontinued.  MRSA PCR negative.  -Micro negative to date.  Respiratory viral panel negative.  -Continue Yaakov  Pulmicort, DuoNebs  -Continue bronchopulmonary hygiene.  Encourage I-S and flutter.  Chest vest available.  -Decrease prednisone to 30 mg daily.  Decrease by 10 mg every 3 days until patient is off steroids  -Continue Lasix 40 mg p.o. twice daily  -Trend electrolytes and renal panel.  Replace electrolytes necessary  Continue mucolytics  -Echo with normal EF of 56 to 60%, grade 1 diastolic dysfunction noted.  -Encourage mobilization.  Out of bed to chair.  -Follow-up with pulmonology in 1 to 2 weeks  -Will need rehab    VTE Prophylaxis:  Pharmacologic VTE prophylaxis orders are present.    CODE STATUS:   Level Of Support Discussed With: Patient  Code Status (Patient has no pulse and is not breathing): CPR (Attempt to Resuscitate)  Medical Interventions (Patient has pulse or is breathing): Full Support      Labs, imaging, microbiology, notes and medications personally reviewed  Discussed with primary    Electronically signed by Omari Grady MD, 12/02/24, 4:11 PM EST.

## 2024-12-02 NOTE — PROGRESS NOTES
Enter Query Response Below      Query Response: Bacterial pneumonia unspecified    Electronically signed by Marco Del Toro MD, 24, 9:40 AM EST.                                               If applicable, please update the problem list.   Patient: Cris Agudelo        : 1940  Account: [id number]           Admit Date:         How to Respond to this query:       a. Click New Note     b. Answer query within the yellow box.                c. Update the Problem List, if applicable.      If you have any questions about this query contact me at:  [email or web address]     Dr. Del Toro,     84 year old patient noted with unresolved multifocal pneumonia.  Risk factors include COPD and CHF.  Pulmonology noted healthcare-associated pneumonia.  Treatment has included Vancomycin , bronchoscopy , and Cefepime -.    24 WBC 26.51; Influenza A/B PCR Not Detected; Novel Coronavirus Not Detected; RSV, RCR Not Detected; MRSA PCR-No MRSA Detected; Mycoplasma Pneumoniae IGM Negative   24 WBC 13.38; C-Reactive Protein 28.64; Procalcitonin 0.17; Respiratory Panel PCR Not Detected  24 Strep Pneumoniae Antigen Negative; Legionella Antigen Urine Negative; BAL Culture No Growth; Pneumonia Panel Not Detected    Please clarify the type of pneumonia the patient was treated/monitored for:    Gram negative pneumonia (excluding Haemophilus influenzae)  Bacterial pneumonia unspecified  Other- specify______    By submitting this query, we are merely seeking further clarification of documentation to accurately reflect all conditions that you are monitoring, evaluating, treating or that extend the hospitalization or utilize additional resources of care. Please utilize your independent clinical judgment when addressing the question(s) above.     This query and your response, once completed, will be entered into the legal medical record.    Sincerely,  Mary Kate RICHTER, RN, CCDS  Clinical  Documentation Improvement Program  [email or web address]

## 2024-12-02 NOTE — PROGRESS NOTES
[facility]   Hospitalist Progress Note  Date: 2024  Patient Name: Cris Agudelo  : 1940  MRN: [id number]  Date of admission: 2024      Subjective   Subjective     Chief complaint: Shortness of breath, cough    Summary:  84-year-old female with history of hypertension, CAD, COPD, hyponatremia, dyslipidemia, GERD without esophagitis, hospitalized on 2024 with chief complaint of shortness of breath and cough, found to be hypoxemic, hypercapnic, found to have multifocal pneumonia with hypoxemia on imaging, with recent hospitalization for pneumonia discharged on Augmentin, concern for volume overload, discontinued IV fluids, pulmonary consulted for further evaluation, concern for underlying ILD, previous PFTs were indicative, bronchoscopy 2024.  Breathing improved after bronchoscopy.  Diuresed several liters.  Volume status achieving euvolemia.  Working towards rehab placement.    Interval follow-up: Seen and examined this morning, no acute distress, resting comfortably, no chest pain or palpitations.  Doing well on 2 L.  Lab holiday today.  Discussed going home with home health or rehab, opting for rehab placement.  Will check labs tomorrow.  No chest pain or palpitations.  Remains weak and fatigued.  Needs to ambulate more.  Failed a walk test.  Will need oxygen at time of discharge.    Review of systems:  All systems reviewed and negative except for weakness, fatigue, cough, shortness of breath with exertional activity    Objective   Objective     Vitals:   Temp:  [97.7 °F (36.5 °C)-98.1 °F (36.7 °C)] 98.1 °F (36.7 °C)  Heart Rate:  [70-89] 72  Resp:  [16-20] 18  BP: (115-135)/(46-76) 119/76  Flow (L/min) (Oxygen Therapy):  [2] 2  Physical Exam    Constitutional: Awake, alert, no acute distress laying in bed wearing nasal cannula oxygen appearing comfortable   Eyes: Pupils equal, sclerae anicteric, no conjunctival injection   HENT: NCAT, mucous membranes moist   Neck: Supple, full  range of motion   Respiratory: Diminished breath sounds throughout   Cardiovascular: RRR, no murmurs, rubs, or gallops, palpable pedal pulses bilaterally   Gastrointestinal: Positive bowel sounds, soft, nontender, nondistended   Musculoskeletal: Positive bilateral ankle edema, no clubbing or cyanosis to extremities   Psychiatric: Appropriate affect, cooperative   Neurologic: Oriented x 3, strength symmetric in all extremities, Cranial Nerves grossly intact to confrontation, speech clear   Skin: No rashes visible on exposed skin        Result Review    Result Review:  I have personally reviewed the pertinent results from the past 24 hours to 12/2/2024 14:14 EST and agree with these findings:  [x]  Laboratory   CBC          11/29/2024    04:02 11/30/2024    04:20 12/1/2024    05:26   CBC   WBC 10.12  10.51  11.48    RBC 4.39  4.41  4.39    Hemoglobin 11.9  11.9  11.8    Hematocrit 38.3  38.2  38.9    MCV 87.2  86.6  88.6    MCH 27.1  27.0  26.9    MCHC 31.1  31.2  30.3    RDW 13.5  13.4  13.5    Platelets 448  459  373      BMP          11/29/2024    04:02 11/30/2024    04:20 12/1/2024    05:26   BMP   BUN 19  18  17    Creatinine 0.58  0.57  0.55    Sodium 138  137  137    Potassium 3.6  3.3  3.1    Chloride 100  97  97    CO2 31.0  35.8  32.5    Calcium 8.1  8.0  7.8      LIVER FUNCTION TESTS:      Lab 12/01/24  0526 11/30/24  0420 11/29/24  0402 11/28/24  0516 11/27/24  0451 11/25/24  1553   TOTAL PROTEIN 5.4* 5.6* 5.5* 5.8* 6.5 7.5   ALBUMIN 2.5* 2.6* 2.3* 2.4* 2.3* 3.0*   GLOBULIN  --   --   --   --   --  4.5   ALT (SGPT) 19 19 19 17 19 20   AST (SGOT) 12 13 12 11 16 18   BILIRUBIN 0.4 0.4 0.3 0.3 0.3 0.5   INDIRECT BILIRUBIN 0.3 0.3 0.2 0.2 0.2  --    BILIRUBIN DIRECT 0.1 0.1 0.1 0.1 0.1  --    ALK PHOS 80 81 90 93 105 129*       [x]  Microbiology   Microbiology Results (last 10 days)       Procedure Component Value - Date/Time    Virus Culture - Wash, Lung, Right Lower Lobe [[id number]] Collected: 11/29/24 1041     Lab Status: Preliminary result Specimen: Wash from Lung, Right Lower Lobe Updated: 12/02/24 1306     Viral Culture, General Comment     Comment: Preliminary Report:  No virus isolated at 24 hours. Next report to follow after 4 days.       Narrative:      Performed at:  [address] [place] [address]  [id number]  : Jonah Zhu MD, Phone:  [phone]    Respiratory Culture - Wash, Lung, Right Lower Lobe [[id number]] Collected: 11/29/24 1040    Lab Status: Final result Specimen: Wash from Lung, Right Lower Lobe Updated: 12/01/24 0818     Respiratory Culture Scant growth (1+) Normal respiratory colleen. No S. aureus or Pseudomonas aeruginosa detected. Final report.     Gram Stain Few (2+) WBCs seen      No organisms seen    Virus Culture - Lavage, Lung, Right Lower Lobe [[id number]] Collected: 11/29/24 1037    Lab Status: Preliminary result Specimen: Lavage from Lung, Right Lower Lobe Updated: 12/02/24 1306     Viral Culture, General Comment     Comment: Preliminary Report:  No virus isolated at 24 hours. Next report to follow after 4 days.       Narrative:      Performed at:  [address] [place] [address]  [id number]  : Jonah Zhu MD, Phone:  [phone]    AFB Culture - Lavage, Lung, Right Lower Lobe [[id number]] Collected: 11/29/24 1037    Lab Status: Preliminary result Specimen: Lavage from Lung, Right Lower Lobe Updated: 12/02/24 1209     AFB Stain No acid fast bacilli seen on direct smear      No acid fast bacilli seen on concentrated smear    BAL Culture, Quantitative - Lavage, Lung, Right Lower Lobe [[id number]] Collected: 11/29/24 1037    Lab Status: Final result Specimen: Lavage from Lung, Right Lower Lobe Updated: 12/01/24 0819     BAL Culture No growth     Gram Stain Rare (1+) WBCs seen      No organisms seen    Pneumonia Panel - Lavage, Lung, Right Lower Lobe [[id number]]  (Normal) Collected: 11/29/24 1037    Lab Status: Final  result Specimen: Lavage from Lung, Right Lower Lobe Updated: 11/29/24 1231     Escherichia coli PCR Not Detected     Acinetobacter calcoaceticus-baumannii complex PCR Not Detected     Enterobacter cloacae PCR Not Detected     Klebsiella oxytoca PCR Not Detected     Klebsiella pneumoniae group PCR Not Detected     Klebsiella aerogenes PCR Not Detected     Moraxella catarrhalis PCR Not Detected     Proteus species PCR Not Detected     Pseudomonas aeroginosa PCR Not Detected     Serratia marcescens PCR Not Detected     Staphylococcus aureus PCR Not Detected     Streptococcus pyogenes PCR Not Detected     Haemophilus influenzae PCR Not Detected     Streptococcus agalactiae PCR Not Detected     Streptococcus pneumoniae PCR Not Detected     Chlamydophila pneumoniae PCR Not Detected     Legionella pneumophilia PCR Not Detected     Mycoplasma pneumo by PCR Not Detected     ADENOVIRUS, PCR Not Detected     CTX-M Gene N/A     IMP Gene N/A     KPC Gene N/A     mecA/C and MREJ Gene N/A     NDM Gene N/A     OXA-48-like Gene N/A     VIM Gene N/A     Coronavirus Not Detected     Human Metapneumovirus Not Detected     Human Rhinovirus/Enterovirus Not Detected     Influenza A PCR Not Detected     Influenza B PCR Not Detected     RSV, PCR Not Detected     Parainfluenza virus PCR Not Detected    S. Pneumo Ag Urine or CSF - Urine, Urine, Clean Catch [[id number]]  (Normal) Collected: 11/29/24 0409    Lab Status: Final result Specimen: Urine, Clean Catch Updated: 11/29/24 0640     Strep Pneumo Ag Negative    Legionella Antigen, Urine - Urine, Urine, Clean Catch [[id number]]  (Normal) Collected: 11/29/24 0409    Lab Status: Final result Specimen: Urine, Clean Catch Updated: 11/29/24 0640     LEGIONELLA ANTIGEN, URINE Negative    Respiratory Panel PCR w/COVID-19(SARS-CoV-2) YASMINE/MONICA/LUCIA/PAD/COR/MARY ANN In-House, NP Swab in UTM/VTM, 2 HR TAT - Swab, Nasopharynx [[id number]]  (Normal) Collected: 11/26/24 1405    Lab Status: Final result Specimen:  Swab from Nasopharynx Updated: 11/26/24 1539     ADENOVIRUS, PCR Not Detected     Coronavirus 229E Not Detected     Coronavirus HKU1 Not Detected     Coronavirus NL63 Not Detected     Coronavirus OC43 Not Detected     COVID19 Not Detected     Human Metapneumovirus Not Detected     Human Rhinovirus/Enterovirus Not Detected     Influenza A PCR Not Detected     Influenza B PCR Not Detected     Parainfluenza Virus 1 Not Detected     Parainfluenza Virus 2 Not Detected     Parainfluenza Virus 3 Not Detected     Parainfluenza Virus 4 Not Detected     RSV, PCR Not Detected     Bordetella pertussis pcr Not Detected     Bordetella parapertussis PCR Not Detected     Chlamydophila pneumoniae PCR Not Detected     Mycoplasma pneumo by PCR Not Detected    Narrative:      In the setting of a positive respiratory panel with a viral infection PLUS a negative procalcitonin without other underlying concern for bacterial infection, consider observing off antibiotics or discontinuation of antibiotics and continue supportive care. If the respiratory panel is positive for atypical bacterial infection (Bordetella pertussis, Chlamydophila pneumoniae, or Mycoplasma pneumoniae), consider antibiotic de-escalation to target atypical bacterial infection.    COVID-19, FLU A/B, RSV PCR 1 HR TAT - Swab, Nasopharynx [[id number]]  (Normal) Collected: 11/25/24 2051    Lab Status: Final result Specimen: Swab from Nasopharynx Updated: 11/25/24 2247     COVID19 Not Detected     Influenza A PCR Not Detected     Influenza B PCR Not Detected     RSV, PCR Not Detected    Narrative:      Fact sheet for providers: https://www.fda.gov/media/512348/download    Fact sheet for patients: https://www.fda.gov/media/793263/download    Test performed by PCR.    MRSA Screen, PCR (Inpatient) - Swab, Nares [[id number]]  (Normal) Collected: 11/25/24 2051    Lab Status: Final result Specimen: Swab from Nares Updated: 11/25/24 2321     MRSA PCR No MRSA Detected    Narrative:       The negative predictive value of this diagnostic test is high and should only be used to consider de-escalating anti-MRSA therapy. A positive result may indicate colonization with MRSA and must be correlated clinically.    Blood Culture - Blood, Arm, Left [[id number]]  (Normal) Collected: 11/25/24 1555    Lab Status: Final result Specimen: Blood from Arm, Left Updated: 11/30/24 1616     Blood Culture No growth at 5 days    Blood Culture - Blood, Arm, Left [[id number]]  (Normal) Collected: 11/25/24 1555    Lab Status: Final result Specimen: Blood from Arm, Left Updated: 11/30/24 1616     Blood Culture No growth at 5 days    Mycoplasma Pneumoniae Antibody, IgM - Blood, [[id number]]  (Normal) Collected: 11/25/24 1553    Lab Status: Final result Specimen: Blood Updated: 11/26/24 0756     Mycoplasma pneumo IgM Negative              [x]  Radiology XR Chest 1 View    Result Date: 11/30/2024  Impression: Improved aeration of the lung apices with persistent bilateral airspace opacities. Electronically Signed: Ralph Kenyon MD  11/30/2024 12:25 PM EST  Workstation ID: [id number]    XR Chest 1 View    Result Date: 11/25/2024  Impression: Similar-appearing extensive bilateral airspace opacities, suspicious for persistent multifocal infectious/inflammatory process. 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 labs  Full code  DVT prophylaxis with Lovenox  Clinical course dictate further management  Discussed with nurse at the bedside  Discussed with  rehab placement        VTE Prophylaxis:  Pharmacologic VTE prophylaxis orders are present.        CODE STATUS:   Level Of Support Discussed With: Patient  Code Status (Patient has no pulse and is not breathing): CPR (Attempt to Resuscitate)  Medical Interventions (Patient has pulse or is breathing): Full Support        Electronically signed by Marco Del Toro MD, 12/2/2024, 14:14 EST.    Portions of this documentation were transcribed electronically from a voice recognition software.  I confirm all data accurately represents the service(s) I performed at today's visit.

## 2024-12-02 NOTE — PLAN OF CARE
Problem: Adult Inpatient Plan of Care  Goal: Plan of Care Review  Outcome: Progressing  Flowsheets (Taken 12/2/2024 3017)  Outcome Evaluation: Patient alert and oriented throughout shift. On 2 liters NC with no signs of distress. Patient plan of care discussed at bedside. Patient plans to go to a rehab facility at discharge. Patient is pleasant and soft spoken. Nystatin powder to folds. Standby assist with a cane to the bathroom. Will continue with patient plan of care.  Plan of Care Reviewed With: patient   Goal Outcome Evaluation:  Plan of Care Reviewed With: patient           Outcome Evaluation: Patient alert and oriented throughout shift. On 2 liters NC with no signs of distress. Patient plan of care discussed at bedside. Patient plans to go to a rehab facility at discharge. Patient is pleasant and soft spoken. Nystatin powder to folds. Standby assist with a cane to the bathroom. Will continue with patient plan of care.

## 2024-12-02 NOTE — THERAPY TREATMENT NOTE
Acute Care - Physical Therapy Treatment Note   [place]     Patient Name: Cris Agudelo  : 1940  MRN: [id number]  Today's Date: 2024      Visit Dx:     ICD-10-CM ICD-9-CM   1. Multifocal pneumonia  J18.9 486   2. Difficulty walking  R26.2 719.7   3. Decreased activities of daily living (ADL)  Z78.9 V49.89     Patient Active Problem List   Diagnosis    Hyponatremia    Stress-induced cardiomyopathy    Hyperlipidemia    Primary hypertension    Herpes zoster    Shingles    Non-occlusive coronary artery disease    Pneumonia    Multifocal pneumonia    Acute on chronic respiratory failure with hypoxia    HCAP (healthcare-associated pneumonia)     Past Medical History:   Diagnosis Date    COPD (chronic obstructive pulmonary disease)     History of non-ST elevation myocardial infarction (NSTEMI) 2023    Hyperlipidemia     Hypertension      Past Surgical History:   Procedure Laterality Date    BRONCHOSCOPY N/A 2024    Procedure: BRONCHOSCOPY with BAL, Brushings and washings;  Surgeon: Adan Brown MD;  Location: [facility] ENDOSCOPY;  Service: Pulmonary;  Laterality: N/A;  Bilateral PNA with mucus pluggins    CARDIAC CATHETERIZATION N/A 2023    Procedure: Left Heart Cath;  Surgeon: Mitch Correia MD;  Location: [facility] CATH INVASIVE LOCATION;  Service: Cardiology;  Laterality: N/A;    CARDIAC CATHETERIZATION N/A 2023    Procedure: Coronary angiography;  Surgeon: Mitch Correia MD;  Location: [facility] CATH INVASIVE LOCATION;  Service: Cardiology;  Laterality: N/A;     PT Assessment (Last 12 Hours)       PT Evaluation and Treatment       Row Name 24 1146          Physical Therapy Time and Intention    Subjective Information no complaints  -SM     Document Type therapy note (daily note)  -SM     Mode of Treatment individual therapy;physical therapy  -SM     Patient Effort adequate  -SM     Symptoms Noted During/After Treatment none  -SM       Row Name 24 1146          Bed Mobility     Supine-Sit-Supine [place] (Bed Mobility) standby assist  -     Assistive Device (Bed Mobility) bed rails  -       Row Name 12/02/24 1146          Transfers    Transfers bed-chair transfer;stand-sit transfer  -       Row Name 12/02/24 1146          Bed-Chair Transfer    Bed-Chair [place] (Transfers) contact guard  -     Assistive Device (Bed-Chair Transfers) cane, straight  -       Row Name 12/02/24 1146          Sit-Stand Transfer    Sit-Stand [place] (Transfers) contact guard  -     Assistive Device (Sit-Stand Transfers) cane, straight  -       Row Name 12/02/24 1146          Stand-Sit Transfer    Stand-Sit [place] (Transfers) contact guard  -     Assistive Device (Stand-Sit Transfers) cane, straight  -[facility] Name 12/02/24 1146          Gait/Stairs (Locomotion)    Gait/Stairs Locomotion gait/ambulation assistive device  -     [place] Level (Gait) contact guard  -     Assistive Device (Gait) cane, straight  -     Patient was able to Ambulate yes  -     Distance in Feet (Gait) 15  -SM     Pattern (Gait) 3-point  Reciprocal  -     Deviations/Abnormal Patterns (Gait) gait speed decreased  -     Bilateral Gait Deviations forward flexed posture  -       Row Name 12/02/24 1146          Safety Issues/Impairments Affecting Functional Mobility    Impairments Affecting Function (Mobility) balance;endurance/activity tolerance;cognition;strength  -[facility] Name 12/02/24 1146          Balance    Dynamic Standing Balance contact guard  -     Position/Device Used, Standing Balance cane, straight  -[facility] Name 12/02/24 1146          Positioning and Restraints    Pre-Treatment Position in bed  -     Post Treatment Position chair  -     In Chair reclined;call light within reach;exit alarm on;encouraged to call for assist;heels elevated  -       Row Name 12/02/24 1146          Progress Summary (PT)    Progress Toward Functional Goals (PT) progress toward  functional goals is good  -SM               User Key  (r) = Recorded By, (t) = Taken By, (c) = Cosigned By      Initials Name Provider Type    Gay Tidwell PTA Physical Therapist Assistant                    Physical Therapy Education        No education to display                  PT Recommendation and Plan     Progress Summary (PT)  Progress Toward Functional Goals (PT): progress toward functional goals is good   Outcome Measures       Row Name 12/02/24 1149             How much help from another person do you currently need...    Turning from your back to your side while in flat bed without using bedrails? 4  -SM      Moving from lying on back to sitting on the side of a flat bed without bedrails? 4  -SM      Moving to and from a bed to a chair (including a wheelchair)? 3  -SM      Standing up from a chair using your arms (e.g., wheelchair, bedside chair)? 3  -SM      Climbing 3-5 steps with a railing? 2  -SM      To walk in hospital room? 3  -SM      AM-PAC 6 Clicks Score (PT) 19  -SM                User Key  (r) = Recorded By, (t) = Taken By, (c) = Cosigned By      Initials Name Provider Type    Gay Tidwell PTA Physical Therapist Assistant                     Time Calculation:    PT Charges       Row Name 12/02/24 1146             Time Calculation    PT Received On 12/02/24  -SM         Timed Charges    31398 - Gait Training Minutes  15  -SM      95051 - PT Therapeutic Activity Minutes 8  -SM         Total Minutes    Timed Charges Total Minutes 23  -SM       Total Minutes 23  -SM                User Key  (r) = Recorded By, (t) = Taken By, (c) = Cosigned By      Initials Name Provider Type    Gay Tidwell PTA Physical Therapist Assistant                      PT G-Codes  Outcome Measure Options: AM-PAC 6 Clicks Daily Activity (OT), Optimal Instrument  AM-PAC 6 Clicks Score (PT): 19  AM-PAC 6 Clicks Score (OT): 17    Gay Meng PTA  12/2/2024

## 2024-12-02 NOTE — PLAN OF CARE
Goal Outcome Evaluation:  Plan of Care Reviewed With: patient, daughter        Progress: no change (First session for evaluation)  Outcome Evaluation: Patient presents with limitations of balance, strength, endurance/activity tolerance and cognition/safety awareness which impede her ability to perform ADL and transfers as prior.  The skills of a therapist will be required to safely and effectively implement treatment plan to restore maximal level of function.    Anticipated Discharge Disposition (OT): sub acute care setting

## 2024-12-02 NOTE — PROGRESS NOTES
Walking Oximetry Progress Note      Patient Name:  Cris Agudelo  YOB: 1940  Date of Procedure: 12/02/24              ROOM AIR BASELINE   SpO2% 86   Heart Rate 85     EXERCISE ON ROOM AIR SpO2% EXERCISE ON O2 LPM SpO2%   1 MINUTE  1 MINUTE PD 4 92   2 MINUTES  2 MINUTES PD 4-5 81   3 MINUTES  3 MINUTES PD 5 88   4 MINUTES  4 MINUTES CF 2 92   5 MINUTES  5 MINUTES CF 2 92   6 MINUTES  6 MINUTES CF 2 92              Time to Recovery  na   SpO2% Post Exercise  91 on 2LPM CF.    HR Post Exercise  85     Comments: patient family states she has home O2 2LPM, patient was unable to maintain O2 sat on PD O2 with walk, patient required 2LPM CF.          Electronically signed by Niraj Daigle CRT, 12/02/24, 8:00 AM EST.

## 2024-12-03 LAB
ALBUMIN SERPL-MCNC: 2.7 G/DL (ref 3.5–5.2)
ALP SERPL-CCNC: 84 U/L (ref 39–117)
ALT SERPL W P-5'-P-CCNC: 24 U/L (ref 1–33)
ANION GAP SERPL CALCULATED.3IONS-SCNC: 6.1 MMOL/L (ref 5–15)
AST SERPL-CCNC: 14 U/L (ref 1–32)
BASOPHILS # BLD AUTO: 0.05 10*3/MM3 (ref 0–0.2)
BASOPHILS NFR BLD AUTO: 0.4 % (ref 0–1.5)
BILIRUB CONJ SERPL-MCNC: 0.2 MG/DL (ref 0–0.3)
BILIRUB INDIRECT SERPL-MCNC: 0.3 MG/DL
BILIRUB SERPL-MCNC: 0.5 MG/DL (ref 0–1.2)
BUN SERPL-MCNC: 17 MG/DL (ref 8–23)
BUN/CREAT SERPL: 30.4 (ref 7–25)
CALCIUM SPEC-SCNC: 8.2 MG/DL (ref 8.6–10.5)
CHLORIDE SERPL-SCNC: 95 MMOL/L (ref 98–107)
CO2 SERPL-SCNC: 37.9 MMOL/L (ref 22–29)
CREAT SERPL-MCNC: 0.56 MG/DL (ref 0.57–1)
DEPRECATED RDW RBC AUTO: 42.2 FL (ref 37–54)
EGFRCR SERPLBLD CKD-EPI 2021: 90.1 ML/MIN/1.73
EOSINOPHIL # BLD AUTO: 0.28 10*3/MM3 (ref 0–0.4)
EOSINOPHIL NFR BLD AUTO: 2.3 % (ref 0.3–6.2)
ERYTHROCYTE [DISTWIDTH] IN BLOOD BY AUTOMATED COUNT: 13.4 % (ref 12.3–15.4)
GLUCOSE SERPL-MCNC: 125 MG/DL (ref 65–99)
HCT VFR BLD AUTO: 37.4 % (ref 34–46.6)
HGB BLD-MCNC: 11.9 G/DL (ref 12–15.9)
IMM GRANULOCYTES # BLD AUTO: 0.28 10*3/MM3 (ref 0–0.05)
IMM GRANULOCYTES NFR BLD AUTO: 2.3 % (ref 0–0.5)
LYMPHOCYTES # BLD AUTO: 2.34 10*3/MM3 (ref 0.7–3.1)
LYMPHOCYTES NFR BLD AUTO: 19.1 % (ref 19.6–45.3)
MAGNESIUM SERPL-MCNC: 1.9 MG/DL (ref 1.6–2.4)
MCH RBC QN AUTO: 27.4 PG (ref 26.6–33)
MCHC RBC AUTO-ENTMCNC: 31.8 G/DL (ref 31.5–35.7)
MCV RBC AUTO: 86 FL (ref 79–97)
MONOCYTES # BLD AUTO: 0.71 10*3/MM3 (ref 0.1–0.9)
MONOCYTES NFR BLD AUTO: 5.8 % (ref 5–12)
NEUTROPHILS NFR BLD AUTO: 70.1 % (ref 42.7–76)
NEUTROPHILS NFR BLD AUTO: 8.57 10*3/MM3 (ref 1.7–7)
NRBC BLD AUTO-RTO: 0 /100 WBC (ref 0–0.2)
PHOSPHATE SERPL-MCNC: 2.5 MG/DL (ref 2.5–4.5)
PLATELET # BLD AUTO: 312 10*3/MM3 (ref 140–450)
PMV BLD AUTO: 10.9 FL (ref 6–12)
POTASSIUM SERPL-SCNC: 2.9 MMOL/L (ref 3.5–5.2)
PROT SERPL-MCNC: 5.7 G/DL (ref 6–8.5)
RBC # BLD AUTO: 4.35 10*6/MM3 (ref 3.77–5.28)
SARS-COV-2 RNA RESP QL NAA+PROBE: NOT DETECTED
SODIUM SERPL-SCNC: 139 MMOL/L (ref 136–145)
WBC NRBC COR # BLD AUTO: 12.23 10*3/MM3 (ref 3.4–10.8)

## 2024-12-03 PROCEDURE — 99233 SBSQ HOSP IP/OBS HIGH 50: CPT | Performed by: INTERNAL MEDICINE

## 2024-12-03 PROCEDURE — 94664 DEMO&/EVAL PT USE INHALER: CPT

## 2024-12-03 PROCEDURE — 84100 ASSAY OF PHOSPHORUS: CPT | Performed by: FAMILY MEDICINE

## 2024-12-03 PROCEDURE — 85025 COMPLETE CBC W/AUTO DIFF WBC: CPT | Performed by: FAMILY MEDICINE

## 2024-12-03 PROCEDURE — 94799 UNLISTED PULMONARY SVC/PX: CPT

## 2024-12-03 PROCEDURE — 87635 SARS-COV-2 COVID-19 AMP PRB: CPT | Performed by: INTERNAL MEDICINE

## 2024-12-03 PROCEDURE — 63710000001 PREDNISONE PER 5 MG: Performed by: FAMILY MEDICINE

## 2024-12-03 PROCEDURE — 80048 BASIC METABOLIC PNL TOTAL CA: CPT | Performed by: FAMILY MEDICINE

## 2024-12-03 PROCEDURE — 94761 N-INVAS EAR/PLS OXIMETRY MLT: CPT

## 2024-12-03 PROCEDURE — 94669 MECHANICAL CHEST WALL OSCILL: CPT

## 2024-12-03 PROCEDURE — 25010000002 ENOXAPARIN PER 10 MG: Performed by: INTERNAL MEDICINE

## 2024-12-03 PROCEDURE — 83735 ASSAY OF MAGNESIUM: CPT | Performed by: FAMILY MEDICINE

## 2024-12-03 PROCEDURE — 63710000001 PREDNISONE PER 1 MG: Performed by: FAMILY MEDICINE

## 2024-12-03 PROCEDURE — 94760 N-INVAS EAR/PLS OXIMETRY 1: CPT

## 2024-12-03 PROCEDURE — 80076 HEPATIC FUNCTION PANEL: CPT | Performed by: FAMILY MEDICINE

## 2024-12-03 RX ORDER — POTASSIUM CHLORIDE 750 MG/1
40 CAPSULE, EXTENDED RELEASE ORAL 2 TIMES DAILY WITH MEALS
Status: COMPLETED | OUTPATIENT
Start: 2024-12-03 | End: 2024-12-03

## 2024-12-03 RX ADMIN — POTASSIUM CHLORIDE 40 MEQ: 750 CAPSULE, EXTENDED RELEASE ORAL at 17:11

## 2024-12-03 RX ADMIN — POTASSIUM CHLORIDE 40 MEQ: 750 CAPSULE, EXTENDED RELEASE ORAL at 08:37

## 2024-12-03 RX ADMIN — Medication 10 ML: at 08:37

## 2024-12-03 RX ADMIN — GUAIFENESIN 600 MG: 600 TABLET ORAL at 08:37

## 2024-12-03 RX ADMIN — IPRATROPIUM BROMIDE AND ALBUTEROL SULFATE 3 ML: .5; 3 SOLUTION RESPIRATORY (INHALATION) at 06:22

## 2024-12-03 RX ADMIN — BUDESONIDE 0.5 MG: 0.5 INHALANT RESPIRATORY (INHALATION) at 06:23

## 2024-12-03 RX ADMIN — NYSTATIN: 100000 POWDER TOPICAL at 08:40

## 2024-12-03 RX ADMIN — CARVEDILOL 6.25 MG: 6.25 TABLET, FILM COATED ORAL at 08:37

## 2024-12-03 RX ADMIN — ASPIRIN 81 MG: 81 TABLET, COATED ORAL at 08:37

## 2024-12-03 RX ADMIN — GUAIFENESIN 600 MG: 600 TABLET ORAL at 20:16

## 2024-12-03 RX ADMIN — ARFORMOTEROL TARTRATE 15 MCG: 15 SOLUTION RESPIRATORY (INHALATION) at 06:23

## 2024-12-03 RX ADMIN — Medication 10 ML: at 20:16

## 2024-12-03 RX ADMIN — Medication 250 MG: at 20:16

## 2024-12-03 RX ADMIN — Medication 400 MG: at 08:37

## 2024-12-03 RX ADMIN — IPRATROPIUM BROMIDE AND ALBUTEROL SULFATE 3 ML: .5; 3 SOLUTION RESPIRATORY (INHALATION) at 18:35

## 2024-12-03 RX ADMIN — ARFORMOTEROL TARTRATE 15 MCG: 15 SOLUTION RESPIRATORY (INHALATION) at 18:35

## 2024-12-03 RX ADMIN — NYSTATIN: 100000 POWDER TOPICAL at 20:19

## 2024-12-03 RX ADMIN — ENOXAPARIN SODIUM 40 MG: 100 INJECTION SUBCUTANEOUS at 08:38

## 2024-12-03 RX ADMIN — IPRATROPIUM BROMIDE AND ALBUTEROL SULFATE 3 ML: .5; 3 SOLUTION RESPIRATORY (INHALATION) at 11:26

## 2024-12-03 RX ADMIN — FUROSEMIDE 40 MG: 40 TABLET ORAL at 08:37

## 2024-12-03 RX ADMIN — BUDESONIDE 0.5 MG: 0.5 INHALANT RESPIRATORY (INHALATION) at 18:35

## 2024-12-03 RX ADMIN — Medication 250 MG: at 08:37

## 2024-12-03 RX ADMIN — FUROSEMIDE 40 MG: 40 TABLET ORAL at 17:11

## 2024-12-03 RX ADMIN — CARVEDILOL 6.25 MG: 6.25 TABLET, FILM COATED ORAL at 20:16

## 2024-12-03 RX ADMIN — LISINOPRIL 20 MG: 20 TABLET ORAL at 08:37

## 2024-12-03 RX ADMIN — Medication 10 MG: at 20:16

## 2024-12-03 RX ADMIN — PREDNISONE 30 MG: 20 TABLET ORAL at 08:37

## 2024-12-03 NOTE — PLAN OF CARE
Problem: Adult Inpatient Plan of Care  Goal: Plan of Care Review  Outcome: Progressing  Flowsheets (Taken 12/3/2024 5664)  Outcome Evaluation: Patient alert and disoriented to time only throughout shift. On 2 liters NC with no signs of distress. Patient plan of care discussed at bedside. Patient plans to go to SNF tomorrow once COVID swab is resulted. Nystatin powder to folds. Will continue with patient plan of care.  Plan of Care Reviewed With: patient   Goal Outcome Evaluation:  Plan of Care Reviewed With: patient           Outcome Evaluation: Patient alert and disoriented to time only throughout shift. On 2 liters NC with no signs of distress. Patient plan of care discussed at bedside. Patient plans to go to SNF tomorrow once COVID swab is resulted. Nystatin powder to folds. Will continue with patient plan of care.

## 2024-12-03 NOTE — PLAN OF CARE
Goal Outcome Evaluation:  Plan of Care Reviewed With: patient        Progress: improving  Outcome Evaluation: Patient disoriented to time throughout shift. On 2L NC. No complaints of pain. No acute changes throughout the shift with pateint sleeping between care .VSS. Will continue with POC

## 2024-12-03 NOTE — PROGRESS NOTES
Pulmonary / Critical Care Progress Note      Patient Name: Cris Agudelo  : 1940  MRN: [id number]  Primary Care Physician:  Ramirez Maurice MD  Date of admission: 2024    Subjective   Subjective   Follow-up for concern for pneumonia, acute on chronic hypoxic respiratory failure    Slowly improving  On 2 L of oxygen  Bronchoscopy cultures so far negative and cytology negative  Cough improved  Secretions thinning and clearing  Shortness of breath better  Would like to go to rehab  Very weak and fatigue  No fevers or chills    Objective   Objective     Vitals:   Temp:  [97.2 °F (36.2 °C)-98.1 °F (36.7 °C)] 97.7 °F (36.5 °C)  Heart Rate:  [] 120  Resp:  [16-18] 16  BP: (116-138)/(48-76) 116/50  Flow (L/min) (Oxygen Therapy):  [2] 2    Physical Exam   Vital Signs Reviewed   General: Chronically ill-appearing, elderly female, alert, NAD, sitting up in bed  Chest: Improved aeration, diminished with rhonchi bilaterally, no work of breathing noted on 2 L NC  CV: regular rate and rhythm regular  EXT:  no clubbing, no cyanosis, no edema  Neuro:  A&Ox3, moving all 4 extremities spontaneously  Skin: No rashes or lesions noted    Result Review    Result Review:  I have personally reviewed the results from the time of this admission to 12/3/2024 08:16 EST and agree with these findings:  [x]  Laboratory  [x]  Microbiology  [x]  Radiology  [x]  EKG/Telemetry   []  Cardiology/Vascular   []  Pathology  []  Old records  []  Other:  Most notable findings include:   proBNP   Alpha 1 antitrypsin 305  CRP 28.84      Lab 24  0502 24  0526 24  0420 24  0402 24  0516 24  0451   WBC 12.23* 11.48* 10.51 10.12 14.72* 16.04*   HEMOGLOBIN 11.9* 11.8* 11.9* 11.9* 12.0 12.4   HEMATOCRIT 37.4 38.9 38.2 38.3 38.8 39.7   PLATELETS 312 373 459* 448 528* 532*   SODIUM 139 137 137 138 137 138   POTASSIUM 2.9* 3.1* 3.3* 3.6 3.6 3.1*   CHLORIDE 95* 97* 97* 100 99 98   CO2 37.9* 32.5* 35.8* 31.0*  29.6* 30.8*   BUN 17 17 18 19 24* 22   CREATININE 0.56* 0.55* 0.57 0.58 0.61 0.59   GLUCOSE 125* 147* 184* 207* 212* 227*   CALCIUM 8.2* 7.8* 8.0* 8.1* 8.2* 8.3*   PHOSPHORUS 2.5 2.0* 2.4* 1.8* 2.0* 3.0   TOTAL PROTEIN 5.7* 5.4* 5.6* 5.5* 5.8* 6.5   ALBUMIN 2.7* 2.5* 2.6* 2.3* 2.4* 2.3*   -Recent chest CT on 11/16/24 on previous admission demonstrating extensive bronchocentric alveolar airspace disease throughout both lungs consistent with pneumonia.  Demonstrating extensive groundglass opacities that may represent bronchopneumonia or cryptogenic organizing pneumonia    Bronchoscopy cultures all negative      Assessment & Plan   Assessment / Plan     Active Hospital Problems:  Active Hospital Problems    Diagnosis     **Multifocal pneumonia     Acute on chronic respiratory failure with hypoxia     HCAP (healthcare-associated pneumonia)     Hyperlipidemia     Primary hypertension     Hyponatremia      Impression:  Acute on chronic hypoxic respiratory failure, 2 L at baseline  Concern for pneumonia, organism versus Cryptogenic organizing pneumonia  Concern for ILD  Acute on chronic COPD exacerbation, FEV1 44%  Issues with airway clearance/mucous plugging  Acute on chronic congestive diastolic heart failure, grade 1 DD  Tobacco abuse of cigarettes in remission hypokalemia  Hypomagnesemia      Plan:  -Continue to wean O2 to maintain SpO2 greater than 90%.  Baseline 2 L NC.  -Repeat CXR with improved aeration with persistent bilateral airspace opacities  -S/p bronchoscopy, pneumonia panel negative, cultures no growth to date, cytology pending.  Unfortunately transbronchial biopsy is not performed.  If patient does not improve, may require repeat bronchoscopy with transbronchial lung biopsies.  -Repeat noncontrast chest CT in 8 weeks as an outpatient  -Completed antibiotics.  Cultures negative  -Continue Brovana, Pulmicort, DuoNebs  -Continue bronchopulmonary hygiene.  Encourage I-S and flutter.  Chest vest  available.  -Decrease prednisone to 30 mg daily.  Decrease by 10 mg every 3 days until patient is off steroids  -Continue Lasix 40 mg p.o. twice daily  -Trend electrolytes and renal panel.  Replace potassium and magnesium  -Continue mucolytics  -Echo with normal EF of 56 to 60%, grade 1 diastolic dysfunction noted.  -Encourage mobilization.  Out of bed to chair.  -Follow-up with pulmonology in 1 to 2 weeks  -Would benefit from rehab    VTE Prophylaxis:  Pharmacologic VTE prophylaxis orders are present.    CODE STATUS:   Level Of Support Discussed With: Patient  Code Status (Patient has no pulse and is not breathing): CPR (Attempt to Resuscitate)  Medical Interventions (Patient has pulse or is breathing): Full Support      Labs, imaging, microbiology, notes and medications personally reviewed  Discussed with primary    Electronically signed by Omari Grady MD, 12/03/24, 12:41 PM EST.

## 2024-12-03 NOTE — PROGRESS NOTES
[facility]   Hospitalist Progress Note  Date: 12/3/2024  Patient Name: Cris Agudelo  : 1940  MRN: [id number]  Date of admission: 2024      Subjective   Subjective     Chief complaint: Shortness of breath, cough    Summary:  84-year-old female with history of hypertension, CAD, COPD, hyponatremia, dyslipidemia, GERD without esophagitis, hospitalized on 2024 with chief complaint of shortness of breath and cough, found to be hypoxemic, hypercapnic, found to have multifocal pneumonia with hypoxemia on imaging, with recent hospitalization for pneumonia discharged on Augmentin, concern for volume overload, discontinued IV fluids, pulmonary consulted for further evaluation, concern for underlying ILD, previous PFTs were indicative, bronchoscopy 2024.  Breathing improved after bronchoscopy.  Diuresed several liters.  Volume status achieving euvolemia.  Working towards rehab placement.    Interval follow-up: Denies new complaints      Objective   Objective     Vitals:   Temp:  [97.2 °F (36.2 °C)-98.1 °F (36.7 °C)] 97.3 °F (36.3 °C)  Heart Rate:  [] 79  Resp:  [16-18] 16  BP: (116-162)/(48-76) 162/58  Flow (L/min) (Oxygen Therapy):  [2] 2    Physical Exam    Constitutional: Awake, alert, no distress   Eyes: Pupils equal, sclerae anicteric, no conjunctival injection   HENT: NCAT, mucous membranes moist   Neck: Supple, full range of motion   Respiratory: Diminished breath sounds throughout   Cardiovascular: RRR, no m   Gastrointestinal: Positive bowel sounds, soft, nontender, nondistended   Musculoskeletal: Positive bilateral ankle edema, no clubbing or cyanosis to extremities   Psychiatric: Appropriate affect, cooperative   Neurologic: Speech clear        Result Review    Result Review:  I have personally reviewed the pertinent results from the past 24 hours to 12/3/2024 09:35 EST and agree with these findings:  [x]  Laboratory   CBC          2024    04:20 2024    05:26 12/3/2024     05:02   CBC   WBC 10.51  11.48  12.23    RBC 4.41  4.39  4.35    Hemoglobin 11.9  11.8  11.9    Hematocrit 38.2  38.9  37.4    MCV 86.6  88.6  86.0    MCH 27.0  26.9  27.4    MCHC 31.2  30.3  31.8    RDW 13.4  13.5  13.4    Platelets 459  373  312      BMP          11/30/2024    04:20 12/1/2024    05:26 12/3/2024    05:02   BMP   BUN 18  17  17    Creatinine 0.57  0.55  0.56    Sodium 137  137  139    Potassium 3.3  3.1  2.9    Chloride 97  97  95    CO2 35.8  32.5  37.9    Calcium 8.0  7.8  8.2      LIVER FUNCTION TESTS:      Lab 12/03/24  0502 12/01/24  0526 11/30/24  0420 11/29/24  0402 11/28/24  0516   TOTAL PROTEIN 5.7* 5.4* 5.6* 5.5* 5.8*   ALBUMIN 2.7* 2.5* 2.6* 2.3* 2.4*   ALT (SGPT) 24 19 19 19 17   AST (SGOT) 14 12 13 12 11   BILIRUBIN 0.5 0.4 0.4 0.3 0.3   INDIRECT BILIRUBIN 0.3 0.3 0.3 0.2 0.2   BILIRUBIN DIRECT 0.2 0.1 0.1 0.1 0.1   ALK PHOS 84 80 81 90 93       [x]  Microbiology   Microbiology Results (last 10 days)       Procedure Component Value - Date/Time    Virus Culture - Wash, Lung, Right Lower Lobe [[id number]] Collected: 11/29/24 1040    Lab Status: Preliminary result Specimen: Wash from Lung, Right Lower Lobe Updated: 12/02/24 1306     Viral Culture, General Comment     Comment: Preliminary Report:  No virus isolated at 24 hours. Next report to follow after 4 days.       Narrative:      Performed at:  [address] [place] [address]  [id number]  : Jonah Zhu MD, Phone:  [phone]    Respiratory Culture - Wash, Lung, Right Lower Lobe [[id number]] Collected: 11/29/24 1040    Lab Status: Final result Specimen: Wash from Lung, Right Lower Lobe Updated: 12/01/24 0818     Respiratory Culture Scant growth (1+) Normal respiratory colleen. No S. aureus or Pseudomonas aeruginosa detected. Final report.     Gram Stain Few (2+) WBCs seen      No organisms seen    Virus Culture - Lavage, Lung, Right Lower Lobe [[id number]] Collected: 11/29/24 1037    Lab Status:  Preliminary result Specimen: Lavage from Lung, Right Lower Lobe Updated: 12/02/24 1306     Viral Culture, General Comment     Comment: Preliminary Report:  No virus isolated at 24 hours. Next report to follow after 4 days.       Narrative:      Performed at:  [address]  [id number]  : Jonah Zhu MD, Phone:  [phone]    AFB Culture - Lavage, Lung, Right Lower Lobe [[id number]] Collected: 11/29/24 1037    Lab Status: Preliminary result Specimen: Lavage from Lung, Right Lower Lobe Updated: 12/02/24 1209     AFB Stain No acid fast bacilli seen on direct smear      No acid fast bacilli seen on concentrated smear    BAL Culture, Quantitative - Lavage, Lung, Right Lower Lobe [[id number]] Collected: 11/29/24 1037    Lab Status: Final result Specimen: Lavage from Lung, Right Lower Lobe Updated: 12/01/24 0819     BAL Culture No growth     Gram Stain Rare (1+) WBCs seen      No organisms seen    Pneumonia Panel - Lavage, Lung, Right Lower Lobe [[id number]]  (Normal) Collected: 11/29/24 1037    Lab Status: Final result Specimen: Lavage from Lung, Right Lower Lobe Updated: 11/29/24 1231     Escherichia coli PCR Not Detected     Acinetobacter calcoaceticus-baumannii complex PCR Not Detected     Enterobacter cloacae PCR Not Detected     Klebsiella oxytoca PCR Not Detected     Klebsiella pneumoniae group PCR Not Detected     Klebsiella aerogenes PCR Not Detected     Moraxella catarrhalis PCR Not Detected     Proteus species PCR Not Detected     Pseudomonas aeroginosa PCR Not Detected     Serratia marcescens PCR Not Detected     Staphylococcus aureus PCR Not Detected     Streptococcus pyogenes PCR Not Detected     Haemophilus influenzae PCR Not Detected     Streptococcus agalactiae PCR Not Detected     Streptococcus pneumoniae PCR Not Detected     Chlamydophila pneumoniae PCR Not Detected     Legionella pneumophilia PCR Not Detected     Mycoplasma pneumo by PCR Not  Detected     ADENOVIRUS, PCR Not Detected     CTX-M Gene N/A     IMP Gene N/A     KPC Gene N/A     mecA/C and MREJ Gene N/A     NDM Gene N/A     OXA-48-like Gene N/A     VIM Gene N/A     Coronavirus Not Detected     Human Metapneumovirus Not Detected     Human Rhinovirus/Enterovirus Not Detected     Influenza A PCR Not Detected     Influenza B PCR Not Detected     RSV, PCR Not Detected     Parainfluenza virus PCR Not Detected    S. Pneumo Ag Urine or CSF - Urine, Urine, Clean Catch [[id number]]  (Normal) Collected: 11/29/24 0409    Lab Status: Final result Specimen: Urine, Clean Catch Updated: 11/29/24 0640     Strep Pneumo Ag Negative    Legionella Antigen, Urine - Urine, Urine, Clean Catch [[id number]]  (Normal) Collected: 11/29/24 0409    Lab Status: Final result Specimen: Urine, Clean Catch Updated: 11/29/24 0640     LEGIONELLA ANTIGEN, URINE Negative    Respiratory Panel PCR w/COVID-19(SARS-CoV-2) YASMINE/MONICA/LUCIA/PAD/COR/MARY ANN In-House, NP Swab in UTM/VTM, 2 HR TAT - Swab, Nasopharynx [[id number]]  (Normal) Collected: 11/26/24 1405    Lab Status: Final result Specimen: Swab from Nasopharynx Updated: 11/26/24 1539     ADENOVIRUS, PCR Not Detected     Coronavirus 229E Not Detected     Coronavirus HKU1 Not Detected     Coronavirus NL63 Not Detected     Coronavirus OC43 Not Detected     COVID19 Not Detected     Human Metapneumovirus Not Detected     Human Rhinovirus/Enterovirus Not Detected     Influenza A PCR Not Detected     Influenza B PCR Not Detected     Parainfluenza Virus 1 Not Detected     Parainfluenza Virus 2 Not Detected     Parainfluenza Virus 3 Not Detected     Parainfluenza Virus 4 Not Detected     RSV, PCR Not Detected     Bordetella pertussis pcr Not Detected     Bordetella parapertussis PCR Not Detected     Chlamydophila pneumoniae PCR Not Detected     Mycoplasma pneumo by PCR Not Detected    Narrative:      In the setting of a positive respiratory panel with a viral infection PLUS a negative procalcitonin  without other underlying concern for bacterial infection, consider observing off antibiotics or discontinuation of antibiotics and continue supportive care. If the respiratory panel is positive for atypical bacterial infection (Bordetella pertussis, Chlamydophila pneumoniae, or Mycoplasma pneumoniae), consider antibiotic de-escalation to target atypical bacterial infection.    COVID-19, FLU A/B, RSV PCR 1 HR TAT - Swab, Nasopharynx [[id number]]  (Normal) Collected: 11/25/24 2051    Lab Status: Final result Specimen: Swab from Nasopharynx Updated: 11/25/24 2247     COVID19 Not Detected     Influenza A PCR Not Detected     Influenza B PCR Not Detected     RSV, PCR Not Detected    Narrative:      Fact sheet for providers: https://www.fda.gov/media/949830/download    Fact sheet for patients: https://www.fda.gov/media/098387/download    Test performed by PCR.    MRSA Screen, PCR (Inpatient) - Swab, Nares [[id number]]  (Normal) Collected: 11/25/24 2051    Lab Status: Final result Specimen: Swab from Nares Updated: 11/25/24 2321     MRSA PCR No MRSA Detected    Narrative:      The negative predictive value of this diagnostic test is high and should only be used to consider de-escalating anti-MRSA therapy. A positive result may indicate colonization with MRSA and must be correlated clinically.    Blood Culture - Blood, Arm, Left [[id number]]  (Normal) Collected: 11/25/24 1555    Lab Status: Final result Specimen: Blood from Arm, Left Updated: 11/30/24 1616     Blood Culture No growth at 5 days    Blood Culture - Blood, Arm, Left [[id number]]  (Normal) Collected: 11/25/24 1555    Lab Status: Final result Specimen: Blood from Arm, Left Updated: 11/30/24 1616     Blood Culture No growth at 5 days    Mycoplasma Pneumoniae Antibody, IgM - Blood, [[id number]]  (Normal) Collected: 11/25/24 1553    Lab Status: Final result Specimen: Blood Updated: 11/26/24 0756     Mycoplasma pneumo IgM Negative              [x]  Radiology XR Chest 1  View    Result Date: 11/30/2024  Impression: Improved aeration of the lung apices with persistent bilateral airspace opacities. Electronically Signed: Ralph Kenyon MD  11/30/2024 12:25 PM EST  Workstation ID: [id number]    XR Chest 1 View    Result Date: 11/25/2024  Impression: Similar-appearing extensive bilateral airspace opacities, suspicious for persistent multifocal infectious/inflammatory process. 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        CODE STATUS:   Level Of Support Discussed With: Patient  Code Status (Patient has no pulse and is not breathing): CPR (Attempt to Resuscitate)  Medical Interventions (Patient has pulse or is breathing): Full Support      Electronically signed by Lonnie Hernandez MD, 12/03/24, 9:36 AM EST.

## 2024-12-04 ENCOUNTER — HOSPITAL ENCOUNTER (INPATIENT)
Facility: HOSPITAL | Age: 84
DRG: 947 | End: 2024-12-04
Attending: INTERNAL MEDICINE | Admitting: INTERNAL MEDICINE
Payer: MEDICARE

## 2024-12-04 VITALS
DIASTOLIC BLOOD PRESSURE: 59 MMHG | TEMPERATURE: 97.7 F | WEIGHT: 147.71 LBS | HEIGHT: 60 IN | RESPIRATION RATE: 18 BRPM | HEART RATE: 69 BPM | BODY MASS INDEX: 29 KG/M2 | OXYGEN SATURATION: 96 % | SYSTOLIC BLOOD PRESSURE: 95 MMHG

## 2024-12-04 DIAGNOSIS — Z78.9 DECREASED ACTIVITIES OF DAILY LIVING (ADL): ICD-10-CM

## 2024-12-04 DIAGNOSIS — R26.2 DIFFICULTY WALKING: Primary | ICD-10-CM

## 2024-12-04 PROBLEM — R53.1 WEAKNESS: Status: ACTIVE | Noted: 2024-12-04

## 2024-12-04 LAB
ALBUMIN SERPL-MCNC: 2.4 G/DL (ref 3.5–5.2)
ANION GAP SERPL CALCULATED.3IONS-SCNC: 7.5 MMOL/L (ref 5–15)
BASOPHILS # BLD AUTO: 0.05 10*3/MM3 (ref 0–0.2)
BASOPHILS NFR BLD AUTO: 0.4 % (ref 0–1.5)
BUN SERPL-MCNC: 19 MG/DL (ref 8–23)
BUN/CREAT SERPL: 28.8 (ref 7–25)
CALCIUM SPEC-SCNC: 8.3 MG/DL (ref 8.6–10.5)
CHLORIDE SERPL-SCNC: 97 MMOL/L (ref 98–107)
CO2 SERPL-SCNC: 27.5 MMOL/L (ref 22–29)
CREAT SERPL-MCNC: 0.66 MG/DL (ref 0.57–1)
CYTO UR: NORMAL
DEPRECATED RDW RBC AUTO: 45.1 FL (ref 37–54)
EGFRCR SERPLBLD CKD-EPI 2021: 86.6 ML/MIN/1.73
EOSINOPHIL # BLD AUTO: 0.34 10*3/MM3 (ref 0–0.4)
EOSINOPHIL NFR BLD AUTO: 2.8 % (ref 0.3–6.2)
ERYTHROCYTE [DISTWIDTH] IN BLOOD BY AUTOMATED COUNT: 13.6 % (ref 12.3–15.4)
FUNGUS WND CULT: ABNORMAL
FUNGUS WND CULT: ABNORMAL
GLUCOSE SERPL-MCNC: 108 MG/DL (ref 65–99)
HCT VFR BLD AUTO: 43.1 % (ref 34–46.6)
HGB BLD-MCNC: 13 G/DL (ref 12–15.9)
IMM GRANULOCYTES # BLD AUTO: 0.22 10*3/MM3 (ref 0–0.05)
IMM GRANULOCYTES NFR BLD AUTO: 1.8 % (ref 0–0.5)
LAB AP CASE REPORT: NORMAL
LAB AP CLINICAL INFORMATION: NORMAL
LAB AP SPECIAL STAINS: NORMAL
LYMPHOCYTES # BLD AUTO: 2.55 10*3/MM3 (ref 0.7–3.1)
LYMPHOCYTES NFR BLD AUTO: 21 % (ref 19.6–45.3)
MCH RBC QN AUTO: 27.4 PG (ref 26.6–33)
MCHC RBC AUTO-ENTMCNC: 30.2 G/DL (ref 31.5–35.7)
MCV RBC AUTO: 90.7 FL (ref 79–97)
MONOCYTES # BLD AUTO: 0.78 10*3/MM3 (ref 0.1–0.9)
MONOCYTES NFR BLD AUTO: 6.4 % (ref 5–12)
NEUTROPHILS NFR BLD AUTO: 67.6 % (ref 42.7–76)
NEUTROPHILS NFR BLD AUTO: 8.22 10*3/MM3 (ref 1.7–7)
NRBC BLD AUTO-RTO: 0 /100 WBC (ref 0–0.2)
PATH REPORT.FINAL DX SPEC: NORMAL
PATH REPORT.GROSS SPEC: NORMAL
PHOSPHATE SERPL-MCNC: 2.6 MG/DL (ref 2.5–4.5)
PLATELET # BLD AUTO: 229 10*3/MM3 (ref 140–450)
PMV BLD AUTO: 10.9 FL (ref 6–12)
POTASSIUM SERPL-SCNC: 3.6 MMOL/L (ref 3.5–5.2)
POTASSIUM SERPL-SCNC: 4 MMOL/L (ref 3.5–5.2)
RBC # BLD AUTO: 4.75 10*6/MM3 (ref 3.77–5.28)
SODIUM SERPL-SCNC: 132 MMOL/L (ref 136–145)
WBC NRBC COR # BLD AUTO: 12.16 10*3/MM3 (ref 3.4–10.8)

## 2024-12-04 PROCEDURE — 97161 PT EVAL LOW COMPLEX 20 MIN: CPT

## 2024-12-04 PROCEDURE — 97116 GAIT TRAINING THERAPY: CPT

## 2024-12-04 PROCEDURE — 94799 UNLISTED PULMONARY SVC/PX: CPT

## 2024-12-04 PROCEDURE — 94668 MNPJ CHEST WALL SBSQ: CPT

## 2024-12-04 PROCEDURE — 84132 ASSAY OF SERUM POTASSIUM: CPT | Performed by: INTERNAL MEDICINE

## 2024-12-04 PROCEDURE — 63710000001 PREDNISONE PER 5 MG: Performed by: FAMILY MEDICINE

## 2024-12-04 PROCEDURE — 94760 N-INVAS EAR/PLS OXIMETRY 1: CPT

## 2024-12-04 PROCEDURE — 85025 COMPLETE CBC W/AUTO DIFF WBC: CPT

## 2024-12-04 PROCEDURE — 25010000002 ENOXAPARIN PER 10 MG: Performed by: INTERNAL MEDICINE

## 2024-12-04 PROCEDURE — 94761 N-INVAS EAR/PLS OXIMETRY MLT: CPT

## 2024-12-04 PROCEDURE — 99239 HOSP IP/OBS DSCHRG MGMT >30: CPT | Performed by: INTERNAL MEDICINE

## 2024-12-04 PROCEDURE — 94664 DEMO&/EVAL PT USE INHALER: CPT

## 2024-12-04 PROCEDURE — 94669 MECHANICAL CHEST WALL OSCILL: CPT

## 2024-12-04 PROCEDURE — 63710000001 PREDNISONE PER 1 MG: Performed by: FAMILY MEDICINE

## 2024-12-04 PROCEDURE — 80069 RENAL FUNCTION PANEL: CPT

## 2024-12-04 PROCEDURE — 99233 SBSQ HOSP IP/OBS HIGH 50: CPT | Performed by: INTERNAL MEDICINE

## 2024-12-04 RX ORDER — SODIUM CHLORIDE 0.9 % (FLUSH) 0.9 %
10 SYRINGE (ML) INJECTION AS NEEDED
Status: DISCONTINUED | OUTPATIENT
Start: 2024-12-04 | End: 2024-12-12 | Stop reason: HOSPADM

## 2024-12-04 RX ORDER — GUAIFENESIN/DEXTROMETHORPHAN 100-10MG/5
10 SYRUP ORAL EVERY 6 HOURS PRN
Status: CANCELLED | OUTPATIENT
Start: 2024-12-04

## 2024-12-04 RX ORDER — ASPIRIN 81 MG/1
81 TABLET ORAL DAILY
Status: CANCELLED | OUTPATIENT
Start: 2024-12-05

## 2024-12-04 RX ORDER — PREDNISONE 10 MG/1
10 TABLET ORAL
Status: COMPLETED | OUTPATIENT
Start: 2024-12-08 | End: 2024-12-10

## 2024-12-04 RX ORDER — FUROSEMIDE 40 MG/1
40 TABLET ORAL DAILY
Status: DISCONTINUED | OUTPATIENT
Start: 2024-12-05 | End: 2024-12-12 | Stop reason: HOSPADM

## 2024-12-04 RX ORDER — IPRATROPIUM BROMIDE AND ALBUTEROL SULFATE 2.5; .5 MG/3ML; MG/3ML
3 SOLUTION RESPIRATORY (INHALATION) EVERY 4 HOURS PRN
Status: CANCELLED | OUTPATIENT
Start: 2024-12-04

## 2024-12-04 RX ORDER — POLYETHYLENE GLYCOL 3350 17 G/17G
17 POWDER, FOR SOLUTION ORAL DAILY PRN
Status: CANCELLED | OUTPATIENT
Start: 2024-12-04

## 2024-12-04 RX ORDER — BISACODYL 5 MG/1
5 TABLET, DELAYED RELEASE ORAL DAILY PRN
Status: CANCELLED | OUTPATIENT
Start: 2024-12-04

## 2024-12-04 RX ORDER — IPRATROPIUM BROMIDE AND ALBUTEROL SULFATE 2.5; .5 MG/3ML; MG/3ML
3 SOLUTION RESPIRATORY (INHALATION) EVERY 4 HOURS PRN
Status: DISCONTINUED | OUTPATIENT
Start: 2024-12-04 | End: 2024-12-12 | Stop reason: HOSPADM

## 2024-12-04 RX ORDER — AMOXICILLIN 250 MG
2 CAPSULE ORAL 2 TIMES DAILY PRN
Status: DISCONTINUED | OUTPATIENT
Start: 2024-12-04 | End: 2024-12-12 | Stop reason: HOSPADM

## 2024-12-04 RX ORDER — ENOXAPARIN SODIUM 100 MG/ML
40 INJECTION SUBCUTANEOUS DAILY
Status: DISCONTINUED | OUTPATIENT
Start: 2024-12-05 | End: 2024-12-12 | Stop reason: HOSPADM

## 2024-12-04 RX ORDER — LISINOPRIL 20 MG/1
20 TABLET ORAL DAILY
Status: DISCONTINUED | OUTPATIENT
Start: 2024-12-05 | End: 2024-12-12 | Stop reason: HOSPADM

## 2024-12-04 RX ORDER — IPRATROPIUM BROMIDE AND ALBUTEROL SULFATE 2.5; .5 MG/3ML; MG/3ML
3 SOLUTION RESPIRATORY (INHALATION) 3 TIMES DAILY
Status: DISCONTINUED | OUTPATIENT
Start: 2024-12-04 | End: 2024-12-12 | Stop reason: HOSPADM

## 2024-12-04 RX ORDER — ASPIRIN 81 MG/1
81 TABLET ORAL DAILY
Status: DISCONTINUED | OUTPATIENT
Start: 2024-12-05 | End: 2024-12-12 | Stop reason: HOSPADM

## 2024-12-04 RX ORDER — GUAIFENESIN 600 MG/1
600 TABLET, EXTENDED RELEASE ORAL EVERY 12 HOURS SCHEDULED
Status: DISCONTINUED | OUTPATIENT
Start: 2024-12-04 | End: 2024-12-12 | Stop reason: HOSPADM

## 2024-12-04 RX ORDER — BISACODYL 5 MG/1
5 TABLET, DELAYED RELEASE ORAL DAILY PRN
Status: DISCONTINUED | OUTPATIENT
Start: 2024-12-04 | End: 2024-12-12 | Stop reason: HOSPADM

## 2024-12-04 RX ORDER — CARVEDILOL 6.25 MG/1
6.25 TABLET ORAL 2 TIMES DAILY
Status: CANCELLED | OUTPATIENT
Start: 2024-12-04

## 2024-12-04 RX ORDER — SODIUM CHLORIDE 9 MG/ML
40 INJECTION, SOLUTION INTRAVENOUS AS NEEDED
Status: DISCONTINUED | OUTPATIENT
Start: 2024-12-04 | End: 2024-12-12 | Stop reason: HOSPADM

## 2024-12-04 RX ORDER — BUDESONIDE 0.5 MG/2ML
0.5 INHALANT ORAL
Status: CANCELLED | OUTPATIENT
Start: 2024-12-04

## 2024-12-04 RX ORDER — ONDANSETRON 2 MG/ML
4 INJECTION INTRAMUSCULAR; INTRAVENOUS EVERY 6 HOURS PRN
Status: DISCONTINUED | OUTPATIENT
Start: 2024-12-04 | End: 2024-12-12 | Stop reason: HOSPADM

## 2024-12-04 RX ORDER — ACETAMINOPHEN 325 MG/1
650 TABLET ORAL EVERY 6 HOURS PRN
Status: DISCONTINUED | OUTPATIENT
Start: 2024-12-04 | End: 2024-12-12 | Stop reason: HOSPADM

## 2024-12-04 RX ORDER — ARFORMOTEROL TARTRATE 15 UG/2ML
15 SOLUTION RESPIRATORY (INHALATION)
Status: DISCONTINUED | OUTPATIENT
Start: 2024-12-04 | End: 2024-12-12 | Stop reason: HOSPADM

## 2024-12-04 RX ORDER — SACCHAROMYCES BOULARDII 250 MG
250 CAPSULE ORAL 2 TIMES DAILY
Status: CANCELLED | OUTPATIENT
Start: 2024-12-04

## 2024-12-04 RX ORDER — NYSTATIN 100000 [USP'U]/G
POWDER TOPICAL EVERY 12 HOURS SCHEDULED
Status: CANCELLED | OUTPATIENT
Start: 2024-12-04

## 2024-12-04 RX ORDER — LISINOPRIL 20 MG/1
20 TABLET ORAL DAILY
Status: CANCELLED | OUTPATIENT
Start: 2024-12-05

## 2024-12-04 RX ORDER — AMOXICILLIN 250 MG
2 CAPSULE ORAL 2 TIMES DAILY PRN
Status: CANCELLED | OUTPATIENT
Start: 2024-12-04

## 2024-12-04 RX ORDER — SODIUM CHLORIDE 0.9 % (FLUSH) 0.9 %
10 SYRINGE (ML) INJECTION AS NEEDED
Status: CANCELLED | OUTPATIENT
Start: 2024-12-04

## 2024-12-04 RX ORDER — BUDESONIDE 0.5 MG/2ML
0.5 INHALANT ORAL
Status: DISCONTINUED | OUTPATIENT
Start: 2024-12-04 | End: 2024-12-12 | Stop reason: HOSPADM

## 2024-12-04 RX ORDER — FUROSEMIDE 40 MG/1
40 TABLET ORAL
Status: CANCELLED | OUTPATIENT
Start: 2024-12-04

## 2024-12-04 RX ORDER — PREDNISONE 10 MG/1
10 TABLET ORAL
Status: DISCONTINUED | OUTPATIENT
Start: 2024-12-08 | End: 2024-12-04 | Stop reason: HOSPADM

## 2024-12-04 RX ORDER — SODIUM CHLORIDE 9 MG/ML
40 INJECTION, SOLUTION INTRAVENOUS AS NEEDED
Status: CANCELLED | OUTPATIENT
Start: 2024-12-04

## 2024-12-04 RX ORDER — PREDNISONE 20 MG/1
20 TABLET ORAL
Status: DISCONTINUED | OUTPATIENT
Start: 2024-12-05 | End: 2024-12-04 | Stop reason: HOSPADM

## 2024-12-04 RX ORDER — GUAIFENESIN 600 MG/1
600 TABLET, EXTENDED RELEASE ORAL EVERY 12 HOURS SCHEDULED
Status: CANCELLED | OUTPATIENT
Start: 2024-12-04

## 2024-12-04 RX ORDER — BISACODYL 10 MG
10 SUPPOSITORY, RECTAL RECTAL DAILY PRN
Status: CANCELLED | OUTPATIENT
Start: 2024-12-04

## 2024-12-04 RX ORDER — PREDNISONE 20 MG/1
20 TABLET ORAL
Status: COMPLETED | OUTPATIENT
Start: 2024-12-05 | End: 2024-12-07

## 2024-12-04 RX ORDER — PREDNISONE 20 MG/1
20 TABLET ORAL
Status: CANCELLED | OUTPATIENT
Start: 2024-12-05 | End: 2024-12-08

## 2024-12-04 RX ORDER — PREDNISONE 10 MG/1
10 TABLET ORAL
Status: CANCELLED | OUTPATIENT
Start: 2024-12-08 | End: 2024-12-11

## 2024-12-04 RX ORDER — ARFORMOTEROL TARTRATE 15 UG/2ML
15 SOLUTION RESPIRATORY (INHALATION)
Status: CANCELLED | OUTPATIENT
Start: 2024-12-04

## 2024-12-04 RX ORDER — SODIUM CHLORIDE 0.9 % (FLUSH) 0.9 %
10 SYRINGE (ML) INJECTION EVERY 12 HOURS SCHEDULED
Status: DISCONTINUED | OUTPATIENT
Start: 2024-12-04 | End: 2024-12-12 | Stop reason: HOSPADM

## 2024-12-04 RX ORDER — ENOXAPARIN SODIUM 100 MG/ML
40 INJECTION SUBCUTANEOUS DAILY
Status: CANCELLED | OUTPATIENT
Start: 2024-12-05

## 2024-12-04 RX ORDER — NYSTATIN 100000 [USP'U]/G
POWDER TOPICAL EVERY 12 HOURS SCHEDULED
Status: DISCONTINUED | OUTPATIENT
Start: 2024-12-04 | End: 2024-12-12 | Stop reason: HOSPADM

## 2024-12-04 RX ORDER — ACETAMINOPHEN 325 MG/1
650 TABLET ORAL EVERY 6 HOURS PRN
Status: CANCELLED | OUTPATIENT
Start: 2024-12-04

## 2024-12-04 RX ORDER — CARVEDILOL 6.25 MG/1
6.25 TABLET ORAL 2 TIMES DAILY
Status: DISCONTINUED | OUTPATIENT
Start: 2024-12-04 | End: 2024-12-08

## 2024-12-04 RX ORDER — GUAIFENESIN/DEXTROMETHORPHAN 100-10MG/5
10 SYRUP ORAL EVERY 6 HOURS PRN
Status: DISCONTINUED | OUTPATIENT
Start: 2024-12-04 | End: 2024-12-12 | Stop reason: HOSPADM

## 2024-12-04 RX ORDER — POLYETHYLENE GLYCOL 3350 17 G/17G
17 POWDER, FOR SOLUTION ORAL DAILY PRN
Status: DISCONTINUED | OUTPATIENT
Start: 2024-12-04 | End: 2024-12-12 | Stop reason: HOSPADM

## 2024-12-04 RX ORDER — SODIUM CHLORIDE 0.9 % (FLUSH) 0.9 %
10 SYRINGE (ML) INJECTION EVERY 12 HOURS SCHEDULED
Status: CANCELLED | OUTPATIENT
Start: 2024-12-04

## 2024-12-04 RX ORDER — FUROSEMIDE 40 MG/1
40 TABLET ORAL
Status: DISCONTINUED | OUTPATIENT
Start: 2024-12-04 | End: 2024-12-04

## 2024-12-04 RX ORDER — ONDANSETRON 2 MG/ML
4 INJECTION INTRAMUSCULAR; INTRAVENOUS EVERY 6 HOURS PRN
Status: CANCELLED | OUTPATIENT
Start: 2024-12-04

## 2024-12-04 RX ORDER — SACCHAROMYCES BOULARDII 250 MG
250 CAPSULE ORAL 2 TIMES DAILY
Status: DISCONTINUED | OUTPATIENT
Start: 2024-12-04 | End: 2024-12-12 | Stop reason: HOSPADM

## 2024-12-04 RX ORDER — IPRATROPIUM BROMIDE AND ALBUTEROL SULFATE 2.5; .5 MG/3ML; MG/3ML
3 SOLUTION RESPIRATORY (INHALATION) 3 TIMES DAILY
Status: CANCELLED | OUTPATIENT
Start: 2024-12-04

## 2024-12-04 RX ORDER — BISACODYL 10 MG
10 SUPPOSITORY, RECTAL RECTAL DAILY PRN
Status: DISCONTINUED | OUTPATIENT
Start: 2024-12-04 | End: 2024-12-12 | Stop reason: HOSPADM

## 2024-12-04 RX ADMIN — Medication 250 MG: at 09:18

## 2024-12-04 RX ADMIN — FUROSEMIDE 40 MG: 40 TABLET ORAL at 09:18

## 2024-12-04 RX ADMIN — Medication 250 MG: at 21:29

## 2024-12-04 RX ADMIN — IPRATROPIUM BROMIDE AND ALBUTEROL SULFATE 3 ML: .5; 3 SOLUTION RESPIRATORY (INHALATION) at 06:33

## 2024-12-04 RX ADMIN — Medication 10 ML: at 09:20

## 2024-12-04 RX ADMIN — Medication 10 MG: at 21:29

## 2024-12-04 RX ADMIN — NYSTATIN: 100000 POWDER TOPICAL at 09:24

## 2024-12-04 RX ADMIN — TUBERCULIN PURIFIED PROTEIN DERIVATIVE 5 UNITS: 5 INJECTION, SOLUTION INTRADERMAL at 21:29

## 2024-12-04 RX ADMIN — IPRATROPIUM BROMIDE AND ALBUTEROL SULFATE 3 ML: .5; 3 SOLUTION RESPIRATORY (INHALATION) at 11:44

## 2024-12-04 RX ADMIN — ARFORMOTEROL TARTRATE 15 MCG: 15 SOLUTION RESPIRATORY (INHALATION) at 06:33

## 2024-12-04 RX ADMIN — CARVEDILOL 6.25 MG: 6.25 TABLET, FILM COATED ORAL at 09:18

## 2024-12-04 RX ADMIN — PREDNISONE 30 MG: 20 TABLET ORAL at 09:18

## 2024-12-04 RX ADMIN — ASPIRIN 81 MG: 81 TABLET, COATED ORAL at 09:18

## 2024-12-04 RX ADMIN — IPRATROPIUM BROMIDE AND ALBUTEROL SULFATE 3 ML: .5; 3 SOLUTION RESPIRATORY (INHALATION) at 18:54

## 2024-12-04 RX ADMIN — BUDESONIDE 0.5 MG: 0.5 INHALANT RESPIRATORY (INHALATION) at 06:32

## 2024-12-04 RX ADMIN — ARFORMOTEROL TARTRATE 15 MCG: 15 SOLUTION RESPIRATORY (INHALATION) at 18:55

## 2024-12-04 RX ADMIN — BUDESONIDE 0.5 MG: 0.5 INHALANT RESPIRATORY (INHALATION) at 18:55

## 2024-12-04 RX ADMIN — GUAIFENESIN 600 MG: 600 TABLET ORAL at 21:29

## 2024-12-04 RX ADMIN — ENOXAPARIN SODIUM 40 MG: 100 INJECTION SUBCUTANEOUS at 09:19

## 2024-12-04 RX ADMIN — NYSTATIN: 100000 POWDER TOPICAL at 21:38

## 2024-12-04 RX ADMIN — GUAIFENESIN 600 MG: 600 TABLET ORAL at 09:19

## 2024-12-04 NOTE — NURSING NOTE
Patient alert but disoriented to time. Patient remains on 2L NC. Patient transferred to SNF. IV removed. Daughter remains at bedside.

## 2024-12-04 NOTE — THERAPY TREATMENT NOTE
Acute Care - Physical Therapy Treatment Note   [place]     Patient Name: Cris Agudelo  : 1940  MRN: [id number]  Today's Date: 2024      Visit Dx:     ICD-10-CM ICD-9-CM   1. Multifocal pneumonia  J18.9 486   2. Difficulty walking  R26.2 719.7   3. Decreased activities of daily living (ADL)  Z78.9 V49.89     Patient Active Problem List   Diagnosis    Hyponatremia    Stress-induced cardiomyopathy    Hyperlipidemia    Primary hypertension    Herpes zoster    Shingles    Non-occlusive coronary artery disease    Pneumonia    Multifocal pneumonia    Acute on chronic respiratory failure with hypoxia    HCAP (healthcare-associated pneumonia)    Weakness     Past Medical History:   Diagnosis Date    COPD (chronic obstructive pulmonary disease)     History of non-ST elevation myocardial infarction (NSTEMI) 2023    Hyperlipidemia     Hypertension      Past Surgical History:   Procedure Laterality Date    BRONCHOSCOPY N/A 2024    Procedure: BRONCHOSCOPY with BAL, Brushings and washings;  Surgeon: Adan Brown MD;  Location: [facility] ENDOSCOPY;  Service: Pulmonary;  Laterality: N/A;  Bilateral PNA with mucus pluggins    CARDIAC CATHETERIZATION N/A 2023    Procedure: Left Heart Cath;  Surgeon: Mitch Correia MD;  Location: [facility] CATH INVASIVE LOCATION;  Service: Cardiology;  Laterality: N/A;    CARDIAC CATHETERIZATION N/A 2023    Procedure: Coronary angiography;  Surgeon: Mitch Correia MD;  Location: [facility] CATH INVASIVE LOCATION;  Service: Cardiology;  Laterality: N/A;     PT Assessment (Last 12 Hours)       PT Evaluation and Treatment       Row Name 24 4590          Physical Therapy Time and Intention    Subjective Information complains of;pain  -SM     Document Type therapy note (daily note)  -SM     Mode of Treatment individual therapy;physical therapy  -SM     Patient Effort good  -SM     Symptoms Noted During/After Treatment none  -SM       Row Name 24 0922          Pain Scale:  FACES Pre/Post-Treatment    Pain: FACES Scale, Pretreatment 2-->hurts little bit  -     Posttreatment Pain Rating 2-->hurts little bit  -[facility] Name 12/04/24 1539          Bed Mobility    Supine-Sit-Supine [place] (Bed Mobility) standby assist  -     Assistive Device (Bed Mobility) bed rails  -[facility] Name 12/04/24 1539          Sit-Stand Transfer    Sit-Stand [place] (Transfers) contact guard  -     Assistive Device (Sit-Stand Transfers) cane, straight  -[facility] Name 12/04/24 1539          Stand-Sit Transfer    Stand-Sit [place] (Transfers) contact guard  -     Assistive Device (Stand-Sit Transfers) cane, straight  -[facility] Name 12/04/24 1539          Gait/Stairs (Locomotion)    Gait/Stairs Locomotion gait/ambulation assistive device  -     [place] Level (Gait) contact guard  -     Assistive Device (Gait) cane, straight  -     Patient was able to Ambulate yes  -     Distance in Feet (Gait) 75  -SM     Pattern (Gait) 3-point  -     Deviations/Abnormal Patterns (Gait) gait speed decreased  -     Bilateral Gait Deviations forward flexed posture  -[facility] Name 12/04/24 1539          Safety Issues/Impairments Affecting Functional Mobility    Impairments Affecting Function (Mobility) balance;endurance/activity tolerance;cognition;strength  -[facility] Name 12/04/24 1539          Balance    Dynamic Standing Balance contact guard  -     Position/Device Used, Standing Balance walker, front-wheeled  -[facility] Name 12/04/24 1539          Positioning and Restraints    Pre-Treatment Position in bed  -     Post Treatment Position bed  -     In Bed supine;call light within reach;encouraged to call for assist;with family/caregiver  -[facility] Name 12/04/24 1539          Progress Summary (PT)    Progress Toward Functional Goals (PT) progress toward functional goals is good  -               User Key  (r) = Recorded By, (t) = Taken By, (c) = Cosigned  By      Initials Name Provider Type    Gay Tidwell PTA Physical Therapist Assistant                    Physical Therapy Education        No education to display                  PT Recommendation and Plan     Progress Summary (PT)  Progress Toward Functional Goals (PT): progress toward functional goals is good   Outcome Measures       Row Name 12/04/24 1543 12/02/24 1149          How much help from another person do you currently need...    Turning from your back to your side while in flat bed without using bedrails? 4  -SM 4  -SM     Moving from lying on back to sitting on the side of a flat bed without bedrails? 4  -SM 4  -SM     Moving to and from a bed to a chair (including a wheelchair)? 4  -SM 3  -SM     Standing up from a chair using your arms (e.g., wheelchair, bedside chair)? 4  -SM 3  -SM     Climbing 3-5 steps with a railing? 3  -SM 2  -SM     To walk in hospital room? 3  -SM 3  -SM     AM-PAC 6 Clicks Score (PT) 22  -SM 19  -SM               User Key  (r) = Recorded By, (t) = Taken By, (c) = Cosigned By      Initials Name Provider Type    Gay Tidwell PTA Physical Therapist Assistant                     Time Calculation:    PT Charges       Row Name 12/04/24 1539             Time Calculation    PT Received On 12/04/24  -SM         Timed Charges    80979 - Gait Training Minutes  20  -SM      54475 - PT Therapeutic Activity Minutes 5  -SM         Total Minutes    Timed Charges Total Minutes 25  -SM       Total Minutes 25  -SM                User Key  (r) = Recorded By, (t) = Taken By, (c) = Cosigned By      Initials Name Provider Type    Gay Tidwell PTA Physical Therapist Assistant                      PT G-Codes  Outcome Measure Options: AM-PAC 6 Clicks Daily Activity (OT), Optimal Instrument  AM-PAC 6 Clicks Score (PT): 22  AM-PAC 6 Clicks Score (OT): 17    Gay Meng PTA  12/4/2024     unknown

## 2024-12-04 NOTE — SIGNIFICANT NOTE
12/04/24 0828   OTHER   Discipline occupational therapist   Rehab Time/Intention   Session Not Performed   (anticipate discharge to rehab today)

## 2024-12-04 NOTE — PROGRESS NOTES
Pt vaccination history reviewed for eligibility of Prevnar 20 and pt does not meet  criteria for Prevnar 20.    Pt vaccination history reviewed for eligibility of Covid Vaccine and pt meets  criteria for Covid vaccine.     Order for Prevnar placed if applicable; RN to place order COVID vaccine per standing order if criteria met and patient consents.       Immunization History   Administered Date(s) Administered    Pneumococcal Conjugate 20-Valent (PCV20) 08/17/2023       Pneumococcal Vaccine Recommendations    https://www.cdc.gov/vaccines/vpd/pneumo/downloads/pneumo-vaccine-timing.pdf    Covid Vaccine Recommendations  Beginning September 30, 2024, individuals are considered up to date if they have received a single dose of the 2940-7304 updated COVID-19 vaccine.   Administer the dose >=8 weeks after the last dose of previous formulation.

## 2024-12-04 NOTE — PROGRESS NOTES
Pulmonary / Critical Care Progress Note      Patient Name: Cris Agudelo  : 1940  MRN: [id number]  Primary Care Physician:  Ramirez Maurice MD  Date of admission: 2024    Subjective   Subjective   Follow-up for concern for pneumonia, acute on chronic hypoxic respiratory failure    No acute events overnight    This morning,  Currently on 2 L nasal cannula  Resting in bed  Reports feeling better today  Denies any chest pain or chest tightness  Dyspnea improved  Remains weak and fatigued  No fever or chills    Objective   Objective     Vitals:   Temp:  [97.7 °F (36.5 °C)-98.1 °F (36.7 °C)] 97.7 °F (36.5 °C)  Heart Rate:  [65-97] 69  Resp:  [18] 18  BP: ()/(45-73) 125/55  Flow (L/min) (Oxygen Therapy):  [2] 2    Physical Exam   Vital Signs Reviewed   General: Chronically ill-appearing, elderly female, alert, NAD, sitting up in bed  Chest: Improved aeration, diminished with rhonchi bilaterally, no work of breathing noted on 2 L NC  CV: regular rate and rhythm regular  EXT:  no clubbing, no cyanosis, no edema  Neuro:  A&Ox3, moving all 4 extremities spontaneously  Skin: No rashes or lesions noted    Result Review    Result Review:  I have personally reviewed the results from the time of this admission to 2024 07:56 EST and agree with these findings:  [x]  Laboratory  [x]  Microbiology  [x]  Radiology  [x]  EKG/Telemetry   []  Cardiology/Vascular   []  Pathology  []  Old records  []  Other:  Most notable findings include:   proBNP   Alpha 1 antitrypsin 305  CRP 28.84      Lab 24  0502 24  0526 24  0420 24  0402 24  0516   WBC 12.23* 11.48* 10.51 10.12 14.72*   HEMOGLOBIN 11.9* 11.8* 11.9* 11.9* 12.0   HEMATOCRIT 37.4 38.9 38.2 38.3 38.8   PLATELETS 312 373 459* 448 528*   SODIUM 139 137 137 138 137   POTASSIUM 2.9* 3.1* 3.3* 3.6 3.6   CHLORIDE 95* 97* 97* 100 99   CO2 37.9* 32.5* 35.8* 31.0* 29.6*   BUN 17 17 18 19 24*   CREATININE 0.56* 0.55* 0.57 0.58 0.61    GLUCOSE 125* 147* 184* 207* 212*   CALCIUM 8.2* 7.8* 8.0* 8.1* 8.2*   PHOSPHORUS 2.5 2.0* 2.4* 1.8* 2.0*   TOTAL PROTEIN 5.7* 5.4* 5.6* 5.5* 5.8*   ALBUMIN 2.7* 2.5* 2.6* 2.3* 2.4*   -Recent chest CT on 11/16/24 on previous admission demonstrating extensive bronchocentric alveolar airspace disease throughout both lungs consistent with pneumonia.  Demonstrating extensive groundglass opacities that may represent bronchopneumonia or cryptogenic organizing pneumonia    Bronchoscopy cultures all negative      Assessment & Plan   Assessment / Plan     Active Hospital Problems:  Active Hospital Problems    Diagnosis     **Multifocal pneumonia     Acute on chronic respiratory failure with hypoxia     HCAP (healthcare-associated pneumonia)     Hyperlipidemia     Primary hypertension     Hyponatremia      Impression:  Acute on chronic hypoxic respiratory failure, 2 L at baseline  Concern for pneumonia, organism versus Cryptogenic organizing pneumonia  Concern for ILD  Acute on chronic COPD exacerbation, FEV1 44%  Issues with airway clearance/mucous plugging  Acute on chronic congestive diastolic heart failure, grade 1 DD  Tobacco abuse of cigarettes in remission hypokalemia  Hypomagnesemia        Plan:  -Continue to wean O2 to maintain SpO2 greater than 90%.  Baseline 2 L NC.  -Repeat CXR with improved aeration with persistent bilateral airspace opacities  -S/p bronchoscopy, pneumonia panel negative, cultures no growth to date, cytology pending.  Unfortunately transbronchial biopsy is not performed.  If patient does not improve, may require repeat bronchoscopy with transbronchial lung biopsies.  -Repeat noncontrast chest CT in 8 weeks as an outpatient  -Completed antibiotics.  Cultures negative  -Continue Brovana, Pulmicort, DuoNebs  -Continue bronchopulmonary hygiene.  Encourage I-S and flutter.  Chest vest available.  -Continue prednisone to 30 mg daily.  Decrease by 10 mg every 3 days until patient is off steroids as  outlined in orders  -Continue Lasix 40 mg p.o. twice daily  -Trend electrolytes and renal panel.  Replace potassium and magnesium  -Continue mucolytics  -Echo with normal EF of 56 to 60%, grade 1 diastolic dysfunction noted.  -Encourage mobilization.  Out of bed to chair.  -Follow-up with pulmonology in 1 to 2 weeks  -Would benefit from rehab.  Likely to SNF today    VTE Prophylaxis:  Pharmacologic VTE prophylaxis orders are present.    CODE STATUS:   Level Of Support Discussed With: Patient  Code Status (Patient has no pulse and is not breathing): CPR (Attempt to Resuscitate)  Medical Interventions (Patient has pulse or is breathing): Full Support    Labs, imaging, microbiology, notes and medications personally reviewed  Discussed with primary    I, Dr. Omari Grady, have spent more than 50% of the total time managing the patient in this encounter today.  This included personally reviewing all pertinent labs, imaging, microbiology and documentation. Also discussing the case with the patient and any available family, the admitting physician and any available ancillary staff.    Electronically signed by JES Hogue, 12/04/24, 7:56 AM EST.  Electronically signed by Omari Grady MD, 12/04/24, 12:35 PM EST.

## 2024-12-04 NOTE — DISCHARGE SUMMARY
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       Hospital Course     Hospital Course:  Cris Agudelo is a 84 y.o. female past medical history significant for hypertension, CAD, COPD, hyponatremia, dyslipidemia, GERD without esophagitis, hospitalized on 2024 with chief complaint of shortness of breath and cough, found to be hypoxemic, hypercapnic, found to have multifocal pneumonia with hypoxemia on imaging, with recent hospitalization for pneumonia discharged on Augmentin, concern for volume overload, discontinued IV fluids, pulmonary consulted for further evaluation, concern for underlying ILD, previous PFTs were indicative, bronchoscopy 2024. Breathing improved after bronchoscopy. Diuresed several liters. Volume status achieving euvolemia. Working towards rehab placement.  Seen and  examined 12/4/2024 clinically and hemodynamically stable patient will be discharged to skilled nursing facility at [facility] for ongoing therapy follow-up with pulmonary as directed        DISCHARGE Follow Up Recommendations for labs and diagnostics: As above      Day of Discharge     Vital Signs:  Temp:  [97.3 °F (36.3 °C)-97.7 °F (36.5 °C)] 97.7 °F (36.5 °C)  Heart Rate:  [65-97] 69  Resp:  [18] 18  BP: ()/(42-73) 95/59  Flow (L/min) (Oxygen Therapy):  [2] 2  Physical Exam:   Constitutional: Awake alert and oriented  Respiratory: Clear  Cardiovascular: RRR  GI: Abdomen soft nontender      Discharge Details        Discharge Medications        ASK your doctor about these medications        Instructions Start Date   arformoterol 15 MCG/2ML nebulizer solution  Commonly known as: BROVANA   15 mcg, Nebulization, 2 Times Daily - RT      aspirin 81 MG EC tablet   81 mg, Oral, Daily      atorvastatin 20 MG tablet  Commonly known as: LIPITOR   20 mg, Oral, Nightly      budesonide 0.5 MG/2ML nebulizer solution  Commonly known as: PULMICORT   0.5 mg, Nebulization, 2 Times Daily - RT      carvedilol 6.25 MG tablet  Commonly known as: COREG   6.25 mg, Oral, 2 Times Daily      Farxiga 10 MG tablet  Generic drug: dapagliflozin Propanediol   10 mg, Oral, Daily      furosemide 20 MG tablet  Commonly known as: LASIX   20 mg, Oral, Every Other Day      guaiFENesin 600 MG 12 hr tablet  Commonly known as: MUCINEX   600 mg, Oral, Every 12 Hours Scheduled      ipratropium-albuterol 0.5-2.5 mg/3 ml nebulizer  Commonly known as: DUO-NEB   3 mL, Nebulization, Every 6 Hours PRN      lisinopril 20 MG tablet  Commonly known as: PRINIVIL,ZESTRIL   20 mg, Oral, Daily      nitroglycerin 0.4 MG SL tablet  Commonly known as: NITROSTAT   Place 1 tablet under the tongue Every 5 (Five) Minutes As Needed for Chest Pain.               Allergies   Allergen Reactions    Aleve [Naproxen] Hives       Discharge Disposition:      Diet:  Hospital:  Diet Order    Procedures    Diet: Cardiac; Healthy Heart (2-3 Na+); Texture: Regular (IDDSI 7); Fluid Consistency: Thin (IDDSI 0)       Discharge Activity: As tolerated      CODE STATUS:  Code Status and Medical Interventions: CPR (Attempt to Resuscitate); Full Support   Ordered at: 11/25/24 1837     Level Of Support Discussed With:    Patient     Code Status (Patient has no pulse and is not breathing):    CPR (Attempt to Resuscitate)     Medical Interventions (Patient has pulse or is breathing):    Full Support         Future Appointments   Date Time Provider Department Center   12/11/2024 11:15 AM [facility] 1 [facility]   1/7/2025 11:15 AM Nazia Sam APRN [facility] PCC ETW [facility]   4/11/2025  1:30 PM Izabella Oro APRKETAN [facility] CD [place] [facility]           Pertinent  and/or Most Recent Results     PROCEDURES:   none    LAB RESULTS:      Lab 12/04/24  0758 12/03/24  0502 12/01/24  0526 11/30/24  0420 11/29/24  0402   WBC 12.16* 12.23* 11.48* 10.51 10.12   HEMOGLOBIN 13.0 11.9* 11.8* 11.9* 11.9*   HEMATOCRIT 43.1 37.4 38.9 38.2 38.3   PLATELETS 229 312 373 459* 448   NEUTROS ABS 8.22* 8.57* 8.42* 8.09* 7.57*   IMMATURE GRANS (ABS) 0.22* 0.28* 0.20* 0.16* 0.15*   LYMPHS ABS 2.55 2.34 2.02 1.69 1.77   MONOS ABS 0.78 0.71 0.52 0.50 0.58   EOS ABS 0.34 0.28 0.29 0.04 0.03   MCV 90.7 86.0 88.6 86.6 87.2         Lab 12/04/24  0925 12/04/24  0758 12/03/24  0502 12/01/24  0526 11/30/24  0420 11/29/24  0402 11/28/24  0516   SODIUM  --  132* 139 137 137 138 137   POTASSIUM 3.6 4.0 2.9* 3.1* 3.3* 3.6 3.6   CHLORIDE  --  97* 95* 97* 97* 100 99   CO2  --  27.5 37.9* 32.5* 35.8* 31.0* 29.6*   ANION GAP  --  7.5 6.1 7.5 4.2* 7.0 8.4   BUN  --  19 17 17 18 19 24*   CREATININE  --  0.66 0.56* 0.55* 0.57 0.58 0.61   EGFR  --  86.6 90.1 90.5 89.7 89.4 88.3   GLUCOSE  --  108* 125* 147* 184* 207* 212*   CALCIUM  --  8.3* 8.2* 7.8* 8.0* 8.1* 8.2*   MAGNESIUM  --   --  1.9 1.8 1.9 2.0 2.0   PHOSPHORUS  --  2.6 2.5 2.0* 2.4* 1.8* 2.0*         Lab  12/04/24  0758 12/03/24  0502 12/01/24  0526 11/30/24  0420 11/29/24  0402 11/28/24  0516   TOTAL PROTEIN  --  5.7* 5.4* 5.6* 5.5* 5.8*   ALBUMIN 2.4* 2.7* 2.5* 2.6* 2.3* 2.4*   ALT (SGPT)  --  24 19 19 19 17   AST (SGOT)  --  14 12 13 12 11   BILIRUBIN  --  0.5 0.4 0.4 0.3 0.3   INDIRECT BILIRUBIN  --  0.3 0.3 0.3 0.2 0.2   BILIRUBIN DIRECT  --  0.2 0.1 0.1 0.1 0.1   ALK PHOS  --  84 80 81 90 93         Lab 11/28/24  0516   PROBNP 527.6                 Brief Urine Lab Results       None          Microbiology Results (last 10 days)       Procedure Component Value - Date/Time    COVID PRE-OP / PRE-PROCEDURE SCREENING ORDER (NO ISOLATION) - Swab, Nasopharynx [[id number]]  (Normal) Collected: 12/03/24 1725    Lab Status: Final result Specimen: Swab from Nasopharynx Updated: 12/03/24 1827    Narrative:      The following orders were created for panel order COVID PRE-OP / PRE-PROCEDURE SCREENING ORDER (NO ISOLATION) - Swab, Nasopharynx.  Procedure                               Abnormality         Status                     ---------                               -----------         ------                     COVID-19,CEPHEID/ANDREINA,CO...[[id number]]  Normal              Final result                 Please view results for these tests on the individual orders.    COVID-19,CEPHEID/ANDREINA,COR/LUCIA/PAD/KALEB/LAG/[facility] IN-HOUSE,NP SWAB IN TRANSPORT MEDIA 1 HR TAT, RT-PCR - Swab, Nasopharynx [[id number]]  (Normal) Collected: 12/03/24 1725    Lab Status: Final result Specimen: Swab from Nasopharynx Updated: 12/03/24 1827     COVID19 Not Detected    Narrative:      Fact sheet for providers: https://www.fda.gov/media/089808/download     Fact sheet for patients: https://www.fda.gov/media/994251/download  Fact sheet for providers: https://www.fda.gov/media/474049/download     Fact sheet for patients: https://www.fda.gov/media/884622/download    Fungus Culture - Wash, Lung, Right Lower Lobe [[id number]]  (Abnormal) Collected: 11/29/24 1044     Lab Status: Preliminary result Specimen: Wash from Lung, Right Lower Lobe Updated: 12/04/24 1059     Fungus Culture Moderate growth (3+) Candida albicans    Virus Culture - Wash, Lung, Right Lower Lobe [[id number]] Collected: 11/29/24 1040    Lab Status: Preliminary result Specimen: Wash from Lung, Right Lower Lobe Updated: 12/02/24 1306     Viral Culture, General Comment     Comment: Preliminary Report:  No virus isolated at 24 hours. Next report to follow after 4 days.       Narrative:      Performed at:  [address]  [id number]  : Jonah Zhu MD, Phone:  [phone]    Respiratory Culture - Wash, Lung, Right Lower Lobe [[id number]] Collected: 11/29/24 1040    Lab Status: Final result Specimen: Wash from Lung, Right Lower Lobe Updated: 12/01/24 0818     Respiratory Culture Scant growth (1+) Normal respiratory colleen. No S. aureus or Pseudomonas aeruginosa detected. Final report.     Gram Stain Few (2+) WBCs seen      No organisms seen    Fungus Culture - Lavage, Lung, Right Lower Lobe [[id number]]  (Abnormal) Collected: 11/29/24 1037    Lab Status: Preliminary result Specimen: Lavage from Lung, Right Lower Lobe Updated: 12/04/24 1100     Fungus Culture Light growth (2+) Candida albicans    Virus Culture - Lavage, Lung, Right Lower Lobe [[id number]] Collected: 11/29/24 1037    Lab Status: Preliminary result Specimen: Lavage from Lung, Right Lower Lobe Updated: 12/02/24 1306     Viral Culture, General Comment     Comment: Preliminary Report:  No virus isolated at 24 hours. Next report to follow after 4 days.       Narrative:      Performed at:  [address]  [id number]  : Jonah Zhu MD, Phone:  [phone]    AFB Culture - Lavage, Lung, Right Lower Lobe [[id number]] Collected: 11/29/24 1037    Lab Status: Preliminary result Specimen: Lavage from Lung, Right Lower Lobe Updated: 12/04/24 1116     AFB Culture  No AFB isolated at less than 1 week     AFB Stain No acid fast bacilli seen on direct smear      No acid fast bacilli seen on concentrated smear    BAL Culture, Quantitative - Lavage, Lung, Right Lower Lobe [[id number]] Collected: 11/29/24 1037    Lab Status: Final result Specimen: Lavage from Lung, Right Lower Lobe Updated: 12/01/24 0819     BAL Culture No growth     Gram Stain Rare (1+) WBCs seen      No organisms seen    Pneumonia Panel - Lavage, Lung, Right Lower Lobe [[id number]]  (Normal) Collected: 11/29/24 1037    Lab Status: Final result Specimen: Lavage from Lung, Right Lower Lobe Updated: 11/29/24 1231     Escherichia coli PCR Not Detected     Acinetobacter calcoaceticus-baumannii complex PCR Not Detected     Enterobacter cloacae PCR Not Detected     Klebsiella oxytoca PCR Not Detected     Klebsiella pneumoniae group PCR Not Detected     Klebsiella aerogenes PCR Not Detected     Moraxella catarrhalis PCR Not Detected     Proteus species PCR Not Detected     Pseudomonas aeroginosa PCR Not Detected     Serratia marcescens PCR Not Detected     Staphylococcus aureus PCR Not Detected     Streptococcus pyogenes PCR Not Detected     Haemophilus influenzae PCR Not Detected     Streptococcus agalactiae PCR Not Detected     Streptococcus pneumoniae PCR Not Detected     Chlamydophila pneumoniae PCR Not Detected     Legionella pneumophilia PCR Not Detected     Mycoplasma pneumo by PCR Not Detected     ADENOVIRUS, PCR Not Detected     CTX-M Gene N/A     IMP Gene N/A     KPC Gene N/A     mecA/C and MREJ Gene N/A     NDM Gene N/A     OXA-48-like Gene N/A     VIM Gene N/A     Coronavirus Not Detected     Human Metapneumovirus Not Detected     Human Rhinovirus/Enterovirus Not Detected     Influenza A PCR Not Detected     Influenza B PCR Not Detected     RSV, PCR Not Detected     Parainfluenza virus PCR Not Detected    S. Pneumo Ag Urine or CSF - Urine, Urine, Clean Catch [[id number]]  (Normal) Collected: 11/29/24 0409    Lab  Status: Final result Specimen: Urine, Clean Catch Updated: 11/29/24 0640     Strep Pneumo Ag Negative    Legionella Antigen, Urine - Urine, Urine, Clean Catch [[id number]]  (Normal) Collected: 11/29/24 0409    Lab Status: Final result Specimen: Urine, Clean Catch Updated: 11/29/24 0640     LEGIONELLA ANTIGEN, URINE Negative    Respiratory Panel PCR w/COVID-19(SARS-CoV-2) YASMINE/MONICA/LUCIA/PAD/COR/[facility] In-House, NP Swab in UTM/VTM, 2 HR TAT - Swab, Nasopharynx [[id number]]  (Normal) Collected: 11/26/24 1405    Lab Status: Final result Specimen: Swab from Nasopharynx Updated: 11/26/24 1539     ADENOVIRUS, PCR Not Detected     Coronavirus 229E Not Detected     Coronavirus HKU1 Not Detected     Coronavirus NL63 Not Detected     Coronavirus OC43 Not Detected     COVID19 Not Detected     Human Metapneumovirus Not Detected     Human Rhinovirus/Enterovirus Not Detected     Influenza A PCR Not Detected     Influenza B PCR Not Detected     Parainfluenza Virus 1 Not Detected     Parainfluenza Virus 2 Not Detected     Parainfluenza Virus 3 Not Detected     Parainfluenza Virus 4 Not Detected     RSV, PCR Not Detected     Bordetella pertussis pcr Not Detected     Bordetella parapertussis PCR Not Detected     Chlamydophila pneumoniae PCR Not Detected     Mycoplasma pneumo by PCR Not Detected    Narrative:      In the setting of a positive respiratory panel with a viral infection PLUS a negative procalcitonin without other underlying concern for bacterial infection, consider observing off antibiotics or discontinuation of antibiotics and continue supportive care. If the respiratory panel is positive for atypical bacterial infection (Bordetella pertussis, Chlamydophila pneumoniae, or Mycoplasma pneumoniae), consider antibiotic de-escalation to target atypical bacterial infection.    COVID-19, FLU A/B, RSV PCR 1 HR TAT - Swab, Nasopharynx [[id number]]  (Normal) Collected: 11/25/24 2051    Lab Status: Final result Specimen: Swab from  Nasopharynx Updated: 11/25/24 2247     COVID19 Not Detected     Influenza A PCR Not Detected     Influenza B PCR Not Detected     RSV, PCR Not Detected    Narrative:      Fact sheet for providers: https://www.fda.gov/media/759836/download    Fact sheet for patients: https://www.fda.gov/media/727134/download    Test performed by PCR.    MRSA Screen, PCR (Inpatient) - Swab, Nares [[id number]]  (Normal) Collected: 11/25/24 2051    Lab Status: Final result Specimen: Swab from Nares Updated: 11/25/24 2321     MRSA PCR No MRSA Detected    Narrative:      The negative predictive value of this diagnostic test is high and should only be used to consider de-escalating anti-MRSA therapy. A positive result may indicate colonization with MRSA and must be correlated clinically.    Blood Culture - Blood, Arm, Left [[id number]]  (Normal) Collected: 11/25/24 1555    Lab Status: Final result Specimen: Blood from Arm, Left Updated: 11/30/24 1616     Blood Culture No growth at 5 days    Blood Culture - Blood, Arm, Left [[id number]]  (Normal) Collected: 11/25/24 1555    Lab Status: Final result Specimen: Blood from Arm, Left Updated: 11/30/24 1616     Blood Culture No growth at 5 days    Mycoplasma Pneumoniae Antibody, IgM - Blood, [[id number]]  (Normal) Collected: 11/25/24 1553    Lab Status: Final result Specimen: Blood Updated: 11/26/24 0756     Mycoplasma pneumo IgM Negative            XR Chest 1 View    Result Date: 11/30/2024  Impression: Impression: Improved aeration of the lung apices with persistent bilateral airspace opacities. Electronically Signed: Ralph Kenyon MD  11/30/2024 12:25 PM EST  Workstation ID: [id number]    XR Chest 1 View    Result Date: 11/25/2024  Impression: Impression: Similar-appearing extensive bilateral airspace opacities, suspicious for persistent multifocal infectious/inflammatory process. Electronically Signed: Jared Nelson  11/25/2024 4:29 PM EST  Workstation ID: [id number]    CT Chest Without Contrast  Diagnostic    Result Date: 11/16/2024  Impression: Impression: CT scan of the chest without contrast demonstrating extensive bronchocentric alveolar airspace disease consistent with pneumonia. Electronically Signed: Srikanth Feng MD  11/16/2024 9:45 AM EST  Workstation ID: [id number]    XR Chest 1 View    Result Date: 11/15/2024  Impression: Impression: Increased opacity in the right hilum could be seen with pneumonia or malignancy. Upper lobe predominant airspace opacities with bronchiectasis and septal thickening. Worse appearance the chest when compared to 1 day prior. Electronically Signed: Monika Garcia MD  11/15/2024 2:12 PM EST  Workstation ID: [id number]    XR Chest 2 View    Result Date: 11/14/2024  Impression: Impression: Diffuse interstitial and airspace opacities present throughout the lungs bilaterally, most pronounced within the upper lobes, likely related to a multifocal pneumonia. Follow-up to resolution recommended. Electronically Signed: Anusha Marie MD  11/14/2024 8:17 PM EST  Workstation ID: [id number]             Results for orders placed during the hospital encounter of 11/25/24    Adult Transthoracic Echo Complete W/ Cont if Necessary Per Protocol    Interpretation Summary    Left ventricular ejection fraction appears to be 56 - 60%.    Left ventricular diastolic function is consistent with (grade I) impaired relaxation and age.    Mild aortic insufficiency.      Labs Pending at Discharge:  Pending Labs       Order Current Status    Cytomegalovirus, PCR - Lavage, Lung, Right Lower Lobe In process    Cytomegalovirus, PCR - Wash, Lung, Right Lower Lobe In process    Non-gynecologic Cytology In process    AFB Culture - Lavage, Lung, Right Lower Lobe Preliminary result    Fungus Culture - Lavage, Lung, Right Lower Lobe Preliminary result    Fungus Culture - Wash, Lung, Right Lower Lobe Preliminary result    Virus Culture - Lavage, Lung, Right Lower Lobe Preliminary result    Virus Culture - Wash, Lung,  Right Lower Lobe Preliminary result              Time spent on Discharge including face to face service: 35 minutes    Electronically signed by ANABELA Mathias, 12/04/24, 12:48 PM EST.    Attending Documentation:  Patient independently seen and evaluated, above documentation reflects plan put forth during bedside rounds.  More than 51% of the time of this patient encounter was performed by me. I discussed the care plan with MELISSA Gibson PA-C, I agree with his findings and plan as documented, what I have added to the care plan and modified is as follows in my documentation and my medical decision making; 85 yo female with multiple medical problems here with multifocal pneumonia shortness of breath, doing well stable for discharge to skilled nursing facility.  Electronically signed by Lonnie Hernandez MD, 12/04/24, 7:36 PM EST.

## 2024-12-04 NOTE — PLAN OF CARE
Goal Outcome Evaluation:  Plan of Care Reviewed With: patient        Progress: no change  Outcome Evaluation: Pt presents with limitations that impede their ability to safely and independently transfer and ambulate. The skills of a therapist will be required to safely and effectively implement the following treatment plan to restore maximal level of function.    Anticipated Discharge Disposition (PT): home with home health, home with assist, home with supervision

## 2024-12-04 NOTE — PLAN OF CARE
Goal Outcome Evaluation:  Plan of Care Reviewed With: patient, family        Progress: improving

## 2024-12-04 NOTE — THERAPY EVALUATION
CDU RN aware pt is ready for transfer.   SNF - Physical Therapy Initial Evaluation   [place]     Patient Name: Cris Agudelo  : 1940  MRN: [id number]  Today's Date: 2024      Visit Dx:    ICD-10-CM ICD-9-CM   1. Difficulty walking  R26.2 719.7     Patient Active Problem List   Diagnosis    Hyponatremia    Stress-induced cardiomyopathy    Hyperlipidemia    Primary hypertension    Herpes zoster    Shingles    Non-occlusive coronary artery disease    Pneumonia    Multifocal pneumonia    Acute on chronic respiratory failure with hypoxia    HCAP (healthcare-associated pneumonia)    Weakness     Past Medical History:   Diagnosis Date    CHF (congestive heart failure)     COPD (chronic obstructive pulmonary disease)     History of non-ST elevation myocardial infarction (NSTEMI) 2023    Hyperlipidemia     Hypertension      Past Surgical History:   Procedure Laterality Date    BRONCHOSCOPY N/A 2024    Procedure: BRONCHOSCOPY with BAL, Brushings and washings;  Surgeon: Adan Brown MD;  Location: [facility] ENDOSCOPY;  Service: Pulmonary;  Laterality: N/A;  Bilateral PNA with mucus pluggins    CARDIAC CATHETERIZATION N/A 2023    Procedure: Left Heart Cath;  Surgeon: Mitch Correia MD;  Location: [facility] CATH INVASIVE LOCATION;  Service: Cardiology;  Laterality: N/A;    CARDIAC CATHETERIZATION N/A 2023    Procedure: Coronary angiography;  Surgeon: Mitch Correia MD;  Location: [facility] CATH INVASIVE LOCATION;  Service: Cardiology;  Laterality: N/A;    COLONOSCOPY      EYE SURGERY      HYSTERECTOMY      SKIN BIOPSY      VASCULAR SURGERY         PT Assessment (Last 12 Hours)       PT Evaluation and Treatment       Row Name 24 1800          Physical Therapy Time and Intention    Subjective Information no complaints  -CS     Document Type evaluation  -CS     Mode of Treatment individual therapy;physical therapy  -CS     Patient Effort adequate  -CS     Symptoms Noted During/After Treatment none  -CS       Row Name 24 1683           General Information    Patient Profile Reviewed yes  -CS     Patient Observations alert;cooperative;agree to therapy  -CS     Prior Level of Function independent:;all household mobility;gait;transfer;bed mobility;ADL's  Pt reports independence with all functional mobility and ADLs. Light home management and meal prep  -CS     Equipment Currently Used at Home cane, straight;shower chair;oxygen  Pt reports using STC for ambulation. She has a walk-in shower with seat available. recent use of 2L O2 at home.  -CS     Existing Precautions/Restrictions fall  -CS     Limitations/Impairments safety/cognitive  -CS     Barriers to Rehab none identified  -CS       Row Name 12/04/24 1800          Living Environment    Current Living Arrangements home  -CS     Home Accessibility stairs to enter home;stairs within home  -CS     People in Home alone  -CS     Primary Care Provided by self  -CS       Row Name 12/04/24 1800          Home Main Entrance    Number of Stairs, Main Entrance one  -CS     Stair Railings, Main Entrance railings safe and in good condition  -CS       Row Name 12/04/24 1800          Stairs Within Home, Primary    Number of Stairs, Within Home, Primary none  -CS       Row Name 12/04/24 1800          Pain    Pretreatment Pain Rating 0/10 - no pain  -CS     Posttreatment Pain Rating 0/10 - no pain  -CS       Row Name 12/04/24 1800          Cognition    Affect/Mental Status (Cognition) flat/blunted affect;confused  -CS     Behavioral Issues (Cognition) overwhelmed easily  -CS     Orientation Status (Cognition) oriented to;person;place  intermittent confusion  -CS       Row Name 12/04/24 1800          Range of Motion Comprehensive    General Range of Motion bilateral lower extremity ROM WFL  -CS       Row Name 12/04/24 1800          Strength Comprehensive (MMT)    General Manual Muscle Testing (MMT) Assessment lower extremity strength deficits identified  -CS     Comment, General Manual Muscle Testing (MMT)  Assessment BLEs assessed at 4-/5  -CS       Row Name 12/04/24 1800          Bed Mobility    Bed Mobility bed mobility (all) activities  -CS     All Activities, [place] (Bed Mobility) standby assist;contact guard;verbal cues  -     Assistive Device (Bed Mobility) bed rails  -       Row Name 12/04/24 1800          Transfers    Transfers sit-stand transfer  -       Row Name 12/04/24 1800          Sit-Stand Transfer    Sit-Stand [place] (Transfers) verbal cues;contact guard  -CS     Assistive Device (Sit-Stand Transfers) cane, straight  -       Row Name 12/04/24 1800          Gait/Stairs (Locomotion)    Gait/Stairs Locomotion gait/ambulation assistive device  -     [place] Level (Gait) verbal cues;contact guard  -CS     Assistive Device (Gait) cane, straight  -     Patient was able to Ambulate yes  -CS     Distance in Feet (Gait) 30  -CS     Deviations/Abnormal Patterns (Gait) gait speed decreased  -CS     Comment, (Gait/Stairs) Pt may benefit from height of cane being lowered or using rolling walker for increased steadiness and safety with ambulation and transitional movement. Pt not willing to try during evaluation  -       Row Name 12/04/24 1800          Safety Issues/Impairments Affecting Functional Mobility    Safety Issues Affecting Function (Mobility) awareness of need for assistance;insight into deficits/self-awareness;judgment;problem-solving;sequencing abilities  -     Impairments Affecting Function (Mobility) balance;endurance/activity tolerance;cognition;strength  -     Cognitive Impairments, Mobility Safety/Performance problem-solving/reasoning;judgment;awareness, need for assistance;insight into deficits/self-awareness;sequencing abilities  -       Row Name 12/04/24 1800          Balance    Balance Assessment standing dynamic balance  -     Dynamic Standing Balance verbal cues;contact guard  -     Position/Device Used, Standing Balance supported;walker, front-wheeled   -CS       Row Name             Wound 12/04/24 1832 Bilateral lateral groin    Wound - Properties Group Placement Date: 12/04/24  -FH Placement Time: 1832  -FH Side: Bilateral  -FH Orientation: lateral  -FH Location: groin  -FH Primary Wound Type: Other  -FH, rash/redness  Present on Original Admission: Y  -FH    Retired Wound - Properties Group Placement Date: 12/04/24  -FH Placement Time: 1832  -FH Present on Original Admission: Y  -FH Side: Bilateral  -FH Orientation: lateral  -FH Location: groin  -FH Primary Wound Type: Other  -FH, rash/redness     Retired Wound - Properties Group Placement Date: 12/04/24  -FH Placement Time: 1832  -FH Present on Original Admission: Y  -FH Side: Bilateral  -FH Orientation: lateral  -FH Location: groin  -FH Primary Wound Type: Other  -FH, rash/redness     Retired Wound - Properties Group Date first assessed: 12/04/24  -FH Time first assessed: 1832  -FH Present on Original Admission: Y  -FH Side: Bilateral  -FH Location: groin  -FH Primary Wound Type: Other  -FH, rash/redness       Row Name             Wound 12/04/24 1835 Left lateral breast    Wound - Properties Group Placement Date: 12/04/24  -FH Placement Time: 1835  -FH Side: Left  -FH Orientation: lateral  -FH Location: breast  -FH Primary Wound Type: Other  -FH, rash/redness  Present on Original Admission: Y  -FH    Retired Wound - Properties Group Placement Date: 12/04/24  -FH Placement Time: 1835  -FH Present on Original Admission: Y  -FH Side: Left  -FH Orientation: lateral  -FH Location: breast  -FH Primary Wound Type: Other  -FH, rash/redness     Retired Wound - Properties Group Placement Date: 12/04/24  -FH Placement Time: 1835  -FH Present on Original Admission: Y  -FH Side: Left  -FH Orientation: lateral  -FH Location: breast  -FH Primary Wound Type: Other  -FH, rash/redness     Retired Wound - Properties Group Date first assessed: 12/04/24  -FH Time first assessed: 1835  -FH Present on Original Admission: Y  -FH  Side: Left  -FH Location: breast  -FH Primary Wound Type: Other  -FH, rash/redness       Row Name 12/04/24 1800          Plan of Care Review    Plan of Care Reviewed With patient  -CS     Progress no change  -CS     Outcome Evaluation Pt presents with limitations that impede their ability to safely and independently transfer and ambulate. The skills of a therapist will be required to safely and effectively implement the following treatment plan to restore maximal level of function.  -CS       Row Name 12/04/24 1800          Positioning and Restraints    Pre-Treatment Position in bed  -CS     Post Treatment Position bed  -CS     In Bed fowlers;call light within reach;encouraged to call for assist;exit alarm on  -CS       Row Name 12/04/24 1800          Therapy Assessment/Plan (PT)    Rehab Potential (PT) fair  -CS     Criteria for Skilled Interventions Met (PT) yes;skilled treatment is necessary  -CS     Therapy Frequency (PT) 6 times/wk  -CS     Predicted Duration of Therapy Intervention (PT) 30 days  -CS     Problem List (PT) problems related to;balance;cognition;mobility;strength  -CS     Activity Limitations Related to Problem List (PT) unable to ambulate safely;unable to transfer safely  -CS       Row Name 12/04/24 1800          PT Evaluation Complexity    History, PT Evaluation Complexity 1-2 personal factors and/or comorbidities  -CS     Examination of Body Systems (PT Eval Complexity) total of 4 or more elements  -CS     Clinical Presentation (PT Evaluation Complexity) stable  -CS     Clinical Decision Making (PT Evaluation Complexity) low complexity  -CS     Overall Complexity (PT Evaluation Complexity) low complexity  -CS       Row Name 12/04/24 1800          Therapy Plan Review/Discharge Plan (PT)    Therapy Plan Review (PT) evaluation/treatment results reviewed;patient  -CS       Row Name 12/04/24 1800          Physical Therapy Goals    Bed Mobility Goal Selection (PT) bed mobility, PT goal 1  -CS      Transfer Goal Selection (PT) transfer, PT goal 1  -CS     Gait Training Goal Selection (PT) gait training, PT goal 1  -CS     Stairs Goal Selection (PT) stairs, PT goal 1  -CS       Row Name 12/04/24 1800          Bed Mobility Goal 1 (PT)    Activity/Assistive Device (Bed Mobility Goal 1, PT) bed mobility activities, all  -CS     [place] Level/Cues Needed (Bed Mobility Goal 1, PT) independent  -CS     Time Frame (Bed Mobility Goal 1, PT) long term goal (LTG);30 days  -CS       Row Name 12/04/24 1800          Transfer Goal 1 (PT)    Activity/Assistive Device (Transfer Goal 1, PT) sit-to-stand/stand-to-sit;bed-to-chair/chair-to-bed;cane, straight  progress to STC if using rolling walker  -CS     [place] Level/Cues Needed (Transfer Goal 1, PT) modified independence  -CS     Time Frame (Transfer Goal 1, PT) long term goal (LTG);30 days  -CS       Row Name 12/04/24 1800          Gait Training Goal 1 (PT)    Activity/Assistive Device (Gait Training Goal 1, PT) gait (walking locomotion);assistive device use;cane, straight  use AAD for safety  -CS     [place] Level (Gait Training Goal 1, PT) supervision required  -CS     Distance (Gait Training Goal 1, PT) 500  -CS     Time Frame (Gait Training Goal 1, PT) long term goal (LTG);30 days  -CS       Row Name 12/04/24 1800          Stairs Goal 1 (PT)    Activity/Assistive Device (Stairs Goal 1, PT) ascending stairs;descending stairs;using handrail, left;using handrail, right  -CS     [place] Level/Cues Needed (Stairs Goal 1, PT) supervision required  -CS     Number of Stairs (Stairs Goal 1, PT) 4  -CS     Time Frame (Stairs Goal 1, PT) long term goal (LTG);30 days  -CS               User Key  (r) = Recorded By, (t) = Taken By, (c) = Cosigned By      Initials Name Provider Type     Kayleen Scott, RN Registered Nurse    Lynette Powell, PT Physical Therapist                  Section G        Balance  Balance during transitions & walking: Not steady,  requires assist to steady  Moving from seated to standing position: Not steady, requires assist to steady  Walking: Not steady, requires assist to steady  Turning around while walking: Not steady, requires assist to steady  Moving on and off toilet: Not steady, requires assist to steady  Surface-to-surface transfer: Not steady, requires assist to steady  Mobility devices: Cane/crutch  Range of Motion  Lower Extremity: No impairment  Section GG  Functional Ability/Goals, Adm (Section GG)  Indoor Mobility - Ambulation, Prior Function (CZ3373I): 3. Independent  Stairs, Prior Function (BZ3162U): 2. Needed Some Help  Prior Device Use (LQ8583): none of the above (Z)  Lower Extremity Range of Motion (YA8176K): No impairment     Mobility, Admission Performance (HZ8532)  Roll Left & Right: Mobility Admission Performance (RD9251X2): independent (06)  Sit to Lying: Mobility Admission Performance (XH0418Y1): supervision or touching assistance (04)  Lying to Sitting, Side of Bed: Mobility Admission Performance (GQ2594L5): supervision or touching assistance (04)  Sit to Stand: Mobility Admission Performance (CI3165M6): supervision or touching assistance (04)  Chair/Bed-Chair Transfer: Mobility Admission Performance (KJ5158E6): supervision or touching assistance (04)  Car Transfer: Mobility Admission Performance (ZJ3227U5): not attempted due to environmental limitations (10)  Walk 10 Feet: Mobility Admission Performance (GP9245V1): supervision or touching assistance (04)  Walk 50 Feet With Two Turns: Mobility Admission Performance (WH0676K1): not attempted, medical condition/safety concern (88)  Walk 150 Feet: Mobility Admission Performance (JC3861Y9): not attempted, medical condition/safety concern (88)  Walk 10 Ft, Uneven Surfaces: Mobility Admission Performance (QA5704V0): not applicable (09)  1 Step/Curb: Mobility Admission Performance (WV4100A8): not attempted, medical condition/safety concern (88)  4 Steps: Mobility  Admission Performance (JQ2366U5): not attempted, medical condition/safety concern (88)  12 Steps: Mobility Admission Performance (ON1045P3): not applicable (09)  Picking up object: Mobility Admission Performance (NI1730D5): not attempted, medical condition/safety concern (88)  Use a Wheelchair and/or Scooter: Mobility (RQ0899W2): no (0)     RETIRED Mobility (GG), Discharge Goal (HG4169)  Use a Wheelchair and/or Scooter: Mobility (LI9017Q4): no (0)     Mobility, Discharge Performance (NW6204)  Use a Wheelchair and/or Scooter: Mobility (NN1101F4): no (0)  Physical Therapy Education        No education to display                  PT Recommendation and Plan  Anticipated Discharge Disposition (PT): home with home health, home with assist, home with supervision  Planned Therapy Interventions (PT): balance training, bed mobility training, gait training, strengthening, transfer training, stair training  Therapy Frequency (PT): 6 times/wk  Plan of Care Reviewed With: patient  Progress: no change  Outcome Evaluation: Pt presents with limitations that impede their ability to safely and independently transfer and ambulate. The skills of a therapist will be required to safely and effectively implement the following treatment plan to restore maximal level of function.   Outcome Measures       Row Name 24 1800             How much help from another person do you currently need...    Turning from your back to your side while in flat bed without using bedrails? 4  -CS      Moving from lying on back to sitting on the side of a flat bed without bedrails? 3  -CS      Moving to and from a bed to a chair (including a wheelchair)? 3  -CS      Standing up from a chair using your arms (e.g., wheelchair, bedside chair)? 3  -CS      Climbing 3-5 steps with a railing? 3  -CS      To walk in hospital room? 3  -CS      AM-PAC 6 Clicks Score (PT) 19  -CS         Functional Assessment    Outcome Measure Options AM-PAC 6 Clicks Basic Mobility  (PT)  -CS                User Key  (r) = Recorded By, (t) = Taken By, (c) = Cosigned By      Initials Name Provider Type    Lynette Powell PT Physical Therapist                     Time Calculation:    PT Charges       Row Name 12/04/24 1827             Time Calculation    PT Received On 12/04/24  -CS      PT Goal Re-Cert Due Date 01/02/25  -CS         Untimed Charges    PT Eval/Re-eval Minutes 32  -CS         Total Minutes    Untimed Charges Total Minutes 32  -CS       Total Minutes 32  -CS                User Key  (r) = Recorded By, (t) = Taken By, (c) = Cosigned By      Initials Name Provider Type    Lynette Powell, PT Physical Therapist                  Therapy Charges for Today       Code Description Service Date Service Provider Modifiers Qty    [id number] HC PT EVAL LOW COMPLEXITY 3 12/4/2024 Lynette Woodruff PT GP 1            PT G-Codes  Outcome Measure Options: AM-PAC 6 Clicks Basic Mobility (PT)  AM-PAC 6 Clicks Score (PT): 19    Lynette Woodruff PT  12/4/2024

## 2024-12-05 LAB
ALBUMIN SERPL-MCNC: 2.6 G/DL (ref 3.5–5.2)
ANION GAP SERPL CALCULATED.3IONS-SCNC: 10.1 MMOL/L (ref 5–15)
BUN SERPL-MCNC: 16 MG/DL (ref 8–23)
BUN/CREAT SERPL: 26.2 (ref 7–25)
CALCIUM SPEC-SCNC: 8.3 MG/DL (ref 8.6–10.5)
CHLORIDE SERPL-SCNC: 92 MMOL/L (ref 98–107)
CMV DNA SPEC QL NAA+PROBE: NEGATIVE
CO2 SERPL-SCNC: 32.9 MMOL/L (ref 22–29)
CREAT SERPL-MCNC: 0.61 MG/DL (ref 0.57–1)
DEPRECATED RDW RBC AUTO: 42.7 FL (ref 37–54)
EGFRCR SERPLBLD CKD-EPI 2021: 88.3 ML/MIN/1.73
ERYTHROCYTE [DISTWIDTH] IN BLOOD BY AUTOMATED COUNT: 13.6 % (ref 12.3–15.4)
GLUCOSE SERPL-MCNC: 115 MG/DL (ref 65–99)
HCT VFR BLD AUTO: 37.2 % (ref 34–46.6)
HGB BLD-MCNC: 11.9 G/DL (ref 12–15.9)
MAGNESIUM SERPL-MCNC: 1.8 MG/DL (ref 1.6–2.4)
MCH RBC QN AUTO: 27.8 PG (ref 26.6–33)
MCHC RBC AUTO-ENTMCNC: 32 G/DL (ref 31.5–35.7)
MCV RBC AUTO: 86.9 FL (ref 79–97)
NT-PROBNP SERPL-MCNC: 741.1 PG/ML (ref 0–1800)
PHOSPHATE SERPL-MCNC: 2.7 MG/DL (ref 2.5–4.5)
PLATELET # BLD AUTO: 262 10*3/MM3 (ref 140–450)
PMV BLD AUTO: 11.2 FL (ref 6–12)
POTASSIUM SERPL-SCNC: 3.4 MMOL/L (ref 3.5–5.2)
RBC # BLD AUTO: 4.28 10*6/MM3 (ref 3.77–5.28)
SARS-COV-2 RNA RESP QL NAA+PROBE: NOT DETECTED
SODIUM SERPL-SCNC: 135 MMOL/L (ref 136–145)
SPECIMEN SOURCE: NORMAL
WBC NRBC COR # BLD AUTO: 10.89 10*3/MM3 (ref 3.4–10.8)

## 2024-12-05 PROCEDURE — 97530 THERAPEUTIC ACTIVITIES: CPT

## 2024-12-05 PROCEDURE — 94799 UNLISTED PULMONARY SVC/PX: CPT

## 2024-12-05 PROCEDURE — 97110 THERAPEUTIC EXERCISES: CPT

## 2024-12-05 PROCEDURE — 83880 ASSAY OF NATRIURETIC PEPTIDE: CPT | Performed by: PHYSICIAN ASSISTANT

## 2024-12-05 PROCEDURE — 94668 MNPJ CHEST WALL SBSQ: CPT

## 2024-12-05 PROCEDURE — 99306 1ST NF CARE HIGH MDM 50: CPT | Performed by: INTERNAL MEDICINE

## 2024-12-05 PROCEDURE — 63710000001 PREDNISONE PER 1 MG: Performed by: PHYSICIAN ASSISTANT

## 2024-12-05 PROCEDURE — 97166 OT EVAL MOD COMPLEX 45 MIN: CPT

## 2024-12-05 PROCEDURE — 83735 ASSAY OF MAGNESIUM: CPT | Performed by: PHYSICIAN ASSISTANT

## 2024-12-05 PROCEDURE — 80069 RENAL FUNCTION PANEL: CPT | Performed by: PHYSICIAN ASSISTANT

## 2024-12-05 PROCEDURE — 87635 SARS-COV-2 COVID-19 AMP PRB: CPT | Performed by: INTERNAL MEDICINE

## 2024-12-05 PROCEDURE — 85027 COMPLETE CBC AUTOMATED: CPT | Performed by: PHYSICIAN ASSISTANT

## 2024-12-05 PROCEDURE — 94669 MECHANICAL CHEST WALL OSCILL: CPT

## 2024-12-05 PROCEDURE — 97116 GAIT TRAINING THERAPY: CPT

## 2024-12-05 PROCEDURE — 25010000002 ENOXAPARIN PER 10 MG: Performed by: PHYSICIAN ASSISTANT

## 2024-12-05 PROCEDURE — 97535 SELF CARE MNGMENT TRAINING: CPT

## 2024-12-05 PROCEDURE — 97112 NEUROMUSCULAR REEDUCATION: CPT

## 2024-12-05 PROCEDURE — 94664 DEMO&/EVAL PT USE INHALER: CPT

## 2024-12-05 RX ORDER — POTASSIUM CHLORIDE 750 MG/1
40 CAPSULE, EXTENDED RELEASE ORAL ONCE
Status: COMPLETED | OUTPATIENT
Start: 2024-12-05 | End: 2024-12-05

## 2024-12-05 RX ADMIN — BUDESONIDE 0.5 MG: 0.5 INHALANT RESPIRATORY (INHALATION) at 06:55

## 2024-12-05 RX ADMIN — POTASSIUM CHLORIDE 40 MEQ: 750 CAPSULE, EXTENDED RELEASE ORAL at 07:55

## 2024-12-05 RX ADMIN — ARFORMOTEROL TARTRATE 15 MCG: 15 SOLUTION RESPIRATORY (INHALATION) at 18:37

## 2024-12-05 RX ADMIN — NYSTATIN: 100000 POWDER TOPICAL at 20:55

## 2024-12-05 RX ADMIN — IPRATROPIUM BROMIDE AND ALBUTEROL SULFATE 3 ML: .5; 3 SOLUTION RESPIRATORY (INHALATION) at 18:37

## 2024-12-05 RX ADMIN — PREDNISONE 20 MG: 20 TABLET ORAL at 08:39

## 2024-12-05 RX ADMIN — BUDESONIDE 0.5 MG: 0.5 INHALANT RESPIRATORY (INHALATION) at 18:37

## 2024-12-05 RX ADMIN — GUAIFENESIN 600 MG: 600 TABLET ORAL at 08:39

## 2024-12-05 RX ADMIN — Medication 250 MG: at 08:39

## 2024-12-05 RX ADMIN — Medication 250 MG: at 20:52

## 2024-12-05 RX ADMIN — GUAIFENESIN 600 MG: 600 TABLET ORAL at 20:52

## 2024-12-05 RX ADMIN — NYSTATIN: 100000 POWDER TOPICAL at 10:17

## 2024-12-05 RX ADMIN — IPRATROPIUM BROMIDE AND ALBUTEROL SULFATE 3 ML: .5; 3 SOLUTION RESPIRATORY (INHALATION) at 12:31

## 2024-12-05 RX ADMIN — Medication 10 MG: at 20:52

## 2024-12-05 RX ADMIN — CARVEDILOL 6.25 MG: 6.25 TABLET, FILM COATED ORAL at 20:52

## 2024-12-05 RX ADMIN — ARFORMOTEROL TARTRATE 15 MCG: 15 SOLUTION RESPIRATORY (INHALATION) at 06:55

## 2024-12-05 RX ADMIN — ASPIRIN 81 MG: 81 TABLET, COATED ORAL at 08:39

## 2024-12-05 RX ADMIN — IPRATROPIUM BROMIDE AND ALBUTEROL SULFATE 3 ML: .5; 3 SOLUTION RESPIRATORY (INHALATION) at 06:55

## 2024-12-05 RX ADMIN — ENOXAPARIN SODIUM 40 MG: 100 INJECTION SUBCUTANEOUS at 08:38

## 2024-12-05 NOTE — PLAN OF CARE
Goal Outcome Evaluation:  Plan of Care Reviewed With: patient        Progress: no change  Outcome Evaluation: Patient presents with limitations of decreased functional strength, balance, endurance with limited safety/insight into own deficits requiring need for skilled OT services to facilitate return to prior level of function with ADLs.    Anticipated Discharge Disposition (OT): home with home health

## 2024-12-05 NOTE — SIGNIFICANT NOTE
Wound Eval / Progress Noted     [place]     Patient Name: Cris Agudelo  : 1940  MRN: [id number]  Today's Date: 2024                 Admit Date: 2024    Visit Dx:    ICD-10-CM ICD-9-CM   1. Difficulty walking  R26.2 719.7   2. Decreased activities of daily living (ADL)  Z78.9 V49.89         Weakness        Past Medical History:   Diagnosis Date    CHF (congestive heart failure)     COPD (chronic obstructive pulmonary disease)     History of non-ST elevation myocardial infarction (NSTEMI) 2023    Hyperlipidemia     Hypertension         Past Surgical History:   Procedure Laterality Date    BRONCHOSCOPY N/A 2024    Procedure: BRONCHOSCOPY with BAL, Brushings and washings;  Surgeon: Adan Brown MD;  Location: [facility] ENDOSCOPY;  Service: Pulmonary;  Laterality: N/A;  Bilateral PNA with mucus pluggins    CARDIAC CATHETERIZATION N/A 2023    Procedure: Left Heart Cath;  Surgeon: Mitch Correia MD;  Location: [facility] CATH INVASIVE LOCATION;  Service: Cardiology;  Laterality: N/A;    CARDIAC CATHETERIZATION N/A 2023    Procedure: Coronary angiography;  Surgeon: Mitch Correia MD;  Location: [facility] CATH INVASIVE LOCATION;  Service: Cardiology;  Laterality: N/A;    COLONOSCOPY      EYE SURGERY      HYSTERECTOMY      SKIN BIOPSY      VASCULAR SURGERY           Physical Assessment:     24 0950   Skin   Skin WDL WDL;color;temperature;moisture;elasticity;integrity;all   Skin Color/Characteristics redness blanchable;other (see comments)  (bilateral heels. EUSEBIO, RN, WCC)   Skin Temperature warm   Skin Moisture dry   Skin Integrity intact   Wound 24 Bilateral lateral groin     Placement Date/Time: 24   Side: Bilateral  Orientation: lateral  Location: groin  Primary Wound Type: (c) Other (comment)  Present on Original Admission: Yes   Dressing Appearance open to air   Closure None   Base red;dry   Periwound intact;dry;redness;yeast   Periwound Temperature warm    Periwound Skin Turgor soft   Edges rolled/closed   Drainage Amount none   Care, Wound cleansed with;sterile normal saline   Dressing Care open to air   Wound 12/04/24 1835 Left lateral breast     Placement Date/Time: 12/04/24 1835   Side: Left  Orientation: lateral  Location: breast  Primary Wound Type: (c) Other (comment)  Present on Original Admission: Yes   Dressing Appearance open to air   Closure None   Base red;dry   Periwound intact;dry;pink;yeast   Periwound Temperature warm   Periwound Skin Turgor soft   Edges rolled/closed   Drainage Amount none   Care, Wound cleansed with;sterile normal saline   Dressing Care open to air     Wound Check / Follow-up:  Patient was seen today for a wound consult. Patient is awake, alert and oriented; she is agreeable to the visit at this time.     Patient with fungal presentation noted with satellite lesions under the left breast and going folds. Tissue is red and dry, no active drainage present. Recommending to provide quality skin care and hygiene, apply nystatin powder to the folds. After powder application apply dry fit sheets or pillow cases.    Boggy red intact heels. Recommending to provide quality skin care and hygiene, apply blue top moisture barrier BID and elevate at all times.    No other skin break down identified at this time.    Impression: fungal presentation to left breast / groin, boggy redness to heels     Short term goals:  regain skin integrity, skin protection, topical treatment, pressure reduction, moisture prevention, quality skin care and hygiene.    Essie Vanegas RN    12/5/2024    12:42 EST

## 2024-12-05 NOTE — H&P
[facility]  [facility]: [facility]24   Patient Name: Cris Agudelo  : 1940  MRN: [id number]  Primary Care Physician:  Ramirez Maurice MD  Date of admission: 2024    Subjective   Subjective     Chief Complaint: Weakness    HPI:    Cris Agudelo is a 84 y.o. female past medical history significant for hypertension, CAD, COPD, hypertension, hyperlipidemia, GERD, possible emerging dementia that initially presents to the emergency department with chief complaint of shortness of breath found to be hypoxemic hypercapnic found to have multifocal pneumonia hospitalized for pneumonia started on appropriate antibiotics also concern for volume overload started on diuretics concern for underlying ILD pulmonary consulted underwent bronchoscopy 2024 respiratory status improved clinical status stabilized seen by PT and OT deemed a good candidate for inpatient rehab is been transition skilled nursing facility at [facility] for ongoing therapy.      Personal History     Past Medical History:  Past Medical History:   Diagnosis Date    CHF (congestive heart failure)     COPD (chronic obstructive pulmonary disease)     History of non-ST elevation myocardial infarction (NSTEMI) 2023    Hyperlipidemia     Hypertension        Past Surgical History:  Past Surgical History:   Procedure Laterality Date    BRONCHOSCOPY N/A 2024    Procedure: BRONCHOSCOPY with BAL, Brushings and washings;  Surgeon: Adan Brown MD;  Location: [facility] ENDOSCOPY;  Service: Pulmonary;  Laterality: N/A;  Bilateral PNA with mucus pluggins    CARDIAC CATHETERIZATION N/A 2023    Procedure: Left Heart Cath;  Surgeon: Mitch Correia MD;  Location: [facility] CATH INVASIVE LOCATION;  Service: Cardiology;  Laterality: N/A;    CARDIAC CATHETERIZATION N/A 2023    Procedure: Coronary angiography;  Surgeon: Mitch Correia MD;  Location: [facility] CATH INVASIVE LOCATION;  Service: Cardiology;  Laterality: N/A;     COLONOSCOPY      EYE SURGERY      HYSTERECTOMY      SKIN BIOPSY      VASCULAR SURGERY          Family History:   Family History   Problem Relation Age of Onset    Hypertension Mother     Arthritis Mother     Heart disease Mother     Heart failure Mother         She had congestive heart failure        Social History:   Social History     Socioeconomic History    Marital status:    Tobacco Use    Smoking status: Former     Current packs/day: 0.00     Types: Cigarettes     Quit date: 2023     Years since quittin.6     Passive exposure: Past    Smokeless tobacco: Never    Tobacco comments:     Pt was a social smoker   Vaping Use    Vaping status: Never Used   Substance and Sexual Activity    Alcohol use: Yes     Alcohol/week: 2.0 standard drinks of alcohol     Types: 2 Glasses of wine per week    Drug use: Never    Sexual activity: Defer        Home Medications:  arformoterol, aspirin, atorvastatin, budesonide, carvedilol, dapagliflozin Propanediol, furosemide, guaiFENesin, ipratropium-albuterol, lisinopril, and nitroglycerin    Allergies:  Allergies   Allergen Reactions    Aleve [Naproxen] Hives       Review of Systems   All systems were reviewed and negative except for: Weakness fatigue    Objective   Objective     Vitals:   Temp:  [97.7 °F (36.5 °C)-98.7 °F (37.1 °C)] 97.7 °F (36.5 °C)  Heart Rate:  [74-93] 74  Resp:  [16-18] 18  BP: (116-147)/(34-52) 116/34  Flow (L/min) (Oxygen Therapy):  [2] 2    Physical Exam   Constitutional: Awake appears to be in no acute distress  Respiratory scattered wheezes  Cardiovascular RRR  GI: Abdomen soft nontender    Result Review    Result Review:  I have personally reviewed the results from the time of this admission to 2024 17:02 EST and agree with these findings:  []  Laboratory  []  Microbiology  []  Radiology  []  EKG/Telemetry   []  Cardiology/Vascular   []  Pathology  []  Old records  []  Other:      Assessment & Plan   Assessment / Plan      Assessment:    Hospital-acquired weakness  Multifocal pneumonia resolved  Acute on chronic hypoxemic respiratory failure  Acute decompensation of chronic diastolic congestive heart failure  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        CODE STATUS:    Level Of Support Discussed With: Patient  Code Status (Patient has no pulse and is not breathing): CPR (Attempt to Resuscitate)  Medical Interventions (Patient has pulse or is breathing): Full Support          Electronically signed by ANABELA Mathias, 12/05/24, 5:02 PM EST.

## 2024-12-05 NOTE — THERAPY EVALUATION
SNF - Occupational Therapy Initial Evaluation & Treatment Note   [place]    Patient Name: Cris Agudelo  : 1940    MRN: [id number]                              Today's Date: 2024       Admit Date: 2024    Visit Dx:     ICD-10-CM ICD-9-CM   1. Difficulty walking  R26.2 719.7   2. Decreased activities of daily living (ADL)  Z78.9 V49.89     Patient Active Problem List   Diagnosis    Hyponatremia    Stress-induced cardiomyopathy    Hyperlipidemia    Primary hypertension    Herpes zoster    Shingles    Non-occlusive coronary artery disease    Pneumonia    Multifocal pneumonia    Acute on chronic respiratory failure with hypoxia    HCAP (healthcare-associated pneumonia)    Weakness     Past Medical History:   Diagnosis Date    CHF (congestive heart failure)     COPD (chronic obstructive pulmonary disease)     History of non-ST elevation myocardial infarction (NSTEMI) 2023    Hyperlipidemia     Hypertension      Past Surgical History:   Procedure Laterality Date    BRONCHOSCOPY N/A 2024    Procedure: BRONCHOSCOPY with BAL, Brushings and washings;  Surgeon: Adan Brown MD;  Location: [facility] ENDOSCOPY;  Service: Pulmonary;  Laterality: N/A;  Bilateral PNA with mucus pluggins    CARDIAC CATHETERIZATION N/A 2023    Procedure: Left Heart Cath;  Surgeon: Mitch Correia MD;  Location: [facility] CATH INVASIVE LOCATION;  Service: Cardiology;  Laterality: N/A;    CARDIAC CATHETERIZATION N/A 2023    Procedure: Coronary angiography;  Surgeon: Mitch Correia MD;  Location: [facility] CATH INVASIVE LOCATION;  Service: Cardiology;  Laterality: N/A;    COLONOSCOPY      EYE SURGERY      HYSTERECTOMY      SKIN BIOPSY      VASCULAR SURGERY        General Information       Row Name 24 1145 24 1121       OT Time and Intention    Subjective Information -- no complaints  -EG    Document Type therapy note (daily note)  -EG evaluation  -EG    Mode of Treatment individual therapy;occupational  therapy  -EG individual therapy;occupational therapy  -EG    Patient Effort good  -EG good  -EG      Row Name 12/05/24 1145 12/05/24 1121       General Information    Patient Profile Reviewed -- yes  Lives alone; Ind. w/ADL's/IADL's; SPC  for mobility; stands to groom; Walk-in shower with seat; reports that she does not own any other equipment at home; reports recent O2 use at home  -EG    Prior Level of Function -- independent:;ADL's;all household mobility;community mobility;cooking  -EG    Existing Precautions/Restrictions fall;oxygen therapy device and L/min  -EG fall;oxygen therapy device and L/min  2L O2  -EG    Barriers to Rehab -- none identified  -EG      Row Name 12/05/24 1121          Occupational Profile    Reason for Services/Referral (Occupational Profile) Patient is a 84-year-old female admitted to [facility] on 12/4/2024.  OT was consulted due to shortness of air with cough.  OT to assess for new onset of deficits and limitations in ADL/transfer performance.  No previous OT services for current condition.  -EG       Row Name 12/05/24 1121          Living Environment    People in Home alone  -EG       Row Name 12/05/24 1121          Home Main Entrance    Number of Stairs, Main Entrance one  -EG     Stair Railings, Main Entrance railings safe and in good condition  -EG       Row Name 12/05/24 1121          Stairs Within Home, Primary    Number of Stairs, Within Home, Primary none  -EG       Row Name 12/05/24 1145 12/05/24 1121       Cognition    Orientation Status (Cognition) oriented to;person;place;other (see comments)  -EG oriented to;person;place;other (see comments)  intermitten confusion  -EG      Row Name 12/05/24 1145 12/05/24 1121       Safety Issues/Impairments Affecting Functional Mobility    Impairments Affecting Function (Mobility) balance;endurance/activity tolerance;cognition;strength;shortness of breath  -EG balance;endurance/activity tolerance;cognition;strength;shortness of  breath  -EG              User Key  (r) = Recorded By, (t) = Taken By, (c) = Cosigned By      Initials Name Provider Type    EG Janine Quiros OT Occupational Therapist                     Mobility/ADL's       Row Name 12/05/24 1145 12/05/24 1136       Bed Mobility    Bed Mobility bed mobility (all) activities  -EG bed mobility (all) activities  -EG    All Activities, [place] (Bed Mobility) standby assist;contact guard;verbal cues  -EG standby assist;contact guard;verbal cues  -EG    Assistive Device (Bed Mobility) -- bed rails  -EG      Row Name 12/05/24 1145 12/05/24 1136       Transfers    Transfers sit-stand transfer;bed-chair transfer;stand-sit transfer  -EG bed-chair transfer;sit-stand transfer;stand-sit transfer  -EG      Row Name 12/05/24 1145 12/05/24 1136       Bed-Chair Transfer    Bed-Chair [place] (Transfers) contact guard  -EG contact guard  -EG    Assistive Device (Bed-Chair Transfers) cane, straight  -EG cane, straight  -EG      Row Name 12/05/24 1145 12/05/24 1136       Sit-Stand Transfer    Sit-Stand [place] (Transfers) verbal cues;contact guard  -EG verbal cues;contact guard  -EG    Assistive Device (Sit-Stand Transfers) cane, straight  -EG cane, straight  -EG      Row Name 12/05/24 1145 12/05/24 1136       Stand-Sit Transfer    Stand-Sit [place] (Transfers) contact guard  -EG contact guard  -EG    Assistive Device (Stand-Sit Transfers) cane, straight  -EG cane, straight  -EG      Row Name 12/05/24 1145 12/05/24 1136       Functional Mobility    Functional Mobility- Ind. Level contact guard assist;verbal cues required  -EG contact guard assist;verbal cues required  -EG    Functional Mobility- Device cane, straight  -EG cane, straight  -EG    Functional Mobility- Comment Functional mobility performed in room during ADL's; To and from therapy gym all using SPC; requires rest breaks inbetween performance sats 89-91%  -EG fatigues easily with performance; some instability noted  using cane with lateral sway but no full LOB occuring  -EG      Row Name 12/05/24 1145 12/05/24 1136       Activities of Daily Living    BADL Assessment/Intervention bathing;upper body dressing;lower body dressing;grooming;toileting  -EG bathing;upper body dressing;lower body dressing;grooming;feeding;toileting  -EG      Row Name 12/05/24 1145 12/05/24 1136       Bathing Assessment/Intervention    [place] Level (Bathing) bathing skills;upper body;lower body;contact guard assist;verbal cues;nonverbal cues (demo/gesture)  -EG bathing skills;upper body;lower body;contact guard assist;verbal cues;nonverbal cues (demo/gesture)  -EG    Comment, (Bathing) sponge bath at EOB  -EG --      Row Name 12/05/24 1145 12/05/24 1136       Upper Body Dressing Assessment/Training    [place] Level (Upper Body Dressing) upper body dressing skills;set up  -EG upper body dressing skills;set up  -EG      Row Name 12/05/24 1145 12/05/24 1136       Lower Body Dressing Assessment/Training    [place] Level (Lower Body Dressing) lower body dressing skills;contact guard assist  -EG lower body dressing skills;contact guard assist  -EG      Row Name 12/05/24 1145 12/05/24 1136       Grooming Assessment/Training    [place] Level (Grooming) grooming skills;set up;hair care, combing/brushing;wash face, hands;oral care regimen  -EG grooming skills;set up  -EG      Row Name 12/05/24 1136          Self-Feeding Assessment/Training    [place] Level (Feeding) feeding skills;set up  -EG       Row Name 12/05/24 1145 12/05/24 1136       Toileting Assessment/Training    [place] Level (Toileting) toileting skills;contact guard assist;adjust/manage clothing;perform perineal hygiene  -EG toileting skills;contact guard assist;adjust/manage clothing;perform perineal hygiene  -EG              User Key  (r) = Recorded By, (t) = Taken By, (c) = Cosigned By      Initials Name Provider Type    EG Janine Quiros, OT Occupational Therapist                    Obj/Interventions       Row Name 12/05/24 1146 12/05/24 1138       Sensory Assessment (Somatosensory)    Sensory Assessment (Somatosensory) UE sensation intact  -EG UE sensation intact  -EG      Row Name 12/05/24 1146 12/05/24 1138       Vision Assessment/Intervention    Visual Impairment/Limitations WFL  -EG WFL  -EG      Row Name 12/05/24 1138          Range of Motion Comprehensive    General Range of Motion bilateral upper extremity ROM WNL  -EG       Row Name 12/05/24 1138          Strength Comprehensive (MMT)    Comment, General Manual Muscle Testing (MMT) Assessment BUE's grossly 4-/5  -EG       Row Name 12/05/24 1146 12/05/24 1138       Motor Skills    Motor Skills functional endurance;coordination  -EG coordination;functional endurance  -EG    Coordination WFL  -EG WFL  -EG    Functional Endurance fair- on 2L  -EG fair- on 2L O2  -EG      Row Name 12/05/24 1146 12/05/24 1138       Balance    Balance Assessment -- sit to stand dynamic balance;standing static balance  -EG    Sit to Stand Dynamic Balance -- contact guard  -EG    Static Standing Balance -- contact guard  -EG    Position/Device Used, Standing Balance -- cane, straight  -EG    Balance Interventions standing;sit to stand;supported;static;dynamic;weight shifting activity;occupation based/functional task;dynamic reaching;UE activity with balance activity  -EG --    Comment, Balance standing tasks without UE support CGA for 2-3 minute incriments 3x this date outside of ADL performance; weight shifting can cause a LOB but patient is able to right self at this time  -EG --              User Key  (r) = Recorded By, (t) = Taken By, (c) = Cosigned By      Initials Name Provider Type    EG Janine Quiros OT Occupational Therapist                   Goals/Plan       Row Name 12/05/24 1141          Bed Mobility Goal 1 (OT)    Activity/Assistive Device (Bed Mobility Goal 1, OT) bed mobility activities, all  -EG     [place] Level/Cues  Needed (Bed Mobility Goal 1, OT) modified independence  -EG     Time Frame (Bed Mobility Goal 1, OT) long term goal (LTG);30 days  -EG       Row Name 12/05/24 1141          Transfer Goal 1 (OT)    Activity/Assistive Device (Transfer Goal 1, OT) transfers, all;walker, rolling  -EG     [place] Level/Cues Needed (Transfer Goal 1, OT) modified independence  -EG     Time Frame (Transfer Goal 1, OT) long term goal (LTG);30 days  -EG       Row Name 12/05/24 1141          Bathing Goal 1 (OT)    Activity/Device (Bathing Goal 1, OT) bathing skills, all;shower chair  -EG     [place] Level/Cues Needed (Bathing Goal 1, OT) supervision required;set-up required;verbal cues required  -EG     Time Frame (Bathing Goal 1, OT) long term goal (LTG);30 days  -EG       Row Name 12/05/24 1141          Dressing Goal 1 (OT)    Activity/Device (Dressing Goal 1, OT) dressing skills, all  -EG     [place]/Cues Needed (Dressing Goal 1, OT) modified independence  -EG     Time Frame (Dressing Goal 1, OT) long term goal (LTG);30 days  -EG       Row Name 12/05/24 1141          Toileting Goal 1 (OT)    Activity/Device (Toileting Goal 1, OT) toileting skills, all;raised toilet seat  -EG     [place] Level/Cues Needed (Toileting Goal 1, OT) modified independence  -EG     Time Frame (Toileting Goal 1, OT) long term goal (LTG);30 days  -EG       Row Name 12/05/24 1141          Strength Goal 1 (OT)    Strength Goal 1 (OT) Patient will demonstrate 4/5 bilateral biceps, triceps and  to increase ADL and transfer independence.  -EG     Time Frame (Strength Goal 1, OT) long term goal (LTG);30 days  -EG       Row Name 12/05/24 1141          Problem Specific Goal 1 (OT)    Problem Specific Goal 1 (OT) Patient will demonstrate fair endurance/activity tolerance needed to support ADLs.  -EG     Time Frame (Problem Specific Goal 1, OT) long term goal (LTG);30 days  -EG       Row Name 12/05/24 1141          Therapy Assessment/Plan (OT)     Planned Therapy Interventions (OT) functional balance retraining;activity tolerance training;occupation/activity based interventions;adaptive equipment training;BADL retraining;neuromuscular control/coordination retraining;patient/caregiver education/training;transfer/mobility retraining;strengthening exercise  -EG               User Key  (r) = Recorded By, (t) = Taken By, (c) = Cosigned By      Initials Name Provider Type    EG Janine Quiros, OT Occupational Therapist                   Clinical Impression       Row Name 12/05/24 1147 12/05/24 1139       Pain Assessment    Pretreatment Pain Rating 0/10 - no pain  -EG 0/10 - no pain  -EG    Posttreatment Pain Rating 0/10 - no pain  -EG 0/10 - no pain  -EG      Row Name 12/05/24 1147 12/05/24 1139       Plan of Care Review    Plan of Care Reviewed With patient  -EG patient  -EG    Progress no change  -EG no change  -EG    Outcome Evaluation Pleasant and cooperative; flat affect and can require cues for sequencing/initiation/attention to task; Patient demonstrates deficits in balance, endurance and safety/insight skilled at this time during ADL performance; Fall risk is present with mobility using SPC; OT will continue to treat and follow POC to facilitate highest practical level of function. Ax1 w/RW.  -EG Patient presents with limitations of decreased functional strength, balance, endurance with limited safety/insight into own deficits requiring need for skilled OT services to facilitate return to prior level of function with ADLs.  -EG      Row Name 12/05/24 1147 12/05/24 1139       Therapy Assessment/Plan (OT)    Rehab Potential (OT) good  -EG good  -EG    Criteria for Skilled Therapeutic Interventions Met (OT) yes;meets criteria;skilled treatment is necessary  -EG yes;meets criteria;skilled treatment is necessary  -EG    Therapy Frequency (OT) 5 times/wk  -EG 5 times/wk  -EG      Row Name 12/05/24 1147 12/05/24 1139       Therapy Plan Review/Discharge Plan (OT)     Equipment Needs Upon Discharge (OT) -- walker, rolling  dependent up on progress  -EG    Anticipated Discharge Disposition (OT) home with home health  -EG home with home health  -EG      Row Name 12/05/24 1147 12/05/24 1139       Positioning and Restraints    Pre-Treatment Position -- in bed  -EG    Post Treatment Position chair  -EG chair  -EG    In Chair reclined;sitting;call light within reach;encouraged to call for assist;exit alarm on  -EG reclined;sitting;call light within reach;encouraged to call for assist;exit alarm on  -EG              User Key  (r) = Recorded By, (t) = Taken By, (c) = Cosigned By      Initials Name Provider Type    Janine Khanna, RAZ Occupational Therapist                   Outcome Measures       Row Name 12/05/24 1148 12/05/24 1142       How much help from another is currently needed...    Putting on and taking off regular lower body clothing? 3  -EG 3  -EG    Bathing (including washing, rinsing, and drying) 3  -EG 3  -EG    Toileting (which includes using toilet bed pan or urinal) 3  -EG 3  -EG    Putting on and taking off regular upper body clothing 3  -EG 3  -EG    Taking care of personal grooming (such as brushing teeth) 4  -EG 4  -EG    Eating meals 4  -EG 4  -EG    AM-PAC 6 Clicks Score (OT) 20  -EG 20  -EG      Row Name 12/05/24 1148 12/05/24 1142       Functional Assessment    Outcome Measure Options AM-PAC 6 Clicks Daily Activity (OT);Optimal Instrument  -EG AM-PAC 6 Clicks Daily Activity (OT);Optimal Instrument  -EG      Row Name 12/05/24 1148 12/05/24 1142       Optimal Instrument    Optimal Instrument Optimal - 3  -EG Optimal - 3  -EG    Bending/Stooping 2  -EG 2  -EG    Standing 2  -EG 2  -EG    Reaching 1  -EG 1  -EG              User Key  (r) = Recorded By, (t) = Taken By, (c) = Cosigned By      Initials Name Provider Type    Janine Khanna OT Occupational Therapist                  Section G  Mobility  Bed mobility - self performance: limited assistance  (staff provide guided maneuvering of limbs or other non-weight bearing assistance)  Bed mobility support/assistance: One person assist  Transfer - self performance: limited assistance (staff provide guided maneuvering of limbs or other non-weight bearing assistance)  Transfer support/assistance: One person assist  Walking in room - self performance: limited assistance (staff provide guided maneuvering of limbs or other non-weight bearing assistance)  Walking in room support/assistance: One person assist  Walking in corridors/hallway - self performance: limited assistance (staff provide guided maneuvering of limbs or other non-weight bearing assistance)  Walking in corridors/hallway support/assistance: One person assist  Locomotion on unit - self performance: limited assistance (staff provide guided maneuvering of limbs or other non-weight bearing assistance)  Locomotion on unit support/assistance: One person assist  Locomotion off unit - self performance: activity did not occur  Locomotion off unit support/assistance: Activity did not occur  Dressing - self performance: limited assistance (staff provide guided maneuvering of limbs or other non-weight bearing assistance)  Dressing support/assistance: One person assist  Eating - self performance: independent  Eating support/assistance: Setup help only  Toileting - self performance: limited assistance (staff provide guided maneuvering of limbs or other non-weight bearing assistance)  Toileting support/assistance: One person assist  Personal hygiene - self performance: limited assistance (staff provide guided maneuvering of limbs or other non-weight bearing assistance)  Personal hygiene support/assistance: One person assist  Bathing  Bathing - self performance: Physical help only to transfer to bathe  Bathing support/assistance: One person assist  Balance  Balance during transitions & walking: Not steady, requires assist to steady  Moving from seated to standing  position: Not steady, requires assist to steady  Walking: Not steady, requires assist to steady  Turning around while walking: Not steady, requires assist to steady  Moving on and off toilet: Not steady, requires assist to steady  Surface-to-surface transfer: Not steady, requires assist to steady  Mobility devices: Cane/crutch  Range of Motion  Upper Extremity: No impairment  Lower Extremity: No impairment  Section GG  Functional Ability/Goals, Adm (Section GG)  Self Care, Prior Functioning (RT6811V): 3. Independent  Functional Cognition, Prior Functioning (WO9204C): 3. Independent  Prior Device Use (XF4613): none of the above (Z)  Upper Extremity Range of Motion (BM9942N): No impairment  Lower Extremity Range of Motion (YY0157Y): No impairment  Self Care, Admission (Section GG)  Eating: Self-Care Admission Performance (PW0526R0): setup or clean-up assistance (05)  Oral Hygiene: Self-Care Admission Performance (JM4499F1): setup or clean-up assistance (05)  Toileting Hygiene: Self-Care Admission Performance (HW5883E6): supervision or touching assistance (04)  Shower/Bathe Self: Self-Care Admission Performance ([id number]): partial/moderate assistance (03)  Upper Body Dressing: Self-Care Admission Performance (EA5048B0): supervision or touching assistance (04)  Lower Body Dressing: Self-Care Admission Performance (TP7526A4): supervision or touching assistance (04)  Putting On/Taking Off Footwear: Self-Care Admission Performance (UL2655U0): supervision or touching assistance (04)  Personal Hygiene: Self-Care Admission Performance (AQ2717E8): supervision or touching assistance (04)  Mobility, Admission Performance (DE2544)  Toilet Transfer: Mobility Admission Performance ([id number]): supervision or touching assistance (04)  Tub/shower Transfer: Mobility Admission Performance ([id number]): supervision or touching assistance (04)                Occupational Therapy Education       Title: PT OT SLP Therapies (Done)        Topic: Occupational Therapy (Done)       Point: ADL training (Done)       Description:   Instruct learner(s) on proper safety adaptation and remediation techniques during self care or transfers.   Instruct in proper use of assistive devices.                  Learning Progress Summary            Patient AGNES Arias VU by EG at 12/5/2024 1142    Comment: Education on options for AD use during mobility  Eduation on OT services  Education on PLB and energy conservation techniques                      Point: Home exercise program (Done)       Description:   Instruct learner(s) on appropriate technique for monitoring, assisting and/or progressing therapeutic exercises/activities.                  Learning Progress Summary            Patient AGNES Arias VU by EG at 12/5/2024 1142    Comment: Education on options for AD use during mobility  Eduation on OT services  Education on PLB and energy conservation techniques                      Point: Precautions (Done)       Description:   Instruct learner(s) on prescribed precautions during self-care and functional transfers.                  Learning Progress Summary            Patient AGNES Arias VU by EG at 12/5/2024 1142    Comment: Education on options for AD use during mobility  Eduation on OT services  Education on PLB and energy conservation techniques                      Point: Body mechanics (Done)       Description:   Instruct learner(s) on proper positioning and spine alignment during self-care, functional mobility activities and/or exercises.                  Learning Progress Summary            Patient AGNES Arias VU by EG at 12/5/2024 1142    Comment: Education on options for AD use during mobility  Eduation on OT services  Education on PLB and energy conservation techniques                                      User Key       Initials Effective Dates Name Provider Type Discipline    EG 09/14/22 -  Janine Quiros OT Occupational Therapist OT                  OT  Recommendation and Plan  Planned Therapy Interventions (OT): functional balance retraining, activity tolerance training, occupation/activity based interventions, adaptive equipment training, BADL retraining, neuromuscular control/coordination retraining, patient/caregiver education/training, transfer/mobility retraining, strengthening exercise  Therapy Frequency (OT): 5 times/wk  Plan of Care Review  Plan of Care Reviewed With: patient  Progress: no change  Outcome Evaluation: Pleasant and cooperative; flat affect and can require cues for sequencing/initiation/attention to task; Patient demonstrates deficits in balance, endurance and safety/insight skilled at this time during ADL performance; Fall risk is present with mobility using SPC; OT will continue to treat and follow POC to facilitate highest practical level of function. Ax1 w/RW.     Time Calculation:   Evaluation Complexity (OT)  Review Occupational Profile/Medical/Therapy History Complexity: expanded/moderate complexity  Assessment, Occupational Performance/Identification of Deficit Complexity: 3-5 performance deficits  Clinical Decision Making Complexity (OT): detailed assessment/moderate complexity  Overall Complexity of Evaluation (OT): moderate complexity     Time Calculation- OT       Row Name 12/05/24 1149 12/05/24 1143          Time Calculation- OT    OT Received On 12/05/24  -EG 12/05/24  -EG     OT Goal Re-Cert Due Date 01/04/25  -EG 12/11/24  -EG        Timed Charges    65852 -  OT Neuromuscular Reeducation Minutes 10  -EG --     76942 - OT Therapeutic Activity Minutes 15  -EG --     33863 - OT Self Care/Mgmt Minutes 29  -EG --        Untimed Charges    OT Eval/Re-eval Minutes -- 34  -EG        SNF Occupational Therapy Minutes    Skilled Minutes- OT 54 min  -EG --        Total Minutes    Timed Charges Total Minutes 54  -EG --     Untimed Charges Total Minutes -- 34  -EG      Total Minutes 54  -EG 34  -EG               User Key  (r) = Recorded By,  (t) = Taken By, (c) = Cosigned By      Initials Name Provider Type    EG Janine Quiros, OT Occupational Therapist                  Therapy Charges for Today       Code Description Service Date Service Provider Modifiers Qty    53034857126 HC OT EVAL MOD COMPLEXITY 3 12/5/2024 Janine Quiros, OT GO 1    57251435672 HC OT NEUROMUSC RE EDUCATION EA 15 MIN 12/5/2024 Janine Quiros, OT GO 1    31732429800 HC OT THERAPEUTIC ACT EA 15 MIN 12/5/2024 Janine Quiros, OT GO 1    23382694466 HC OT SELF CARE/MGMT/TRAIN EA 15 MIN 12/5/2024 Janine Quiros OT GO 2                 Janine Quiros OT  12/5/2024

## 2024-12-05 NOTE — PLAN OF CARE
Goal Outcome Evaluation:  Plan of Care Reviewed With: patient        Progress: no change  Outcome Evaluation: Resident alert, oriented x2-3, not oriented to year/time. requires cueing for most tasks. Transfers with assist of one to bathroom. Had therapy evaluation today. still presents with noticable congested cough, able to bring up thick green secretions. RT here for respiratory treatments and percussion vest tx. Resident tolerated well. family at BS most of afternoon. Resident pleasant and cooperative.

## 2024-12-05 NOTE — THERAPY TREATMENT NOTE
SNF - Physical Therapy Treatment Note   [place]     Patient Name: Cris Agudelo  : 1940  MRN: [id number]  Today's Date: 2024      Visit Dx:    ICD-10-CM ICD-9-CM   1. Difficulty walking  R26.2 719.7   2. Decreased activities of daily living (ADL)  Z78.9 V49.89     Patient Active Problem List   Diagnosis    Hyponatremia    Stress-induced cardiomyopathy    Hyperlipidemia    Primary hypertension    Herpes zoster    Shingles    Non-occlusive coronary artery disease    Pneumonia    Multifocal pneumonia    Acute on chronic respiratory failure with hypoxia    HCAP (healthcare-associated pneumonia)    Weakness     Past Medical History:   Diagnosis Date    CHF (congestive heart failure)     COPD (chronic obstructive pulmonary disease)     History of non-ST elevation myocardial infarction (NSTEMI) 2023    Hyperlipidemia     Hypertension      Past Surgical History:   Procedure Laterality Date    BRONCHOSCOPY N/A 2024    Procedure: BRONCHOSCOPY with BAL, Brushings and washings;  Surgeon: Adan Brown MD;  Location: [facility] ENDOSCOPY;  Service: Pulmonary;  Laterality: N/A;  Bilateral PNA with mucus pluggins    CARDIAC CATHETERIZATION N/A 2023    Procedure: Left Heart Cath;  Surgeon: Mitch Correia MD;  Location: [facility] CATH INVASIVE LOCATION;  Service: Cardiology;  Laterality: N/A;    CARDIAC CATHETERIZATION N/A 2023    Procedure: Coronary angiography;  Surgeon: Mitch Correia MD;  Location: [facility] CATH INVASIVE LOCATION;  Service: Cardiology;  Laterality: N/A;    COLONOSCOPY      EYE SURGERY      HYSTERECTOMY      SKIN BIOPSY      VASCULAR SURGERY         PT Assessment (Last 12 Hours)       PT Evaluation and Treatment       Row Name 24 1153          Physical Therapy Time and Intention    Document Type therapy note (daily note)  -WM     Mode of Treatment individual therapy;physical therapy  -WM     Patient Effort good  -WM     Symptoms Noted During/After Treatment fatigue  -WM       Row  Name 12/05/24 1153          Sit-Stand Transfer    Sit-Stand [place] (Transfers) contact guard;verbal cues  -     Assistive Device (Sit-Stand Transfers) cane, straight  -       Row Name 12/05/24 1153          Stand-Sit Transfer    Stand-Sit [place] (Transfers) contact guard;verbal cues  -     Assistive Device (Stand-Sit Transfers) cane, straight  -       Row Name 12/05/24 1153          Gait/Stairs (Locomotion)    [place] Level (Gait) contact guard;verbal cues  -     Assistive Device (Gait) cane, straight  -     Distance in Feet (Gait) 55  + 100  -     Pattern (Gait) 3-point;step-through  -     Deviations/Abnormal Patterns (Gait) gait speed decreased;stride length decreased  -       Row Name 12/05/24 1153          Safety Issues/Impairments Affecting Functional Mobility    Impairments Affecting Function (Mobility) balance;coordination;endurance/activity tolerance;strength  -       Row Name 12/05/24 1153          Motor Skills    Therapeutic Exercise hip;knee;ankle;aerobic  -       Row Name 12/05/24 1153          Hip (Therapeutic Exercise)    Hip (Therapeutic Exercise) strengthening exercise  -     Hip Strengthening (Therapeutic Exercise) bilateral;aBduction;aDduction;marching while seated;sitting;2 lb free weight;resistance band;yellow;15 repititions;2 sets  -       Row Name 12/05/24 1153          Knee (Therapeutic Exercise)    Knee (Therapeutic Exercise) strengthening exercise  -     Knee Strengthening (Therapeutic Exercise) bilateral;LAQ (long arc quad);hamstring curls;sitting;2 lb free weight;resistance band;yellow;15 repititions;2 sets  -       Row Name 12/05/24 1153          Ankle (Therapeutic Exercise)    Ankle (Therapeutic Exercise) AROM (active range of motion)  -     Ankle AROM (Therapeutic Exercise) bilateral;dorsiflexion;plantarflexion;sitting;30 repititions  -       Row Name 12/05/24 1153          Aerobic Exercise    Time Performed (Aerobic Exercise) NuStep x  15 minutes load 3  -WM       Row Name             Wound 12/04/24 1832 Bilateral lateral groin    Wound - Properties Group Placement Date: 12/04/24  -FH Placement Time: 1832  -FH Side: Bilateral  -FH Orientation: lateral  -FH Location: groin  -FH Primary Wound Type: Other  -FH, rash/redness  Present on Original Admission: Y  -FH    Retired Wound - Properties Group Placement Date: 12/04/24  -FH Placement Time: 1832  -FH Present on Original Admission: Y  -FH Side: Bilateral  -FH Orientation: lateral  -FH Location: groin  -FH Primary Wound Type: Other  -FH, rash/redness     Retired Wound - Properties Group Placement Date: 12/04/24  -FH Placement Time: 1832  -FH Present on Original Admission: Y  -FH Side: Bilateral  -FH Orientation: lateral  -FH Location: groin  -FH Primary Wound Type: Other  -FH, rash/redness     Retired Wound - Properties Group Date first assessed: 12/04/24  -FH Time first assessed: 1832  -FH Present on Original Admission: Y  -FH Side: Bilateral  -FH Location: groin  -FH Primary Wound Type: Other  -FH, rash/redness       Row Name             Wound 12/04/24 1835 Left lateral breast    Wound - Properties Group Placement Date: 12/04/24  -FH Placement Time: 1835  -FH Side: Left  -FH Orientation: lateral  -FH Location: breast  -FH Primary Wound Type: Other  -FH, rash/redness  Present on Original Admission: Y  -FH    Retired Wound - Properties Group Placement Date: 12/04/24  -FH Placement Time: 1835  -FH Present on Original Admission: Y  -FH Side: Left  -FH Orientation: lateral  -FH Location: breast  -FH Primary Wound Type: Other  -FH, rash/redness     Retired Wound - Properties Group Placement Date: 12/04/24  -FH Placement Time: 1835  -FH Present on Original Admission: Y  -FH Side: Left  -FH Orientation: lateral  -FH Location: breast  -FH Primary Wound Type: Other  -FH, rash/redness     Retired Wound - Properties Group Date first assessed: 12/04/24  -FH Time first assessed: 1835  -FH Present on Original  Admission: Y  -FH Side: Left  -FH Location: breast  -FH Primary Wound Type: Other  -FH, rash/redness       Row Name             Wound 12/04/24 1842 Left posterior heel    Wound - Properties Group Placement Date: 12/04/24  - Placement Time: 1842 -FH Side: Left  -FH Orientation: posterior  -FH Location: heel  -FH Primary Wound Type: Other  -FH, blanchable redness     Retired Wound - Properties Group Placement Date: 12/04/24  - Placement Time: 1842 -FH Side: Left  -FH Orientation: posterior  -FH Location: heel  -FH Primary Wound Type: Other  -FH, blanchable redness     Retired Wound - Properties Group Placement Date: 12/04/24  - Placement Time: 1842 -FH Side: Left  -FH Orientation: posterior  -FH Location: heel  -FH Primary Wound Type: Other  -FH, blanchable redness     Retired Wound - Properties Group Date first assessed: 12/04/24  - Time first assessed: 1842 -FH Side: Left  -FH Location: heel  -FH Primary Wound Type: Other  -FH, blanchable redness       Row Name 12/05/24 1153          Positioning and Restraints    Pre-Treatment Position sitting in chair/recliner  -WM     Post Treatment Position chair  -WM     In Chair reclined;call light within reach;encouraged to call for assist;exit alarm on  -WM       Row Name 12/05/24 1153          Progress Summary (PT)    Progress Toward Functional Goals (PT) progress toward functional goals is good  -WM               User Key  (r) = Recorded By, (t) = Taken By, (c) = Cosigned By      Initials Name Provider Type     Kayleen Scott RN Registered Nurse    Harsh Smith PTA Physical Therapist Assistant                  Section G              Section GG                       Physical Therapy Education        No education to display                  PT Recommendation and Plan     Progress Summary (PT)  Progress Toward Functional Goals (PT): progress toward functional goals is good   Outcome Measures       Row Name 12/05/24 1158 12/04/24 1800          How  much help from another person do you currently need...    Turning from your back to your side while in flat bed without using bedrails? 4  -WM 4  -CS     Moving from lying on back to sitting on the side of a flat bed without bedrails? 3  -WM 3  -CS     Moving to and from a bed to a chair (including a wheelchair)? 3  -WM 3  -CS     Standing up from a chair using your arms (e.g., wheelchair, bedside chair)? 3  -WM 3  -CS     Climbing 3-5 steps with a railing? 3  -WM 3  -CS     To walk in hospital room? 3  -WM 3  -CS     AM-PAC 6 Clicks Score (PT) 19  -WM 19  -CS        Functional Assessment    Outcome Measure Options -- AM-PAC 6 Clicks Basic Mobility (PT)  -CS               User Key  (r) = Recorded By, (t) = Taken By, (c) = Cosigned By      Initials Name Provider Type    Harsh Smith PTA Physical Therapist Assistant    CS Lynette Woodruff, PT Physical Therapist                     Time Calculation:    PT Charges       Row Name 12/05/24 1152             Time Calculation    PT Received On 12/05/24  -WM         Timed Charges    03420 - PT Therapeutic Exercise Minutes 28  -WM      92517 - Gait Training Minutes  8  -WM      75688 - PT Therapeutic Activity Minutes 5  -WM         SNF Physical Therapy Minutes    Skilled Minutes- PT 41 min  -WM         Total Minutes    Timed Charges Total Minutes 41  -WM       Total Minutes 41  -WM                User Key  (r) = Recorded By, (t) = Taken By, (c) = Cosigned By      Initials Name Provider Type    Harsh Smith PTA Physical Therapist Assistant                      PT G-Codes  Outcome Measure Options: AM-PAC 6 Clicks Daily Activity (OT), Optimal Instrument  AM-PAC 6 Clicks Score (PT): 19  AM-PAC 6 Clicks Score (OT): 20    Harsh Viramontes PTA  12/5/2024

## 2024-12-05 NOTE — CONSULTS
"Nutrition Services    Patient Name: Cris Agudelo  YOB: 1940  MRN: [id number]  Admission date: 12/4/2024      CLINICAL NUTRITION ASSESSMENT      Reason for Assessment  Nursing Facility Admission     H&P:  Past Medical History:   Diagnosis Date    CHF (congestive heart failure)     COPD (chronic obstructive pulmonary disease)     History of non-ST elevation myocardial infarction (NSTEMI) 08/23/2023    Hyperlipidemia     Hypertension         Current Problems:   Active Hospital Problems    Diagnosis     **Weakness         Nutrition/Diet History         Narrative   83 yo female presented with Shortness of breath, cough, dx multifocal PNA.   Pt visited this AM, new SNF admission. Falls asleep during interview. Per flowsheets/menu selection, eating 25-50% of meals with daily ONS. Will modify, Boost VHC+Boost original- both daily, optimize kcal/protein. Yogurt added at breakfast, kcal/protein.   Wt loss noted, mixed etiology ex. Diuresis, poor intake. New malnutrition dx. Findings of muscle/fat loss during NFPE.  From previous RD assessment- \"No difficulty chewing/swallowing. Eats soft foods, she is able to choose items well tolerated; no consistency change needed\". No SLP eval noted; likely she is tolerating po intake. SNF staff will encouraged meals with supplement between meals.  Skin- LADY lateral groin, Lt lateral breast, Lt posterior heel- areas of concern.  H/o +chronic diarrhea, bowel meds at home for management.  RD to follow per protocol. Section K and MSA completed.     Anthropometrics        Current Height, Weight Height: 152.4 cm (60\")  Weight: 64.5 kg (142 lb 3.2 oz)   Current BMI Body mass index is 27.77 kg/m².   BMI Classification Overweight   % %, IBW 45.4 kg   Adjusted Body Weight (ABW)    Weight Hx  Wt Readings from Last 10 Encounters:   12/05/24 0448 64.5 kg (142 lb 3.2 oz)   12/04/24 1624 64.9 kg (143 lb 1.3 oz)   11/25/24 1556 67 kg (147 lb 11.3 oz)   11/20/24 0500 66 kg (145 lb 8.1 " "oz)   11/19/24 0520 70.6 kg (155 lb 10.3 oz)   11/18/24 0500 70 kg (154 lb 5.2 oz)   11/15/24 1340 72.6 kg (160 lb 0.9 oz)   11/14/24 1501 69.9 kg (154 lb)   08/06/24 2226 71 kg (156 lb 8.4 oz)   07/11/24 1334 68 kg (150 lb)   06/27/24 1406 68.5 kg (151 lb)   01/08/24 1409 55.8 kg (123 lb)   12/29/23 1117 57.7 kg (127 lb 3.2 oz)   09/29/23 1311 57.6 kg (127 lb)          Wt Change Observation Wt down 5# (4%) in 1 week; poor intake and on Lasix, chronic HF.      Estimated/Assessed Needs  Estimated Needs based on: Current Body Weight 64.5 kg       Energy Requirements 27-35 kcal/kg   EST Needs (kcal/day) 0389-0387 kcal       Protein Requirements 1.3-1.6 g/kg    EST Daily Needs (g/day)  g protein       Fluid Requirements 25-30 ml/kg    Estimated Needs (mL/day) 5913-4074 mL fluid     Labs/Medications         Pertinent Labs Reviewed.   Results from last 7 days   Lab Units 12/05/24  0446 12/04/24  0925 12/04/24  0758 12/03/24  0502 12/01/24  0526 11/30/24  0420   SODIUM mmol/L 135*  --  132* 139 137 137   POTASSIUM mmol/L 3.4* 3.6 4.0 2.9* 3.1* 3.3*   CHLORIDE mmol/L 92*  --  97* 95* 97* 97*   CO2 mmol/L 32.9*  --  27.5 37.9* 32.5* 35.8*   BUN mg/dL 16  --  19 17 17 18   CREATININE mg/dL 0.61  --  0.66 0.56* 0.55* 0.57   CALCIUM mg/dL 8.3*  --  8.3* 8.2* 7.8* 8.0*   BILIRUBIN mg/dL  --   --   --  0.5 0.4 0.4   ALK PHOS U/L  --   --   --  84 80 81   ALT (SGPT) U/L  --   --   --  24 19 19   AST (SGOT) U/L  --   --   --  14 12 13   GLUCOSE mg/dL 115*  --  108* 125* 147* 184*     Results from last 7 days   Lab Units 12/05/24  0446 12/04/24  0758 12/03/24  0502 12/01/24  0526   MAGNESIUM mg/dL 1.8  --  1.9 1.8   PHOSPHORUS mg/dL 2.7   < > 2.5 2.0*   HEMOGLOBIN g/dL 11.9*   < > 11.9* 11.8*   HEMATOCRIT % 37.2   < > 37.4 38.9    < > = values in this interval not displayed.     COVID19   Date Value Ref Range Status   12/03/2024 Not Detected Not Detected - Ref. Range Final     No results found for: \"HGBA1C\"      Pertinent " Medications Reviewed.     Malnutrition Severity Assessment      Patient meets criteria for : Severe Malnutrition  Malnutrition Type (Last 8 Hours)       Malnutrition Severity Assessment       Row Name 12/05/24 0718       Malnutrition Severity Assessment    Malnutrition Type Acute Disease or Injury - Related Malnutrition      Row Name 12/05/24 0718       Insufficient Energy Intake     Insufficient Energy Intake Findings Severe    Insufficient Energy Intake  < or equal to 50% of est. energy requirement for > or equal to 5d)      Row Name 12/05/24 0718       Unintentional Weight Loss     Unintentional Weight Loss Findings Severe    Unintentional Weight Loss  Weight loss greater than 2% in one week      Row Name 12/05/24 0718       Muscle Loss    Loss of Muscle Mass Findings Moderate    Minter Region Moderate - slight depression    Acromion Bone Region Moderate - acromion may slightly protrude    Posterior Calf Region Moderate - some roundness, slight firmness      Row Name 12/05/24 0718       Fat Loss    Subcutaneous Fat Loss Findings Moderate    Orbital Region  Moderate -  somewhat hollowness, slightly dark circles      Row Name 12/05/24 0718       Criteria Met (Must meet criteria for severity in at least 2 of these categories: M Wasting, Fat Loss, Fluid, Secondary Signs, Wt. Status, Intake)    Patient meets criteria for  Severe Malnutrition                     Nutrition Diagnosis         Nutrition Dx Problem 1 Severe malnutrition, protein/energy related to inadequate energy Intake as evidenced by wt loss, body composition changes, <50% estimated energy intake compared to energy requirements > days.     Nutrition Intervention           Current Nutrition Orders & Evaluation of Intake       Current PO Diet Diet: Cardiac; Healthy Heart (2-3 Na+); Texture: Regular (IDDSI 7); Fluid Consistency: Thin (IDDSI 0)   Supplement Orders Placed This Encounter      Dietary Nutrition Supplements Boost [facility]           Nutrition  Intervention/Prescription        Recommend liberalize diet order, if feasible (malnutrition, wt loss, poor intake).  Providing Boost VHC x 1 and Boost original x 1.        Medical Nutrition Therapy/Nutrition Education          Learner     Readiness N/A  N/A     Method     Response N/A  N/A     Monitor/Evaluation        Monitor Per protocol, PO intake, Supplement intake, Pertinent labs, Weight     Nutrition Discharge Plan         To be determined     Electronically signed by:  Nuvia Evans RD  12/05/24 07:33 EST

## 2024-12-05 NOTE — PLAN OF CARE
Goal Outcome Evaluation:  Plan of Care Reviewed With: patient        Progress: no change  Outcome Evaluation: Alert and oriented x2-3; able to voice needs to staff. O2 at 2L; no complaints of SOA. Coreg held at bedtime due to B/P 126/52. No complaints of pain. Refused to try to void when up for weight this morning. Rested well between care. Call light within reach; care plan ongoing.

## 2024-12-06 PROBLEM — E43 SEVERE PROTEIN-CALORIE MALNUTRITION: Status: ACTIVE | Noted: 2024-12-06

## 2024-12-06 LAB
ANION GAP SERPL CALCULATED.3IONS-SCNC: 7.6 MMOL/L (ref 5–15)
BUN SERPL-MCNC: 12 MG/DL (ref 8–23)
BUN/CREAT SERPL: 21.8 (ref 7–25)
CALCIUM SPEC-SCNC: 8.4 MG/DL (ref 8.6–10.5)
CHLORIDE SERPL-SCNC: 94 MMOL/L (ref 98–107)
CMV DNA SPEC QL NAA+PROBE: NORMAL
CO2 SERPL-SCNC: 33.4 MMOL/L (ref 22–29)
CREAT SERPL-MCNC: 0.55 MG/DL (ref 0.57–1)
EGFRCR SERPLBLD CKD-EPI 2021: 90.5 ML/MIN/1.73
GLUCOSE SERPL-MCNC: 93 MG/DL (ref 65–99)
INDURATION: 0 MM (ref 0–10)
Lab: NORMAL
Lab: NORMAL
MYCOBACTERIUM SPEC CULT: NORMAL
NIGHT BLUE STAIN TISS: NORMAL
NIGHT BLUE STAIN TISS: NORMAL
POTASSIUM SERPL-SCNC: 3.8 MMOL/L (ref 3.5–5.2)
SODIUM SERPL-SCNC: 135 MMOL/L (ref 136–145)
TB SKIN TEST: NEGATIVE
WHOLE BLOOD HOLD SPECIMEN: NORMAL

## 2024-12-06 PROCEDURE — 63710000001 PREDNISONE PER 1 MG: Performed by: PHYSICIAN ASSISTANT

## 2024-12-06 PROCEDURE — 94799 UNLISTED PULMONARY SVC/PX: CPT

## 2024-12-06 PROCEDURE — 97110 THERAPEUTIC EXERCISES: CPT

## 2024-12-06 PROCEDURE — 25010000002 ENOXAPARIN PER 10 MG: Performed by: PHYSICIAN ASSISTANT

## 2024-12-06 PROCEDURE — 94669 MECHANICAL CHEST WALL OSCILL: CPT

## 2024-12-06 PROCEDURE — 97116 GAIT TRAINING THERAPY: CPT

## 2024-12-06 PROCEDURE — 94664 DEMO&/EVAL PT USE INHALER: CPT

## 2024-12-06 PROCEDURE — 94668 MNPJ CHEST WALL SBSQ: CPT

## 2024-12-06 PROCEDURE — 80048 BASIC METABOLIC PNL TOTAL CA: CPT | Performed by: PHYSICIAN ASSISTANT

## 2024-12-06 PROCEDURE — 94760 N-INVAS EAR/PLS OXIMETRY 1: CPT

## 2024-12-06 PROCEDURE — 99309 SBSQ NF CARE MODERATE MDM 30: CPT | Performed by: INTERNAL MEDICINE

## 2024-12-06 PROCEDURE — 97535 SELF CARE MNGMENT TRAINING: CPT

## 2024-12-06 PROCEDURE — 97530 THERAPEUTIC ACTIVITIES: CPT

## 2024-12-06 PROCEDURE — 97112 NEUROMUSCULAR REEDUCATION: CPT

## 2024-12-06 RX ORDER — POTASSIUM CHLORIDE 750 MG/1
40 CAPSULE, EXTENDED RELEASE ORAL ONCE
Status: COMPLETED | OUTPATIENT
Start: 2024-12-06 | End: 2024-12-06

## 2024-12-06 RX ORDER — ATORVASTATIN CALCIUM 40 MG/1
40 TABLET, FILM COATED ORAL NIGHTLY
Status: DISCONTINUED | OUTPATIENT
Start: 2024-12-06 | End: 2024-12-12 | Stop reason: HOSPADM

## 2024-12-06 RX ADMIN — PREDNISONE 20 MG: 20 TABLET ORAL at 09:05

## 2024-12-06 RX ADMIN — IPRATROPIUM BROMIDE AND ALBUTEROL SULFATE 3 ML: .5; 3 SOLUTION RESPIRATORY (INHALATION) at 18:56

## 2024-12-06 RX ADMIN — LISINOPRIL 20 MG: 20 TABLET ORAL at 09:05

## 2024-12-06 RX ADMIN — ARFORMOTEROL TARTRATE 15 MCG: 15 SOLUTION RESPIRATORY (INHALATION) at 06:25

## 2024-12-06 RX ADMIN — Medication 250 MG: at 09:30

## 2024-12-06 RX ADMIN — ARFORMOTEROL TARTRATE 15 MCG: 15 SOLUTION RESPIRATORY (INHALATION) at 18:56

## 2024-12-06 RX ADMIN — NYSTATIN: 100000 POWDER TOPICAL at 20:54

## 2024-12-06 RX ADMIN — BUDESONIDE 0.5 MG: 0.5 INHALANT RESPIRATORY (INHALATION) at 18:56

## 2024-12-06 RX ADMIN — BUDESONIDE 0.5 MG: 0.5 INHALANT RESPIRATORY (INHALATION) at 06:25

## 2024-12-06 RX ADMIN — ENOXAPARIN SODIUM 40 MG: 100 INJECTION SUBCUTANEOUS at 09:05

## 2024-12-06 RX ADMIN — NYSTATIN: 100000 POWDER TOPICAL at 09:07

## 2024-12-06 RX ADMIN — Medication 250 MG: at 20:42

## 2024-12-06 RX ADMIN — ATORVASTATIN CALCIUM 40 MG: 40 TABLET, FILM COATED ORAL at 20:42

## 2024-12-06 RX ADMIN — POTASSIUM CHLORIDE 40 MEQ: 750 CAPSULE, EXTENDED RELEASE ORAL at 09:05

## 2024-12-06 RX ADMIN — GUAIFENESIN 600 MG: 600 TABLET ORAL at 20:42

## 2024-12-06 RX ADMIN — CARVEDILOL 6.25 MG: 6.25 TABLET, FILM COATED ORAL at 09:05

## 2024-12-06 RX ADMIN — FUROSEMIDE 40 MG: 40 TABLET ORAL at 09:05

## 2024-12-06 RX ADMIN — GUAIFENESIN 600 MG: 600 TABLET ORAL at 09:05

## 2024-12-06 RX ADMIN — ASPIRIN 81 MG: 81 TABLET, COATED ORAL at 09:05

## 2024-12-06 RX ADMIN — Medication 10 MG: at 20:41

## 2024-12-06 RX ADMIN — IPRATROPIUM BROMIDE AND ALBUTEROL SULFATE 3 ML: .5; 3 SOLUTION RESPIRATORY (INHALATION) at 06:25

## 2024-12-06 NOTE — PROGRESS NOTES
"Nursing Facility Medication Regimen Review    Potentially Clinically Significant Medication Issues during this review: []Identified   []Not identified    Provider acknowledgement required?   []Yes   [x]No    Cris Agudelo is a 84 y.o.female admitted by Lonnie Hernandez MD , on 12/4/2024  4:06 PM , for Multifocal pneumonia [J18.9]  Weakness [R53.1]    Visit Vitals  BP (!) 123/35 (BP Location: Left arm, Patient Position: Lying)   Pulse 81   Temp 97.9 °F (36.6 °C) (Oral)   Resp 18   Ht 152.4 cm (60\")   Wt 64.4 kg (141 lb 15.6 oz)   SpO2 98%   BMI 27.73 kg/m²        Lab Results   Component Value Date    GLUCOSE 93 12/06/2024    BUN 12 12/06/2024    CREATININE 0.55 (L) 12/06/2024    BCR 21.8 12/06/2024    K 3.8 12/06/2024    CO2 33.4 (H) 12/06/2024    CALCIUM 8.4 (L) 12/06/2024    ALBUMIN 2.6 (L) 12/05/2024    AST 14 12/03/2024    ALT 24 12/03/2024    WBC 10.89 (H) 12/05/2024    HGB 11.9 (L) 12/05/2024    HCT 37.2 12/05/2024    MCV 86.9 12/05/2024     12/05/2024        Active Ambulatory Problems     Diagnosis Date Noted    Hyponatremia 08/07/2023    Stress-induced cardiomyopathy 08/07/2023    Hyperlipidemia 08/18/2023    Primary hypertension 08/18/2023    Herpes zoster 08/18/2023    Shingles 08/23/2023    Non-occlusive coronary artery disease 08/23/2023    Pneumonia 11/15/2024    Multifocal pneumonia 11/25/2024    Acute on chronic respiratory failure with hypoxia 11/25/2024    HCAP (healthcare-associated pneumonia) 11/25/2024     Resolved Ambulatory Problems     Diagnosis Date Noted    STEMI (ST elevation myocardial infarction) 08/06/2023    NSTEMI (non-ST elevated myocardial infarction) 08/06/2023    Acute non-ST elevation myocardial infarction (NSTEMI) of anterior wall involving right ventricle 08/06/2023    History of non-ST elevation myocardial infarction (NSTEMI) 08/23/2023     Past Medical History:   Diagnosis Date    CHF (congestive heart failure)     COPD (chronic obstructive pulmonary disease)     Hypertension " "        Hospital Medications (active)         Dose Frequency Start End    acetaminophen (TYLENOL) tablet 650 mg 650 mg Every 6 Hours PRN 12/4/2024 --    Admin Instructions: If given for fever, use fever parameter: fever greater than 100.4 °F  Based on patient request - if ordered for moderate or severe pain, provider allows for administration of a medication prescribed for a lower pain scale.    Do not exceed 4 grams of acetaminophen in a 24 hr period. Max dose of 2gm for AST/ALT greater than 120 units/L.    If given for pain, use the following pain scale:   Mild Pain = Pain Score of 1-3, CPOT 1-2  Moderate Pain = Pain Score of 4-6, CPOT 3-4  Severe Pain = Pain Score of 7-10, CPOT 5-8    Route: Oral    Cosign for Ordering: Accepted by Lonnie Hernandez MD on 12/5/2024  6:45 PM    arformoterol (BROVANA) nebulizer solution 15 mcg 15 mcg 2 Times Daily - RT 12/4/2024 --    Admin Instructions: Keep refrigerated.    Route: Nebulization    Cosign for Ordering: Accepted by Lonnie Hernandez MD on 12/5/2024  6:45 PM    aspirin EC tablet 81 mg 81 mg Daily 12/5/2024 --    Admin Instructions: Do not crush or chew the capsules or tablets. The drug may not work as designed if the capsule or tablet is crushed or chewed. Swallow whole.  Do not exceed 4 grams of aspirin in a 24 hr period.    If given for pain, use the following pain scale:   Mild Pain = Pain Score of 1-3, CPOT 1-2  Moderate Pain = Pain Score of 4-6, CPOT 3-4  Severe Pain = Pain Score of 7-10, CPOT 5-8    Route: Oral    Cosign for Ordering: Accepted by Lonnie Hernandez MD on 12/5/2024  6:45 PM    bisacodyl (DULCOLAX) EC tablet 5 mg 5 mg Daily PRN 12/4/2024 --    Admin Instructions: Use if no bowel movement after 12 hours.  Swallow whole. Do not crush, split, or chew tablet.    Route: Oral    Cosign for Ordering: Accepted by Lonnie Hernandez MD on 12/5/2024  6:45 PM    Linked Group 1: Placed in \"And\" Linked Group        bisacodyl (DULCOLAX) suppository 10 mg 10 mg Daily PRN " "12/4/2024 --    Admin Instructions: Use if no bowel movement after 12 hours.  Hold for diarrhea    Route: Rectal    Cosign for Ordering: Accepted by Lonnie Hernandez MD on 12/5/2024  6:45 PM    Linked Group 1: Placed in \"And\" Linked Group        budesonide (PULMICORT) nebulizer solution 0.5 mg 0.5 mg 2 Times Daily - RT 12/4/2024 --    Admin Instructions: Include Respiratory Treatment Education  Do not shake.  Protect from light.    Route: Nebulization    Cosign for Ordering: Accepted by Lonnie Hernandez MD on 12/5/2024  6:45 PM    carvedilol (COREG) tablet 6.25 mg 6.25 mg 2 Times Daily 12/4/2024 --    Admin Instructions: Hold for SBP less than 100, DBP less than 60, or heart rate less than 50. If a dose is held, please contact the provider.  Give with food.    Route: Oral    Cosign for Ordering: Accepted by Lonnie Hernandez MD on 12/5/2024  6:45 PM    Enoxaparin Sodium (LOVENOX) syringe 40 mg 40 mg Daily 12/5/2024 --    Admin Instructions: Give subcutaneous in abdomen only. Do not massage site after injection.    Route: Subcutaneous    Cosign for Ordering: Accepted by Lonnie Hernandez MD on 12/5/2024  6:45 PM    furosemide (LASIX) tablet 40 mg 40 mg Daily 12/5/2024 --    Route: Oral    Cosign for Ordering: Accepted by Lonnie Hernandez MD on 12/5/2024  6:45 PM    guaiFENesin (MUCINEX) 12 hr tablet 600 mg 600 mg Every 12 Hours Scheduled 12/4/2024 --    Admin Instructions: Do not crush or chew the capsules or tablets. The drug may not work as designed if the capsule or tablet is crushed or chewed. Swallow whole.  Caution: Look alike/sound alike drug alert               Do not crush, split, or chew.    Route: Oral    Cosign for Ordering: Accepted by Lonnie Hernandez MD on 12/5/2024  6:45 PM    guaiFENesin-dextromethorphan (ROBITUSSIN DM) 100-10 MG/5ML syrup 10 mL 10 mL Every 6 Hours PRN 12/4/2024 --    Admin Instructions: Caution: Look alike/sound alike drug alert    Route: Oral    Cosign for Ordering: Accepted by Lonnie Hernandez MD " "on 12/5/2024  6:45 PM    ipratropium-albuterol (DUO-NEB) nebulizer solution 3 mL 3 mL 3 times daily 12/4/2024 --    Admin Instructions: Include Respiratory Treatment Education    Route: Nebulization    Cosign for Ordering: Accepted by Lonnie Hernandez MD on 12/5/2024  6:45 PM    ipratropium-albuterol (DUO-NEB) nebulizer solution 3 mL 3 mL Every 4 Hours PRN 12/4/2024 --    Admin Instructions: Include Respiratory Treatment Education    Route: Nebulization    Cosign for Ordering: Accepted by Lonnie Hernandez MD on 12/5/2024  6:45 PM    lisinopril (PRINIVIL,ZESTRIL) tablet 20 mg 20 mg Daily 12/5/2024 --    Admin Instructions: Hold for SBP less than 100, DBP less than 60.    Route: Oral    Cosign for Ordering: Accepted by Lonnie Hernandez MD on 12/5/2024  6:45 PM    melatonin tablet 10 mg 10 mg Nightly 12/4/2024 --    Route: Oral    Cosign for Ordering: Accepted by Lonnie Hernandez MD on 12/5/2024  6:45 PM    nystatin (MYCOSTATIN) powder  Every 12 Hours Scheduled 12/4/2024 --    Route: Topical    Cosign for Ordering: Accepted by Lonnie Hernandez MD on 12/5/2024  6:45 PM    ondansetron (ZOFRAN) injection 4 mg 4 mg Every 6 Hours PRN 12/4/2024 --    Admin Instructions: If BOTH ondansetron (ZOFRAN) and promethazine (PHENERGAN) are ordered use ondansetron first and THEN promethazine IF ondansetron is ineffective.    Route: Intravenous    Cosign for Ordering: Accepted by Lonnie Hernandez MD on 12/5/2024  6:45 PM    polyethylene glycol (MIRALAX) packet 17 g 17 g Daily PRN 12/4/2024 --    Admin Instructions: Use if no bowel movement after 12 hours. Mix in 6-8 ounces of water.  Use 4-8 ounces of water, tea, or juice for each 17 gram dose.    Route: Oral    Cosign for Ordering: Accepted by Lonnie Hernandez MD on 12/5/2024  6:45 PM    Linked Group 1: Placed in \"And\" Linked Group        predniSONE (DELTASONE) tablet 10 mg 10 mg Daily With Breakfast 12/8/2024 12/11/2024    Admin Instructions: Take with food.    Route: Oral    Cosign for Ordering: " "Accepted by Lonnie Hernandez MD on 12/5/2024  6:45 PM    Linked Group 2: Placed in \"Followed by\" Linked Group        predniSONE (DELTASONE) tablet 20 mg 20 mg Daily With Breakfast 12/5/2024 12/8/2024    Admin Instructions: Take with food.    Route: Oral    Cosign for Ordering: Accepted by Lonnie Hernandez MD on 12/5/2024  6:45 PM    Linked Group 2: Placed in \"Followed by\" Linked Group        saccharomyces boulardii (FLORASTOR) capsule 250 mg 250 mg 2 Times Daily 12/4/2024 --    Route: Oral    Cosign for Ordering: Accepted by Lonnie Hernandez MD on 12/5/2024  6:45 PM    sennosides-docusate (PERICOLACE) 8.6-50 MG per tablet 2 tablet 2 tablet 2 Times Daily PRN 12/4/2024 --    Admin Instructions: Start bowel management regimen if patient has not had a bowel movement after 12 hours.    Route: Oral    Cosign for Ordering: Accepted by Lonnie Hernandez MD on 12/5/2024  6:45 PM    Linked Group 1: Placed in \"And\" Linked Group        sodium chloride 0.9 % flush 10 mL 10 mL As Needed 12/4/2024 --    Route: Intravenous    Cosign for Ordering: Accepted by Lonnie Hernandez MD on 12/5/2024  6:45 PM    sodium chloride 0.9 % flush 10 mL 10 mL Every 12 Hours Scheduled 12/4/2024 --    Route: Intravenous    Cosign for Ordering: Accepted by Lonnie Hernandez MD on 12/5/2024  6:45 PM    sodium chloride 0.9 % flush 10 mL 10 mL As Needed 12/4/2024 --    Route: Intravenous    Cosign for Ordering: Accepted by Lonnie Hernandez MD on 12/5/2024  6:45 PM    sodium chloride 0.9 % infusion 40 mL 40 mL As Needed 12/4/2024 --    Admin Instructions: Following administration of an IV intermittent medication, flush line with 40mL NS at 100mL/hr.    Route: Intravenous    Cosign for Ordering: Accepted by Lonnie Hernandez MD on 12/5/2024  6:45 PM    tuberculin injection 5 Units 5 Units Once 12/11/2024 --    Admin Instructions: 2nd Step    Route: Intradermal    Cosign for Ordering: Accepted by Lonnie Hernandez MD on 12/5/2024  6:45 PM           Psychotropic " Medications  None    Potentially Clinically Significant Medication Issues/PharmD Recommendations:   Psychotropic Medication: No issues.   Opioid Use Review: No issues.   Medication without an appropriate indication:  No issues.   Untreated indication:  Hyperlipidemia   Missing Duration: No issues.   Excessive or Inadequate Dose: No issues.   Ineffective Drug Therapy: No issues   Medication Adverse Reactions/Consequences: No issues.   Medication Monitoring: No issues.   Drug Interactions: No major issues.   Duplicate Therapy:  No issues.   PTA Medication Omissions (not currently ordered):  Atorvastatin   Safety: No issues.     Additional notes:   Voalted PA regarding need to restart atorvastatin.  No reply at this time.     In completing this Drug Regimen Review for KD Baker Chad Reichmuth, have reviewed the electronic medical chart contents, including but not limited to the following: Medication Administration Records (MAR), Prescriber's orders, Progress, nursing and consultants' notes, Laboratory and diagnostic test results, Other sources of information about documented expressions or indications of distress and/or changes in condition.

## 2024-12-06 NOTE — PLAN OF CARE
Goal Outcome Evaluation:  Plan of Care Reviewed With: patient           Outcome Evaluation: Pt is alert to person and year. pleasantly confused to place. VSS. Up with one assist using her walker and gait belt. Participated in therapy this shift. Denies pain or discomfort. Chair alarm in place and in working order. Call light is within reach.

## 2024-12-06 NOTE — THERAPY TREATMENT NOTE
SNF - Physical Therapy Treatment Note   [place]     Patient Name: Cris Agudelo  : 1940  MRN: [id number]  Today's Date: 2024      Visit Dx:    ICD-10-CM ICD-9-CM   1. Difficulty walking  R26.2 719.7   2. Decreased activities of daily living (ADL)  Z78.9 V49.89     Patient Active Problem List   Diagnosis    Hyponatremia    Stress-induced cardiomyopathy    Hyperlipidemia    Primary hypertension    Herpes zoster    Shingles    Non-occlusive coronary artery disease    Pneumonia    Multifocal pneumonia    Acute on chronic respiratory failure with hypoxia    HCAP (healthcare-associated pneumonia)    Weakness    Severe protein-calorie malnutrition     Past Medical History:   Diagnosis Date    CHF (congestive heart failure)     COPD (chronic obstructive pulmonary disease)     History of non-ST elevation myocardial infarction (NSTEMI) 2023    Hyperlipidemia     Hypertension      Past Surgical History:   Procedure Laterality Date    BRONCHOSCOPY N/A 2024    Procedure: BRONCHOSCOPY with BAL, Brushings and washings;  Surgeon: Adan Brown MD;  Location: [facility] ENDOSCOPY;  Service: Pulmonary;  Laterality: N/A;  Bilateral PNA with mucus pluggins    CARDIAC CATHETERIZATION N/A 2023    Procedure: Left Heart Cath;  Surgeon: Mitch Correia MD;  Location: [facility] CATH INVASIVE LOCATION;  Service: Cardiology;  Laterality: N/A;    CARDIAC CATHETERIZATION N/A 2023    Procedure: Coronary angiography;  Surgeon: Mitch Correia MD;  Location: [facility] CATH INVASIVE LOCATION;  Service: Cardiology;  Laterality: N/A;    COLONOSCOPY      EYE SURGERY      HYSTERECTOMY      SKIN BIOPSY      VASCULAR SURGERY         PT Assessment (Last 12 Hours)       PT Evaluation and Treatment       Row Name 24 1155          Physical Therapy Time and Intention    Document Type therapy note (daily note)  -WM     Mode of Treatment individual therapy;physical therapy  -WM     Patient Effort good  -WM       Row Name 24  1155          Cognition    Affect/Mental Status (Cognition) flat/blunted affect  -       Row Name 12/06/24 1155          Sit-Stand Transfer    Sit-Stand [place] (Transfers) contact guard;standby assist;verbal cues  -     Assistive Device (Sit-Stand Transfers) walker, front-wheeled  -WM       Row Name 12/06/24 1155          Stand-Sit Transfer    Stand-Sit [place] (Transfers) contact guard;standby assist;verbal cues  -     Assistive Device (Stand-Sit Transfers) walker, front-wheeled  -       Row Name 12/06/24 1155          Gait/Stairs (Locomotion)    [place] Level (Gait) contact guard;standby assist;supervision  -     Assistive Device (Gait) walker, front-wheeled  -     Distance in Feet (Gait) 225  + 55  -     Pattern (Gait) 4-point;step-through  -     Deviations/Abnormal Patterns (Gait) gait speed decreased  -     Comment, (Gait/Stairs) Pt ambulated with supplemental O2 @ 2L  -       Row Name 12/06/24 1155          Safety Issues/Impairments Affecting Functional Mobility    Impairments Affecting Function (Mobility) balance;coordination;endurance/activity tolerance;strength  -       Row Name 12/06/24 1155          Hip (Therapeutic Exercise)    Hip Strengthening (Therapeutic Exercise) bilateral;aBduction;aDduction;marching while seated;sitting;2 lb free weight;resistance band;yellow;15 repititions;2 sets  -       Row Name 12/06/24 1155          Knee (Therapeutic Exercise)    Knee Strengthening (Therapeutic Exercise) bilateral;LAQ (long arc quad);hamstring curls;sitting;2 lb free weight;resistance band;yellow;15 repititions;2 sets  -       Row Name 12/06/24 1155          Ankle (Therapeutic Exercise)    Ankle (Therapeutic Exercise) strengthening exercise  -     Ankle Strengthening (Therapeutic Exercise) bilateral;dorsiflexion;sitting;3 lb free weight;15 repititions;2 sets  -       Row Name 12/06/24 1155          Aerobic Exercise    Time Performed (Aerobic Exercise) NuStep x 15  minutes, load 3  -WM       Row Name             Wound 12/04/24 1832 Bilateral lateral groin    Wound - Properties Group Placement Date: 12/04/24  -FH Placement Time: 1832  -FH Side: Bilateral  -FH Orientation: lateral  -FH Location: groin  -FH Primary Wound Type: Other  -FH, rash/redness  Present on Original Admission: Y  -FH    Retired Wound - Properties Group Placement Date: 12/04/24  -FH Placement Time: 1832  -FH Present on Original Admission: Y  -FH Side: Bilateral  -FH Orientation: lateral  -FH Location: groin  -FH Primary Wound Type: Other  -FH, rash/redness     Retired Wound - Properties Group Placement Date: 12/04/24  -FH Placement Time: 1832  -FH Present on Original Admission: Y  -FH Side: Bilateral  -FH Orientation: lateral  -FH Location: groin  -FH Primary Wound Type: Other  -FH, rash/redness     Retired Wound - Properties Group Date first assessed: 12/04/24  -FH Time first assessed: 1832  -FH Present on Original Admission: Y  -FH Side: Bilateral  -FH Location: groin  -FH Primary Wound Type: Other  -FH, rash/redness       Row Name             Wound 12/04/24 1835 Left lateral breast    Wound - Properties Group Placement Date: 12/04/24  -FH Placement Time: 1835  -FH Side: Left  -FH Orientation: lateral  -FH Location: breast  -FH Primary Wound Type: Other  -FH, rash/redness  Present on Original Admission: Y  -FH    Retired Wound - Properties Group Placement Date: 12/04/24  -FH Placement Time: 1835  -FH Present on Original Admission: Y  -FH Side: Left  -FH Orientation: lateral  -FH Location: breast  -FH Primary Wound Type: Other  -FH, rash/redness     Retired Wound - Properties Group Placement Date: 12/04/24  -FH Placement Time: 1835  -FH Present on Original Admission: Y  -FH Side: Left  -FH Orientation: lateral  -FH Location: breast  -FH Primary Wound Type: Other  -FH, rash/redness     Retired Wound - Properties Group Date first assessed: 12/04/24  -FH Time first assessed: 1835  -FH Present on Original  Admission: Y  -FH Side: Left  -FH Location: breast  -FH Primary Wound Type: Other  -FH, rash/redness       Row Name 12/06/24 1155          Positioning and Restraints    Pre-Treatment Position sitting in chair/recliner  -WM     Post Treatment Position chair  -WM     In Chair reclined;call light within reach;encouraged to call for assist;exit alarm on  -WM       Row Name 12/06/24 1155          Progress Summary (PT)    Progress Toward Functional Goals (PT) progress toward functional goals is good  -WM               User Key  (r) = Recorded By, (t) = Taken By, (c) = Cosigned By      Initials Name Provider Type     Kayleen Scott RN Registered Nurse    Harsh Smith PTA Physical Therapist Assistant                  Section G              Section GG                       Physical Therapy Education        No education to display                  PT Recommendation and Plan     Progress Summary (PT)  Progress Toward Functional Goals (PT): progress toward functional goals is good   Outcome Measures       Row Name 12/06/24 1200 12/05/24 1158 12/04/24 1800       How much help from another person do you currently need...    Turning from your back to your side while in flat bed without using bedrails? 4  -WM 4  -WM 4  -CS    Moving from lying on back to sitting on the side of a flat bed without bedrails? 3  -WM 3  -WM 3  -CS    Moving to and from a bed to a chair (including a wheelchair)? 3  -WM 3  -WM 3  -CS    Standing up from a chair using your arms (e.g., wheelchair, bedside chair)? 3  -WM 3  -WM 3  -CS    Climbing 3-5 steps with a railing? 3  -WM 3  -WM 3  -CS    To walk in hospital room? 3  -WM 3  -WM 3  -CS    AM-PAC 6 Clicks Score (PT) 19  -WM 19  -WM 19  -CS       Functional Assessment    Outcome Measure Options -- -- AM-PAC 6 Clicks Basic Mobility (PT)  -CS              User Key  (r) = Recorded By, (t) = Taken By, (c) = Cosigned By      Initials Name Provider Type    Harsh Smith PTA Physical  Therapist Assistant    Lynette Powell, PT Physical Therapist                     Time Calculation:    PT Charges       Row Name 12/06/24 1154             Time Calculation    PT Received On 12/06/24  -WM         Timed Charges    04030 - PT Therapeutic Exercise Minutes 29  -WM      43232 - Gait Training Minutes  9  -WM      65078 - PT Therapeutic Activity Minutes 4  -WM         SNF Physical Therapy Minutes    Skilled Minutes- PT 42 min  -WM         Total Minutes    Timed Charges Total Minutes 42  -WM       Total Minutes 42  -WM                User Key  (r) = Recorded By, (t) = Taken By, (c) = Cosigned By      Initials Name Provider Type     Harsh Viramontes PTA Physical Therapist Assistant                  Therapy Charges for Today       Code Description Service Date Service Provider Modifiers Qty    72681642366 HC PT THER PROC EA 15 MIN 12/5/2024 Harsh Viramontes PTA GP 2    69314637706 HC GAIT TRAINING EA 15 MIN 12/5/2024 Harsh Viramontes PTA GP 1            PT G-Codes  Outcome Measure Options: AM-PAC 6 Clicks Daily Activity (OT), Optimal Instrument  AM-PAC 6 Clicks Score (PT): 19  AM-PAC 6 Clicks Score (OT): 21    Harsh Viramontes PTA  12/6/2024

## 2024-12-06 NOTE — THERAPY TREATMENT NOTE
SNF - Occupational Therapy Treatment Note   [place]    Patient Name: Cris Agudelo  : 1940    MRN: [id number]                              Today's Date: 2024       Admit Date: 2024    Visit Dx:     ICD-10-CM ICD-9-CM   1. Difficulty walking  R26.2 719.7   2. Decreased activities of daily living (ADL)  Z78.9 V49.89     Patient Active Problem List   Diagnosis    Hyponatremia    Stress-induced cardiomyopathy    Hyperlipidemia    Primary hypertension    Herpes zoster    Shingles    Non-occlusive coronary artery disease    Pneumonia    Multifocal pneumonia    Acute on chronic respiratory failure with hypoxia    HCAP (healthcare-associated pneumonia)    Weakness    Severe protein-calorie malnutrition     Past Medical History:   Diagnosis Date    CHF (congestive heart failure)     COPD (chronic obstructive pulmonary disease)     History of non-ST elevation myocardial infarction (NSTEMI) 2023    Hyperlipidemia     Hypertension      Past Surgical History:   Procedure Laterality Date    BRONCHOSCOPY N/A 2024    Procedure: BRONCHOSCOPY with BAL, Brushings and washings;  Surgeon: Adan Brown MD;  Location: [facility] ENDOSCOPY;  Service: Pulmonary;  Laterality: N/A;  Bilateral PNA with mucus pluggins    CARDIAC CATHETERIZATION N/A 2023    Procedure: Left Heart Cath;  Surgeon: Mitch Correia MD;  Location: [facility] CATH INVASIVE LOCATION;  Service: Cardiology;  Laterality: N/A;    CARDIAC CATHETERIZATION N/A 2023    Procedure: Coronary angiography;  Surgeon: Mitch Correia MD;  Location: [facility] CATH INVASIVE LOCATION;  Service: Cardiology;  Laterality: N/A;    COLONOSCOPY      EYE SURGERY      HYSTERECTOMY      SKIN BIOPSY      VASCULAR SURGERY        General Information       Row Name 24 1024          OT Time and Intention    Document Type therapy note (daily note)  -EG     Mode of Treatment individual therapy;occupational therapy  -EG     Patient Effort good  -EG       Row Name  12/06/24 1024          General Information    Existing Precautions/Restrictions fall;oxygen therapy device and L/min  -EG       Row Name 12/06/24 1024          Cognition    Orientation Status (Cognition) oriented to;person;place;other (see comments)  -EG       Row Name 12/06/24 1024          Safety Issues/Impairments Affecting Functional Mobility    Impairments Affecting Function (Mobility) balance;coordination;endurance/activity tolerance;strength  -EG               User Key  (r) = Recorded By, (t) = Taken By, (c) = Cosigned By      Initials Name Provider Type    EG Janine Quiros, OT Occupational Therapist                     Mobility/ADL's       Row Name 12/06/24 1025          Bed Mobility    Comment, (Bed Mobility) up in chair upon OT arrival  -EG       Row Name 12/06/24 1025          Transfers    Transfers sit-stand transfer;stand-sit transfer  OT provided patient with RW for safety as opposed to straight cane this date  -EG     Comment, (Transfers) transfer to multiple chairs with arms in therapy gym using RW, frequent cues for hand placement and safety with limited follow through or carryover noted  -EG       Row Name 12/06/24 1025          Sit-Stand Transfer    Sit-Stand [place] (Transfers) contact guard;verbal cues  -EG     Assistive Device (Sit-Stand Transfers) walker, front-wheeled  -EG       Row Name 12/06/24 1025          Stand-Sit Transfer    Stand-Sit [place] (Transfers) contact guard;verbal cues  -EG     Assistive Device (Stand-Sit Transfers) cane, straight  -EG     Comment, (Stand-Sit Transfer) cues to reach back for surface each time  -EG       Row Name 12/06/24 1025          Functional Mobility    Functional Mobility- Ind. Level contact guard assist;verbal cues required  -EG     Functional Mobility- Device cane, straight  -EG     Functional Mobility- Comment functional mobility performed to and from therapy gym using RW; performed one lap around unit with continued steps crossing  midline without full scissor step occuring; Patient cued and educated on stepping stradegies with limited carrover or follow through noted  -EG       Row Name 12/06/24 1025          Activities of Daily Living    BADL Assessment/Intervention lower body dressing;bathing  -EG       Row Name 12/06/24 1025          Bathing Assessment/Intervention    Comment, (Bathing) tub shower transfer training; performed 2x with use of grab bars that patient reports she has at home; does not have bench has to step in and out; educated on safe stepping techniques  -EG       Row Name 12/06/24 1025          Lower Body Dressing Assessment/Training    [place] Level (Lower Body Dressing) lower body dressing skills;don;doff;shoes/slippers;socks  -EG     Comment, (Lower Body Dressing) while seated in  -EG               User Key  (r) = Recorded By, (t) = Taken By, (c) = Cosigned By      Initials Name Provider Type    EG Janine Quiros OT Occupational Therapist                   Obj/Interventions       Row Name 12/06/24 1031          Sensory Assessment (Somatosensory)    Sensory Assessment (Somatosensory) UE sensation intact  -EG       Row Name 12/06/24 1031          Vision Assessment/Intervention    Visual Impairment/Limitations WFL  -EG       Row Name 12/06/24 1031          Motor Skills    Motor Skills functional endurance  -EG     Functional Endurance far- on 2L for mobility and endurance training on omnicycle  -EG     Therapeutic Exercise aerobic  -EG       Row Name 12/06/24 1031          Balance    Balance Interventions standing;sit to stand;supported;static;dynamic;foam;weight shifting activity;occupation based/functional task;UE activity with balance activity  -EG     Comment, Balance Unsupported standing on foam balance pad with table top task/sequencing activity total of 3-4 minutes; Unsupported standing with BUE gross motor ball movements in all planes 2x10 all with CGA but unsteadiness noted; Attempts to upgrade to small  gross motor step outs unsupported butunable to perform without unilateral UE support and continues to demonstrate scissored stepping pattern; Patient able to go up and down set of 2 steps 2x using Bilateral hand rails with cues for safety and education but impulsivity and poor follow through noted  -EG       Row Name 12/06/24 1031          Aerobic Exercise    Type (Aerobic Exercise) arm bike  -EG     Comment, Aerobic Exercise (Therapeutic Exercise) Patient able to perform Omnicycle on cardiac interval setting 15:00 no rest break required  -EG               User Key  (r) = Recorded By, (t) = Taken By, (c) = Cosigned By      Initials Name Provider Type    EG Janine Quiros, RAZ Occupational Therapist                   Goals/Plan    No documentation.                  Clinical Impression       Row Name 12/06/24 1038          Pain Assessment    Pretreatment Pain Rating 0/10 - no pain  -EG     Posttreatment Pain Rating 0/10 - no pain  -EG       Row Name 12/06/24 1038          Plan of Care Review    Plan of Care Reviewed With patient  -EG     Progress improving  -EG     Outcome Evaluation Pleasant and cooperative; No complaints of pain; OT noting fall risk with mobility and poor safety/insight skills; Addressing balance, endurance and strength this date; Emphasis on safety during transfers; OT will continue to treat and follow POC; Ax1 w/RW and or SPC and gait belt at this time.  -EG       Row Name 12/06/24 1038          Therapy Assessment/Plan (OT)    Rehab Potential (OT) good  -EG     Criteria for Skilled Therapeutic Interventions Met (OT) yes;meets criteria;skilled treatment is necessary  -EG     Therapy Frequency (OT) 5 times/wk  -EG       Row Name 12/06/24 1038          Therapy Plan Review/Discharge Plan (OT)    Anticipated Discharge Disposition (OT) home with home health  -EG       Row Name 12/06/24 1038          Positioning and Restraints    Pre-Treatment Position sitting in chair/recliner  -EG     Post Treatment  Position chair  -EG     In Chair reclined;sitting;call light within reach;encouraged to call for assist;exit alarm on  -EG               User Key  (r) = Recorded By, (t) = Taken By, (c) = Cosigned By      Initials Name Provider Type    Janine Khanna, OT Occupational Therapist                   Outcome Measures       Row Name 12/06/24 1040          How much help from another is currently needed...    Putting on and taking off regular lower body clothing? 3  -EG     Bathing (including washing, rinsing, and drying) 3  -EG     Toileting (which includes using toilet bed pan or urinal) 3  -EG     Putting on and taking off regular upper body clothing 4  -EG     Taking care of personal grooming (such as brushing teeth) 4  -EG     Eating meals 4  -EG     AM-PAC 6 Clicks Score (OT) 21  -EG       Row Name 12/06/24 0829          How much help from another person do you currently need...    Turning from your back to your side while in flat bed without using bedrails? 4  -RB     Moving from lying on back to sitting on the side of a flat bed without bedrails? 3  -RB     Moving to and from a bed to a chair (including a wheelchair)? 3  -RB     Standing up from a chair using your arms (e.g., wheelchair, bedside chair)? 3  -RB     Climbing 3-5 steps with a railing? 3  -RB     To walk in hospital room? 3  -RB     AM-PAC 6 Clicks Score (PT) 19  -RB     Highest Level of Mobility Goal 6 --> Walk 10 steps or more  -RB       Row Name 12/06/24 1040          Functional Assessment    Outcome Measure Options AM-PAC 6 Clicks Daily Activity (OT);Optimal Instrument  -EG       Row Name 12/06/24 1040          Optimal Instrument    Optimal Instrument Optimal - 3  -EG     Bending/Stooping 2  -EG     Standing 2  -EG     Reaching 1  -EG               User Key  (r) = Recorded By, (t) = Taken By, (c) = Cosigned By      Initials Name Provider Type    Maday Hopkins, RN Registered Nurse    Janine Khanna, OT Occupational Therapist                   Section G  Mobility  Bed mobility - self performance: limited assistance (staff provide guided maneuvering of limbs or other non-weight bearing assistance)  Bed mobility support/assistance: One person assist  Transfer - self performance: limited assistance (staff provide guided maneuvering of limbs or other non-weight bearing assistance)  Transfer support/assistance: One person assist  Walking in room - self performance: limited assistance (staff provide guided maneuvering of limbs or other non-weight bearing assistance)  Walking in room support/assistance: One person assist  Walking in corridors/hallway - self performance: limited assistance (staff provide guided maneuvering of limbs or other non-weight bearing assistance)  Walking in corridors/hallway support/assistance: One person assist  Locomotion on unit - self performance: limited assistance (staff provide guided maneuvering of limbs or other non-weight bearing assistance)  Locomotion on unit support/assistance: One person assist  Locomotion off unit - self performance: activity did not occur  Locomotion off unit support/assistance: Activity did not occur  Dressing - self performance: limited assistance (staff provide guided maneuvering of limbs or other non-weight bearing assistance)  Dressing support/assistance: One person assist  Eating - self performance: independent  Eating support/assistance: Setup help only  Toileting - self performance: limited assistance (staff provide guided maneuvering of limbs or other non-weight bearing assistance)  Toileting support/assistance: One person assist  Personal hygiene - self performance: limited assistance (staff provide guided maneuvering of limbs or other non-weight bearing assistance)  Personal hygiene support/assistance: One person assist  Bathing  Bathing - self performance: Physical help only to transfer to bathe  Bathing support/assistance: One person assist  Balance  Balance during transitions & walking:  Not steady, requires assist to steady  Moving from seated to standing position: Not steady, requires assist to steady  Walking: Not steady, requires assist to steady  Turning around while walking: Not steady, requires assist to steady  Moving on and off toilet: Not steady, requires assist to steady  Surface-to-surface transfer: Not steady, requires assist to steady  Mobility devices: Cane/crutch  Range of Motion  Upper Extremity: No impairment  Lower Extremity: No impairment  Section GG  Functional Ability/Goals, Adm (Section GG)  Self Care, Prior Functioning (CO1563U): 3. Independent  Functional Cognition, Prior Functioning (AH5014F): 3. Independent  Prior Device Use (HX7617): none of the above (Z)  Upper Extremity Range of Motion (WH7261K): No impairment  Lower Extremity Range of Motion (IV9351U): No impairment  Self Care, Admission (Section GG)  Eating: Self-Care Admission Performance ([id number]): setup or clean-up assistance (05)  Oral Hygiene: Self-Care Admission Performance (QP4614Q4): setup or clean-up assistance (05)  Toileting Hygiene: Self-Care Admission Performance (MM4374G0): supervision or touching assistance (04)  Shower/Bathe Self: Self-Care Admission Performance (MG0472Z5): partial/moderate assistance (03)  Upper Body Dressing: Self-Care Admission Performance ([id number]): supervision or touching assistance (04)  Lower Body Dressing: Self-Care Admission Performance (AG9106R9): supervision or touching assistance (04)  Putting On/Taking Off Footwear: Self-Care Admission Performance (WF6905Y3): supervision or touching assistance (04)  Personal Hygiene: Self-Care Admission Performance (MI1201Z9): supervision or touching assistance (04)  Mobility, Admission Performance (BI8869)  Toilet Transfer: Mobility Admission Performance (HX0243Z9): supervision or touching assistance (04)  Tub/shower Transfer: Mobility Admission Performance ([id number]): supervision or touching assistance (04)                Occupational  Therapy Education       Title: PT OT SLP Therapies (Done)       Topic: Occupational Therapy (Done)       Point: ADL training (Done)       Description:   Instruct learner(s) on proper safety adaptation and remediation techniques during self care or transfers.   Instruct in proper use of assistive devices.                  Learning Progress Summary            Patient AGNES Arias VU by EG at 12/5/2024 1142    Comment: Education on options for AD use during mobility  Eduation on OT services  Education on PLB and energy conservation techniques                      Point: Home exercise program (Done)       Description:   Instruct learner(s) on appropriate technique for monitoring, assisting and/or progressing therapeutic exercises/activities.                  Learning Progress Summary            Patient AGNES Arias VU by EG at 12/5/2024 1142    Comment: Education on options for AD use during mobility  Eduation on OT services  Education on PLB and energy conservation techniques                      Point: Precautions (Done)       Description:   Instruct learner(s) on prescribed precautions during self-care and functional transfers.                  Learning Progress Summary            Patient AGNES Arias VU by EG at 12/5/2024 1142    Comment: Education on options for AD use during mobility  Eduation on OT services  Education on PLB and energy conservation techniques                      Point: Body mechanics (Done)       Description:   Instruct learner(s) on proper positioning and spine alignment during self-care, functional mobility activities and/or exercises.                  Learning Progress Summary            Patient AGNES Arias VU by EG at 12/5/2024 1142    Comment: Education on options for AD use during mobility  Eduation on OT services  Education on PLB and energy conservation techniques                                      User Key       Initials Effective Dates Name Provider Type Discipline    EG 09/14/22 -  Ishaan  RAZ Mehta Occupational Therapist OT                  OT Recommendation and Plan  Planned Therapy Interventions (OT): functional balance retraining, activity tolerance training, occupation/activity based interventions, adaptive equipment training, BADL retraining, neuromuscular control/coordination retraining, patient/caregiver education/training, transfer/mobility retraining, strengthening exercise  Therapy Frequency (OT): 5 times/wk  Plan of Care Review  Plan of Care Reviewed With: patient  Progress: improving  Outcome Evaluation: Pleasant and cooperative; No complaints of pain; OT noting fall risk with mobility and poor safety/insight skills; Addressing balance, endurance and strength this date; Emphasis on safety during transfers; OT will continue to treat and follow POC; Ax1 w/RW and or SPC and gait belt at this time.     Time Calculation:   Evaluation Complexity (OT)  Review Occupational Profile/Medical/Therapy History Complexity: expanded/moderate complexity  Assessment, Occupational Performance/Identification of Deficit Complexity: 3-5 performance deficits  Clinical Decision Making Complexity (OT): detailed assessment/moderate complexity  Overall Complexity of Evaluation (OT): moderate complexity     Time Calculation- OT       Row Name 12/06/24 1042             Time Calculation- OT    OT Received On 12/06/24  -EG      OT Goal Re-Cert Due Date 01/04/24  -EG         Timed Charges    45486 - OT Therapeutic Exercise Minutes 15  -EG      08453 -  OT Neuromuscular Reeducation Minutes 18  -EG      63604 - OT Therapeutic Activity Minutes 15  -EG      24248 - OT Self Care/Mgmt Minutes 8  -EG         SNF Occupational Therapy Minutes    Skilled Minutes- OT 56 min  -EG         Total Minutes    Timed Charges Total Minutes 56  -EG       Total Minutes 56  -EG                User Key  (r) = Recorded By, (t) = Taken By, (c) = Cosigned By      Initials Name Provider Type    Janine Khanna OT Occupational Therapist                   Therapy Charges for Today       Code Description Service Date Service Provider Modifiers Qty    52578753084 HC OT EVAL MOD COMPLEXITY 3 12/5/2024 Janine Quiros, OT GO 1    04643759945 HC OT NEUROMUSC RE EDUCATION EA 15 MIN 12/5/2024 Janine Quiros, OT GO 1    47020279447 HC OT THERAPEUTIC ACT EA 15 MIN 12/5/2024 Janine Quiros, OT GO 1    29485372466 HC OT SELF CARE/MGMT/TRAIN EA 15 MIN 12/5/2024 Janine Quiros, OT GO 2    07707041494 HC OT NEUROMUSC RE EDUCATION EA 15 MIN 12/6/2024 Janine Quiros, OT GO 1    21928380787 HC OT THER PROC EA 15 MIN 12/6/2024 Janine Quiros, OT GO 1    81938543338 HC OT THERAPEUTIC ACT EA 15 MIN 12/6/2024 Janine Quiros, OT GO 1    32891348193 HC OT SELF CARE/MGMT/TRAIN EA 15 MIN 12/6/2024 Janine Quiros, OT GO 1                 Janine Quiros OT  12/6/2024

## 2024-12-06 NOTE — PROGRESS NOTES
Pulmonary / Critical Care Progress Note      Patient Name: Cris Agudelo  : 1940  MRN: [id number]  Attending:  Lonnie Hernandez MD  Date of admission: 2024    Subjective   Subjective   Follow-up for COPD    Over past 24 hours:  Doing better  On 2 L of oxygen  Working well with skilled nursing  Walking halls with physical therapy  Does have some dyspnea on activity  Cough improved.  Scant with minimal secretions  No wheezing  No fevers or chills        Objective   Objective     Vitals:   Temp:  [97.7 °F (36.5 °C)-97.9 °F (36.6 °C)] 97.9 °F (36.6 °C)  Heart Rate:  [81-89] 81  Resp:  [18-20] 18  BP: (123-127)/(35-58) 123/35  Flow (L/min) (Oxygen Therapy):  [2] 2    Physical Exam   Vital Signs Reviewed   General:  WDWN, Alert, NAD.    HEENT:  PERRL, EOMI.  OP, nares clear  Chest:  good aeration, scant rhonchi bilaterally, tympanic to percussion bilaterally, no work of breathing noted  CV: RRR, no MGR, pulses 2+, equal.  Abd:  Soft, NT, ND, + BS, no HSM  EXT:  no clubbing, no cyanosis, no edema  Neuro:  A&Ox3, CN grossly intact, no focal deficits.  Skin: No rashes or lesions noted      Result Review    Result Review:  I have personally reviewed the results from the time of this admission to 2024 13:43 EST and agree with these findings:  [x]  Laboratory  [x]  Microbiology  [x]  Radiology  []  EKG/Telemetry   []  Cardiology/Vascular   [x]  Pathology  []  Old records  []  Other:  Most notable findings include:       Lab 24  0438 24  0446 24  0925 24  0758 24  0502 24  0526 24  0420   WBC  --  10.89*  --  12.16* 12.23* 11.48* 10.51   HEMOGLOBIN  --  11.9*  --  13.0 11.9* 11.8* 11.9*   HEMATOCRIT  --  37.2  --  43.1 37.4 38.9 38.2   PLATELETS  --  262  --  229 312 373 459*   SODIUM 135* 135*  --  132* 139 137 137   POTASSIUM 3.8 3.4* 3.6 4.0 2.9* 3.1* 3.3*   CHLORIDE 94* 92*  --  97* 95* 97* 97*   CO2 33.4* 32.9*  --  27.5 37.9* 32.5* 35.8*   BUN 12 16  --  19 17 17 18    CREATININE 0.55* 0.61  --  0.66 0.56* 0.55* 0.57   GLUCOSE 93 115*  --  108* 125* 147* 184*   CALCIUM 8.4* 8.3*  --  8.3* 8.2* 7.8* 8.0*   PHOSPHORUS  --  2.7  --  2.6 2.5 2.0* 2.4*   TOTAL PROTEIN  --   --   --   --  5.7* 5.4* 5.6*   ALBUMIN  --  2.6*  --  2.4* 2.7* 2.5* 2.6*     CT Chest Without Contrast Diagnostic    Result Date: 11/16/2024  CT CHEST WO CONTRAST DIAGNOSTIC Date of Exam: 11/16/2024 9:17 AM EST Indication: multifocal PNA. Comparison: CXR 11/15/2024, CT chest 12/11/2023 Technique: Axial CT images were obtained of the chest without contrast administration.  Reconstructed coronal and sagittal images were also obtained. Automated exposure control and iterative construction methods were used. Findings: Lung window images reveal extensive bronchocentric alveolar airspace disease throughout both lungs consistent with pneumonia. The largest areas of consolidation measure up to 4 cm. Findings are consistent with the appearance on chest radiograph. Extensive groundglass opacity is seen. This may represent bronchopneumonia or cryptogenic organizing pneumonia. No distinct mass is evident. No pleural fluid is evident. Mediastinal windows reveal no mediastinal or axillary adenopathy. The elidia are obscured. Calcifications are seen in the aortic valve. Extensive coronary artery calcifications are evident. A small hiatal hernia is seen. Upper abdominal structures have an unremarkable appearance. Degenerative spurring is seen in the thoracic spine.     Impression: Impression: CT scan of the chest without contrast demonstrating extensive bronchocentric alveolar airspace disease consistent with pneumonia. Electronically Signed: Srikanth Feng MD  11/16/2024 9:45 AM EST  Workstation ID: [id number]     Results for orders placed during the hospital encounter of 11/25/24    Adult Transthoracic Echo Complete W/ Cont if Necessary Per Protocol    Interpretation Summary    Left ventricular ejection fraction appears to be 56 -  60%.    Left ventricular diastolic function is consistent with (grade I) impaired relaxation and age.    Mild aortic insufficiency.       Bronchoscopy cultures unremarkable  BAL cytology unremarkable       Assessment & Plan   Assessment / Plan     Active Hospital Problems:  Active Hospital Problems    Diagnosis     **Weakness     Severe protein-calorie malnutrition        Impression:  chronic hypoxic respiratory failure, 2 L at baseline  Recent community-acquired pneumonia versus Cryptogenic organizing pneumonia  Question interstitial lung disease  Acute on chronic COPD exacerbation, FEV1 44%  Issues with airway clearance/mucous plugging  Acute on chronic congestive diastolic heart failure, grade 1 DD  Tobacco abuse of cigarettes in remission hypokalemia  Hypomagnesemia  Hypokalemia  Hyponatremia, clinically significant     Plan:  -On home oxygen 2 L/min  -S/p bronchoscopy, pneumonia panel negative, cultures no growth to date, cytology pending.  Unfortunately transbronchial biopsy is not performed.  If patient does not improve, may require repeat bronchoscopy with transbronchial lung biopsies.  -Repeat noncontrast chest CT in 8 weeks as an outpatient  -Completed antibiotics.  Bronchoscopy cultures were negative  -Continue nebulizers and bronchopulmonary hygiene.  Chest vest available.  -Continue prednisone 20 mg daily.  Decrease by 10 mg every 3 days until patient is off steroids as outlined in orders  -Continue Lasix 40 mg p.o. twice daily  -Trend electrolytes and renal panel.  Replace potassium orally  -Continue mucolytics  -Echo with normal EF of 56 to 60%, grade 1 diastolic dysfunction noted.  -Encourage activity and incentive spirometer use  -PT and OT per skilled nursing facility protocols  -Will need to follow-up with us as an outpatient    VTE Prophylaxis:  Pharmacologic VTE prophylaxis orders are present.    CODE STATUS:   Level Of Support Discussed With: Patient  Code Status (Patient has no pulse and is  not breathing): CPR (Attempt to Resuscitate)  Medical Interventions (Patient has pulse or is breathing): Full Support    Will follow peripherally as she is in the skilled nursing facility and stable on her home oxygen.  Please call us with questions.  Otherwise, we will sign off.      Labs, imaging, microbiology, notes and medications personally reviewed  Discussed with primary    Electronically signed by Omari Grady MD, 12/06/24, 1:45 PM EST.

## 2024-12-06 NOTE — PLAN OF CARE
Goal Outcome Evaluation:  Plan of Care Reviewed With: patient        Progress: no change  Outcome Evaluation: Alert and oriented x2; able to voice needs. Reoriented as needed. Transfers x1 person assist using gait belt and walker. No complaints of pain. Continues with congested cough; expiratory wheezes throughout lung fields. Coreg administered at bedtime with B/P of 126/58. Call light within reach; care plan ongoing.

## 2024-12-07 PROCEDURE — 63710000001 PREDNISONE PER 1 MG: Performed by: PHYSICIAN ASSISTANT

## 2024-12-07 PROCEDURE — 97116 GAIT TRAINING THERAPY: CPT

## 2024-12-07 PROCEDURE — 25010000002 ENOXAPARIN PER 10 MG: Performed by: PHYSICIAN ASSISTANT

## 2024-12-07 PROCEDURE — 94799 UNLISTED PULMONARY SVC/PX: CPT

## 2024-12-07 PROCEDURE — 97110 THERAPEUTIC EXERCISES: CPT

## 2024-12-07 PROCEDURE — 94664 DEMO&/EVAL PT USE INHALER: CPT

## 2024-12-07 PROCEDURE — 94669 MECHANICAL CHEST WALL OSCILL: CPT

## 2024-12-07 RX ADMIN — ENOXAPARIN SODIUM 40 MG: 100 INJECTION SUBCUTANEOUS at 08:57

## 2024-12-07 RX ADMIN — NYSTATIN: 100000 POWDER TOPICAL at 20:32

## 2024-12-07 RX ADMIN — BUDESONIDE 0.5 MG: 0.5 INHALANT RESPIRATORY (INHALATION) at 19:21

## 2024-12-07 RX ADMIN — PREDNISONE 20 MG: 20 TABLET ORAL at 08:57

## 2024-12-07 RX ADMIN — IPRATROPIUM BROMIDE AND ALBUTEROL SULFATE 3 ML: .5; 3 SOLUTION RESPIRATORY (INHALATION) at 07:29

## 2024-12-07 RX ADMIN — BUDESONIDE 0.5 MG: 0.5 INHALANT RESPIRATORY (INHALATION) at 07:29

## 2024-12-07 RX ADMIN — Medication 250 MG: at 08:57

## 2024-12-07 RX ADMIN — ARFORMOTEROL TARTRATE 15 MCG: 15 SOLUTION RESPIRATORY (INHALATION) at 07:29

## 2024-12-07 RX ADMIN — GUAIFENESIN 600 MG: 600 TABLET ORAL at 20:28

## 2024-12-07 RX ADMIN — ATORVASTATIN CALCIUM 40 MG: 40 TABLET, FILM COATED ORAL at 20:28

## 2024-12-07 RX ADMIN — Medication 10 MG: at 20:28

## 2024-12-07 RX ADMIN — IPRATROPIUM BROMIDE AND ALBUTEROL SULFATE 3 ML: .5; 3 SOLUTION RESPIRATORY (INHALATION) at 13:43

## 2024-12-07 RX ADMIN — IPRATROPIUM BROMIDE AND ALBUTEROL SULFATE 3 ML: .5; 3 SOLUTION RESPIRATORY (INHALATION) at 19:21

## 2024-12-07 RX ADMIN — Medication 250 MG: at 20:29

## 2024-12-07 RX ADMIN — ASPIRIN 81 MG: 81 TABLET, COATED ORAL at 08:57

## 2024-12-07 RX ADMIN — NYSTATIN: 100000 POWDER TOPICAL at 09:00

## 2024-12-07 RX ADMIN — ARFORMOTEROL TARTRATE 15 MCG: 15 SOLUTION RESPIRATORY (INHALATION) at 19:21

## 2024-12-07 RX ADMIN — GUAIFENESIN 600 MG: 600 TABLET ORAL at 08:57

## 2024-12-07 NOTE — THERAPY TREATMENT NOTE
SNF - Physical Therapy Progress Note   [place]     Patient Name: Cris Agudelo  : 1940  MRN: [id number]  Today's Date: 2024      Visit Dx:     ICD-10-CM ICD-9-CM   1. Difficulty walking  R26.2 719.7   2. Decreased activities of daily living (ADL)  Z78.9 V49.89     Patient Active Problem List   Diagnosis    Hyponatremia    Stress-induced cardiomyopathy    Hyperlipidemia    Primary hypertension    Herpes zoster    Shingles    Non-occlusive coronary artery disease    Pneumonia    Multifocal pneumonia    Acute on chronic respiratory failure with hypoxia    HCAP (healthcare-associated pneumonia)    Weakness    Severe protein-calorie malnutrition     Past Medical History:   Diagnosis Date    CHF (congestive heart failure)     COPD (chronic obstructive pulmonary disease)     History of non-ST elevation myocardial infarction (NSTEMI) 2023    Hyperlipidemia     Hypertension      Past Surgical History:   Procedure Laterality Date    BRONCHOSCOPY N/A 2024    Procedure: BRONCHOSCOPY with BAL, Brushings and washings;  Surgeon: Adan Brown MD;  Location: [facility] ENDOSCOPY;  Service: Pulmonary;  Laterality: N/A;  Bilateral PNA with mucus pluggins    CARDIAC CATHETERIZATION N/A 2023    Procedure: Left Heart Cath;  Surgeon: Mitch Correia MD;  Location: [facility] CATH INVASIVE LOCATION;  Service: Cardiology;  Laterality: N/A;    CARDIAC CATHETERIZATION N/A 2023    Procedure: Coronary angiography;  Surgeon: Mitch Correia MD;  Location: [facility] CATH INVASIVE LOCATION;  Service: Cardiology;  Laterality: N/A;    COLONOSCOPY      EYE SURGERY      HYSTERECTOMY      SKIN BIOPSY      VASCULAR SURGERY       PT Assessment (Last 12 Hours)       PT Evaluation and Treatment       Row Name 24 1200          Physical Therapy Time and Intention    Subjective Information no complaints  -CS     Document Type therapy note (daily note)  -CS     Mode of Treatment individual therapy;physical therapy  -CS      Patient Effort good  -CS     Symptoms Noted During/After Treatment fatigue  -CS       Row Name 12/07/24 1200          Pain    Pretreatment Pain Rating 0/10 - no pain  -CS     Posttreatment Pain Rating 0/10 - no pain  -CS       Row Name 12/07/24 1200          Bed Mobility    Supine-Sit-Supine [place] (Bed Mobility) supervision  -CS     Assistive Device (Bed Mobility) bed rails  -CS       Row Name 12/07/24 1200          Sit-Stand Transfer    Sit-Stand [place] (Transfers) standby assist  -CS     Assistive Device (Sit-Stand Transfers) walker, front-wheeled  -CS       Row Name 12/07/24 1200          Stand-Sit Transfer    Stand-Sit [place] (Transfers) standby assist  -CS     Assistive Device (Stand-Sit Transfers) walker, front-wheeled  -CS       Row Name 12/07/24 1200          Gait/Stairs (Locomotion)    [place] Level (Gait) standby assist  -CS     Assistive Device (Gait) walker, front-wheeled  -CS     Distance in Feet (Gait) 250  + 55  -CS     Pattern (Gait) 4-point;step-through  -CS     Deviations/Abnormal Patterns (Gait) gait speed decreased  -CS     Bilateral Gait Deviations forward flexed posture  -CS       Row Name 12/07/24 1200          Aerobic Exercise    Time Performed (Aerobic Exercise) 10:11  -CS     Comment, Aerobic Exercise (Therapeutic Exercise) Nu-Step x 621 steps at 61 SPM on load 5  -CS       Row Name             Wound 12/04/24 1832 Bilateral lateral groin    Wound - Properties Group Placement Date: 12/04/24  -FH Placement Time: 1832  -FH Side: Bilateral  -FH Orientation: lateral  -FH Location: groin  -FH Primary Wound Type: Other  -FH, rash/redness  Present on Original Admission: Y  -FH    Retired Wound - Properties Group Placement Date: 12/04/24  -FH Placement Time: 1832  -FH Present on Original Admission: Y  -FH Side: Bilateral  -FH Orientation: lateral  -FH Location: groin  -FH Primary Wound Type: Other  -FH, rash/redness     Retired Wound - Properties Group Placement Date:  12/04/24  - Placement Time: 1832  -FH Present on Original Admission: Y  -FH Side: Bilateral  -FH Orientation: lateral  -FH Location: groin  -FH Primary Wound Type: Other  -FH, rash/redness     Retired Wound - Properties Group Date first assessed: 12/04/24  - Time first assessed: 1832  -FH Present on Original Admission: Y  -FH Side: Bilateral  -FH Location: groin  -FH Primary Wound Type: Other  -FH, rash/redness       Row Name             Wound 12/04/24 1835 Left lateral breast    Wound - Properties Group Placement Date: 12/04/24  - Placement Time: 1835  -FH Side: Left  -FH Orientation: lateral  -FH Location: breast  -FH Primary Wound Type: Other  -FH, rash/redness  Present on Original Admission: Y  -FH    Retired Wound - Properties Group Placement Date: 12/04/24  - Placement Time: 1835  -FH Present on Original Admission: Y  -FH Side: Left  -FH Orientation: lateral  -FH Location: breast  -FH Primary Wound Type: Other  -FH, rash/redness     Retired Wound - Properties Group Placement Date: 12/04/24  - Placement Time: 1835  -FH Present on Original Admission: Y  -FH Side: Left  -FH Orientation: lateral  -FH Location: breast  -FH Primary Wound Type: Other  -FH, rash/redness     Retired Wound - Properties Group Date first assessed: 12/04/24  - Time first assessed: 1835  -FH Present on Original Admission: Y  -FH Side: Left  -FH Location: breast  -FH Primary Wound Type: Other  -FH, rash/redness       Row Name 12/07/24 1200          Positioning and Restraints    Pre-Treatment Position in bed  -CS     Post Treatment Position bed  -CS     In Bed fowlers;call light within reach;encouraged to call for assist  no alarms active upon entering room today  -CS       Row Name 12/07/24 1200          Progress Summary (PT)    Progress Toward Functional Goals (PT) progress toward functional goals is good  -CS               User Key  (r) = Recorded By, (t) = Taken By, (c) = Cosigned By      Initials Name Provider Type      Kayleen Scott, RN Registered Nurse    Musa Jerez PTA Physical Therapist Assistant                    Physical Therapy Education        No education to display                  PT Recommendation and Plan     Progress Summary (PT)  Progress Toward Functional Goals (PT): progress toward functional goals is good   Outcome Measures       Row Name 12/07/24 1200 12/06/24 1200 12/05/24 1158       How much help from another person do you currently need...    Turning from your back to your side while in flat bed without using bedrails? 4  -CS 4  -WM 4  -WM    Moving from lying on back to sitting on the side of a flat bed without bedrails? 3  -CS 3  -WM 3  -WM    Moving to and from a bed to a chair (including a wheelchair)? 3  -CS 3  -WM 3  -WM    Standing up from a chair using your arms (e.g., wheelchair, bedside chair)? 4  -CS 3  -WM 3  -WM    Climbing 3-5 steps with a railing? 3  -CS 3  -WM 3  -WM    To walk in hospital room? 3  -CS 3  -WM 3  -WM    AM-PAC 6 Clicks Score (PT) 20  -CS 19  -WM 19  -WM       Functional Assessment    Outcome Measure Options AM-PAC 6 Clicks Basic Mobility (PT)  -CS -- --      Row Name 12/04/24 1800             How much help from another person do you currently need...    Turning from your back to your side while in flat bed without using bedrails? 4  -CSA      Moving from lying on back to sitting on the side of a flat bed without bedrails? 3  -CSA      Moving to and from a bed to a chair (including a wheelchair)? 3  -CSA      Standing up from a chair using your arms (e.g., wheelchair, bedside chair)? 3  -CSA      Climbing 3-5 steps with a railing? 3  -CSA      To walk in hospital room? 3  -CSA      AM-PAC 6 Clicks Score (PT) 19  -CSA         Functional Assessment    Outcome Measure Options AM-PAC 6 Clicks Basic Mobility (PT)  -CSA                User Key  (r) = Recorded By, (t) = Taken By, (c) = Cosigned By      Initials Name Provider Type    Harsh Smith PTA Physical  Therapist Assistant    Lynette Moore, PT Physical Therapist    CS Musa Rowley PTA Physical Therapist Assistant                     Time Calculation:    PT Charges       Row Name 12/07/24 1252             Time Calculation    Start Time 1004  -CS      PT Received On 12/07/24  -CS         Timed Charges    56233 - PT Therapeutic Exercise Minutes 11  -CS      53452 - Gait Training Minutes  10  -CS      82943 - PT Therapeutic Activity Minutes 4  -CS         SNF Physical Therapy Minutes    Skilled Minutes- PT 25 min  -CS         Total Minutes    Timed Charges Total Minutes 25  -CS       Total Minutes 25  -CS                User Key  (r) = Recorded By, (t) = Taken By, (c) = Cosigned By      Initials Name Provider Type    CS Musa Rowley PTA Physical Therapist Assistant                  Therapy Charges for Today       Code Description Service Date Service Provider Modifiers Qty    84230992915 HC PT THER PROC EA 15 MIN 12/7/2024 Musa Rowley PTA GP 1    00476404032 HC GAIT TRAINING EA 15 MIN 12/7/2024 Musa Rowley PTA GP 1            PT G-Codes  Outcome Measure Options: AM-PAC 6 Clicks Basic Mobility (PT)  AM-PAC 6 Clicks Score (PT): 20  AM-PAC 6 Clicks Score (OT): 21    uMsa Rowley PTA  12/7/2024

## 2024-12-07 NOTE — PROGRESS NOTES
[facility]   Hospitalist Progress Note  Date: 2024  Patient Name: Cris Agudelo  : 1940  MRN: [id number]  Date of admission: 2024      Subjective   Subjective     Chief Complaint: Weakness    Summary: Cris Agudelo is a 84 y.o. female past medical history significant for hypertension, CAD, COPD, hypertension, hyperlipidemia, GERD, possible emerging dementia that initially presents to the emergency department with chief complaint of shortness of breath found to be hypoxemic hypercapnic found to have multifocal pneumonia hospitalized for pneumonia started on appropriate antibiotics also concern for volume overload started on diuretics concern for underlying ILD pulmonary consulted underwent bronchoscopy 2024 respiratory status improved clinical status stabilized seen by PT and OT deemed a good candidate for inpatient rehab is been transition skilled nursing facility at [facility] for ongoing therapy.     Interval Followup: Patient seen and examined daughter at the bedside respiratory status continues to improve having some difficulty mobilizing secretions continue with nebulizer treatments bronchopulmonary hygiene may need to consider adding saline nebs.  Continue with Lasix 40 mg daily      Objective   Objective     Vitals:   Temp:  [97.5 °F (36.4 °C)-97.7 °F (36.5 °C)] 97.5 °F (36.4 °C)  Heart Rate:  [80-88] 88  Resp:  [16-18] 18  BP: (116-130)/(40-43) 130/43  Flow (L/min) (Oxygen Therapy):  [2] 2    Physical Exam   Constitutional: Awake alert oriented no acute distress  Respiratory: Coarse breath sounds  Cardiovascular RRR  GI: Abdomen soft nontender    Result Review    Result Review:  I have personally reviewed the results from the time of this admission to 2024 13:09 EST and agree with these findings:  []  Laboratory  []  Microbiology  []  Radiology  []  EKG/Telemetry   []  Cardiology/Vascular   []  Pathology  []  Old records  []  Other:    Assessment & Plan   Assessment / Plan    Assessment:     Hospital-acquired weakness  Multifocal pneumonia resolved  Acute on chronic hypoxemic respiratory failure  Acute decompensation of chronic diastolic congestive heart failure  History of stress-induced cardiomyopathy with recovered ejection fraction  Stable coronary artery disease  Recent exacerbation of COPD   GERD     Plan:     Continue with nebulizer treatments and bronchopulmonary hygiene  Complete steroid taper  Antibiotic course completed  Continuing with diuretics Lasix 40 mg daily  Continue with Coreg 6.25 mg twice daily  Monitor renal function electrolytes  Monitor volume status  Daily PT OT       Discussed plan with RN.    VTE Prophylaxis:  Pharmacologic VTE prophylaxis orders are present.        CODE STATUS:   Level Of Support Discussed With: Patient  Code Status (Patient has no pulse and is not breathing): CPR (Attempt to Resuscitate)  Medical Interventions (Patient has pulse or is breathing): Full Support        Electronically signed by ANABELA Mathias, 12/07/24, 1:09 PM EST.

## 2024-12-07 NOTE — PLAN OF CARE
Goal Outcome Evaluation:  Plan of Care Reviewed With: patient           Outcome Evaluation: Pt is AO x 2 - 3 and VSS. She is a 1 assist to the BSC with a walker and giat belt. No c/o pain or discomfort.She remains on O2 at 2LPM n/c and O2 sats remain stable. Will continue with her POC. Call light and personal items within reach.

## 2024-12-07 NOTE — PLAN OF CARE
Goal Outcome Evaluation:  Plan of Care Reviewed With: patient           Outcome Evaluation: Pt a/ox3-4. Ambulates to toilet with walker and gait belt. No complaints of pain or discomfort. Skin care completed for MASD associated excoriation under breasts and in groin. 2L NC. Continue with POC.

## 2024-12-08 PROCEDURE — 94799 UNLISTED PULMONARY SVC/PX: CPT

## 2024-12-08 PROCEDURE — 94664 DEMO&/EVAL PT USE INHALER: CPT

## 2024-12-08 PROCEDURE — 94669 MECHANICAL CHEST WALL OSCILL: CPT

## 2024-12-08 PROCEDURE — 63710000001 PREDNISONE PER 5 MG: Performed by: PHYSICIAN ASSISTANT

## 2024-12-08 PROCEDURE — 25010000002 ENOXAPARIN PER 10 MG: Performed by: PHYSICIAN ASSISTANT

## 2024-12-08 RX ORDER — CARVEDILOL 3.12 MG/1
3.12 TABLET ORAL 2 TIMES DAILY
Status: DISCONTINUED | OUTPATIENT
Start: 2024-12-08 | End: 2024-12-12 | Stop reason: HOSPADM

## 2024-12-08 RX ADMIN — NYSTATIN: 100000 POWDER TOPICAL at 08:47

## 2024-12-08 RX ADMIN — FUROSEMIDE 40 MG: 40 TABLET ORAL at 08:46

## 2024-12-08 RX ADMIN — ATORVASTATIN CALCIUM 40 MG: 40 TABLET, FILM COATED ORAL at 20:27

## 2024-12-08 RX ADMIN — IPRATROPIUM BROMIDE AND ALBUTEROL SULFATE 3 ML: .5; 3 SOLUTION RESPIRATORY (INHALATION) at 06:45

## 2024-12-08 RX ADMIN — GUAIFENESIN 600 MG: 600 TABLET ORAL at 08:46

## 2024-12-08 RX ADMIN — ASPIRIN 81 MG: 81 TABLET, COATED ORAL at 08:46

## 2024-12-08 RX ADMIN — BUDESONIDE 0.5 MG: 0.5 INHALANT RESPIRATORY (INHALATION) at 06:45

## 2024-12-08 RX ADMIN — Medication 250 MG: at 08:46

## 2024-12-08 RX ADMIN — IPRATROPIUM BROMIDE AND ALBUTEROL SULFATE 3 ML: .5; 3 SOLUTION RESPIRATORY (INHALATION) at 19:23

## 2024-12-08 RX ADMIN — GUAIFENESIN 600 MG: 600 TABLET ORAL at 20:27

## 2024-12-08 RX ADMIN — IPRATROPIUM BROMIDE AND ALBUTEROL SULFATE 3 ML: .5; 3 SOLUTION RESPIRATORY (INHALATION) at 11:40

## 2024-12-08 RX ADMIN — Medication 10 MG: at 20:26

## 2024-12-08 RX ADMIN — CARVEDILOL 3.12 MG: 3.12 TABLET, FILM COATED ORAL at 20:26

## 2024-12-08 RX ADMIN — ENOXAPARIN SODIUM 40 MG: 100 INJECTION SUBCUTANEOUS at 08:46

## 2024-12-08 RX ADMIN — PREDNISONE 10 MG: 10 TABLET ORAL at 08:46

## 2024-12-08 RX ADMIN — NYSTATIN: 100000 POWDER TOPICAL at 20:28

## 2024-12-08 RX ADMIN — Medication 250 MG: at 20:26

## 2024-12-08 RX ADMIN — ARFORMOTEROL TARTRATE 15 MCG: 15 SOLUTION RESPIRATORY (INHALATION) at 19:23

## 2024-12-08 RX ADMIN — CARVEDILOL 3.12 MG: 3.12 TABLET, FILM COATED ORAL at 08:46

## 2024-12-08 RX ADMIN — BUDESONIDE 0.5 MG: 0.5 INHALANT RESPIRATORY (INHALATION) at 19:23

## 2024-12-08 RX ADMIN — ARFORMOTEROL TARTRATE 15 MCG: 15 SOLUTION RESPIRATORY (INHALATION) at 06:45

## 2024-12-08 NOTE — PLAN OF CARE
Goal Outcome Evaluation:  Plan of Care Reviewed With: patient           Outcome Evaluation: Pt is AO x 3 - 4 and VSS. She is a 1 assist for transfers with a walker and gait belt. She had no complaints of pain or discomfort. Will continue with her plan of care. Call light within reach.

## 2024-12-08 NOTE — PLAN OF CARE
Goal Outcome Evaluation:  Plan of Care Reviewed With: patient        Progress: no change  Outcome Evaluation: Resident alert x3, not oriented to time. Transfers with assist of one to bathroom. Remained in bed most of shift today, refused to sit up in chair. Family at BS this afternoon. Continues to present with exipratory adventitious lung sounds. Rhonchi heard, cleared with coughing. coughs up thick green phlegm. Receives respiratory tx via breathing tx and percussion vest.participated in bedside exercises with pca. Reident pleasant and cooperative.

## 2024-12-09 ENCOUNTER — TELEPHONE (OUTPATIENT)
Dept: CARDIOLOGY | Facility: CLINIC | Age: 84
End: 2024-12-09
Payer: MEDICARE

## 2024-12-09 VITALS
OXYGEN SATURATION: 98 % | HEART RATE: 77 BPM | BODY MASS INDEX: 27.66 KG/M2 | DIASTOLIC BLOOD PRESSURE: 50 MMHG | WEIGHT: 140.87 LBS | RESPIRATION RATE: 18 BRPM | HEIGHT: 60 IN | SYSTOLIC BLOOD PRESSURE: 125 MMHG | TEMPERATURE: 98.3 F

## 2024-12-09 LAB
VIRUS SPEC CULT: NORMAL
VIRUS SPEC CULT: NORMAL

## 2024-12-09 PROCEDURE — 94799 UNLISTED PULMONARY SVC/PX: CPT

## 2024-12-09 PROCEDURE — 97110 THERAPEUTIC EXERCISES: CPT

## 2024-12-09 PROCEDURE — 94669 MECHANICAL CHEST WALL OSCILL: CPT

## 2024-12-09 PROCEDURE — 94664 DEMO&/EVAL PT USE INHALER: CPT

## 2024-12-09 PROCEDURE — 63710000001 PREDNISONE PER 5 MG: Performed by: PHYSICIAN ASSISTANT

## 2024-12-09 PROCEDURE — 97116 GAIT TRAINING THERAPY: CPT

## 2024-12-09 PROCEDURE — 97535 SELF CARE MNGMENT TRAINING: CPT

## 2024-12-09 PROCEDURE — 25010000002 ENOXAPARIN PER 10 MG: Performed by: PHYSICIAN ASSISTANT

## 2024-12-09 PROCEDURE — 97530 THERAPEUTIC ACTIVITIES: CPT

## 2024-12-09 RX ADMIN — CARVEDILOL 3.12 MG: 3.12 TABLET, FILM COATED ORAL at 20:43

## 2024-12-09 RX ADMIN — BUDESONIDE 0.5 MG: 0.5 INHALANT RESPIRATORY (INHALATION) at 09:13

## 2024-12-09 RX ADMIN — ARFORMOTEROL TARTRATE 15 MCG: 15 SOLUTION RESPIRATORY (INHALATION) at 19:10

## 2024-12-09 RX ADMIN — Medication 250 MG: at 20:43

## 2024-12-09 RX ADMIN — IPRATROPIUM BROMIDE AND ALBUTEROL SULFATE 3 ML: .5; 3 SOLUTION RESPIRATORY (INHALATION) at 19:10

## 2024-12-09 RX ADMIN — Medication 250 MG: at 08:14

## 2024-12-09 RX ADMIN — NYSTATIN: 100000 POWDER TOPICAL at 20:44

## 2024-12-09 RX ADMIN — Medication 10 MG: at 20:42

## 2024-12-09 RX ADMIN — ENOXAPARIN SODIUM 40 MG: 100 INJECTION SUBCUTANEOUS at 08:14

## 2024-12-09 RX ADMIN — ASPIRIN 81 MG: 81 TABLET, COATED ORAL at 08:14

## 2024-12-09 RX ADMIN — CARVEDILOL 3.12 MG: 3.12 TABLET, FILM COATED ORAL at 08:16

## 2024-12-09 RX ADMIN — ARFORMOTEROL TARTRATE 15 MCG: 15 SOLUTION RESPIRATORY (INHALATION) at 09:13

## 2024-12-09 RX ADMIN — GUAIFENESIN 600 MG: 600 TABLET ORAL at 20:43

## 2024-12-09 RX ADMIN — ATORVASTATIN CALCIUM 40 MG: 40 TABLET, FILM COATED ORAL at 20:42

## 2024-12-09 RX ADMIN — BUDESONIDE 0.5 MG: 0.5 INHALANT RESPIRATORY (INHALATION) at 19:10

## 2024-12-09 RX ADMIN — IPRATROPIUM BROMIDE AND ALBUTEROL SULFATE 3 ML: .5; 3 SOLUTION RESPIRATORY (INHALATION) at 09:13

## 2024-12-09 RX ADMIN — GUAIFENESIN 600 MG: 600 TABLET ORAL at 08:16

## 2024-12-09 RX ADMIN — FUROSEMIDE 40 MG: 40 TABLET ORAL at 08:14

## 2024-12-09 RX ADMIN — PREDNISONE 10 MG: 10 TABLET ORAL at 08:19

## 2024-12-09 RX ADMIN — NYSTATIN: 100000 POWDER TOPICAL at 08:17

## 2024-12-09 NOTE — PLAN OF CARE
Goal Outcome Evaluation:  Plan of Care Reviewed With: patient        Progress: no change  Outcome Evaluation: Resident alert x2-3, forgetful, not orineted to time. Transfers with assist of one to bathroom. Needs cueing to complete tasks, participated in therapy as ordered. Observed ambulating in draper with PCA this afternoon, tolerated well. daughter at BS this afternoon. Continues treatments with respiratory therapy, including percussion vets. Lungs sound better today, still very diminished, continues with congested cough. Will continue current POC.

## 2024-12-09 NOTE — PLAN OF CARE
Goal Outcome Evaluation:  Plan of Care Reviewed With: patient           Outcome Evaluation: No significant changes this shift. She is a 1 assist to the bathroom with a walker. No c/o pain. Will continue with her POC. Call light and personal items within reach.

## 2024-12-09 NOTE — THERAPY TREATMENT NOTE
SNF - Occupational Therapy Treatment Note   [place]    Patient Name: Cris Agudelo  : 1940    MRN: [id number]                              Today's Date: 2024       Admit Date: 2024    Visit Dx:     ICD-10-CM ICD-9-CM   1. Difficulty walking  R26.2 719.7   2. Decreased activities of daily living (ADL)  Z78.9 V49.89     Patient Active Problem List   Diagnosis    Hyponatremia    Stress-induced cardiomyopathy    Hyperlipidemia    Primary hypertension    Herpes zoster    Shingles    Non-occlusive coronary artery disease    Pneumonia    Multifocal pneumonia    Acute on chronic respiratory failure with hypoxia    HCAP (healthcare-associated pneumonia)    Weakness    Severe protein-calorie malnutrition     Past Medical History:   Diagnosis Date    CHF (congestive heart failure)     COPD (chronic obstructive pulmonary disease)     History of non-ST elevation myocardial infarction (NSTEMI) 2023    Hyperlipidemia     Hypertension      Past Surgical History:   Procedure Laterality Date    BRONCHOSCOPY N/A 2024    Procedure: BRONCHOSCOPY with BAL, Brushings and washings;  Surgeon: Adan Brown MD;  Location: [facility] ENDOSCOPY;  Service: Pulmonary;  Laterality: N/A;  Bilateral PNA with mucus pluggins    CARDIAC CATHETERIZATION N/A 2023    Procedure: Left Heart Cath;  Surgeon: Mitch Correia MD;  Location: [facility] CATH INVASIVE LOCATION;  Service: Cardiology;  Laterality: N/A;    CARDIAC CATHETERIZATION N/A 2023    Procedure: Coronary angiography;  Surgeon: Mitch Correia MD;  Location: [facility] CATH INVASIVE LOCATION;  Service: Cardiology;  Laterality: N/A;    COLONOSCOPY      EYE SURGERY      HYSTERECTOMY      SKIN BIOPSY      VASCULAR SURGERY        General Information       Row Name 24 1138          OT Time and Intention    Document Type therapy note (daily note)  -EG     Mode of Treatment individual therapy;occupational therapy  -EG     Patient Effort good  -EG       Row Name  12/09/24 1138          General Information    Existing Precautions/Restrictions fall;oxygen therapy device and L/min  -EG       Row Name 12/09/24 1138          Cognition    Orientation Status (Cognition) oriented to;person;place;other (see comments)  -EG       Row Name 12/09/24 1138          Safety Issues/Impairments Affecting Functional Mobility    Impairments Affecting Function (Mobility) balance;endurance/activity tolerance;strength  -EG               User Key  (r) = Recorded By, (t) = Taken By, (c) = Cosigned By      Initials Name Provider Type    EG Janine Quiros OT Occupational Therapist                     Mobility/ADL's       Row Name 12/09/24 1138          Bed Mobility    Bed Mobility supine-sit-supine  -EG     Supine-Sit-Supine [place] (Bed Mobility) standby assist  -EG     Assistive Device (Bed Mobility) bed rails  -EG       [facility] Name 12/09/24 1138          Transfers    Transfers sit-stand transfer;stand-sit transfer;toilet transfer  -EG     Comment, (Transfers) transfer in and out of walk-in shower with bench; multiple chairs with arms all using RW Close CGA reccomended with fall risk and poor insight noted  -EG       [facility] Name 12/09/24 1138          Sit-Stand Transfer    Sit-Stand [place] (Transfers) contact guard  -EG     Assistive Device (Sit-Stand Transfers) walker, front-wheeled  -EG       Row Name 12/09/24 1138          Stand-Sit Transfer    Stand-Sit [place] (Transfers) contact guard  -EG     Assistive Device (Stand-Sit Transfers) walker, front-wheeled  -EG       Row Name 12/09/24 1138          Toilet Transfer    [place] Level (Toilet Transfer) contact guard;verbal cues;nonverbal cues (demo/gesture)  -EG     Assistive Device (Toilet Transfer) commode chair;grab bars/safety frame  -EG       Row Name 12/09/24 1138          Functional Mobility    Functional Mobility- Ind. Level contact guard assist;verbal cues required  -EG     Functional Mobility- Device walker, front-wheeled   -EG     Functional Mobility- Comment OT transitioned patient to use of RW due to safety concerns; Patient able to perform functional mobility to and from shower room using RW with mod cues for safety and proper use of AD required; education and training on O2 tubing management and fall risk prevention; poor safety and cue follow through  -EG       Row Name 12/09/24 1138          Activities of Daily Living    BADL Assessment/Intervention bathing;upper body dressing;grooming;lower body dressing;toileting  -EG       Row Name 12/09/24 1138          Bathing Assessment/Intervention    [place] Level (Bathing) bathing skills;upper body;lower body;contact guard assist;verbal cues;nonverbal cues (demo/gesture)  -EG     Comment, (Bathing) Close CGA in shower; limited use of shower bench for seated compensatory technique use despite high fall risk with education and cueing provided 50% of time by OT; Patient with poor insight  -EG       Row Name 12/09/24 1138          Upper Body Dressing Assessment/Training    [place] Level (Upper Body Dressing) upper body dressing skills;set up  -EG       Row Name 12/09/24 1138          Lower Body Dressing Assessment/Training    [place] Level (Lower Body Dressing) lower body dressing skills;don;doff;shoes/slippers;socks;pants/bottoms;contact guard assist  -EG       Row Name 12/09/24 1138          Grooming Assessment/Training    [place] Level (Grooming) grooming skills;set up;hair care, combing/brushing;wash face, hands;oral care regimen  -EG       Row Name 12/09/24 1138          Toileting Assessment/Training    [place] Level (Toileting) toileting skills;contact guard assist;adjust/manage clothing;perform perineal hygiene  -EG     Comment, (Toileting) cues and education on safe small space manuevering required  -EG               User Key  (r) = Recorded By, (t) = Taken By, (c) = Cosigned By      Initials Name Provider Type    EG Janine Quiros, OT Occupational  Therapist                   Obj/Interventions       [facility] Name 12/09/24 1142          Vision Assessment/Intervention    Visual Impairment/Limitations WFL  -EG       Row Name 12/09/24 1142          Shoulder (Therapeutic Exercise)    Shoulder (Therapeutic Exercise) strengthening exercise  -EG     Shoulder Strengthening (Therapeutic Exercise) 1 lb free weight;bilateral;flexion;extension;scapular stabilization;external rotation;internal rotation;2 sets;10 repetitions;sitting  -EG       [facility] Name 12/09/24 1142          Elbow/Forearm (Therapeutic Exercise)    Elbow/Forearm (Therapeutic Exercise) strengthening exercise  -EG     Elbow/Forearm Strengthening (Therapeutic Exercise) 1 lb free weight;bilateral;flexion;extension;2 sets;10 repetitions;sitting  -EG       [facility] Name 12/09/24 1142          Motor Skills    Therapeutic Exercise shoulder;elbow/forearm  -EG       Row Name 12/09/24 1142          Balance    Balance Interventions standing;sit to stand;supported;static;dynamic;weight shifting activity;occupation based/functional task;dynamic reaching  -EG               User Key  (r) = Recorded By, (t) = Taken By, (c) = Cosigned By      Initials Name Provider Type    EG Janine Quiros, RAZ Occupational Therapist                   Goals/Plan    No documentation.                  Clinical Impression       Row Name 12/09/24 1143          Pain Assessment    Posttreatment Pain Rating 0/10 - no pain  -EG       Row Name 12/09/24 1143          Plan of Care Review    Plan of Care Reviewed With patient  -EG     Progress no change  -EG     Outcome Evaluation Patient pleasant and cooperative;  no complaints of pain but flat affect noted with decreased conversation from Patient; OT services noting patient is a high fall risk with poor insight and limited safety cue follow through; Poor education carryover noted at this time with compensatory techniques for ADL's; CGA for most ADL's but high safety concern is noted; Decreased awareness of O2  tubing, OT reccomending 24/7 SUP at this time. ax1 w/RW  -EG       Row Name 12/09/24 1143          Therapy Assessment/Plan (OT)    Rehab Potential (OT) good  -EG     Criteria for Skilled Therapeutic Interventions Met (OT) yes;meets criteria;skilled treatment is necessary  -EG     Therapy Frequency (OT) 5 times/wk  -EG       Row Name 12/09/24 1143          Therapy Plan Review/Discharge Plan (OT)    Anticipated Discharge Disposition (OT) home with home health  -EG       Row Name 12/09/24 1143          Positioning and Restraints    Pre-Treatment Position in bed  -EG     Post Treatment Position bed  -EG     In Bed sitting EOB;call light within reach;exit alarm on;encouraged to call for assist  -EG               User Key  (r) = Recorded By, (t) = Taken By, (c) = Cosigned By      Initials Name Provider Type    Janine Khanna, RAZ Occupational Therapist                   Outcome Measures       Row Name 12/09/24 1145          How much help from another is currently needed...    Putting on and taking off regular lower body clothing? 3  -EG     Bathing (including washing, rinsing, and drying) 3  -EG     Toileting (which includes using toilet bed pan or urinal) 3  -EG     Putting on and taking off regular upper body clothing 4  -EG     Taking care of personal grooming (such as brushing teeth) 4  -EG     Eating meals 4  -EG     AM-PAC 6 Clicks Score (OT) 21  -EG       Row Name 12/09/24 0950 12/09/24 0903       How much help from another person do you currently need...    Turning from your back to your side while in flat bed without using bedrails? 4  -FH 4  -WM    Moving from lying on back to sitting on the side of a flat bed without bedrails? 3  -FH 3  -WM    Moving to and from a bed to a chair (including a wheelchair)? 3  -FH 3  -WM    Standing up from a chair using your arms (e.g., wheelchair, bedside chair)? 4  -FH 4  -WM    Climbing 3-5 steps with a railing? 2  -FH 2  -WM    To walk in hospital room? 3  -FH 3  -WM     AM-[facility] 6 Clicks Score (PT) 19  -FH 19  -WM    Highest Level of Mobility Goal 6 --> Walk 10 steps or more  -FH 6 --> Walk 10 steps or more  -      Row Name 12/09/24 1145          Functional Assessment    Outcome Measure Options AM-[facility] 6 Clicks Daily Activity (OT);Optimal Instrument  -EG       Row Name 12/09/24 1145          Optimal Instrument    Optimal Instrument Optimal - 3  -EG     Bending/Stooping 2  -EG     Standing 2  -EG     Reaching 1  -EG               User Key  (r) = Recorded By, (t) = Taken By, (c) = Cosigned By      Initials Name Provider Type     Kayleen Scott, RN Registered Nurse    Harsh Smith PTA Physical Therapist Assistant    EG Janine Quiros OT Occupational Therapist                  Section G  Mobility  Bed mobility - self performance: limited assistance (staff provide guided maneuvering of limbs or other non-weight bearing assistance)  Bed mobility support/assistance: One person assist  Transfer - self performance: limited assistance (staff provide guided maneuvering of limbs or other non-weight bearing assistance)  Transfer support/assistance: One person assist  Walking in room - self performance: limited assistance (staff provide guided maneuvering of limbs or other non-weight bearing assistance)  Walking in room support/assistance: One person assist  Walking in corridors/hallway - self performance: limited assistance (staff provide guided maneuvering of limbs or other non-weight bearing assistance)  Walking in corridors/hallway support/assistance: One person assist  Locomotion on unit - self performance: limited assistance (staff provide guided maneuvering of limbs or other non-weight bearing assistance)  Locomotion on unit support/assistance: One person assist  Locomotion off unit - self performance: activity did not occur  Locomotion off unit support/assistance: Activity did not occur  Dressing - self performance: limited assistance (staff provide guided maneuvering of  limbs or other non-weight bearing assistance)  Dressing support/assistance: One person assist  Eating - self performance: independent  Eating support/assistance: Setup help only  Toileting - self performance: limited assistance (staff provide guided maneuvering of limbs or other non-weight bearing assistance)  Toileting support/assistance: One person assist  Personal hygiene - self performance: limited assistance (staff provide guided maneuvering of limbs or other non-weight bearing assistance)  Personal hygiene support/assistance: One person assist  Bathing  Bathing - self performance: Physical help only to transfer to bathe  Bathing support/assistance: One person assist  Balance  Balance during transitions & walking: Not steady, requires assist to steady  Moving from seated to standing position: Not steady, requires assist to steady  Walking: Not steady, requires assist to steady  Turning around while walking: Not steady, requires assist to steady  Moving on and off toilet: Not steady, requires assist to steady  Surface-to-surface transfer: Not steady, requires assist to steady  Mobility devices: Cane/crutch  Range of Motion  Upper Extremity: No impairment  Lower Extremity: No impairment  Section GG  Functional Ability/Goals, Adm (Section GG)  Self Care, Prior Functioning (JP6479W): 3. Independent  Functional Cognition, Prior Functioning (LI5071X): 3. Independent  Prior Device Use (DR5898): none of the above (Z)  Upper Extremity Range of Motion (MU5698K): No impairment  Lower Extremity Range of Motion (PC1003G): No impairment  Self Care, Admission (Section GG)  Eating: Self-Care Admission Performance (AB5487I1): setup or clean-up assistance (05)  Oral Hygiene: Self-Care Admission Performance (JC1080B5): setup or clean-up assistance (05)  Toileting Hygiene: Self-Care Admission Performance (BT5155U5): supervision or touching assistance (04)  Shower/Bathe Self: Self-Care Admission Performance (OS7624N5):  partial/moderate assistance (03)  Upper Body Dressing: Self-Care Admission Performance (YF9442G1): supervision or touching assistance (04)  Lower Body Dressing: Self-Care Admission Performance (OH1443Q0): supervision or touching assistance (04)  Putting On/Taking Off Footwear: Self-Care Admission Performance (SL6182O2): supervision or touching assistance (04)  Personal Hygiene: Self-Care Admission Performance (CN7389T2): supervision or touching assistance (04)  Mobility, Admission Performance (IL6747)  Toilet Transfer: Mobility Admission Performance (DG0223D2): supervision or touching assistance (04)  Tub/shower Transfer: Mobility Admission Performance (FG2690WO6): supervision or touching assistance (04)                Occupational Therapy Education       Title: PT OT SLP Therapies (Done)       Topic: Occupational Therapy (Done)       Point: ADL training (Done)       Description:   Instruct learner(s) on proper safety adaptation and remediation techniques during self care or transfers.   Instruct in proper use of assistive devices.                  Learning Progress Summary            AGNES Argueta VU by JAYCE at 12/5/2024 1142    Comment: Education on options for AD use during mobility  Eduation on OT services  Education on PLB and energy conservation techniques                      Point: Home exercise program (Done)       Description:   Instruct learner(s) on appropriate technique for monitoring, assisting and/or progressing therapeutic exercises/activities.                  Learning Progress Summary            AGNES Argueta VU by JAYCE at 12/5/2024 1142    Comment: Education on options for AD use during mobility  Eduation on OT services  Education on PLB and energy conservation techniques                      Point: Precautions (Done)       Description:   Instruct learner(s) on prescribed precautions during self-care and functional transfers.                  Learning Progress Summary            Patient Eager,  AGNES, VU by EG at 12/5/2024 1142    Comment: Education on options for AD use during mobility  Eduation on OT services  Education on PLB and energy conservation techniques                      Point: Body mechanics (Done)       Description:   Instruct learner(s) on proper positioning and spine alignment during self-care, functional mobility activities and/or exercises.                  Learning Progress Summary            Patient AGNES Arias VU by EG at 12/5/2024 1142    Comment: Education on options for AD use during mobility  Eduation on OT services  Education on PLB and energy conservation techniques                                      User Key       Initials Effective Dates Name Provider Type Discipline    EG 09/14/22 -  Janine Quiros, OT Occupational Therapist OT                  OT Recommendation and Plan  Planned Therapy Interventions (OT): functional balance retraining, activity tolerance training, occupation/activity based interventions, adaptive equipment training, BADL retraining, neuromuscular control/coordination retraining, patient/caregiver education/training, transfer/mobility retraining, strengthening exercise  Therapy Frequency (OT): 5 times/wk  Plan of Care Review  Plan of Care Reviewed With: patient  Progress: no change  Outcome Evaluation: Patient pleasant and cooperative;  no complaints of pain but flat affect noted with decreased conversation from Patient; OT services noting patient is a high fall risk with poor insight and limited safety cue follow through; Poor education carryover noted at this time with compensatory techniques for ADL's; CGA for most ADL's but high safety concern is noted; Decreased awareness of O2 tubing, OT reccomending 24/7 SUP at this time. ax1 w/RW     Time Calculation:   Evaluation Complexity (OT)  Review Occupational Profile/Medical/Therapy History Complexity: expanded/moderate complexity  Assessment, Occupational Performance/Identification of Deficit Complexity: 3-5  performance deficits  Clinical Decision Making Complexity (OT): detailed assessment/moderate complexity  Overall Complexity of Evaluation (OT): moderate complexity     Time Calculation- OT       Row Name 12/09/24 1145 12/09/24 0858          Time Calculation- OT    OT Received On 12/09/24  -EG --     OT Goal Re-Cert Due Date 01/04/24  -EG --        Timed Charges    45013 - OT Therapeutic Exercise Minutes 14  -EG --     52811 - Gait Training Minutes  -- 9  -WM     66107 - OT Therapeutic Activity Minutes 10  -EG --     85019 - OT Self Care/Mgmt Minutes 31  -EG --        SNF Occupational Therapy Minutes    Skilled Minutes- OT 55 min  -EG --        Total Minutes    Timed Charges Total Minutes 55  -EG 9  -WM      Total Minutes 55  -EG 9  -WM               User Key  (r) = Recorded By, (t) = Taken By, (c) = Cosigned By      Initials Name Provider Type    Harsh Smith, PTA Physical Therapist Assistant    Janine Khanna OT Occupational Therapist                  Therapy Charges for Today       Code Description Service Date Service Provider Modifiers Qty    95910807092 HC OT THER PROC EA 15 MIN 12/9/2024 Janine Quiros OT GO 1    98301983102 HC OT THERAPEUTIC ACT EA 15 MIN 12/9/2024 Janine Quiros OT GO 1    62138962091 HC OT SELF CARE/MGMT/TRAIN EA 15 MIN 12/9/2024 Janine Quiros OT GO 2                 Janine Quiros OT  12/9/2024

## 2024-12-09 NOTE — THERAPY TREATMENT NOTE
SNF - Physical Therapy Treatment Note   [place]     Patient Name: Cris Agudelo  : 1940  MRN: [id number]  Today's Date: 2024      Visit Dx:    ICD-10-CM ICD-9-CM   1. Difficulty walking  R26.2 719.7   2. Decreased activities of daily living (ADL)  Z78.9 V49.89     Patient Active Problem List   Diagnosis    Hyponatremia    Stress-induced cardiomyopathy    Hyperlipidemia    Primary hypertension    Herpes zoster    Shingles    Non-occlusive coronary artery disease    Pneumonia    Multifocal pneumonia    Acute on chronic respiratory failure with hypoxia    HCAP (healthcare-associated pneumonia)    Weakness    Severe protein-calorie malnutrition     Past Medical History:   Diagnosis Date    CHF (congestive heart failure)     COPD (chronic obstructive pulmonary disease)     History of non-ST elevation myocardial infarction (NSTEMI) 2023    Hyperlipidemia     Hypertension      Past Surgical History:   Procedure Laterality Date    BRONCHOSCOPY N/A 2024    Procedure: BRONCHOSCOPY with BAL, Brushings and washings;  Surgeon: Adan Brown MD;  Location: [facility] ENDOSCOPY;  Service: Pulmonary;  Laterality: N/A;  Bilateral PNA with mucus pluggins    CARDIAC CATHETERIZATION N/A 2023    Procedure: Left Heart Cath;  Surgeon: Mitch Correia MD;  Location: [facility] CATH INVASIVE LOCATION;  Service: Cardiology;  Laterality: N/A;    CARDIAC CATHETERIZATION N/A 2023    Procedure: Coronary angiography;  Surgeon: Mitch Correia MD;  Location: [facility] CATH INVASIVE LOCATION;  Service: Cardiology;  Laterality: N/A;    COLONOSCOPY      EYE SURGERY      HYSTERECTOMY      SKIN BIOPSY      VASCULAR SURGERY         PT Assessment (Last 12 Hours)       PT Evaluation and Treatment       Row Name 24 0859          Physical Therapy Time and Intention    Document Type therapy note (daily note)  -WM     Mode of Treatment individual therapy;physical therapy  -WM     Patient Effort good  -WM     Symptoms Noted  During/After Treatment fatigue  -       Row Name 12/09/24 0859          Bed Mobility    Supine-Sit-Supine [place] (Bed Mobility) standby assist  -     Assistive Device (Bed Mobility) bed rails  -       Row Name 12/09/24 0859          Sit-Stand Transfer    Sit-Stand [place] (Transfers) standby assist  -     Assistive Device (Sit-Stand Transfers) walker, front-wheeled  -       Row Name 12/09/24 0859          Stand-Sit Transfer    Stand-Sit [place] (Transfers) standby assist  -     Assistive Device (Stand-Sit Transfers) walker, front-wheeled  -       Row Name 12/09/24 0859          Gait/Stairs (Locomotion)    [place] Level (Gait) standby assist  -     Assistive Device (Gait) walker, front-wheeled  -     Distance in Feet (Gait) 250  + 55  -     Pattern (Gait) 4-point;step-through  -     Deviations/Abnormal Patterns (Gait) gait speed decreased  -       Row Name 12/09/24 0859          Safety Issues/Impairments Affecting Functional Mobility    Impairments Affecting Function (Mobility) balance;endurance/activity tolerance;strength  -       Row Name 12/09/24 0859          Hip (Therapeutic Exercise)    Hip Strengthening (Therapeutic Exercise) bilateral;aBduction;heel slides;marching while seated;sitting;supine;resistance band;green;15 repititions;2 sets  Manual resistance  -       Row Name 12/09/24 0859          Knee (Therapeutic Exercise)    Knee (Therapeutic Exercise) AROM (active range of motion)  -     Knee AROM (Therapeutic Exercise) bilateral;LAQ (long arc quad);sitting;15 repititions;2 sets  -     Knee Strengthening (Therapeutic Exercise) bilateral;hamstring curls;sitting;resistance band;green;15 repititions;2 sets  -       Row Name 12/09/24 0859          Ankle (Therapeutic Exercise)    Ankle AROM (Therapeutic Exercise) bilateral;dorsiflexion;plantarflexion;supine;30 repititions  -       Row Name 12/09/24 0859          Aerobic Exercise    Time Performed (Aerobic  Exercise) NuStep x 15 minutes, load 3  -WM       Row Name             Wound 12/04/24 1832 Bilateral lateral groin    Wound - Properties Group Placement Date: 12/04/24  - Placement Time: 1832  -FH Side: Bilateral  -FH Orientation: lateral  -FH Location: groin  -FH Primary Wound Type: Other  -FH, rash/redness  Present on Original Admission: Y  -FH    Retired Wound - Properties Group Placement Date: 12/04/24  - Placement Time: 1832  -FH Present on Original Admission: Y  -FH Side: Bilateral  -FH Orientation: lateral  -FH Location: groin  -FH Primary Wound Type: Other  -FH, rash/redness     Retired Wound - Properties Group Placement Date: 12/04/24  - Placement Time: 1832  -FH Present on Original Admission: Y  -FH Side: Bilateral  -FH Orientation: lateral  -FH Location: groin  -FH Primary Wound Type: Other  -FH, rash/redness     Retired Wound - Properties Group Date first assessed: 12/04/24  - Time first assessed: 1832  -FH Present on Original Admission: Y  -FH Side: Bilateral  -FH Location: groin  -FH Primary Wound Type: Other  -FH, rash/redness       Row Name             Wound 12/04/24 1835 Left lateral breast    Wound - Properties Group Placement Date: 12/04/24  - Placement Time: 1835 -FH Side: Left  -FH Orientation: lateral  -FH Location: breast  -FH Primary Wound Type: Other  -FH, rash/redness  Present on Original Admission: Y  -FH    Retired Wound - Properties Group Placement Date: 12/04/24  - Placement Time: 1835  -FH Present on Original Admission: Y  -FH Side: Left  -FH Orientation: lateral  -FH Location: breast  -FH Primary Wound Type: Other  -FH, rash/redness     Retired Wound - Properties Group Placement Date: 12/04/24  - Placement Time: 1835  -FH Present on Original Admission: Y  -FH Side: Left  -FH Orientation: lateral  -FH Location: breast  -FH Primary Wound Type: Other  -FH, rash/redness     Retired Wound - Properties Group Date first assessed: 12/04/24  - Time first assessed: 1835  -FH  Present on Original Admission: Y  -FH Side: Left  -FH Location: breast  -FH Primary Wound Type: Other  -FH, rash/redness       Row Name 12/09/24 0859          Positioning and Restraints    Pre-Treatment Position sitting in chair/recliner  -WM     Post Treatment Position bed  -WM     In Bed fowlers;call light within reach;encouraged to call for assist  -WM       Row Name 12/09/24 0859          Progress Summary (PT)    Progress Toward Functional Goals (PT) progress toward functional goals is good  -WM               User Key  (r) = Recorded By, (t) = Taken By, (c) = Cosigned By      Initials Name Provider Type     Kayleen Scott, RN Registered Nurse    Harsh Smith PTA Physical Therapist Assistant                  Section G              Section GG                       Physical Therapy Education        No education to display                  PT Recommendation and Plan     Progress Summary (PT)  Progress Toward Functional Goals (PT): progress toward functional goals is good   Outcome Measures       Row Name 12/09/24 0903 12/07/24 1200 12/06/24 1200       How much help from another person do you currently need...    Turning from your back to your side while in flat bed without using bedrails? 4  -WM 4  -CS 4  -WM    Moving from lying on back to sitting on the side of a flat bed without bedrails? 3  -WM 3  -CS 3  -WM    Moving to and from a bed to a chair (including a wheelchair)? 3  -WM 3  -CS 3  -WM    Standing up from a chair using your arms (e.g., wheelchair, bedside chair)? 4  -WM 4  -CS 3  -WM    Climbing 3-5 steps with a railing? 2  -WM 3  -CS 3  -WM    To walk in hospital room? 3  -WM 3  -CS 3  -WM    AM-PAC 6 Clicks Score (PT) 19  -WM 20  -CS 19  -WM       Functional Assessment    Outcome Measure Options -- AM-PAC 6 Clicks Basic Mobility (PT)  -CS --              User Key  (r) = Recorded By, (t) = Taken By, (c) = Cosigned By      Initials Name Provider Type    Harsh Smith PTA Physical  Therapist Assistant    Musa Jerez PTA Physical Therapist Assistant                     Time Calculation:    PT Charges       Row Name 12/09/24 0858             Time Calculation    PT Received On 12/09/24  -WM         Timed Charges    83520 - PT Therapeutic Exercise Minutes 30  -WM      86269 - Gait Training Minutes  9  -WM      16310 - PT Therapeutic Activity Minutes 4  -WM         SNF Physical Therapy Minutes    Skilled Minutes- PT 43 min  -WM         Total Minutes    Timed Charges Total Minutes 43  -WM       Total Minutes 43  -WM                User Key  (r) = Recorded By, (t) = Taken By, (c) = Cosigned By      Initials Name Provider Type    Harsh Smith PTA Physical Therapist Assistant                      PT G-Codes  Outcome Measure Options: AM-PAC 6 Clicks Basic Mobility (PT)  AM-PAC 6 Clicks Score (PT): 19  AM-PAC 6 Clicks Score (OT): 21    Harsh Viramontes PTA  12/9/2024

## 2024-12-09 NOTE — TELEPHONE ENCOUNTER
The [facility] received a fax that requires your attention. The document has been indexed to the patient’s chart for your review.      Reason for sending: EXTERNAL MEDICAL RECORD NOTIFICATION     Documents Description: SHIPMENT NOTIF-[facility]-12.9.24    Name of Sender: [employer]     Date Indexed: 12.9.24

## 2024-12-10 LAB
ANION GAP SERPL CALCULATED.3IONS-SCNC: 7.2 MMOL/L (ref 5–15)
BUN SERPL-MCNC: 11 MG/DL (ref 8–23)
BUN/CREAT SERPL: 23.4 (ref 7–25)
CALCIUM SPEC-SCNC: 8.2 MG/DL (ref 8.6–10.5)
CHLORIDE SERPL-SCNC: 96 MMOL/L (ref 98–107)
CO2 SERPL-SCNC: 32.8 MMOL/L (ref 22–29)
CREAT SERPL-MCNC: 0.47 MG/DL (ref 0.57–1)
EGFRCR SERPLBLD CKD-EPI 2021: 94 ML/MIN/1.73
GLUCOSE SERPL-MCNC: 119 MG/DL (ref 65–99)
POTASSIUM SERPL-SCNC: 3.3 MMOL/L (ref 3.5–5.2)
SODIUM SERPL-SCNC: 136 MMOL/L (ref 136–145)
WHOLE BLOOD HOLD SPECIMEN: NORMAL

## 2024-12-10 PROCEDURE — 97110 THERAPEUTIC EXERCISES: CPT

## 2024-12-10 PROCEDURE — 94664 DEMO&/EVAL PT USE INHALER: CPT

## 2024-12-10 PROCEDURE — 97112 NEUROMUSCULAR REEDUCATION: CPT

## 2024-12-10 PROCEDURE — 97530 THERAPEUTIC ACTIVITIES: CPT

## 2024-12-10 PROCEDURE — 80048 BASIC METABOLIC PNL TOTAL CA: CPT | Performed by: PHYSICIAN ASSISTANT

## 2024-12-10 PROCEDURE — 25010000002 ENOXAPARIN PER 10 MG: Performed by: PHYSICIAN ASSISTANT

## 2024-12-10 PROCEDURE — 63710000001 PREDNISONE PER 5 MG: Performed by: PHYSICIAN ASSISTANT

## 2024-12-10 PROCEDURE — 94669 MECHANICAL CHEST WALL OSCILL: CPT

## 2024-12-10 PROCEDURE — 97116 GAIT TRAINING THERAPY: CPT

## 2024-12-10 PROCEDURE — 94799 UNLISTED PULMONARY SVC/PX: CPT

## 2024-12-10 RX ORDER — POTASSIUM CHLORIDE 750 MG/1
30 CAPSULE, EXTENDED RELEASE ORAL ONCE
Status: COMPLETED | OUTPATIENT
Start: 2024-12-10 | End: 2024-12-10

## 2024-12-10 RX ADMIN — PREDNISONE 10 MG: 10 TABLET ORAL at 08:19

## 2024-12-10 RX ADMIN — ARFORMOTEROL TARTRATE 15 MCG: 15 SOLUTION RESPIRATORY (INHALATION) at 19:24

## 2024-12-10 RX ADMIN — GUAIFENESIN 600 MG: 600 TABLET ORAL at 20:27

## 2024-12-10 RX ADMIN — IPRATROPIUM BROMIDE AND ALBUTEROL SULFATE 3 ML: .5; 3 SOLUTION RESPIRATORY (INHALATION) at 11:53

## 2024-12-10 RX ADMIN — POTASSIUM CHLORIDE 30 MEQ: 750 CAPSULE, EXTENDED RELEASE ORAL at 08:18

## 2024-12-10 RX ADMIN — BUDESONIDE 0.5 MG: 0.5 INHALANT RESPIRATORY (INHALATION) at 19:24

## 2024-12-10 RX ADMIN — ENOXAPARIN SODIUM 40 MG: 100 INJECTION SUBCUTANEOUS at 08:18

## 2024-12-10 RX ADMIN — Medication 250 MG: at 20:26

## 2024-12-10 RX ADMIN — FUROSEMIDE 40 MG: 40 TABLET ORAL at 08:19

## 2024-12-10 RX ADMIN — IPRATROPIUM BROMIDE AND ALBUTEROL SULFATE 3 ML: .5; 3 SOLUTION RESPIRATORY (INHALATION) at 19:24

## 2024-12-10 RX ADMIN — ASPIRIN 81 MG: 81 TABLET, COATED ORAL at 08:19

## 2024-12-10 RX ADMIN — Medication 10 MG: at 20:27

## 2024-12-10 RX ADMIN — ATORVASTATIN CALCIUM 40 MG: 40 TABLET, FILM COATED ORAL at 20:26

## 2024-12-10 RX ADMIN — IPRATROPIUM BROMIDE AND ALBUTEROL SULFATE 3 ML: .5; 3 SOLUTION RESPIRATORY (INHALATION) at 06:38

## 2024-12-10 RX ADMIN — NYSTATIN: 100000 POWDER TOPICAL at 10:47

## 2024-12-10 RX ADMIN — Medication 250 MG: at 08:19

## 2024-12-10 RX ADMIN — NYSTATIN: 100000 POWDER TOPICAL at 20:27

## 2024-12-10 RX ADMIN — GUAIFENESIN 600 MG: 600 TABLET ORAL at 08:19

## 2024-12-10 RX ADMIN — ARFORMOTEROL TARTRATE 15 MCG: 15 SOLUTION RESPIRATORY (INHALATION) at 06:38

## 2024-12-10 RX ADMIN — BUDESONIDE 0.5 MG: 0.5 INHALANT RESPIRATORY (INHALATION) at 06:38

## 2024-12-10 RX ADMIN — CARVEDILOL 3.12 MG: 3.12 TABLET, FILM COATED ORAL at 20:26

## 2024-12-10 RX ADMIN — CARVEDILOL 3.12 MG: 3.12 TABLET, FILM COATED ORAL at 08:19

## 2024-12-10 NOTE — THERAPY TREATMENT NOTE
SNF - Physical Therapy Treatment Note   [place]     Patient Name: Cris Agudelo  : 1940  MRN: [id number]  Today's Date: 12/10/2024      Visit Dx:    ICD-10-CM ICD-9-CM   1. Difficulty walking  R26.2 719.7   2. Decreased activities of daily living (ADL)  Z78.9 V49.89     Patient Active Problem List   Diagnosis    Hyponatremia    Stress-induced cardiomyopathy    Hyperlipidemia    Primary hypertension    Herpes zoster    Shingles    Non-occlusive coronary artery disease    Pneumonia    Multifocal pneumonia    Acute on chronic respiratory failure with hypoxia    HCAP (healthcare-associated pneumonia)    Weakness    Severe protein-calorie malnutrition     Past Medical History:   Diagnosis Date    CHF (congestive heart failure)     COPD (chronic obstructive pulmonary disease)     History of non-ST elevation myocardial infarction (NSTEMI) 2023    Hyperlipidemia     Hypertension      Past Surgical History:   Procedure Laterality Date    BRONCHOSCOPY N/A 2024    Procedure: BRONCHOSCOPY with BAL, Brushings and washings;  Surgeon: Adan Brown MD;  Location: [facility] ENDOSCOPY;  Service: Pulmonary;  Laterality: N/A;  Bilateral PNA with mucus pluggins    CARDIAC CATHETERIZATION N/A 2023    Procedure: Left Heart Cath;  Surgeon: Mitch Correia MD;  Location: [facility] CATH INVASIVE LOCATION;  Service: Cardiology;  Laterality: N/A;    CARDIAC CATHETERIZATION N/A 2023    Procedure: Coronary angiography;  Surgeon: Mitch Correia MD;  Location: [facility] CATH INVASIVE LOCATION;  Service: Cardiology;  Laterality: N/A;    COLONOSCOPY      EYE SURGERY      HYSTERECTOMY      SKIN BIOPSY      VASCULAR SURGERY         PT Assessment (Last 12 Hours)       PT Evaluation and Treatment       Row Name 12/10/24 1539          Physical Therapy Time and Intention    Subjective Information no complaints  -VK     Document Type therapy note (daily note)  -VK     Mode of Treatment individual therapy;physical therapy  -VK      Patient Effort good  -VK       Row Name 12/10/24 1539          General Information    Patient Profile Reviewed yes  -VK     Existing Precautions/Restrictions fall;oxygen therapy device and L/min  -VK     Limitations/Impairments safety/cognitive  -VK       Row Name 12/10/24 1539          Cognition    Affect/Mental Status (Cognition) WFL  -VK     Orientation Status (Cognition) oriented to;person;situation  -VK     Personal Safety Interventions nonskid shoes/slippers when out of bed;gait belt;fall prevention program maintained  -VK       Row Name 12/10/24 1539          Bed Mobility    Bed Mobility sit-supine;scooting/bridging  -VK     Scooting/Bridging [place] (Bed Mobility) standby assist;verbal cues  -VK     Sit-Supine [place] (Bed Mobility) standby assist;verbal cues  -VK     Bed Mobility, Safety Issues decreased use of arms for pushing/pulling;decreased use of legs for bridging/pushing  -VK     Assistive Device (Bed Mobility) bed rails  -VK       Row Name 12/10/24 1539          Sit-Stand Transfer    Sit-Stand [place] (Transfers) contact guard;verbal cues  -VK     Assistive Device (Sit-Stand Transfers) walker, front-wheeled  -VK       Row Name 12/10/24 1539          Stand-Sit Transfer    Stand-Sit [place] (Transfers) contact guard;verbal cues  -VK     Assistive Device (Stand-Sit Transfers) walker, front-wheeled  -VK       Row Name 12/10/24 1539          Gait/Stairs (Locomotion)    [place] Level (Gait) standby assist;contact guard;verbal cues  Verbal cues mostly for which direction to turn  -VK     Assistive Device (Gait) walker, front-wheeled  -VK     Patient was able to Ambulate yes  -VK     Distance in Feet (Gait) --  55ft, 215ft  -VK     Pattern (Gait) 4-point;step-through  -VK     Deviations/Abnormal Patterns (Gait) gait speed decreased;stride length decreased  -VK     Bilateral Gait Deviations forward flexed posture  -VK     [place] Level (Stairs) stand by assist;contact  guard;verbal cues  -VK     Assistive Device (Stairs) walker, front-wheeled  -VK     Handrail Location (Stairs) both sides  -VK     Number of Steps (Stairs) 4  -VK     Ascending Technique (Stairs) step-over-step  -VK     Descending Technique (Stairs) step-over-step  -VK     Stairs, Safety Issues sequencing ability decreased  -VK       Row Name 12/10/24 1539          Safety Issues/Impairments Affecting Functional Mobility    Safety Issues Affecting Function (Mobility) positioning of assistive device;safety precaution awareness;sequencing abilities;judgment;impulsivity  Pt tends to leave walker off to the side when going to turn and back up to a chair to sit.  -VK     Impairments Affecting Function (Mobility) balance;endurance/activity tolerance;strength  -VK       Row Name 12/10/24 1539          Balance    Sit to Stand Dynamic Balance contact guard  -VK     Static Standing Balance standby assist  -VK     Dynamic Standing Balance standby assist;contact guard  -VK     Position/Device Used, Standing Balance walker, front-wheeled  -VK     Balance Interventions sit to stand  -VK       Row Name 12/10/24 1539          Aerobic Exercise    Type (Aerobic Exercise) recumbent stationary bike  -VK     Time Performed (Aerobic Exercise) 15 minutes  -VK     Comment, Aerobic Exercise (Therapeutic Exercise) Load 3  -VK       Row Name             Wound 12/04/24 1832 Bilateral lateral groin    Wound - Properties Group Placement Date: 12/04/24  -FH Placement Time: 1832 -FH Side: Bilateral  -FH Orientation: lateral  -FH Location: groin  -FH Primary Wound Type: Other  -FH, rash/redness  Present on Original Admission: Y  -FH    Retired Wound - Properties Group Placement Date: 12/04/24  -FH Placement Time: 1832  -FH Present on Original Admission: Y  -FH Side: Bilateral  -FH Orientation: lateral  -FH Location: groin  -FH Primary Wound Type: Other  -FH, rash/redness     Retired Wound - Properties Group Placement Date: 12/04/24  -FH Placement  Time: 1832  -FH Present on Original Admission: Y  -FH Side: Bilateral  -FH Orientation: lateral  -FH Location: groin  -FH Primary Wound Type: Other  -FH, rash/redness     Retired Wound - Properties Group Date first assessed: 12/04/24  - Time first assessed: 1832  -FH Present on Original Admission: Y  -FH Side: Bilateral  -FH Location: groin  -FH Primary Wound Type: Other  -FH, rash/redness       Row Name             Wound 12/04/24 1835 Left lateral breast    Wound - Properties Group Placement Date: 12/04/24  - Placement Time: 1835  -FH Side: Left  -FH Orientation: lateral  -FH Location: breast  -FH Primary Wound Type: Other  -FH, rash/redness  Present on Original Admission: Y  -FH    Retired Wound - Properties Group Placement Date: 12/04/24  - Placement Time: 1835  -FH Present on Original Admission: Y  -FH Side: Left  -FH Orientation: lateral  -FH Location: breast  -FH Primary Wound Type: Other  -FH, rash/redness     Retired Wound - Properties Group Placement Date: 12/04/24  - Placement Time: 1835  -FH Present on Original Admission: Y  -FH Side: Left  -FH Orientation: lateral  -FH Location: breast  -FH Primary Wound Type: Other  -FH, rash/redness     Retired Wound - Properties Group Date first assessed: 12/04/24  - Time first assessed: 1835  -FH Present on Original Admission: Y  -FH Side: Left  -FH Location: breast  -FH Primary Wound Type: Other  -FH, rash/redness       Row Name 12/10/24 1539          Vital Signs    Intra SpO2 (%) 89  -VK     O2 Delivery Intra Treatment nasal cannula  -VK     Post SpO2 (%) 93  -VK     O2 Delivery Post Treatment nasal cannula  -VK       Row Name 12/10/24 1539          Positioning and Restraints    Pre-Treatment Position sitting in chair/recliner  -VK     Post Treatment Position bed  -VK     In Bed fowlers;call light within reach;encouraged to call for assist;exit alarm on;with family/caregiver  -VK       Row Name 12/10/24 1539          Progress Summary (PT)    Progress  Toward Functional Goals (PT) progress toward functional goals is good  -VK               User Key  (r) = Recorded By, (t) = Taken By, (c) = Cosigned By      Initials Name Provider Type     Kayleen Scott, RN Registered Nurse    Radhika Chan PTA Physical Therapist Assistant                  Section G              Section GG                       Physical Therapy Education        No education to display                  PT Recommendation and Plan     Progress Summary (PT)  Progress Toward Functional Goals (PT): progress toward functional goals is good   Outcome Measures       Row Name 12/10/24 1600 12/09/24 0903          How much help from another person do you currently need...    Turning from your back to your side while in flat bed without using bedrails? 4  -VK 4  -WM     Moving from lying on back to sitting on the side of a flat bed without bedrails? 3  -VK 3  -WM     Moving to and from a bed to a chair (including a wheelchair)? 3  -VK 3  -WM     Standing up from a chair using your arms (e.g., wheelchair, bedside chair)? 3  -VK 4  -WM     Climbing 3-5 steps with a railing? 3  -VK 2  -WM     To walk in hospital room? 3  -VK 3  -WM     AM-PAC 6 Clicks Score (PT) 19  -VK 19  -WM               User Key  (r) = Recorded By, (t) = Taken By, (c) = Cosigned By      Initials Name Provider Type    Harsh Smith PTA Physical Therapist Assistant    Radhika Chan PTA Physical Therapist Assistant                     Time Calculation:    PT Charges       Row Name 12/10/24 1651             Time Calculation    PT Received On 12/10/24  -VK         Timed Charges    36487 - PT Therapeutic Exercise Minutes 16  -VK      14004 - Gait Training Minutes  10  -VK      49048 - PT Therapeutic Activity Minutes 12  -VK         SNF Physical Therapy Minutes    Skilled Minutes- PT 38 min  -VK         Total Minutes    Timed Charges Total Minutes 38  -VK       Total Minutes 38  -VK                User Key  (r) = Recorded By,  (t) = Taken By, (c) = Cosigned By      Initials Name Provider Type    Radhika Chan, TUTU Physical Therapist Assistant                  Therapy Charges for Today       Code Description Service Date Service Provider Modifiers Qty    82149835529 HC PT THER PROC EA 15 MIN 12/10/2024 Radhika Galeano, TUTU GP 1    36058338835 HC GAIT TRAINING EA 15 MIN 12/10/2024 Radhika Galeano, TUTU GP 1    46643532627 HC PT THERAPEUTIC ACT EA 15 MIN 12/10/2024 Radhika Galeano, TUTU GP 1            PT G-Codes  Outcome Measure Options: AM-PAC 6 Clicks Daily Activity (OT), Optimal Instrument  AM-PAC 6 Clicks Score (PT): 19  AM-PAC 6 Clicks Score (OT): 21    Radhika Galeano PTA  12/10/2024

## 2024-12-10 NOTE — THERAPY TREATMENT NOTE
SNF - Occupational Therapy Treatment Note   [place]    Patient Name: Cris Agudelo  : 1940    MRN: [id number]                              Today's Date: 12/10/2024       Admit Date: 2024    Visit Dx:     ICD-10-CM ICD-9-CM   1. Difficulty walking  R26.2 719.7   2. Decreased activities of daily living (ADL)  Z78.9 V49.89     Patient Active Problem List   Diagnosis    Hyponatremia    Stress-induced cardiomyopathy    Hyperlipidemia    Primary hypertension    Herpes zoster    Shingles    Non-occlusive coronary artery disease    Pneumonia    Multifocal pneumonia    Acute on chronic respiratory failure with hypoxia    HCAP (healthcare-associated pneumonia)    Weakness    Severe protein-calorie malnutrition     Past Medical History:   Diagnosis Date    CHF (congestive heart failure)     COPD (chronic obstructive pulmonary disease)     History of non-ST elevation myocardial infarction (NSTEMI) 2023    Hyperlipidemia     Hypertension      Past Surgical History:   Procedure Laterality Date    BRONCHOSCOPY N/A 2024    Procedure: BRONCHOSCOPY with BAL, Brushings and washings;  Surgeon: Adan Brown MD;  Location: [facility] ENDOSCOPY;  Service: Pulmonary;  Laterality: N/A;  Bilateral PNA with mucus pluggins    CARDIAC CATHETERIZATION N/A 2023    Procedure: Left Heart Cath;  Surgeon: Mitch Correia MD;  Location: [facility] CATH INVASIVE LOCATION;  Service: Cardiology;  Laterality: N/A;    CARDIAC CATHETERIZATION N/A 2023    Procedure: Coronary angiography;  Surgeon: Mitch Correia MD;  Location: [facility] CATH INVASIVE LOCATION;  Service: Cardiology;  Laterality: N/A;    COLONOSCOPY      EYE SURGERY      HYSTERECTOMY      SKIN BIOPSY      VASCULAR SURGERY        General Information       Row Name 12/10/24 0907          OT Time and Intention    Document Type therapy note (daily note)  -EG     Mode of Treatment individual therapy;occupational therapy  -EG     Patient Effort good  -EG       Row Name  12/10/24 0907          General Information    Existing Precautions/Restrictions fall;oxygen therapy device and L/min  -EG       Row Name 12/10/24 0907          Cognition    Orientation Status (Cognition) oriented to;person;place;other (see comments)  -EG       Row Name 12/10/24 0907          Safety Issues/Impairments Affecting Functional Mobility    Impairments Affecting Function (Mobility) balance;endurance/activity tolerance;strength  -EG               User Key  (r) = Recorded By, (t) = Taken By, (c) = Cosigned By      Initials Name Provider Type    EG Janine Quiros, OT Occupational Therapist                     Mobility/ADL's       Row Name 12/10/24 0908          Bed Mobility    Comment, (Bed Mobility) Up in chair upon OT arrival  -EG       Row Name 12/10/24 0908          Transfers    Transfers sit-stand transfer;stand-sit transfer  -EG     Comment, (Transfers) transfer in and out of recliner chair; multiple chairs with arms in therapy gym all using RW CGA due to high fall risk continuing to be noted with poor proprioception  -EG       Row Name 12/10/24 0908          Sit-Stand Transfer    Sit-Stand [place] (Transfers) contact guard  -EG     Assistive Device (Sit-Stand Transfers) walker, front-wheeled  -EG     Comment, (Sit-Stand Transfer) cues for hand placement  -EG       Row Name 12/10/24 0908          Stand-Sit Transfer    Stand-Sit [place] (Transfers) contact guard  -EG     Assistive Device (Stand-Sit Transfers) walker, front-wheeled  -EG     Comment, (Stand-Sit Transfer) cus for hand placement  -EG       Row Name 12/10/24 0908          Functional Mobility    Functional Mobility- Ind. Level contact guard assist;verbal cues required  -EG     Functional Mobility- Device walker, front-wheeled  -EG     Functional Mobility- Comment Patient able to perform functional mobility to and from therapy gym using RW; Participated in mobility obstacle course for safe use of AD, manuevering small spaces and  stepping over obstacles to promote safety and carryover for simulated at home performance  -EG               User Key  (r) = Recorded By, (t) = Taken By, (c) = Cosigned By      Initials Name Provider Type    EG Janine Quiros OT Occupational Therapist                   Obj/Interventions       Row Name 12/10/24 0909          Vision Assessment/Intervention    Visual Impairment/Limitations WFL  -EG       Row Name 12/10/24 0909          Shoulder (Therapeutic Exercise)    Shoulder (Therapeutic Exercise) strengthening exercise  -EG     Shoulder Strengthening (Therapeutic Exercise) bilateral;flexion;extension;horizontal aBduction/aDduction;external rotation;internal rotation;scapular stabilization;sitting;10 repetitions;2 sets;1 lb free weight;2 lb free weight  Attempt to upgrade to use of 2# dumb bells but unable to perform full set of 8 in all exercise planes; downgrade to 1# dumb bells  -EG       Row Name 12/10/24 0909          Elbow/Forearm (Therapeutic Exercise)    Elbow/Forearm (Therapeutic Exercise) strengthening exercise  -EG     Elbow/Forearm Strengthening (Therapeutic Exercise) bilateral;flexion;extension;1 lb free weight;2 sets;10 repetitions;sitting  -EG       Row Name 12/10/24 0909          Motor Skills    Motor Skills functional endurance  -EG     Functional Endurance fair- on 2L via NC  -EG     Therapeutic Exercise shoulder;elbow/forearm;aerobic  Tricep extension exercises via [employer] 3x12 total of 15#'s  -EG       Row Name 12/10/24 0909          Balance    Balance Interventions standing;sit to stand;supported;static;dynamic;weight shifting activity;occupation based/functional task;UE activity with balance activity  -EG     Comment, Balance Unsupported standing tasks performed on level surfaces 2x for 4-5 minutes with table top task no LOB but close CGA reccomended due to fall risk present with poor insight; OT educated on JEVON adjustments to promote balance and safety  -EG       Row Name 12/10/24 0909           Aerobic Exercise    Type (Aerobic Exercise) arm bike  -EG     Comment, Aerobic Exercise (Therapeutic Exercise) Omnicycle performed 15:00 cardiac interval setting one rest break  -EG               User Key  (r) = Recorded By, (t) = Taken By, (c) = Cosigned By      Initials Name Provider Type    Janine Khanna OT Occupational Therapist                   Goals/Plan    No documentation.                  Clinical Impression       Row Name 12/10/24 0915          Pain Assessment    Pretreatment Pain Rating 0/10 - no pain  -EG     Posttreatment Pain Rating 0/10 - no pain  -EG       Row Name 12/10/24 0915          Plan of Care Review    Plan of Care Reviewed With patient  -EG     Progress no change  -EG     Outcome Evaluation Pleasant and cooperative; cueing for attention,sequencing and safety required throughout session; OT educated on fall risk prevention multiple times during session; Participated in mobility, strength and balance interventions this date; Continue to treat and follow POC; Ax1 w/RW and gait belt recc.  -EG       Row Name 12/10/24 0915          Therapy Assessment/Plan (OT)    Rehab Potential (OT) good  -EG     Criteria for Skilled Therapeutic Interventions Met (OT) yes;meets criteria;skilled treatment is necessary  -EG     Therapy Frequency (OT) 5 times/wk  -EG       Row Name 12/10/24 0915          Therapy Plan Review/Discharge Plan (OT)    Anticipated Discharge Disposition (OT) home with home health  -EG       Row Name 12/10/24 0915          Positioning and Restraints    Post Treatment Position chair  -EG     In Chair reclined;sitting;call light within reach;encouraged to call for assist;exit alarm on  -EG               User Key  (r) = Recorded By, (t) = Taken By, (c) = Cosigned By      Initials Name Provider Type    Janine Khanna OT Occupational Therapist                   Outcome Measures       Row Name 12/10/24 0916          How much help from another is currently needed...    Putting  on and taking off regular lower body clothing? 3  -EG     Bathing (including washing, rinsing, and drying) 3  -EG     Toileting (which includes using toilet bed pan or urinal) 3  -EG     Putting on and taking off regular upper body clothing 4  -EG     Taking care of personal grooming (such as brushing teeth) 4  -EG     Eating meals 4  -EG     AM-PAC 6 Clicks Score (OT) 21  -EG       Row Name 12/09/24 7269          How much help from another person do you currently need...    Turning from your back to your side while in flat bed without using bedrails? 4  -BR     Moving from lying on back to sitting on the side of a flat bed without bedrails? 3  -BR     Moving to and from a bed to a chair (including a wheelchair)? 3  -BR     Standing up from a chair using your arms (e.g., wheelchair, bedside chair)? 4  -BR     Climbing 3-5 steps with a railing? 2  -BR     To walk in hospital room? 3  -BR     AM-PAC 6 Clicks Score (PT) 19  -BR     Highest Level of Mobility Goal 6 --> Walk 10 steps or more  -BR       Row Name 12/10/24 0916          Functional Assessment    Outcome Measure Options AM-PAC 6 Clicks Daily Activity (OT);Optimal Instrument  -EG       Row Name 12/10/24 0916          Optimal Instrument    Optimal Instrument Optimal - 3  -EG     Bending/Stooping 2  -EG     Standing 1  -EG     Reaching 1  -EG               User Key  (r) = Recorded By, (t) = Taken By, (c) = Cosigned By      Initials Name Provider Type    BR Ana Lozano, RN Registered Nurse    EG Janine Quiros OT Occupational Therapist                  Section G  Mobility  Bed mobility - self performance: limited assistance (staff provide guided maneuvering of limbs or other non-weight bearing assistance)  Bed mobility support/assistance: One person assist  Transfer - self performance: limited assistance (staff provide guided maneuvering of limbs or other non-weight bearing assistance)  Transfer support/assistance: One person assist  Walking in room - self  performance: limited assistance (staff provide guided maneuvering of limbs or other non-weight bearing assistance)  Walking in room support/assistance: One person assist  Walking in corridors/hallway - self performance: limited assistance (staff provide guided maneuvering of limbs or other non-weight bearing assistance)  Walking in corridors/hallway support/assistance: One person assist  Locomotion on unit - self performance: limited assistance (staff provide guided maneuvering of limbs or other non-weight bearing assistance)  Locomotion on unit support/assistance: One person assist  Locomotion off unit - self performance: activity did not occur  Locomotion off unit support/assistance: Activity did not occur  Dressing - self performance: limited assistance (staff provide guided maneuvering of limbs or other non-weight bearing assistance)  Dressing support/assistance: One person assist  Eating - self performance: independent  Eating support/assistance: Setup help only  Toileting - self performance: limited assistance (staff provide guided maneuvering of limbs or other non-weight bearing assistance)  Toileting support/assistance: One person assist  Personal hygiene - self performance: limited assistance (staff provide guided maneuvering of limbs or other non-weight bearing assistance)  Personal hygiene support/assistance: One person assist  Bathing  Bathing - self performance: Physical help only to transfer to bathe  Bathing support/assistance: One person assist  Balance  Balance during transitions & walking: Not steady, requires assist to steady  Moving from seated to standing position: Not steady, requires assist to steady  Walking: Not steady, requires assist to steady  Turning around while walking: Not steady, requires assist to steady  Moving on and off toilet: Not steady, requires assist to steady  Surface-to-surface transfer: Not steady, requires assist to steady  Mobility devices: Cane/crutch  Range of  Motion  Upper Extremity: No impairment  Lower Extremity: No impairment  Section GG  Functional Ability/Goals, Adm (Section GG)  Self Care, Prior Functioning (EG1035S): 3. Independent  Functional Cognition, Prior Functioning (VM1893C): 3. Independent  Prior Device Use (SM1909): none of the above (Z)  Upper Extremity Range of Motion (XA9187J): No impairment  Lower Extremity Range of Motion (BX3349S): No impairment  Self Care, Admission (Section GG)  Eating: Self-Care Admission Performance (PJ8432M6): setup or clean-up assistance (05)  Oral Hygiene: Self-Care Admission Performance (FU7897R8): setup or clean-up assistance (05)  Toileting Hygiene: Self-Care Admission Performance (VI3479W7): supervision or touching assistance (04)  Shower/Bathe Self: Self-Care Admission Performance (SJ1570Z3): partial/moderate assistance (03)  Upper Body Dressing: Self-Care Admission Performance (EL2192O6): supervision or touching assistance (04)  Lower Body Dressing: Self-Care Admission Performance (PP3575Z5): supervision or touching assistance (04)  Putting On/Taking Off Footwear: Self-Care Admission Performance (QI3640Q1): supervision or touching assistance (04)  Personal Hygiene: Self-Care Admission Performance (IC4428C2): supervision or touching assistance (04)  Mobility, Admission Performance (NN9123)  Toilet Transfer: Mobility Admission Performance (EJ3072Q1): supervision or touching assistance (04)  Tub/shower Transfer: Mobility Admission Performance (NX1845DN7): supervision or touching assistance (04)                Occupational Therapy Education       Title: PT OT SLP Therapies (Done)       Topic: Occupational Therapy (Done)       Point: ADL training (Done)       Description:   Instruct learner(s) on proper safety adaptation and remediation techniques during self care or transfers.   Instruct in proper use of assistive devices.                  Learning Progress Summary            Patient AGNES Arias VU by JAYCE at 12/5/2024 7727     Comment: Education on options for AD use during mobility  Eduation on OT services  Education on PLB and energy conservation techniques                      Point: Home exercise program (Done)       Description:   Instruct learner(s) on appropriate technique for monitoring, assisting and/or progressing therapeutic exercises/activities.                  Learning Progress Summary            Patient AGNES Arias VU by EG at 12/5/2024 1142    Comment: Education on options for AD use during mobility  Eduation on OT services  Education on PLB and energy conservation techniques                      Point: Precautions (Done)       Description:   Instruct learner(s) on prescribed precautions during self-care and functional transfers.                  Learning Progress Summary            AGNES Argueta VU by EG at 12/5/2024 1142    Comment: Education on options for AD use during mobility  Eduation on OT services  Education on PLB and energy conservation techniques                      Point: Body mechanics (Done)       Description:   Instruct learner(s) on proper positioning and spine alignment during self-care, functional mobility activities and/or exercises.                  Learning Progress Summary            AGNES Argueta VU by EG at 12/5/2024 1142    Comment: Education on options for AD use during mobility  Eduation on OT services  Education on PLB and energy conservation techniques                                      User Key       Initials Effective Dates Name Provider Type Discipline    EG 09/14/22 -  Janine Quiros, OT Occupational Therapist OT                  OT Recommendation and Plan  Planned Therapy Interventions (OT): functional balance retraining, activity tolerance training, occupation/activity based interventions, adaptive equipment training, BADL retraining, neuromuscular control/coordination retraining, patient/caregiver education/training, transfer/mobility retraining, strengthening  exercise  Therapy Frequency (OT): 5 times/wk  Plan of Care Review  Plan of Care Reviewed With: patient  Progress: no change  Outcome Evaluation: Pleasant and cooperative; cueing for attention,sequencing and safety required throughout session; OT educated on fall risk prevention multiple times during session; Participated in mobility, strength and balance interventions this date; Continue to treat and follow POC; Ax1 w/RW and gait belt recc.     Time Calculation:   Evaluation Complexity (OT)  Review Occupational Profile/Medical/Therapy History Complexity: expanded/moderate complexity  Assessment, Occupational Performance/Identification of Deficit Complexity: 3-5 performance deficits  Clinical Decision Making Complexity (OT): detailed assessment/moderate complexity  Overall Complexity of Evaluation (OT): moderate complexity     Time Calculation- OT       Row Name 12/10/24 0917             Time Calculation- OT    OT Received On 12/10/24  -EG      OT Goal Re-Cert Due Date 01/04/24  -EG         Timed Charges    49520 - OT Therapeutic Exercise Minutes 31  -EG      28647 -  OT Neuromuscular Reeducation Minutes 9  -EG      78475 - OT Therapeutic Activity Minutes 14  -EG         SNF Occupational Therapy Minutes    Skilled Minutes- OT 54 min  -EG         Total Minutes    Timed Charges Total Minutes 54  -EG       Total Minutes 54  -EG                User Key  (r) = Recorded By, (t) = Taken By, (c) = Cosigned By      Initials Name Provider Type    EG Janine Quiros OT Occupational Therapist                  Therapy Charges for Today       Code Description Service Date Service Provider Modifiers Qty    30283818701 HC OT THER PROC EA 15 MIN 12/9/2024 Janine Quiros OT GO 1    32911945271 HC OT THERAPEUTIC ACT EA 15 MIN 12/9/2024 Janine Quiros OT GO 1    64387842827 HC OT SELF CARE/MGMT/TRAIN EA 15 MIN 12/9/2024 Janine Quiros OT GO 2    39548678374 HC OT NEUROMUSC RE EDUCATION EA 15 MIN 12/10/2024 Janine Quiros  OT GO 1    [id number]  OT THER PROC EA 15 MIN 12/10/2024 Janine Quiros OT GO 2    [id number]  OT THERAPEUTIC ACT EA 15 MIN 12/10/2024 Janine Quiros OT GO 1                 Janine Quiros OT  12/10/2024

## 2024-12-10 NOTE — PLAN OF CARE
Goal Outcome Evaluation:  Plan of Care Reviewed With: patient           Outcome Evaluation: Pt is alert and oriented to person and date. She is a 1 assist for transfers to the bathroom with a walker. No c/o pain or discomfort. Will continue with her plan oc care. Call light and personal items within reach.

## 2024-12-10 NOTE — PLAN OF CARE
Goal Outcome Evaluation:  Plan of Care Reviewed With: patient        Progress: no change  Outcome Evaluation: Resident alert x2-3, forgetful, transfers with assist of one to bathroom. Requires cueing for most tasks, step by step instructions. participated in therapy as ordered. lungsounds cont to be adventitious, inspiratory crackles today, discussed lung sounds with RT while here for resp. tx. vistors at bedside this afternoon and evening. Resident conversant with visitors and staff, pleasant and cooperative.

## 2024-12-11 PROCEDURE — 97530 THERAPEUTIC ACTIVITIES: CPT

## 2024-12-11 PROCEDURE — 94664 DEMO&/EVAL PT USE INHALER: CPT

## 2024-12-11 PROCEDURE — 97110 THERAPEUTIC EXERCISES: CPT

## 2024-12-11 PROCEDURE — 94669 MECHANICAL CHEST WALL OSCILL: CPT

## 2024-12-11 PROCEDURE — 94799 UNLISTED PULMONARY SVC/PX: CPT

## 2024-12-11 PROCEDURE — 97112 NEUROMUSCULAR REEDUCATION: CPT

## 2024-12-11 PROCEDURE — 97116 GAIT TRAINING THERAPY: CPT

## 2024-12-11 PROCEDURE — 97535 SELF CARE MNGMENT TRAINING: CPT

## 2024-12-11 PROCEDURE — 25010000002 ENOXAPARIN PER 10 MG: Performed by: PHYSICIAN ASSISTANT

## 2024-12-11 RX ADMIN — ASPIRIN 81 MG: 81 TABLET, COATED ORAL at 08:31

## 2024-12-11 RX ADMIN — FUROSEMIDE 40 MG: 40 TABLET ORAL at 08:31

## 2024-12-11 RX ADMIN — BUDESONIDE 0.5 MG: 0.5 INHALANT RESPIRATORY (INHALATION) at 06:35

## 2024-12-11 RX ADMIN — CARVEDILOL 3.12 MG: 3.12 TABLET, FILM COATED ORAL at 08:31

## 2024-12-11 RX ADMIN — IPRATROPIUM BROMIDE AND ALBUTEROL SULFATE 3 ML: .5; 3 SOLUTION RESPIRATORY (INHALATION) at 18:27

## 2024-12-11 RX ADMIN — GUAIFENESIN 600 MG: 600 TABLET ORAL at 08:31

## 2024-12-11 RX ADMIN — ENOXAPARIN SODIUM 40 MG: 100 INJECTION SUBCUTANEOUS at 08:31

## 2024-12-11 RX ADMIN — ARFORMOTEROL TARTRATE 15 MCG: 15 SOLUTION RESPIRATORY (INHALATION) at 18:27

## 2024-12-11 RX ADMIN — GUAIFENESIN 600 MG: 600 TABLET ORAL at 20:44

## 2024-12-11 RX ADMIN — NYSTATIN: 100000 POWDER TOPICAL at 08:32

## 2024-12-11 RX ADMIN — ARFORMOTEROL TARTRATE 15 MCG: 15 SOLUTION RESPIRATORY (INHALATION) at 06:36

## 2024-12-11 RX ADMIN — NYSTATIN: 100000 POWDER TOPICAL at 20:45

## 2024-12-11 RX ADMIN — TUBERCULIN PURIFIED PROTEIN DERIVATIVE 5 UNITS: 5 INJECTION, SOLUTION INTRADERMAL at 20:43

## 2024-12-11 RX ADMIN — ATORVASTATIN CALCIUM 40 MG: 40 TABLET, FILM COATED ORAL at 20:44

## 2024-12-11 RX ADMIN — CARVEDILOL 3.12 MG: 3.12 TABLET, FILM COATED ORAL at 20:44

## 2024-12-11 RX ADMIN — BUDESONIDE 0.5 MG: 0.5 INHALANT RESPIRATORY (INHALATION) at 18:27

## 2024-12-11 RX ADMIN — Medication 10 MG: at 20:44

## 2024-12-11 RX ADMIN — Medication 250 MG: at 08:31

## 2024-12-11 RX ADMIN — Medication 250 MG: at 20:44

## 2024-12-11 RX ADMIN — IPRATROPIUM BROMIDE AND ALBUTEROL SULFATE 3 ML: .5; 3 SOLUTION RESPIRATORY (INHALATION) at 06:35

## 2024-12-11 RX ADMIN — IPRATROPIUM BROMIDE AND ALBUTEROL SULFATE 3 ML: .5; 3 SOLUTION RESPIRATORY (INHALATION) at 11:59

## 2024-12-11 NOTE — TELEPHONE ENCOUNTER
Pt daughter called and requested labs orders to be faxed to [phone]. I faxed over orders.   [de-identified] : I reviewed the available imaging the patient as well as my impression of her symptoms.  Presently we do not have access to the imaging and diagnostic treatment records for spinal conditions.  Given her ongoing severe symptoms I recommended MRIs of the cervical thoracic and lumbar spine to evaluate for structural cause of radiculopathy or possible myelopathy.  Given the more central diagnoses and symptoms that she experienced have also recommended a referral to our neurology colleagues for evaluation.  Once imaging can be completed we will discuss appropriate next steps as needed  I have spent greater than 60 minutes preparing to see the patient, collecting relevant history, performing a thorough history and physical examination, counseling the patient regarding my findings ordering the appropriate therapies and tests, communicating with other relevant healthcare professionals, documenting my encounter and coordinating care.

## 2024-12-11 NOTE — PLAN OF CARE
Goal Outcome Evaluation:  Plan of Care Reviewed With: patient        Progress: no change  Outcome Evaluation: Stand by with walker. no c/o pain. participated with therapy today. See discharge care plans for further details.

## 2024-12-11 NOTE — THERAPY DISCHARGE NOTE
SNF - Physical Therapy Treatment Note/Discharge   Jadyn     Patient Name: Cris Agudelo  : 1940  MRN: [id number]  Today's Date: 2024                Admit Date: 2024    Visit Dx:    ICD-10-CM ICD-9-CM   1. Difficulty walking  R26.2 719.7   2. Decreased activities of daily living (ADL)  Z78.9 V49.89     Patient Active Problem List   Diagnosis    Hyponatremia    Stress-induced cardiomyopathy    Hyperlipidemia    Primary hypertension    Herpes zoster    Shingles    Non-occlusive coronary artery disease    Pneumonia    Multifocal pneumonia    Acute on chronic respiratory failure with hypoxia    HCAP (healthcare-associated pneumonia)    Weakness    Severe protein-calorie malnutrition     Past Medical History:   Diagnosis Date    CHF (congestive heart failure)     COPD (chronic obstructive pulmonary disease)     History of non-ST elevation myocardial infarction (NSTEMI) 2023    Hyperlipidemia     Hypertension      Past Surgical History:   Procedure Laterality Date    BRONCHOSCOPY N/A 2024    Procedure: BRONCHOSCOPY with BAL, Brushings and washings;  Surgeon: Adan Brown MD;  Location: [facility] ENDOSCOPY;  Service: Pulmonary;  Laterality: N/A;  Bilateral PNA with mucus pluggins    CARDIAC CATHETERIZATION N/A 2023    Procedure: Left Heart Cath;  Surgeon: Mitch Correia MD;  Location: [facility] CATH INVASIVE LOCATION;  Service: Cardiology;  Laterality: N/A;    CARDIAC CATHETERIZATION N/A 2023    Procedure: Coronary angiography;  Surgeon: Mitch Correia MD;  Location: [facility] CATH INVASIVE LOCATION;  Service: Cardiology;  Laterality: N/A;    COLONOSCOPY      EYE SURGERY      HYSTERECTOMY      SKIN BIOPSY      VASCULAR SURGERY         PT Assessment (Last 12 Hours)       PT Evaluation and Treatment       Row Name 24 1043          Physical Therapy Time and Intention    Subjective Information no complaints  -VK     Document Type therapy note (daily note)  -VK     Mode of Treatment  individual therapy;physical therapy  -VK     Patient Effort good  -       Row Name 12/11/24 1043          General Information    Patient Profile Reviewed yes  -VK     Existing Precautions/Restrictions fall;oxygen therapy device and L/min  -       Row Name 12/11/24 1043          Cognition    Affect/Mental Status (Cognition) unable/difficult to assess;confused  Pt does not speak often throughout treatment. Pt has difficulty understanding left and right directions during gait training  -VK     Orientation Status (Cognition) oriented to;person;situation  -VK     Personal Safety Interventions nonskid shoes/slippers when out of bed;gait belt;fall prevention program maintained  -VK       Row Name 12/11/24 1043          Bed Mobility    Bed Mobility rolling right;rolling left;scooting/bridging;supine-sit;sit-supine  -VK     Rolling Left [place] (Bed Mobility) modified independence  -VK     Rolling Right [place] (Bed Mobility) modified independence  -VK     Scooting/Bridging [place] (Bed Mobility) modified independence  -VK     Supine-Sit [place] (Bed Mobility) modified independence  -VK     Sit-Supine [place] (Bed Mobility) modified independence  -VK     Bed Mobility, Safety Issues decreased use of arms for pushing/pulling;decreased use of legs for bridging/pushing  -VK       Row Name 12/11/24 1043          Transfers    Transfers sit-stand transfer;stand-sit transfer;toilet transfer  -       Row Name 12/11/24 1043          Sit-Stand Transfer    Sit-Stand [place] (Transfers) standby assist;verbal cues  -VK     Assistive Device (Sit-Stand Transfers) walker, front-wheeled  -       Row Name 12/11/24 1043          Stand-Sit Transfer    Stand-Sit [place] (Transfers) standby assist;verbal cues  -VK     Assistive Device (Stand-Sit Transfers) walker, front-wheeled  -       Row Name 12/11/24 1043          Toilet Transfer    Type (Toilet Transfer) sit-stand;stand-sit  -VK     [place]  Level (Toilet Transfer) supervision  -VK     Assistive Device (Toilet Transfer) raised toilet seat;walker, front-wheeled  -VK     Comment, (Toilet Transfer) Pt had a slight stumble when leaving bathroom, appeared to be due to patient trying to move quickly, no fall and did not require assist to prevent fall.  -VK       Row Name 12/11/24 1043          Gait/Stairs (Locomotion)    [place] Level (Gait) standby assist;verbal cues  -VK     Assistive Device (Gait) walker, front-wheeled  -VK     Patient was able to Ambulate yes  -VK     Distance in Feet (Gait) --  225ft, 225ft  -VK     Pattern (Gait) 4-point;step-through  -VK     Deviations/Abnormal Patterns (Gait) base of support, narrow;laila decreased  -VK     Bilateral Gait Deviations forward flexed posture  -VK       Row Name 12/11/24 1043          Safety Issues/Impairments Affecting Functional Mobility    Safety Issues Affecting Function (Mobility) sequencing abilities;safety precautions follow-through/compliance;safety precaution awareness  Pt continues to leave walker off to the side when going to sit in a chair, despite multiple verbal/tactile cues throughout treatment.  -VK     Impairments Affecting Function (Mobility) balance;endurance/activity tolerance;strength  -VK       Row Name 12/11/24 1043          Balance    Sit to Stand Dynamic Balance standby assist  -VK     Static Standing Balance supervision;standby assist  -VK     Dynamic Standing Balance standby assist  -VK     Position/Device Used, Standing Balance walker, front-wheeled  -VK     Balance Interventions sit to stand  -VK       Row Name 12/11/24 1043          Aerobic Exercise    Type (Aerobic Exercise) recumbent stationary bike  -VK     Time Performed (Aerobic Exercise) 15 minutes  -VK     Comment, Aerobic Exercise (Therapeutic Exercise) Load 4  -VK       Row Name             Wound 12/04/24 1832 Bilateral lateral groin    Wound - Properties Group Placement Date: 12/04/24  - Placement Time:  1832  -FH Side: Bilateral  -FH Orientation: lateral  -FH Location: groin  -FH Primary Wound Type: Other  -FH, rash/redness  Present on Original Admission: Y  -FH    Retired Wound - Properties Group Placement Date: 12/04/24  - Placement Time: 1832  -FH Present on Original Admission: Y  -FH Side: Bilateral  -FH Orientation: lateral  -FH Location: groin  -FH Primary Wound Type: Other  -FH, rash/redness     Retired Wound - Properties Group Placement Date: 12/04/24  - Placement Time: 1832  -FH Present on Original Admission: Y  -FH Side: Bilateral  -FH Orientation: lateral  -FH Location: groin  -FH Primary Wound Type: Other  -FH, rash/redness     Retired Wound - Properties Group Date first assessed: 12/04/24  - Time first assessed: 1832  -FH Present on Original Admission: Y  -FH Side: Bilateral  -FH Location: groin  -FH Primary Wound Type: Other  -FH, rash/redness       Row Name             Wound 12/04/24 1835 Left lateral breast    Wound - Properties Group Placement Date: 12/04/24  - Placement Time: 1835  -FH Side: Left  -FH Orientation: lateral  -FH Location: breast  -FH Primary Wound Type: Other  -FH, rash/redness  Present on Original Admission: Y  -FH    Retired Wound - Properties Group Placement Date: 12/04/24  - Placement Time: 1835  -FH Present on Original Admission: Y  -FH Side: Left  -FH Orientation: lateral  -FH Location: breast  -FH Primary Wound Type: Other  -FH, rash/redness     Retired Wound - Properties Group Placement Date: 12/04/24  - Placement Time: 1835  -FH Present on Original Admission: Y  -FH Side: Left  -FH Orientation: lateral  -FH Location: breast  -FH Primary Wound Type: Other  -FH, rash/redness     Retired Wound - Properties Group Date first assessed: 12/04/24  - Time first assessed: 1835  -FH Present on Original Admission: Y  -FH Side: Left  -FH Location: breast  -FH Primary Wound Type: Other  -FH, rash/redness       Row Name 12/11/24 1043          Vital Signs    Intra SpO2 (%) 93   -VK     O2 Delivery Intra Treatment nasal cannula  -VK     Post SpO2 (%) 96  -VK     O2 Delivery Post Treatment nasal cannula  -VK       Row Name 12/11/24 1043          Positioning and Restraints    Pre-Treatment Position in bed  -VK     Post Treatment Position bed  -VK     In Bed fowlers;call light within reach;encouraged to call for assist;exit alarm on  Pt declines chair  -VK       Row Name 12/11/24 1043          Progress Summary (PT)    Progress Toward Functional Goals (PT) progress toward functional goals is good  -VK       Row Name 12/11/24 1043          Bed Mobility Goal 1 (PT)    Progress/Outcomes (Bed Mobility Goal 1, PT) goal met  -VK       Row Name 12/11/24 1043          Transfer Goal 1 (PT)    Progress/Outcome (Transfer Goal 1, PT) good progress toward goal;goal ongoing  -VK       Row Name 12/11/24 1043          Gait Training Goal 1 (PT)    Progress/Outcome (Gait Training Goal 1, PT) goal not met;goal ongoing  -VK       Row Name 12/11/24 1043          Stairs Goal 1 (PT)    Progress/Outcome (Stairs Goal 1, PT) goal ongoing;good progress toward goal  -VK       Row Name 12/11/24 1043          Discharge Summary (PT)    Additional Documentation Discharge Summary (PT) (Group)  -VK       Row Name 12/11/24 1043          Discharge Summary (PT)    Reason for Discharge (PT Discharge Summary) patient discharged from this facility  -VK     Outcomes Achieved Upon Discharge (PT Discharge Summary) goals partially achieved prior to discharge;progress was made toward goals  -VK     Transfer to Another Level of Care or Facility (PT Discharge Summary) home with assist recommended;home with home health recommended  -VK               User Key  (r) = Recorded By, (t) = Taken By, (c) = Cosigned By      Initials Name Provider Type    Kayleen Rossi, RN Registered Nurse    Radhika Chan PTA Physical Therapist Assistant                  Section G              Section GG                    Mobility, Discharge  Performance (CS0957)  Roll Left & Right: Mobility Discharge ([id number]): independent (06)  Sit to Lying: Mobility Discharge Performance (EX0315M6): independent (06)  Lying to Sitting, Side of Bed: Mobility Discharge Performance (VN7583T9): independent (06)  Sit to Stand: Mobility Discharge Performance (OO0756U2): supervision or touching assistance (04)  Chair/Bed-Chair Transfer: Mobility Discharge Performance (KT0420L9): supervision or touching assistance (04)  Toilet Transfer: Mobility Discharge Performance (LD8042Z1): supervision or touching assistance (04)  Tub/shower Transfer: Mobility Discharge Performance (TQ4988LT6): supervision or touching assistance (04)  Car Transfer: Mobility Discharge Performance (NR3899L1): supervision or touching assistance (04)  Walk 10 Feet: Mobility Discharge Performance (YW4101W2): supervision or touching assistance (04)  Walk 50 Feet With Two Turns: Mobility Discharge Performance (EL2179F8): supervision or touching assistance (04)  Walk 150 Feet: Mobility Discharge Performance (FR3064Q4): supervision or touching assistance (04)  Walk 10 Ft, Uneven Surfaces: Mobility Discharge Performance (EG1040S6): supervision or touching assistance (04)  1 Step/Curb: Mobility Discharge Performance (FO1846T1): supervision or touching assistance (04)  4 Steps: Mobility Discharge Performance (YW3223R2): supervision or touching assistance (04)  12 Steps: Mobility Discharge Performance (EM8873O6): supervision or touching assistance (04)  Picking up object: Mobility Discharge Performance (QU9057H5): setup or clean-up assistance (05)  Wheel 50 Ft Two Turns: Mobility Discharge Performance (OL3668Q0): not applicable (09)  Wheel 150 Feet: Mobility Discharge Performance (AT3928O5): not applicable (09)    Physical Therapy Education        No education to display                    PT Recommendation and Plan     Progress Summary (PT)  Progress Toward Functional Goals (PT): progress toward functional goals is  good     Outcome Measures       Row Name 12/11/24 1300 12/10/24 1600 12/09/24 0903       How much help from another person do you currently need...    Turning from your back to your side while in flat bed without using bedrails? 4  -VK 4  -VK 4  -WM    Moving from lying on back to sitting on the side of a flat bed without bedrails? 4  -VK 3  -VK 3  -WM    Moving to and from a bed to a chair (including a wheelchair)? 3  -VK 3  -VK 3  -WM    Standing up from a chair using your arms (e.g., wheelchair, bedside chair)? 3  -VK 3  -VK 4  -WM    Climbing 3-5 steps with a railing? 3  -VK 3  -VK 2  -WM    To walk in hospital room? 3  -VK 3  -VK 3  -WM    AM-PAC 6 Clicks Score (PT) 20  -VK 19  -VK 19  -WM              User Key  (r) = Recorded By, (t) = Taken By, (c) = Cosigned By      Initials Name Provider Type     Harsh Viramontes PTA Physical Therapist Assistant    Radhika Chan PTA Physical Therapist Assistant                     Time Calculation:    PT Charges       Row Name 12/11/24 1300             Time Calculation    PT Received On 12/11/24  -VK         Timed Charges    68123 - PT Therapeutic Exercise Minutes 18  -VK      39590 - Gait Training Minutes  10  -VK      76905 - PT Therapeutic Activity Minutes 13  -VK         SNF Physical Therapy Minutes    Skilled Minutes- PT 41 min  -VK         Total Minutes    Timed Charges Total Minutes 41  -VK       Total Minutes 41  -VK                User Key  (r) = Recorded By, (t) = Taken By, (c) = Cosigned By      Initials Name Provider Type    Radhika Chan PTA Physical Therapist Assistant                  Therapy Charges for Today       Code Description Service Date Service Provider Modifiers Qty    00372640789 HC PT THER PROC EA 15 MIN 12/10/2024 Radhika Galeano PTA GP 1    33659848778 HC GAIT TRAINING EA 15 MIN 12/10/2024 Radhika Galeano PTA GP 1    51324675829 HC PT THERAPEUTIC ACT EA 15 MIN 12/10/2024 Radhika Galeano PTA GP 1    33212113597 HC PT THER PROC EA 15  MIN 12/11/2024 Radhika Galeano, PTA GP 1    71178171656 HC GAIT TRAINING EA 15 MIN 12/11/2024 Radhika Galeano, PTA GP 1    82089270544 HC PT THERAPEUTIC ACT EA 15 MIN 12/11/2024 Radhika Galeano, PTA GP 1            PT G-Codes  Outcome Measure Options: AM-PAC 6 Clicks Daily Activity (OT), Optimal Instrument  AM-PAC 6 Clicks Score (PT): 20  AM-PAC 6 Clicks Score (OT): 21         Radhika Galeano PTA  12/11/2024

## 2024-12-11 NOTE — THERAPY TREATMENT NOTE
SNF - Occupational Therapy Treatment Note   [place]    Patient Name: Cris Agudelo  : 1940    MRN: [id number]                              Today's Date: 2024       Admit Date: 2024    Visit Dx:     ICD-10-CM ICD-9-CM   1. Difficulty walking  R26.2 719.7   2. Decreased activities of daily living (ADL)  Z78.9 V49.89     Patient Active Problem List   Diagnosis    Hyponatremia    Stress-induced cardiomyopathy    Hyperlipidemia    Primary hypertension    Herpes zoster    Shingles    Non-occlusive coronary artery disease    Pneumonia    Multifocal pneumonia    Acute on chronic respiratory failure with hypoxia    HCAP (healthcare-associated pneumonia)    Weakness    Severe protein-calorie malnutrition     Past Medical History:   Diagnosis Date    CHF (congestive heart failure)     COPD (chronic obstructive pulmonary disease)     History of non-ST elevation myocardial infarction (NSTEMI) 2023    Hyperlipidemia     Hypertension      Past Surgical History:   Procedure Laterality Date    BRONCHOSCOPY N/A 2024    Procedure: BRONCHOSCOPY with BAL, Brushings and washings;  Surgeon: Adan Brown MD;  Location: [facility] ENDOSCOPY;  Service: Pulmonary;  Laterality: N/A;  Bilateral PNA with mucus pluggins    CARDIAC CATHETERIZATION N/A 2023    Procedure: Left Heart Cath;  Surgeon: Mitch Correia MD;  Location: [facility] CATH INVASIVE LOCATION;  Service: Cardiology;  Laterality: N/A;    CARDIAC CATHETERIZATION N/A 2023    Procedure: Coronary angiography;  Surgeon: Mitch Correia MD;  Location: [facility] CATH INVASIVE LOCATION;  Service: Cardiology;  Laterality: N/A;    COLONOSCOPY      EYE SURGERY      HYSTERECTOMY      SKIN BIOPSY      VASCULAR SURGERY        General Information       Row Name 24 1122          OT Time and Intention    Document Type therapy note (daily note)  -EG     Mode of Treatment individual therapy;occupational therapy  -EG     Patient Effort good  -EG       Row Name  12/11/24 1122          General Information    Existing Precautions/Restrictions fall;oxygen therapy device and L/min  -EG       Row Name 12/11/24 1122          Cognition    Orientation Status (Cognition) oriented to;person;situation  -EG       Row Name 12/11/24 1122          Safety Issues/Impairments Affecting Functional Mobility    Impairments Affecting Function (Mobility) balance;endurance/activity tolerance;strength  -EG               User Key  (r) = Recorded By, (t) = Taken By, (c) = Cosigned By      Initials Name Provider Type    EG Janine Quiros OT Occupational Therapist                     Mobility/ADL's       Row Name 12/11/24 1123          Bed Mobility    Bed Mobility supine-sit  -EG     Supine-Sit [place] (Bed Mobility) modified independence  -EG     Assistive Device (Bed Mobility) bed rails  -EG       Row Name 12/11/24 1123          Transfers    Transfers bed-chair transfer;sit-stand transfer;stand-sit transfer;toilet transfer  -EG       Row Name 12/11/24 1123          Bed-Chair Transfer    Bed-Chair [place] (Transfers) standby assist  -EG     Assistive Device (Bed-Chair Transfers) walker, front-wheeled  -EG     Comment, (Bed-Chair Transfer) cues for safety and education on safe use of AD; continues to have poor insight of O2 tubing  -EG       Row Name 12/11/24 1123          Sit-Stand Transfer    Sit-Stand [place] (Transfers) contact guard;verbal cues  -EG     Assistive Device (Sit-Stand Transfers) walker, front-wheeled  -EG       [facility] Name 12/11/24 1123          Stand-Sit Transfer    Stand-Sit [place] (Transfers) contact guard;verbal cues  -EG     Assistive Device (Stand-Sit Transfers) walker, front-wheeled  -EG     Comment, (Stand-Sit Transfer) continues to require cues for reaching back for surfaces  -EG       Row Name 12/11/24 1123          Toilet Transfer    [place] Level (Toilet Transfer) contact guard;verbal cues;nonverbal cues (demo/gesture)  -EG     Assistive Device  (Toilet Transfer) commode chair;grab bars/safety frame  -EG       Row Name 12/11/24 [facility]          Functional Mobility    Functional Mobility- Ind. Level contact guard assist;verbal cues required  -EG     Functional Mobility- Device walker, front-wheeled  -EG     Functional Mobility- Comment functional mobility to and from therapy gym; around unit; Participated in functional mobility obstacle course for small space manuevering, stepping over items in pathway and picking up items from ground no LOB but close CGA required; poor safety and max cues for proper use of AD required  -EG       Row Name 12/11/24 [facility]          Activities of Daily Living    BADL Assessment/Intervention bathing;upper body dressing;lower body dressing;grooming;toileting  -EG       [facility] Name 12/11/24 1123          Bathing Assessment/Intervention    [place] Level (Bathing) bathing skills;upper body;lower body;verbal cues;nonverbal cues (demo/gesture);standby assist;contact guard assist  -EG     Comment, (Bathing) cues for safety; performance of tasks seated when able to decrease fall risk  -EG       Row Name 12/11/24 [facility]          Upper Body Dressing Assessment/Training    [place] Level (Upper Body Dressing) upper body dressing skills;set up  -EG       [facility] Name 12/11/24 [facility]          Lower Body Dressing Assessment/Training    [place] Level (Lower Body Dressing) lower body dressing skills;don;doff;shoes/slippers;socks;pants/bottoms;contact guard assist  -EG       Row Name 12/11/24 1123          Grooming Assessment/Training    [place] Level (Grooming) grooming skills;set up;hair care, combing/brushing;wash face, hands;oral care regimen  -EG       Row Name 12/11/24 1123          Toileting Assessment/Training    [place] Level (Toileting) toileting skills;contact guard assist;adjust/manage clothing;perform perineal hygiene  -EG               User Key  (r) = Recorded By, (t) = Taken By, (c) = Cosigned By      Initials Name  Provider Type    EG Janine Quiros, RAZ Occupational Therapist                   Obj/Interventions       Row Name 12/11/24 1127          Sensory Assessment (Somatosensory)    Sensory Assessment (Somatosensory) UE sensation intact  -EG       Row Name 12/11/24 1127          Balance    Balance Interventions standing;sit to stand;supported;static  -EG     Comment, Balance Unsupported ball tossing tasks and gross motor tasks performed for 2-3 minutes 2x without LOB and CGA from OT; continued education on JEVON adjustments and fall risk prevention  -EG               User Key  (r) = Recorded By, (t) = Taken By, (c) = Cosigned By      Initials Name Provider Type    EG Janine Quiros, RAZ Occupational Therapist                   Goals/Plan    No documentation.                  Clinical Impression       Row Name 12/11/24 1134          Pain Assessment    Pretreatment Pain Rating 0/10 - no pain  -EG     Posttreatment Pain Rating 0/10 - no pain  -EG       Row Name 12/11/24 1134          Plan of Care Review    Plan of Care Reviewed With patient  -EG     Progress no change  -EG     Outcome Evaluation Pleasant and cooperative; forgetful with inconsistent cue follow through and poor carryover on education; Patient reaching a plateau with skilled OT services secondary to cognitive deficits; Need for cues for initiation and sequencing with ADL's at times; OT continue treatment and POC; Ax1 w/RW reccomended by OT.  -EG       Row Name 12/11/24 1134          Therapy Assessment/Plan (OT)    Rehab Potential (OT) good  -EG     Criteria for Skilled Therapeutic Interventions Met (OT) yes;meets criteria;skilled treatment is necessary  -EG     Therapy Frequency (OT) 5 times/wk  -EG       Row Name 12/11/24 1134          Therapy Plan Review/Discharge Plan (OT)    Anticipated Discharge Disposition (OT) home with home health  -EG       Row Name 12/11/24 1134          Positioning and Restraints    Pre-Treatment Position in bed  -EG     Post  Treatment Position chair  -EG     In Chair reclined;sitting;call light within reach;encouraged to call for assist;exit alarm on  -EG               User Key  (r) = Recorded By, (t) = Taken By, (c) = Cosigned By      Initials Name Provider Type    EG Janine Quiros, OT Occupational Therapist                   Outcome Measures       Row Name 12/11/24 1136          How much help from another is currently needed...    Putting on and taking off regular lower body clothing? 3  -EG     Bathing (including washing, rinsing, and drying) 3  -EG     Toileting (which includes using toilet bed pan or urinal) 3  -EG     Putting on and taking off regular upper body clothing 4  -EG     Taking care of personal grooming (such as brushing teeth) 4  -EG     Eating meals 4  -EG     AM-PAC 6 Clicks Score (OT) 21  -EG       Row Name 12/11/24 0834          How much help from another person do you currently need...    Turning from your back to your side while in flat bed without using bedrails? 4  -FL     Moving from lying on back to sitting on the side of a flat bed without bedrails? 4  -FL     Moving to and from a bed to a chair (including a wheelchair)? 3  -FL     Standing up from a chair using your arms (e.g., wheelchair, bedside chair)? 3  -FL     Climbing 3-5 steps with a railing? 2  -FL     To walk in hospital room? 3  -FL     AM-PAC 6 Clicks Score (PT) 19  -FL     Highest Level of Mobility Goal 6 --> Walk 10 steps or more  -FL       Row Name 12/11/24 1136          Functional Assessment    Outcome Measure Options AM-PAC 6 Clicks Daily Activity (OT);Optimal Instrument  -EG       Row Name 12/11/24 1136          Optimal Instrument    Optimal Instrument Optimal - 3  -EG     Bending/Stooping 2  -EG     Standing 1  -EG     Reaching 1  -EG               User Key  (r) = Recorded By, (t) = Taken By, (c) = Cosigned By      Initials Name Provider Type    FL Dalila Serrato RN Registered Nurse    Janine Khanna, RAZ Occupational Therapist                   Section G  Mobility  Bed mobility - self performance: limited assistance (staff provide guided maneuvering of limbs or other non-weight bearing assistance)  Bed mobility support/assistance: One person assist  Transfer - self performance: limited assistance (staff provide guided maneuvering of limbs or other non-weight bearing assistance)  Transfer support/assistance: One person assist  Walking in room - self performance: limited assistance (staff provide guided maneuvering of limbs or other non-weight bearing assistance)  Walking in room support/assistance: One person assist  Walking in corridors/hallway - self performance: limited assistance (staff provide guided maneuvering of limbs or other non-weight bearing assistance)  Walking in corridors/hallway support/assistance: One person assist  Locomotion on unit - self performance: limited assistance (staff provide guided maneuvering of limbs or other non-weight bearing assistance)  Locomotion on unit support/assistance: One person assist  Locomotion off unit - self performance: activity did not occur  Locomotion off unit support/assistance: Activity did not occur  Dressing - self performance: limited assistance (staff provide guided maneuvering of limbs or other non-weight bearing assistance)  Dressing support/assistance: One person assist  Eating - self performance: independent  Eating support/assistance: Setup help only  Toileting - self performance: limited assistance (staff provide guided maneuvering of limbs or other non-weight bearing assistance)  Toileting support/assistance: One person assist  Personal hygiene - self performance: limited assistance (staff provide guided maneuvering of limbs or other non-weight bearing assistance)  Personal hygiene support/assistance: One person assist  Bathing  Bathing - self performance: Physical help only to transfer to bathe  Bathing support/assistance: One person assist  Balance  Balance during  transitions & walking: Not steady, requires assist to steady  Moving from seated to standing position: Not steady, requires assist to steady  Walking: Not steady, requires assist to steady  Turning around while walking: Not steady, requires assist to steady  Moving on and off toilet: Not steady, requires assist to steady  Surface-to-surface transfer: Not steady, requires assist to steady  Mobility devices: Cane/crutch  Range of Motion  Upper Extremity: No impairment  Lower Extremity: No impairment  Section GG  Functional Ability/Goals, Adm (Section GG)  Self Care, Prior Functioning (GN9949F): 3. Independent  Functional Cognition, Prior Functioning (LM3825H): 3. Independent  Prior Device Use (KJ4183): none of the above (Z)  Upper Extremity Range of Motion (FB4433K): No impairment  Lower Extremity Range of Motion (RN4627Q): No impairment  Self Care, Admission (Section GG)  Eating: Self-Care Admission Performance (SU3778S4): setup or clean-up assistance (05)  Oral Hygiene: Self-Care Admission Performance (RW3993J9): setup or clean-up assistance (05)  Toileting Hygiene: Self-Care Admission Performance (PG7794K0): supervision or touching assistance (04)  Shower/Bathe Self: Self-Care Admission Performance (PF0008Y0): partial/moderate assistance (03)  Upper Body Dressing: Self-Care Admission Performance (VE2929S1): supervision or touching assistance (04)  Lower Body Dressing: Self-Care Admission Performance (WD9165N9): supervision or touching assistance (04)  Putting On/Taking Off Footwear: Self-Care Admission Performance (NJ6682X6): supervision or touching assistance (04)  Personal Hygiene: Self-Care Admission Performance (DZ1828F0): supervision or touching assistance (04)  Mobility, Admission Performance (CC4991)  Toilet Transfer: Mobility Admission Performance (CM1923P7): supervision or touching assistance (04)  Tub/shower Transfer: Mobility Admission Performance (TL0018NM3): supervision or touching assistance (04)                 Occupational Therapy Education       Title: PT OT SLP Therapies (Done)       Topic: Occupational Therapy (Done)       Point: ADL training (Done)       Description:   Instruct learner(s) on proper safety adaptation and remediation techniques during self care or transfers.   Instruct in proper use of assistive devices.                  Learning Progress Summary            Patient AGNES Arais VU by EG at 12/5/2024 1142    Comment: Education on options for AD use during mobility  Eduation on OT services  Education on PLB and energy conservation techniques                      Point: Home exercise program (Done)       Description:   Instruct learner(s) on appropriate technique for monitoring, assisting and/or progressing therapeutic exercises/activities.                  Learning Progress Summary            Patient AGNES Arias VU by EG at 12/5/2024 1142    Comment: Education on options for AD use during mobility  Eduation on OT services  Education on PLB and energy conservation techniques                      Point: Precautions (Done)       Description:   Instruct learner(s) on prescribed precautions during self-care and functional transfers.                  Learning Progress Summary            Patient AGNES Arias VU by EG at 12/5/2024 1142    Comment: Education on options for AD use during mobility  Eduation on OT services  Education on PLB and energy conservation techniques                      Point: Body mechanics (Done)       Description:   Instruct learner(s) on proper positioning and spine alignment during self-care, functional mobility activities and/or exercises.                  Learning Progress Summary            Patient AGNES Arias VU by EG at 12/5/2024 1142    Comment: Education on options for AD use during mobility  Eduation on OT services  Education on PLB and energy conservation techniques                                      User Key       Initials Effective Dates Name Provider Type Discipline    EG  09/14/22 -  Janine Quiros OT Occupational Therapist OT                  OT Recommendation and Plan  Planned Therapy Interventions (OT): functional balance retraining, activity tolerance training, occupation/activity based interventions, adaptive equipment training, BADL retraining, neuromuscular control/coordination retraining, patient/caregiver education/training, transfer/mobility retraining, strengthening exercise  Therapy Frequency (OT): 5 times/wk  Plan of Care Review  Plan of Care Reviewed With: patient  Progress: no change  Outcome Evaluation: Pleasant and cooperative; forgetful with inconsistent cue follow through and poor carryover on education; Patient reaching a plateau with skilled OT services secondary to cognitive deficits; Need for cues for initiation and sequencing with ADL's at times; OT continue treatment and POC; Ax1 w/RW reccomended by OT.     Time Calculation:   Evaluation Complexity (OT)  Review Occupational Profile/Medical/Therapy History Complexity: expanded/moderate complexity  Assessment, Occupational Performance/Identification of Deficit Complexity: 3-5 performance deficits  Clinical Decision Making Complexity (OT): detailed assessment/moderate complexity  Overall Complexity of Evaluation (OT): moderate complexity     Time Calculation- OT       Row Name 12/11/24 1136             Time Calculation- OT    OT Received On 12/11/24  -EG      OT Goal Re-Cert Due Date 01/04/24  -EG         Timed Charges    40845 -  OT Neuromuscular Reeducation Minutes 11  -EG      67599 - OT Therapeutic Activity Minutes 15  -EG      76893 - OT Self Care/Mgmt Minutes 30  -EG         SNF Occupational Therapy Minutes    Skilled Minutes- OT 56 min  -EG         Total Minutes    Timed Charges Total Minutes 56  -EG       Total Minutes 56  -EG                User Key  (r) = Recorded By, (t) = Taken By, (c) = Cosigned By      Initials Name Provider Type    EG Janine Quiros OT Occupational Therapist                   Therapy Charges for Today       Code Description Service Date Service Provider Modifiers Qty    68345024802 HC OT NEUROMUSC RE EDUCATION EA 15 MIN 12/10/2024 Janine Quiros, OT GO 1    43716619498 HC OT THER PROC EA 15 MIN 12/10/2024 Janine Quiros, OT GO 2    21943267344 HC OT THERAPEUTIC ACT EA 15 MIN 12/10/2024 Janine Quiros, OT GO 1    73361460583 HC OT NEUROMUSC RE EDUCATION EA 15 MIN 12/11/2024 Janine Quiros, OT GO 1    25595458990 HC OT THERAPEUTIC ACT EA 15 MIN 12/11/2024 Janine Quiros, OT GO 1    73439936897 HC OT SELF CARE/MGMT/TRAIN EA 15 MIN 12/11/2024 Janine Quiros, OT GO 2                 Janine Quiros OT  12/11/2024

## 2024-12-11 NOTE — PLAN OF CARE
Goal Outcome Evaluation:  Plan of Care Reviewed With: patient  PT A&O with confusion able to voice needs and wants, pt ambulates with standby assist with walker no current issues or concerns cont POC

## 2024-12-11 NOTE — THERAPY DISCHARGE NOTE
SNF - Occupational Therapy Discharge   [place]    Patient Name: Cris Agudelo  : 1940    MRN: [id number]                              Today's Date: 2024       Admit Date: 2024    Visit Dx:     ICD-10-CM ICD-9-CM   1. Difficulty walking  R26.2 719.7   2. Decreased activities of daily living (ADL)  Z78.9 V49.89     Patient Active Problem List   Diagnosis    Hyponatremia    Stress-induced cardiomyopathy    Hyperlipidemia    Primary hypertension    Herpes zoster    Shingles    Non-occlusive coronary artery disease    Pneumonia    Multifocal pneumonia    Acute on chronic respiratory failure with hypoxia    HCAP (healthcare-associated pneumonia)    Weakness    Severe protein-calorie malnutrition     Past Medical History:   Diagnosis Date    CHF (congestive heart failure)     COPD (chronic obstructive pulmonary disease)     History of non-ST elevation myocardial infarction (NSTEMI) 2023    Hyperlipidemia     Hypertension      Past Surgical History:   Procedure Laterality Date    BRONCHOSCOPY N/A 2024    Procedure: BRONCHOSCOPY with BAL, Brushings and washings;  Surgeon: Adan Brown MD;  Location: [facility] ENDOSCOPY;  Service: Pulmonary;  Laterality: N/A;  Bilateral PNA with mucus pluggins    CARDIAC CATHETERIZATION N/A 2023    Procedure: Left Heart Cath;  Surgeon: Mitch Correia MD;  Location: [facility] CATH INVASIVE LOCATION;  Service: Cardiology;  Laterality: N/A;    CARDIAC CATHETERIZATION N/A 2023    Procedure: Coronary angiography;  Surgeon: Mitch Correia MD;  Location: [facility] CATH INVASIVE LOCATION;  Service: Cardiology;  Laterality: N/A;    COLONOSCOPY      EYE SURGERY      HYSTERECTOMY      SKIN BIOPSY      VASCULAR SURGERY        General Information       Row Name 24 1122          OT Time and Intention    Document Type therapy note (daily note)  -EG     Mode of Treatment individual therapy;occupational therapy  -EG     Patient Effort good  -EG       Row Name 24  1122          General Information    Existing Precautions/Restrictions fall;oxygen therapy device and L/min  -EG       Row Name 12/11/24 1122          Cognition    Orientation Status (Cognition) oriented to;person;situation  -EG       [facility] Name 12/11/24 1122          Safety Issues/Impairments Affecting Functional Mobility    Impairments Affecting Function (Mobility) balance;endurance/activity tolerance;strength  -EG               User Key  (r) = Recorded By, (t) = Taken By, (c) = Cosigned By      Initials Name Provider Type    EG Janine Quiros OT Occupational Therapist                   Mobility/ADL's       Row Name 12/11/24 1123          Bed Mobility    Bed Mobility supine-sit  -EG     Supine-Sit [place] (Bed Mobility) modified independence  -EG     Assistive Device (Bed Mobility) bed rails  -EG       [facility] Name 12/11/24 1123          Transfers    Transfers bed-chair transfer;sit-stand transfer;stand-sit transfer;toilet transfer  -EG       [facility] Name 12/11/24 1123          Bed-Chair Transfer    Bed-Chair Isabela (Transfers) standby assist  -EG     Assistive Device (Bed-Chair Transfers) walker, front-wheeled  -EG     Comment, (Bed-Chair Transfer) cues for safety and education on safe use of AD; continues to have poor insight of O2 tubing  -EG       [facility] Name 12/11/24 1123          Sit-Stand Transfer    Sit-Stand Isabela (Transfers) contact guard;verbal cues  -EG     Assistive Device (Sit-Stand Transfers) walker, front-wheeled  -EG       [facility] Name 12/11/24 1123          Stand-Sit Transfer    Stand-Sit Isabel[place] (Transfers) contact guard;verbal cues  -EG     Assistive Device (Stand-Sit Transfers) walker, front-wheeled  -EG     Comment, (Stand-Sit Transfer) continues to require cues for reaching back for surfaces  -EG       [facility] Name 12/11/24 1123          Toilet Transfer    [place] Level (Toilet Transfer) contact guard;verbal cues;nonverbal cues (demo/gesture)  -EG     Assistive Device (Toilet  Transfer) commode chair;grab bars/safety frame  -EG       Row Name 12/11/24 1123          Functional Mobility    Functional Mobility- Ind. Level contact guard assist;verbal cues required  -EG     Functional Mobility- Device walker, front-wheeled  -EG     Functional Mobility- Comment functional mobility to and from therapy gym; around unit; Participated in functional mobility obstacle course for small space manuevering, stepping over items in pathway and picking up items from ground no LOB but close CGA required; poor safety and max cues for proper use of AD required  -EG       Row Name 12/11/24 1123          Activities of Daily Living    BADL Assessment/Intervention bathing;upper body dressing;lower body dressing;grooming;toileting  -EG       Row Name 12/11/24 1123          Bathing Assessment/Intervention    [place] Level (Bathing) bathing skills;upper body;lower body;verbal cues;nonverbal cues (demo/gesture);standby assist;contact guard assist  -EG     Comment, (Bathing) cues for safety; performance of tasks seated when able to decrease fall risk  -EG       Row Name 12/11/24 [facility]          Upper Body Dressing Assessment/Training    [place] Level (Upper Body Dressing) upper body dressing skills;set up  -EG       Row Name 12/11/24 1123          Lower Body Dressing Assessment/Training    [place] Level (Lower Body Dressing) lower body dressing skills;don;doff;shoes/slippers;socks;pants/bottoms;contact guard assist  -EG       Row Name 12/11/24 1123          Grooming Assessment/Training    [place] Level (Grooming) grooming skills;set up;hair care, combing/brushing;wash face, hands;oral care regimen  -EG       Row Name 12/11/24 1123          Toileting Assessment/Training    [place] Level (Toileting) toileting skills;contact guard assist;adjust/manage clothing;perform perineal hygiene  -EG               User Key  (r) = Recorded By, (t) = Taken By, (c) = Cosigned By      Initials Name Provider Type     EG Janine Quiros OT Occupational Therapist                   Obj/Interventions       Row Name 12/11/24 1127          Sensory Assessment (Somatosensory)    Sensory Assessment (Somatosensory) UE sensation intact  -EG       Row Name 12/11/24 1127          Balance    Balance Interventions standing;sit to stand;supported;static  -EG     Comment, Balance Unsupported ball tossing tasks and gross motor tasks performed for 2-3 minutes 2x without LOB and CGA from OT; continued education on JEVON adjustments and fall risk prevention  -EG               User Key  (r) = Recorded By, (t) = Taken By, (c) = Cosigned By      Initials Name Provider Type    EG Janine Quiros OT Occupational Therapist                   Goals/Plan       Row Name 12/11/24 1338          Bed Mobility Goal 1 (OT)    Activity/Assistive Device (Bed Mobility Goal 1, OT) bed mobility activities, all  -EG     [place] Level/Cues Needed (Bed Mobility Goal 1, OT) modified independence  -EG     Time Frame (Bed Mobility Goal 1, OT) long term goal (LTG);30 days  -EG     Progress/Outcomes (Bed Mobility Goal 1, OT) goal met  -EG       Row Name 12/11/24 1338          Transfer Goal 1 (OT)    Activity/Assistive Device (Transfer Goal 1, OT) transfers, all;walker, rolling  -EG     [place] Level/Cues Needed (Transfer Goal 1, OT) modified independence  -EG     Time Frame (Transfer Goal 1, OT) long term goal (LTG);30 days  -EG     Progress/Outcome (Transfer Goal 1, OT) good progress toward goal;goal partially met  -EG       Row Name 12/11/24 1216          Bathing Goal 1 (OT)    Activity/Device (Bathing Goal 1, OT) bathing skills, all;shower chair  -EG     [place] Level/Cues Needed (Bathing Goal 1, OT) supervision required;set-up required;verbal cues required  -EG     Time Frame (Bathing Goal 1, OT) long term goal (LTG);30 days  -EG     Progress/Outcomes (Bathing Goal 1, OT) goal met  -EG       [facility] Name 12/11/24 1338          Dressing Goal 1 (OT)     Activity/Device (Dressing Goal 1, OT) dressing skills, all  -EG     [place]/Cues Needed (Dressing Goal 1, OT) modified independence  -EG     Time Frame (Dressing Goal 1, OT) long term goal (LTG);30 days  -EG     Progress/Outcome (Dressing Goal 1, OT) goal partially met;good progress toward goal  -EG       Row Name 12/11/24 1331          Toileting Goal 1 (OT)    Activity/Device (Toileting Goal 1, OT) toileting skills, all;raised toilet seat  -EG     [place] Level/Cues Needed (Toileting Goal 1, OT) modified independence  -EG     Time Frame (Toileting Goal 1, OT) long term goal (LTG);30 days  -EG     Progress/Outcome (Toileting Goal 1, OT) good progress toward goal;goal partially met  -EG       Row Name 12/11/24 2623          Strength Goal 1 (OT)    Strength Goal 1 (OT) Patient will demonstrate 4/5 bilateral biceps, triceps and  to increase ADL and transfer independence.  -EG     Time Frame (Strength Goal 1, OT) long term goal (LTG);30 days  -EG     Progress/Outcome (Strength Goal 1, OT) goal met  -EG       Row Name 12/11/24 3827          Problem Specific Goal 1 (OT)    Problem Specific Goal 1 (OT) Patient will demonstrate fair endurance/activity tolerance needed to support ADLs.  -EG     Time Frame (Problem Specific Goal 1, OT) long term goal (LTG);30 days  -EG     Progress/Outcome (Problem Specific Goal 1, OT) goal met  -EG               User Key  (r) = Recorded By, (t) = Taken By, (c) = Cosigned By      Initials Name Provider Type    EG Janine Quiros OT Occupational Therapist                   Clinical Impression       Row Name 12/11/24 7048          Pain Assessment    Pretreatment Pain Rating 0/10 - no pain  -EG     Posttreatment Pain Rating 0/10 - no pain  -EG       Row Name 12/11/24 3454          Plan of Care Review    Plan of Care Reviewed With patient  -EG     Progress no change  -EG     Outcome Evaluation Pleasant and cooperative; forgetful with inconsistent cue follow through and poor  carryover on education; Patient reaching a plateau with skilled OT services secondary to cognitive deficits; Need for cues for initiation and sequencing with ADL's at times; OT continue treatment and POC; Ax1 w/RW reccomended by OT.  -EG       Row Name 12/11/24 1134          Therapy Assessment/Plan (OT)    Rehab Potential (OT) good  -EG     Criteria for Skilled Therapeutic Interventions Met (OT) yes;meets criteria;skilled treatment is necessary  -EG     Therapy Frequency (OT) 5 times/wk  -EG       Row Name 12/11/24 1134          Therapy Plan Review/Discharge Plan (OT)    Anticipated Discharge Disposition (OT) home with home health  -EG       Row Name 12/11/24 1134          Positioning and Restraints    Pre-Treatment Position in bed  -EG     Post Treatment Position chair  -EG     In Chair reclined;sitting;call light within reach;encouraged to call for assist;exit alarm on  -EG               User Key  (r) = Recorded By, (t) = Taken By, (c) = Cosigned By      Initials Name Provider Type    EG Janine Quiros, OT Occupational Therapist                   Outcome Measures       Row Name 12/11/24 1136          How much help from another is currently needed...    Putting on and taking off regular lower body clothing? 3  -EG     Bathing (including washing, rinsing, and drying) 3  -EG     Toileting (which includes using toilet bed pan or urinal) 3  -EG     Putting on and taking off regular upper body clothing 4  -EG     Taking care of personal grooming (such as brushing teeth) 4  -EG     Eating meals 4  -EG     AM-PAC 6 Clicks Score (OT) 21  -EG       Row Name 12/11/24 1300 12/11/24 0834       How much help from another person do you currently need...    Turning from your back to your side while in flat bed without using bedrails? 4  -VK 4  -FL    Moving from lying on back to sitting on the side of a flat bed without bedrails? 4  -VK 4  -FL    Moving to and from a bed to a chair (including a wheelchair)? 3  -VK 3  -FL     Standing up from a chair using your arms (e.g., wheelchair, bedside chair)? 3  -VK 3  -FL    Climbing 3-5 steps with a railing? 3  -VK 2  -FL    To walk in hospital room? 3  -VK 3  -FL    AM-PAC 6 Clicks Score (PT) 20  -VK 19  -FL    Highest Level of Mobility Goal 6 --> Walk 10 steps or more  -VK 6 --> Walk 10 steps or more  -FL      Row Name 12/11/24 1136          Functional Assessment    Outcome Measure Options AM-PAC 6 Clicks Daily Activity (OT);Optimal Instrument  -EG       Row Name 12/11/24 1136          Optimal Instrument    Optimal Instrument Optimal - 3  -EG     Bending/Stooping 2  -EG     Standing 1  -EG     Reaching 1  -EG               User Key  (r) = Recorded By, (t) = Taken By, (c) = Cosigned By      Initials Name Provider Type    FL Dalila Serrato, RN Registered Nurse    Janine Khanna, OT Occupational Therapist    Radhika Chan PTA Physical Therapist Assistant                  Section G  Mobility  Bed mobility - self performance: supervision (oversight, encourage)  Bed mobility support/assistance: One person assist  Transfer - self performance: supervision (oversight, encourage)  Transfer support/assistance: One person assist  Walking in room - self performance: supervision (oversight, encourage)  Walking in room support/assistance: One person assist  Walking in corridors/hallway - self performance: supervision (oversight, encourage)  Walking in corridors/hallway support/assistance: One person assist  Locomotion on unit - self performance: supervision (oversight, encourage)  Locomotion on unit support/assistance: One person assist  Locomotion off unit - self performance: activity did not occur  Locomotion off unit support/assistance: Activity did not occur  Dressing - self performance: supervision (oversight, encourage)  Dressing support/assistance: Setup help only  Eating - self performance: independent  Eating support/assistance: Setup help only  Toileting - self performance: supervision  (oversight, encourage)  Toileting support/assistance: One person assist  Personal hygiene - self performance: independent  Personal hygiene support/assistance: Setup help only  Bathing  Bathing - self performance: Physical help only to transfer to bathe  Bathing support/assistance: Setup only  Balance  Balance during transitions & walking: Not steady but able to stabilize without assist  Moving from seated to standing position: Not steady but able to stabilize without assist  Walking: Not steady but able to stabilize without assist  Turning around while walking: Not steady but able to stabilize without assist  Moving on and off toilet: Not steady but able to stabilize without assist  Surface-to-surface transfer: Not steady but able to stabilize without assist  Mobility devices: Walker  Range of Motion  Upper Extremity: No impairment  Lower Extremity: No impairment  Section GG  Functional Ability/Goals, Adm (Section GG)  Self Care, Prior Functioning (VM8800Q): 3. Independent  Functional Cognition, Prior Functioning (MG5195B): 3. Independent  Prior Device Use (CL1895): none of the above (Z)  Upper Extremity Range of Motion (PI6085T): No impairment  Lower Extremity Range of Motion (FB3125S): No impairment  Self Care, Admission (Section GG)  Eating: Self-Care Admission Performance (UE6311F0): setup or clean-up assistance (05)  Oral Hygiene: Self-Care Admission Performance (DN6667W8): setup or clean-up assistance (05)  Toileting Hygiene: Self-Care Admission Performance (MI8275J1): supervision or touching assistance (04)  Shower/Bathe Self: Self-Care Admission Performance (MZ4862P4): partial/moderate assistance (03)  Upper Body Dressing: Self-Care Admission Performance (QO1451N9): supervision or touching assistance (04)  Lower Body Dressing: Self-Care Admission Performance (LA9997H4): supervision or touching assistance (04)  Putting On/Taking Off Footwear: Self-Care Admission Performance (NI9955B4): supervision or  touching assistance (04)  Personal Hygiene: Self-Care Admission Performance (US5898U8): supervision or touching assistance (04)  Mobility, Admission Performance (HQ4797)  Toilet Transfer: Mobility Admission Performance (WK9790K0): supervision or touching assistance (04)  Tub/shower Transfer: Mobility Admission Performance (LL2663FA0): supervision or touching assistance (04)        Self Care, Discharge Performance (GR8837)  Eating: Self-Care Discharge Performance (UW6655J7): setup or clean-up assistance (05)  Oral Hygiene: Self-Care Discharge Performance (FE3296W3): setup or clean-up assistance (05)  Toileting Hygiene: Self-Care Discharge Performance (QJ1120K2): supervision or touching assistance (04)  Shower/Bathe Self: Self-Care Discharge Performance (LN3151H8): supervision or touching assistance (04)  Upper Body Dressing: Self-Care Discharge Performance (XL5044M5): setup or clean-up assistance (05)  Lower Body Dressing: Self-Care Discharge Performance (MU7823F4): setup or clean-up assistance (05)  Putting On/Taking Off Footwear: Self-Care Discharge Performance (XN9598U0): setup or clean-up assistance (05)  Personal Hygiene: Self-Care Discharge Performance (QQ7276U1): setup or clean-up assistance (05)       Occupational Therapy Education       Title: PT OT SLP Therapies (Done)       Topic: Occupational Therapy (Done)       Point: ADL training (Done)       Description:   Instruct learner(s) on proper safety adaptation and remediation techniques during self care or transfers.   Instruct in proper use of assistive devices.                  Learning Progress Summary            Patient AGNES Arias VU by EG at 12/5/2024 1142    Comment: Education on options for AD use during mobility  Eduation on OT services  Education on PLB and energy conservation techniques                      Point: Home exercise program (Done)       Description:   Instruct learner(s) on appropriate technique for monitoring, assisting and/or  progressing therapeutic exercises/activities.                  Learning Progress Summary            Patient AGNES Arias VU by EG at 12/5/2024 1142    Comment: Education on options for AD use during mobility  Eduation on OT services  Education on PLB and energy conservation techniques                      Point: Precautions (Done)       Description:   Instruct learner(s) on prescribed precautions during self-care and functional transfers.                  Learning Progress Summary            Patient AGNES Arias VU by EG at 12/5/2024 1142    Comment: Education on options for AD use during mobility  Eduation on OT services  Education on PLB and energy conservation techniques                      Point: Body mechanics (Done)       Description:   Instruct learner(s) on proper positioning and spine alignment during self-care, functional mobility activities and/or exercises.                  Learning Progress Summary            Patient AGNES Arias VU by EG at 12/5/2024 1142    Comment: Education on options for AD use during mobility  Eduation on OT services  Education on PLB and energy conservation techniques                                      User Key       Initials Effective Dates Name Provider Type Discipline    EG 09/14/22 -  Janine Quiros OT Occupational Therapist OT                  OT Recommendation and Plan  Planned Therapy Interventions (OT): functional balance retraining, activity tolerance training, occupation/activity based interventions, adaptive equipment training, BADL retraining, neuromuscular control/coordination retraining, patient/caregiver education/training, transfer/mobility retraining, strengthening exercise  Therapy Frequency (OT): 5 times/wk  Plan of Care Review  Plan of Care Reviewed With: patient  Progress: no change  Outcome Evaluation: Pleasant and cooperative; forgetful with inconsistent cue follow through and poor carryover on education; Patient reaching a plateau with skilled OT services  secondary to cognitive deficits; Need for cues for initiation and sequencing with ADL's at times; OT continue treatment and POC; Ax1 w/RW reccomended by OT.  Plan of Care Reviewed With: patient  Outcome Evaluation: Pleasant and cooperative; forgetful with inconsistent cue follow through and poor carryover on education; Patient reaching a plateau with skilled OT services secondary to cognitive deficits; Need for cues for initiation and sequencing with ADL's at times; OT continue treatment and POC; Ax1 w/RW reccomended by OT.     Time Calculation:   Evaluation Complexity (OT)  Review Occupational Profile/Medical/Therapy History Complexity: expanded/moderate complexity  Assessment, Occupational Performance/Identification of Deficit Complexity: 3-5 performance deficits  Clinical Decision Making Complexity (OT): detailed assessment/moderate complexity  Overall Complexity of Evaluation (OT): moderate complexity     Time Calculation- OT       Row Name 12/11/24 1300 12/11/24 1136          Time Calculation- OT    OT Received On -- 12/11/24  -EG     OT Goal Re-Cert Due Date -- 01/04/24  -EG        Timed Charges    93708 -  OT Neuromuscular Reeducation Minutes -- 11  -EG     60992 - Gait Training Minutes  10  -VK --     92877 - OT Therapeutic Activity Minutes -- 15  -EG     56046 - OT Self Care/Mgmt Minutes -- 30  -EG        SNF Occupational Therapy Minutes    Skilled Minutes- OT -- 56 min  -EG        Total Minutes    Timed Charges Total Minutes 10  -VK 56  -EG      Total Minutes 10  -VK 56  -EG               User Key  (r) = Recorded By, (t) = Taken By, (c) = Cosigned By      Initials Name Provider Type    EG Janine Quiros OT Occupational Therapist    Radhika Chan PTA Physical Therapist Assistant                  Therapy Charges for Today       Code Description Service Date Service Provider Modifiers Qty    [id number]  OT NEUROMUSC RE EDUCATION EA 15 MIN 12/10/2024 Janine Quiros OT GO 1    [id number]  OT  THER PROC EA 15 MIN 12/10/2024 Janine Quiros, OT GO 2    96206144020 HC OT THERAPEUTIC ACT EA 15 MIN 12/10/2024 Janine Quiros, OT GO 1    01026904755 HC OT NEUROMUSC RE EDUCATION EA 15 MIN 12/11/2024 Janine Quiros, OT GO 1    35132125311 HC OT THERAPEUTIC ACT EA 15 MIN 12/11/2024 Janine Quiros, OT GO 1    63570772607 HC OT SELF CARE/MGMT/TRAIN EA 15 MIN 12/11/2024 Janine Quiros OT GO 2                 Janine Quiros OT  12/11/2024

## 2024-12-11 NOTE — PROGRESS NOTES
[facility]   Hospitalist Progress Note  Date: 2024  Patient Name: Cris Agudelo  : 1940  MRN: [id number]  Date of admission: 2024      Subjective   Subjective     Chief Complaint: Weakness    Summary: Cris Agudelo is a 84 y.o. female past medical history significant for hypertension, CAD, COPD, hypertension, hyperlipidemia, GERD, possible emerging dementia that initially presents to the emergency department with chief complaint of shortness of breath found to be hypoxemic hypercapnic found to have multifocal pneumonia hospitalized for pneumonia started on appropriate antibiotics also concern for volume overload started on diuretics concern for underlying ILD pulmonary consulted underwent bronchoscopy 2024 respiratory status improved clinical status stabilized seen by PT and OT deemed a good candidate for inpatient rehab is been transition skilled nursing facility at [facility] for ongoing therapy.     Interval Followup: 2024    Resting comfortably in bed.  Pleasant demeanor.    Denies any new issues or complaints.  Participating with physical therapy.    Discussed discharge plans.  Discharging from SNF on Thursday.  Patient will be staying with daughter Coco.  Home health being set up.        Objective   Objective     Vitals:   Temp:  [97.7 °F (36.5 °C)-98.2 °F (36.8 °C)] 97.7 °F (36.5 °C)  Heart Rate:  [73-84] 75  Resp:  [18-20] 18  BP: (124)/(44-50) 124/44  Flow (L/min) (Oxygen Therapy):  [2] 2    Physical Exam   Constitutional: Awake alert oriented no acute distress  Respiratory: Scattered coarse breath sounds  Cardiovascular RRR  GI: Abdomen soft nontender    Result Review    Result Review:  I have personally reviewed the results from the time of this admission to 2024 18:07 EST and agree with these findings:  []  Laboratory  []  Microbiology  []  Radiology  []  EKG/Telemetry   []  Cardiology/Vascular   []  Pathology  []  Old records  []  Other:    Assessment & Plan   Assessment  / Plan   Assessment:     Hospital-acquired weakness  Multifocal pneumonia resolved  Acute on chronic hypoxemic respiratory failure  Acute decompensation of chronic diastolic congestive heart failure  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  Home Thursday.  Home health.  Will be staying with her daughter Coco.  Continue with nebulizer treatments and bronchopulmonary hygiene  Completed steroid taper  Antibiotic course completed  Continuing with diuretics Lasix 40 mg daily  Continue with Coreg 6.25 mg twice daily  Monitor renal function electrolytes  Monitor volume status  Daily PT OT       Discussed plan with RN.    VTE Prophylaxis:  Pharmacologic VTE prophylaxis orders are present.        CODE STATUS:   Level Of Support Discussed With: Patient  Code Status (Patient has no pulse and is not breathing): CPR (Attempt to Resuscitate)  Medical Interventions (Patient has pulse or is breathing): Full Support

## 2024-12-12 VITALS
OXYGEN SATURATION: 98 % | HEART RATE: 80 BPM | BODY MASS INDEX: 28 KG/M2 | SYSTOLIC BLOOD PRESSURE: 118 MMHG | WEIGHT: 142.64 LBS | TEMPERATURE: 97.3 F | HEIGHT: 60 IN | RESPIRATION RATE: 18 BRPM | DIASTOLIC BLOOD PRESSURE: 40 MMHG

## 2024-12-12 LAB
ANION GAP SERPL CALCULATED.3IONS-SCNC: 7.5 MMOL/L (ref 5–15)
BUN SERPL-MCNC: 10 MG/DL (ref 8–23)
BUN/CREAT SERPL: 18.2 (ref 7–25)
CALCIUM SPEC-SCNC: 8.4 MG/DL (ref 8.6–10.5)
CHLORIDE SERPL-SCNC: 97 MMOL/L (ref 98–107)
CO2 SERPL-SCNC: 33.5 MMOL/L (ref 22–29)
CREAT SERPL-MCNC: 0.55 MG/DL (ref 0.57–1)
EGFRCR SERPLBLD CKD-EPI 2021: 90.5 ML/MIN/1.73
GLUCOSE SERPL-MCNC: 141 MG/DL (ref 65–99)
MAGNESIUM SERPL-MCNC: 1.6 MG/DL (ref 1.6–2.4)
POTASSIUM SERPL-SCNC: 3.5 MMOL/L (ref 3.5–5.2)
SODIUM SERPL-SCNC: 138 MMOL/L (ref 136–145)
WHOLE BLOOD HOLD SPECIMEN: NORMAL

## 2024-12-12 PROCEDURE — 80048 BASIC METABOLIC PNL TOTAL CA: CPT | Performed by: PHYSICIAN ASSISTANT

## 2024-12-12 PROCEDURE — 94799 UNLISTED PULMONARY SVC/PX: CPT

## 2024-12-12 PROCEDURE — 94669 MECHANICAL CHEST WALL OSCILL: CPT

## 2024-12-12 PROCEDURE — 25010000002 ENOXAPARIN PER 10 MG: Performed by: PHYSICIAN ASSISTANT

## 2024-12-12 PROCEDURE — 94664 DEMO&/EVAL PT USE INHALER: CPT

## 2024-12-12 PROCEDURE — 83735 ASSAY OF MAGNESIUM: CPT | Performed by: PHYSICIAN ASSISTANT

## 2024-12-12 RX ORDER — CARVEDILOL 3.12 MG/1
3.12 TABLET ORAL 2 TIMES DAILY
Qty: 60 TABLET | Refills: 0 | Status: SHIPPED | OUTPATIENT
Start: 2024-12-12

## 2024-12-12 RX ORDER — POTASSIUM CHLORIDE 750 MG/1
10 CAPSULE, EXTENDED RELEASE ORAL DAILY
Qty: 30 CAPSULE | Refills: 0 | Status: SHIPPED | OUTPATIENT
Start: 2024-12-12

## 2024-12-12 RX ADMIN — ASPIRIN 81 MG: 81 TABLET, COATED ORAL at 09:03

## 2024-12-12 RX ADMIN — NYSTATIN 1 APPLICATION: 100000 POWDER TOPICAL at 09:05

## 2024-12-12 RX ADMIN — IPRATROPIUM BROMIDE AND ALBUTEROL SULFATE 3 ML: .5; 3 SOLUTION RESPIRATORY (INHALATION) at 06:23

## 2024-12-12 RX ADMIN — BUDESONIDE 0.5 MG: 0.5 INHALANT RESPIRATORY (INHALATION) at 06:23

## 2024-12-12 RX ADMIN — GUAIFENESIN 600 MG: 600 TABLET ORAL at 09:02

## 2024-12-12 RX ADMIN — FUROSEMIDE 40 MG: 40 TABLET ORAL at 09:03

## 2024-12-12 RX ADMIN — ENOXAPARIN SODIUM 40 MG: 100 INJECTION SUBCUTANEOUS at 09:04

## 2024-12-12 RX ADMIN — IPRATROPIUM BROMIDE AND ALBUTEROL SULFATE 3 ML: .5; 3 SOLUTION RESPIRATORY (INHALATION) at 13:02

## 2024-12-12 RX ADMIN — Medication 250 MG: at 09:04

## 2024-12-12 RX ADMIN — ARFORMOTEROL TARTRATE 15 MCG: 15 SOLUTION RESPIRATORY (INHALATION) at 06:23

## 2024-12-12 RX ADMIN — CARVEDILOL 3.12 MG: 3.12 TABLET, FILM COATED ORAL at 09:03

## 2024-12-12 NOTE — PLAN OF CARE
Goal Outcome Evaluation:           Progress: improving  Outcome Evaluation: pt is AOX2, makes needs known to staff. Rno complaints this shiftequires assist of one and walker to transfer and ambulate to bathroom. No complaints this shift. Call light and personal items within reach at bedside.

## 2024-12-12 NOTE — DISCHARGE SUMMARY
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 female who was recently hospitalized from  through  with respiratory failure from COPD exacerbation and pneumonia.  Also diastolic CHF.  After appropriate treatment and bronchoscopy she improved; however, she remained weak and required additional rehab.  She was subsequently discharged to our SNF on .    She spent 8 days in SNF rehab and worked with PT and OT on a daily basis.  She had no setbacks during the stay.  Discharge medications as listed below.  She will have home health arranged.  She will be staying with her daughter Coco initially.  Follow-up with PCP and pulmonology to be arranged.  Discharge labs include creatinine 0.5, potassium 3.5.  BP this morning 118/40.    Discharge Follow Up / Recommendations (labs, diagnostics, meds, etc):   PCP after rehab  Future Appointments   Date Time Provider Department Center   2025 11:15 AM Nazia Sam APRN [facility] [place] ETW [facility]   2025  1:30 PM Izabella Oro APRN [facility] [place] [facility] CD ETOWN [facility]     Consultants     Consults       Date and Time Order Name Status Description    2024  4:08 PM Inpatient Pulmonology Consult Completed     2024  4:08 PM Inpatient Hospitalist Consult            On Day of Discharge   VS: Temp:  [97.3 °F (36.3 °C)-98.2 °F  (36.8 °C)] 97.3 °F (36.3 °C)  Heart Rate:  [75-84] 80  Resp:  [16-18] 16  BP: (116-118)/(40-48) 118/40  Flow (L/min) (Oxygen Therapy):  [2] 2  EXAM:   Constitutional: Awake alert oriented.  Comfortable  Respiratory: Coarse breath sounds  Cardiovascular RRR  GI: Abdomen soft nontender     Discharge Medications        New Medications        Instructions Start Date   potassium chloride 10 MEQ CR capsule  Commonly known as: MICRO-K   10 mEq, Oral, Daily             Changes to Medications        Instructions Start Date   carvedilol 3.125 MG tablet  Commonly known as: COREG  What changed:   medication strength  how much to take   3.125 mg, Oral, 2 Times Daily             Continue These Medications        Instructions Start Date   arformoterol 15 MCG/2ML nebulizer solution  Commonly known as: BROVANA   15 mcg, Nebulization, 2 Times Daily - RT      aspirin 81 MG EC tablet   81 mg, Oral, Daily      atorvastatin 20 MG tablet  Commonly known as: LIPITOR   20 mg, Oral, Nightly      budesonide 0.5 MG/2ML nebulizer solution  Commonly known as: PULMICORT   0.5 mg, Nebulization, 2 Times Daily - RT      Farxiga 10 MG tablet  Generic drug: dapagliflozin Propanediol   10 mg, Oral, Daily      furosemide 20 MG tablet  Commonly known as: LASIX   20 mg, Oral, Every Other Day      guaiFENesin 600 MG 12 hr tablet  Commonly known as: MUCINEX   600 mg, Oral, Every 12 Hours Scheduled      ipratropium-albuterol 0.5-2.5 mg/3 ml nebulizer  Commonly known as: DUO-NEB   3 mL, Nebulization, Every 6 Hours PRN      nitroglycerin 0.4 MG SL tablet  Commonly known as: NITROSTAT   Place 1 tablet under the tongue Every 5 (Five) Minutes As Needed for Chest Pain.             Stop These Medications      lisinopril 20 MG tablet  Commonly known as: PRINIVIL,ZESTRIL            Procedures   None in rehab  Imaging   None in rehab  Discharge Details   Hospital Diet:     Diet Order   Procedures    Diet: Cardiac; Healthy Heart (2-3 Na+); Texture: Regular (IDDSI 7);  Fluid Consistency: Thin (IDDSI 0)     CODE STATUS:    Code Status and Medical Interventions: CPR (Attempt to Resuscitate); Full Support   Ordered at: 12/04/24 1608     Level Of Support Discussed With:    Patient     Code Status (Patient has no pulse and is not breathing):    CPR (Attempt to Resuscitate)     Medical Interventions (Patient has pulse or is breathing):    Full Support       Discharge Disposition: Home-Health Care [facility] [place] [place]  Pertinent  Labs   LAB RESULTS:          Lab 12/12/24  0420 12/10/24  0508 12/06/24  0438   SODIUM 138 136 135*   POTASSIUM 3.5 3.3* 3.8   CHLORIDE 97* 96* 94*   CO2 33.5* 32.8* 33.4*   ANION GAP 7.5 7.2 7.6   BUN 10 11 12   CREATININE 0.55* 0.47* 0.55*   EGFR 90.5 94.0 90.5   GLUCOSE 141* 119* 93   CALCIUM 8.4* 8.2* 8.4*   MAGNESIUM 1.6  --   --                          Brief Urine Lab Results       None          Microbiology Results (last 10 days)       Procedure Component Value - Date/Time    COVID PRE-OP / PRE-PROCEDURE SCREENING ORDER (NO ISOLATION) - Swab, Nasal Cavity [[id number]]  (Normal) Collected: 12/05/24 0755    Lab Status: Final result Specimen: Swab from Nasal Cavity Updated: 12/05/24 1313    Narrative:      The following orders were created for panel order COVID PRE-OP / PRE-PROCEDURE SCREENING ORDER (NO ISOLATION) - Swab, Nasal Cavity.  Procedure                               Abnormality         Status                     ---------                               -----------         ------                     COVID-19,CEPHEID/ANDREINA,CO...[[id number]]  Normal              Final result                 Please view results for these tests on the individual orders.    COVID-19,CEPHEID/ANDREINA,COR/LUCIA/PAD/KALEB/LAG/[facility] IN-HOUSE,NP SWAB IN TRANSPORT MEDIA 1 HR TAT, RT-PCR - Swab, Nasopharynx [[id number]]  (Normal) Collected: 12/05/24 0755    Lab Status: Final result Specimen: Swab from Nasopharynx Updated: 12/05/24 1313     COVID19 Not Detected    Narrative:      Fact sheet for providers:  https://www.fda.gov/media/843023/download     Fact sheet for patients: https://www.fda.gov/media/373386/download  Fact sheet for providers: https://www.fda.gov/media/776996/download     Fact sheet for patients: https://www.fda.gov/media/385619/download    COVID PRE-OP / PRE-PROCEDURE SCREENING ORDER (NO ISOLATION) - Swab, Nasopharynx [[id number]]  (Normal) Collected: 12/03/24 1725    Lab Status: Final result Specimen: Swab from Nasopharynx Updated: 12/03/24 1827    Narrative:      The following orders were created for panel order COVID PRE-OP / PRE-PROCEDURE SCREENING ORDER (NO ISOLATION) - Swab, Nasopharynx.  Procedure                               Abnormality         Status                     ---------                               -----------         ------                     COVID-19,CEPHEID/ANDREINA,CO...[[id number]]  Normal              Final result                 Please view results for these tests on the individual orders.    COVID-19,CEPHEID/ANDREINA,COR/LUCIA/PAD/KALEB/LAG/MARY ANN IN-HOUSE,NP SWAB IN TRANSPORT MEDIA 1 HR TAT, RT-PCR - Swab, Nasopharynx [[id number]]  (Normal) Collected: 12/03/24 1725    Lab Status: Final result Specimen: Swab from Nasopharynx Updated: 12/03/24 1827     COVID19 Not Detected    Narrative:      Fact sheet for providers: https://www.fda.gov/media/689990/download     Fact sheet for patients: https://www.fda.gov/media/456490/download  Fact sheet for providers: https://www.fda.gov/media/730324/download     Fact sheet for patients: https://www.fda.gov/media/562496/download          Labs Pending at Discharge:   Time spent on Discharge including face to face service: > 30 minutes  Electronically signed by ANABELA Harris, 12/12/24, 9:32 AM EST.

## 2024-12-12 NOTE — PLAN OF CARE
Goal Outcome Evaluation:  Plan of Care Reviewed With: patient        Progress: improving  Outcome Evaluation: Patient is alert and oriented x3. Denies pain. Is to discharge home today. Daughter to provide transport.

## 2024-12-13 LAB
MYCOBACTERIUM SPEC CULT: NORMAL
NIGHT BLUE STAIN TISS: NORMAL
NIGHT BLUE STAIN TISS: NORMAL

## 2024-12-20 LAB
MYCOBACTERIUM SPEC CULT: NORMAL
NIGHT BLUE STAIN TISS: NORMAL
NIGHT BLUE STAIN TISS: NORMAL

## 2024-12-27 LAB
MYCOBACTERIUM SPEC CULT: NORMAL
NIGHT BLUE STAIN TISS: NORMAL
NIGHT BLUE STAIN TISS: NORMAL

## 2025-01-02 LAB
FUNGUS WND CULT: ABNORMAL
FUNGUS WND CULT: ABNORMAL

## 2025-01-03 LAB
MYCOBACTERIUM SPEC CULT: NORMAL
NIGHT BLUE STAIN TISS: NORMAL
NIGHT BLUE STAIN TISS: NORMAL

## 2025-01-10 LAB
MYCOBACTERIUM SPEC CULT: NORMAL
NIGHT BLUE STAIN TISS: NORMAL
NIGHT BLUE STAIN TISS: NORMAL

## 2025-01-16 ENCOUNTER — HOSPITAL ENCOUNTER (OUTPATIENT)
Facility: HOSPITAL | Age: 85
Discharge: HOME | End: 2025-01-16
Payer: MEDICARE

## 2025-01-16 VITALS
BODY MASS INDEX: 27.76 KG/M2 | TEMPERATURE: 97.6 F | OXYGEN SATURATION: 95 % | HEIGHT: 60 IN | WEIGHT: 141.4 LBS | SYSTOLIC BLOOD PRESSURE: 177 MMHG | HEART RATE: 81 BPM | RESPIRATION RATE: 16 BRPM | DIASTOLIC BLOOD PRESSURE: 87 MMHG

## 2025-01-16 DIAGNOSIS — J98.4 RESTRICTIVE AIRWAY DISEASE: Primary | Chronic | ICD-10-CM

## 2025-01-16 DIAGNOSIS — R06.89 AIRWAY CLEARANCE IMPAIRMENT: ICD-10-CM

## 2025-01-16 DIAGNOSIS — J18.9 MULTIFOCAL PNEUMONIA: ICD-10-CM

## 2025-01-16 DIAGNOSIS — F17.211 NICOTINE DEPENDENCE, CIGARETTES, IN REMISSION: ICD-10-CM

## 2025-01-16 DIAGNOSIS — T17.500A MUCUS PLUGGING OF BRONCHI: ICD-10-CM

## 2025-01-16 PROCEDURE — 71046 X-RAY EXAM CHEST 2 VIEWS: CPT

## 2025-01-16 RX ORDER — LISINOPRIL 10 MG/1
1 TABLET ORAL DAILY
COMMUNITY
Start: 2024-11-15

## 2025-01-16 RX ORDER — AZITHROMYCIN 250 MG/1
TABLET, FILM COATED ORAL
COMMUNITY
Start: 2024-12-18 | End: 2025-01-16

## 2025-01-16 RX ORDER — MIRTAZAPINE 15 MG/1
1 TABLET, FILM COATED ORAL DAILY
COMMUNITY
Start: 2024-12-23

## 2025-01-16 NOTE — PROGRESS NOTES
Primary Care Provider  Ramirez Maurice MD     Referring Provider  Marco GUTIERREZ MD     Chief Complaint  COPD and Hospital Follow Up Visit ([facility] 11/25-12/4 Bronch 11/29)    Subjective          Cris Agudelo presents to [facility]  History of Present Illness  Cris Agudelo is a 84 y.o. female patient here for follow-up in COPD clinic.    Patient was recently admitted to [facility] from 11/25/2024 until 12/12/2024.  Per her hospital discharge summary, she was hospitalized for respiratory failure from COPD exacerbation and pneumonia along with diastolic congestive heart failure.  Patient did undergo a bronchoscopy in which her symptoms improved.  She did remain weak and required additional rehab.  She was discharged to skilled nursing facility on 12/4/2024.  Patient did spend 8 days in skilled nursing rehab and ordered physical therapy and Occupational Therapy.  She was discharged with home health.    Patient states that she is doing well since discharge from the hospital.  She was seen by her primary care provider since discharge and was given a steroid injection along with antibiotics.  She continues to use Brovana and Pulmicort as prescribed.  She only uses her DuoNebs as needed.  She continues to take Mucinex to help with airway clearance.  Patient is currently on 2 L and is wanting to have her oxygen discontinued potentially.  She is agreeable to having a 6-minute walk performed in office today.  I have also discussed patient's bronchoscopy results with her.  She and her daughter have no additional questions today in office.     Her history of smoking is   Tobacco Use: Low Risk  (1/16/2025)    Patient History     Smoking Tobacco Use: Never     Smokeless Tobacco Use: Never     Passive Exposure: Past   Recent Concern: Tobacco Use - Medium Risk (12/4/2024)    Patient History     Smoking Tobacco Use: Former     Smokeless Tobacco Use: Never     Passive Exposure: Past    .    Review of Systems   Constitutional:  Negative for chills, fatigue, fever, unexpected weight gain and unexpected weight loss.   HENT:  Congestion: Nasal.    Respiratory:  Positive for shortness of breath. Negative for apnea, cough and wheezing.         Negative for Hemoptysis     Cardiovascular:  Negative for chest pain, palpitations and leg swelling.   Skin:         Negative for cyanosis      Sleep: Negative for Excessive daytime sleepiness  Negative for morning headaches  Negative for Snoring    Family History   Problem Relation Age of Onset    Hypertension Mother     Arthritis Mother     Heart disease Mother     Heart failure Mother         She had congestive heart failure        Social History     Socioeconomic History    Marital status:    Tobacco Use    Smoking status: Never     Passive exposure: Past    Smokeless tobacco: Never    Tobacco comments:     Second hand smoke    Vaping Use    Vaping status: Never Used   Substance and Sexual Activity    Alcohol use: Yes     Alcohol/week: 2.0 standard drinks of alcohol     Types: 2 Glasses of wine per week    Drug use: Never    Sexual activity: Defer        Past Medical History:   Diagnosis Date    CHF (congestive heart failure)     COPD (chronic obstructive pulmonary disease)     History of non-ST elevation myocardial infarction (NSTEMI) 08/23/2023    Hyperlipidemia     Hypertension         Immunization History   Administered Date(s) Administered    PPD Test 12/04/2024, 12/11/2024    Pneumococcal Conjugate 20-Valent (PCV20) 08/17/2023         Allergies   Allergen Reactions    Aleve [Naproxen] Hives          Current Outpatient Medications:     arformoterol (BROVANA) 15 MCG/2ML nebulizer solution, Take 2 mL by nebulization 2 (Two) Times a Day., Disp: , Rfl:     aspirin 81 MG EC tablet, Take 1 tablet by mouth Daily., Disp: 30 tablet, Rfl: 1    atorvastatin (LIPITOR) 20 MG tablet, Take 1 tablet by mouth Every Night. (Patient taking differently: Take 2  tablets by mouth Every Night.), Disp: 90 tablet, Rfl: 1    budesonide (PULMICORT) 0.5 MG/2ML nebulizer solution, Take 2 mL by nebulization 2 (Two) Times a Day., Disp: , Rfl:     carvedilol (COREG) 3.125 MG tablet, Take 1 tablet by mouth 2 (Two) Times a Day., Disp: 60 tablet, Rfl: 0    dapagliflozin Propanediol (Farxiga) 10 MG tablet, Take 10 mg by mouth Daily., Disp: 90 tablet, Rfl: 3    furosemide (LASIX) 20 MG tablet, TAKE 1 TABLET BY MOUTH EVERY OTHER DAY, Disp: 45 tablet, Rfl: 0    guaiFENesin (MUCINEX) 600 MG 12 hr tablet, Take 1 tablet by mouth Every 12 (Twelve) Hours., Disp: 15 tablet, Rfl: 0    ipratropium-albuterol (DUO-NEB) 0.5-2.5 mg/3 ml nebulizer, Take 3 mL by nebulization Every 6 (Six) Hours As Needed for Shortness of Air., Disp: 360 mL, Rfl: 5    mirtazapine (REMERON) 15 MG tablet, Take 1 tablet by mouth Daily., Disp: , Rfl:     nitroglycerin (NITROSTAT) 0.4 MG SL tablet, Place 1 tablet under the tongue Every 5 (Five) Minutes As Needed for Chest Pain., Disp: , Rfl:     potassium chloride (MICRO-K) 10 MEQ CR capsule, Take 1 capsule by mouth Daily., Disp: 30 capsule, Rfl: 0    lisinopril (PRINIVIL,ZESTRIL) 10 MG tablet, Take 1 tablet by mouth Daily. (Patient not taking: Reported on 1/16/2025), Disp: , Rfl:      Objective   Physical Exam  Constitutional:       General: She is not in acute distress.     Appearance: Normal appearance. She is normal weight.   HENT:      Right Ear: Hearing normal.      Left Ear: Hearing normal.      Nose: No nasal tenderness or congestion.      Mouth/Throat:      Mouth: Mucous membranes are moist. No oral lesions.   Eyes:      Extraocular Movements: Extraocular movements intact.      Pupils: Pupils are equal, round, and reactive to light.   Cardiovascular:      Rate and Rhythm: Normal rate and regular rhythm.      Pulses: Normal pulses.      Heart sounds: Normal heart sounds. No murmur heard.  Pulmonary:      Effort: Pulmonary effort is normal.      Breath sounds: Decreased  "breath sounds present. No wheezing, rhonchi or rales.      Comments: Patient is on 2 L of oxygen.  She is able to speak full sentences without difficulty  Musculoskeletal:      Right lower leg: No edema.      Left lower leg: No edema.   Skin:     General: Skin is warm and dry.      Findings: No lesion or rash.   Neurological:      General: No focal deficit present.      Mental Status: She is alert and oriented to person, place, and time.   Psychiatric:         Mood and Affect: Affect normal. Mood is not anxious or depressed.         Vital Signs:   /87 Comment: was taken off B/P medication at D/C  Pulse 81   Temp 97.6 °F (36.4 °C) (Oral)   Resp 16   Ht 152.4 cm (60\")   Wt 64.1 kg (141 lb 6.4 oz)   SpO2 95% Comment: 2 L's C  BMI 27.62 kg/m²        Result Review :   The following data was reviewed by: JES Ryan on 01/16/2025:  CMP          12/6/2024    04:38 12/10/2024    05:08 12/12/2024    04:20   CMP   Glucose 93  119  141    BUN 12  11  10    Creatinine 0.55  0.47  0.55    EGFR 90.5  94.0  90.5    Sodium 135  136  138    Potassium 3.8  3.3  3.5    Chloride 94  96  97    Calcium 8.4  8.2  8.4    BUN/Creatinine Ratio 21.8  23.4  18.2    Anion Gap 7.6  7.2  7.5      CBC w/diff          12/3/2024    05:02 12/4/2024    07:58 12/5/2024    04:46   CBC w/Diff   WBC 12.23  12.16  10.89    RBC 4.35  4.75  4.28    Hemoglobin 11.9  13.0  11.9    Hematocrit 37.4  43.1  37.2    MCV 86.0  90.7  86.9    MCH 27.4  27.4  27.8    MCHC 31.8  30.2  32.0    RDW 13.4  13.6  13.6    Platelets 312  229  262    Neutrophil Rel % 70.1  67.6     Immature Granulocyte Rel % 2.3  1.8     Lymphocyte Rel % 19.1  21.0     Monocyte Rel % 5.8  6.4     Eosinophil Rel % 2.3  2.8     Basophil Rel % 0.4  0.4     Personally reviewed respiratory culture, virus culture, fungus culture, bronchoscopy wash, cytomegalovirus, pneumonia panel, BAL culture and cytology from 11/29/2024    Data reviewed : Radiologic studies chest x-ray " 11/25/2024, chest x-ray 11/30/2024, Consultant notes Dr Brown consult note 11/26/2024, Recent hospitalization notes Dr. Brown bronchoscopy operative note 11/29/2024 Hospital discharge summary 12/12/2024, and Nazia ANDRE last office note    Procedures        Assessment and Plan    Diagnoses and all orders for this visit:    1. Restrictive airway disease (Primary)  Comments:  Continue Brovana and Pulmicort    2. Airway clearance impairment  Comments:  Bronchoscopy performed inpatient    3. Mucus plugging of bronchi  Comments:  Bronchoscopy performed inpatient    4. Nicotine dependence, cigarettes, in remission    5. Multifocal pneumonia  Comments:  Chest x-ray ordered  Orders:  -     XR Chest 2 View; Future  -     6 Minute Walk Test; Future          Follow Up   Return in about 2 years (around 1/16/2027).  Patient was given instructions and counseling regarding her condition or for health maintenance advice. Please see specific information pulled into the AVS if appropriate.

## 2025-03-11 ENCOUNTER — OFFICE VISIT (OUTPATIENT)
Dept: SURGERY | Facility: CLINIC | Age: 85
End: 2025-03-11
Payer: MEDICARE

## 2025-03-11 VITALS — RESPIRATION RATE: 14 BRPM | BODY MASS INDEX: 28.13 KG/M2 | HEIGHT: 60 IN | WEIGHT: 143.3 LBS

## 2025-03-11 DIAGNOSIS — D17.1 LIPOMA OF TORSO: Primary | ICD-10-CM

## 2025-03-11 PROCEDURE — 99203 OFFICE O/P NEW LOW 30 MIN: CPT | Performed by: SURGERY

## 2025-03-11 PROCEDURE — 1160F RVW MEDS BY RX/DR IN RCRD: CPT | Performed by: SURGERY

## 2025-03-11 PROCEDURE — 1159F MED LIST DOCD IN RCRD: CPT | Performed by: SURGERY

## 2025-03-11 RX ORDER — SODIUM CHLORIDE 0.9 % (FLUSH) 0.9 %
10 SYRINGE (ML) INJECTION AS NEEDED
OUTPATIENT
Start: 2025-03-11

## 2025-03-11 RX ORDER — SODIUM CHLORIDE 9 MG/ML
40 INJECTION, SOLUTION INTRAVENOUS AS NEEDED
OUTPATIENT
Start: 2025-03-11

## 2025-03-11 RX ORDER — SODIUM CHLORIDE 0.9 % (FLUSH) 0.9 %
10 SYRINGE (ML) INJECTION EVERY 12 HOURS SCHEDULED
OUTPATIENT
Start: 2025-03-11

## 2025-03-11 RX ORDER — ONDANSETRON 2 MG/ML
4 INJECTION INTRAMUSCULAR; INTRAVENOUS EVERY 6 HOURS PRN
OUTPATIENT
Start: 2025-03-11

## 2025-03-11 RX ORDER — POTASSIUM CHLORIDE 1500 MG/1
1 TABLET, EXTENDED RELEASE ORAL DAILY
COMMUNITY
Start: 2025-02-23

## 2025-03-11 RX ORDER — FUROSEMIDE 40 MG/1
40 TABLET ORAL DAILY
COMMUNITY
Start: 2025-03-05 | End: 2025-06-03

## 2025-03-11 RX ORDER — IPRATROPIUM BROMIDE AND ALBUTEROL SULFATE 2.5; .5 MG/3ML; MG/3ML
3 SOLUTION RESPIRATORY (INHALATION)
COMMUNITY

## 2025-03-11 RX ORDER — ATORVASTATIN CALCIUM 80 MG/1
80 TABLET, FILM COATED ORAL DAILY
COMMUNITY

## 2025-03-11 NOTE — PROGRESS NOTES
Inpatient History and Physical Surgical Orders    Preadmission Location:   Preadmission Time:  Facility:  Surgery Date:  Surgery Time:  Preadmission Test date:     Chief Complaint  Outpatient History and Physical / Surgical Orders    Primary Care Provider: Ramirez Maurice MD    Referring Provider: Ramirez Maurice MD    Subjective      Patient Name: Cris Agudelo : 1940    HPI  The patient is an 84-year-old female that presented with a subcutaneous mass of the upper back.  She has had a excision of that area in the past but has developed a recurrent lesion there.    Past History:  Medical History: has a past medical history of CHF (congestive heart failure), COPD (chronic obstructive pulmonary disease), Cyst of skin, History of non-ST elevation myocardial infarction (NSTEMI) (2023), Hyperlipidemia, and Hypertension.   Surgical History: has a past surgical history that includes Cardiac catheterization (N/A, 2023); Cardiac catheterization (N/A, 2023); Bronchoscopy (N/A, 2024); Hysterectomy; Eye surgery; Colonoscopy; Skin biopsy; and Vascular surgery.   Family History: family history includes Arthritis in her mother; Heart disease in her mother; Heart failure in her mother; Hypertension in her mother.   Social History: reports that she has never smoked. She has been exposed to tobacco smoke. She has never used smokeless tobacco. She reports current alcohol use of about 2.0 standard drinks of alcohol per week. She reports that she does not use drugs.  Allergies: Aleve [naproxen]       Current Outpatient Medications:     arformoterol (BROVANA) 15 MCG/2ML nebulizer solution, Take 2 mL by nebulization 2 (Two) Times a Day., Disp: , Rfl:     aspirin 81 MG EC tablet, Take 1 tablet by mouth Daily., Disp: 30 tablet, Rfl: 1    atorvastatin (LIPITOR) 20 MG tablet, Take 1 tablet by mouth Every Night. (Patient taking differently: Take 2 tablets by mouth Every Night.), Disp: 90 tablet, Rfl: 1     "budesonide (PULMICORT) 0.5 MG/2ML nebulizer solution, Take 2 mL by nebulization 2 (Two) Times a Day., Disp: , Rfl:     carvedilol (COREG) 3.125 MG tablet, Take 1 tablet by mouth 2 (Two) Times a Day., Disp: 60 tablet, Rfl: 0    dapagliflozin Propanediol (Farxiga) 10 MG tablet, Take 10 mg by mouth Daily., Disp: 90 tablet, Rfl: 3    furosemide (LASIX) 20 MG tablet, TAKE 1 TABLET BY MOUTH EVERY OTHER DAY, Disp: 45 tablet, Rfl: 0    furosemide (LASIX) 40 MG tablet, Take 1 tablet by mouth Daily., Disp: , Rfl:     guaiFENesin (MUCINEX) 600 MG 12 hr tablet, Take 1 tablet by mouth Every 12 (Twelve) Hours., Disp: 15 tablet, Rfl: 0    ipratropium-albuterol (DUO-NEB) 0.5-2.5 mg/3 ml nebulizer, Take 3 mL by nebulization Every 6 (Six) Hours As Needed for Shortness of Air., Disp: 360 mL, Rfl: 5    lisinopril (PRINIVIL,ZESTRIL) 10 MG tablet, Take 1 tablet by mouth Daily., Disp: , Rfl:     mirtazapine (REMERON) 15 MG tablet, Take 1 tablet by mouth Daily., Disp: , Rfl:     nitroglycerin (NITROSTAT) 0.4 MG SL tablet, Place 1 tablet under the tongue Every 5 (Five) Minutes As Needed for Chest Pain., Disp: , Rfl:     potassium chloride (MICRO-K) 10 MEQ CR capsule, Take 1 capsule by mouth Daily., Disp: 30 capsule, Rfl: 0    potassium chloride ER (K-TAB) 20 MEQ tablet controlled-release ER tablet, Take 1 tablet by mouth Daily., Disp: , Rfl:     atorvastatin (LIPITOR) 80 MG tablet, Take 1 tablet by mouth Daily., Disp: , Rfl:     ipratropium-albuterol (DUO-NEB) 0.5-2.5 mg/3 ml nebulizer, Inhale 3 mL., Disp: , Rfl:        Objective   Vital Signs:   Resp 14   Ht 152.4 cm (60\")   Wt 65 kg (143 lb 4.8 oz)   BMI 27.99 kg/m²       Physical Exam  Vitals and nursing note reviewed.   Constitutional:       Appearance: Normal appearance. The patient is well-developed.   Cardiovascular:      Rate and Rhythm: Normal rate and regular rhythm.   Pulmonary:      Effort: Pulmonary effort is normal.      Breath sounds: Normal air entry.   Abdominal:      " General: Bowel sounds are normal.      Palpations: Abdomen is soft.      Skin:     General: Skin is warm and dry.   Neurological:      Mental Status: The patient is alert and oriented to person, place, and time.      Motor: Motor function is intact.   Psychiatric:         Mood and Affect: Mood normal.   Back: She appears to have a sizable subcutaneous mass of her upper back.  Its at least 4 to 5 cm in greatest diameter and feels most consistent with a lipoma.    Result Review :               Assessment and Plan   Diagnoses and all orders for this visit:    1. Lipoma of torso (Primary)  -     Case Request; Standing  -     Follow Anesthesia Guidelines / Protocol; Future  -     Follow Anesthesia Guidelines / Protocol; Standing  -     Verify NPO Status; Standing  -     Verify / Perform Chlorhexidine Skin Prep; Standing  -     Insert Peripheral IV; Standing  -     Saline Lock & Maintain IV Access; Standing  -     sodium chloride 0.9 % flush 10 mL  -     sodium chloride 0.9 % flush 10 mL  -     sodium chloride 0.9 % infusion 40 mL  -     Place Sequential Compression Device; Standing  -     Maintain Sequential Compression Device; Standing  -     ondansetron (ZOFRAN) injection 4 mg  -     ceFAZolin (ANCEF) 2,000 mg in sodium chloride 0.9 % 100 mL IVPB  -     Case Request    We will schedule her for an excision of this back lipoma in the operating room.  I have described the procedure to her as well as the risk and benefits and she is agreeable to proceeding.    I  Domingo Palumbo MD  03/11/2025

## 2025-03-17 NOTE — PROGRESS NOTES
Primary Care Provider  Ramirez Maurice MD   Referring Provider  No ref. provider found    Patient Complaint  Follow-up and Cough (Clear mucus)    Patient or patient representative verbalized consent for the use of Ambient Listening during the visit with  JES Wagner for chart documentation. 3/18/2025  11:41 EDT      Subjective       History of Presenting Illness  Cris Agudelo is a pleasant 84 y.o. female  of  Dr. Nascimento who presents to [facility] with history of COPD, cough,pneumonia, here for followup appointment.  Patient was last seen January 16, 2025 in the COPD clinic.  She continues on Brovana Pulmicort nebulizers with DuoNeb as needed.  She does use Mucinex to help with airway clearance.      History of Present Illness  The patient presents for evaluation of a cough. She is accompanied by her daughter.    She was previously hospitalized at the beginning of the year and was seen in November 2024. She saw Viky and did the 6-minute walk test, which she passed and was taken off oxygen. However, she went back on oxygen about 2 weeks later due to fluid accumulation around her heart. The doctor recommended hospitalization, but she declined. Instead, they decided to monitor her condition over the weekend to see if the medication would help, which it did. She has been off oxygen for about 3 to 4 weeks now. She was advised to keep the oxygen equipment at home as it is harder to get it back once returned. She saw Dr. Lopez her regular doctor in January 2025. She was kept on furosemide for the fluid and potassium to replenish what the furosemide pulls off. The doctor is concerned that if she stops the medication, the fluid will build up again.    She reports feeling generally well today, although with a slight hoarseness and a mild cough. She has not experienced any fever. She has been able to expectorate the sputum. She continues to take Mucinex twice daily.  She has not used DuoNeb since her last illness. Following her most recent hospital discharge, she was diagnosed with pneumonia and was prescribed an emergency nebulizer every 4 hours. During a subsequent checkup, she experienced dyspnea and was again placed on the emergency nebulizer every 4 hours, along with Robitussin DM, which improved her condition. She underwent bronchoscopy during her hospital stay, which did not yield any significant findings. She has not had any further lung diagnostics since her chest x-ray in January 2025. She also reports frequent throat clearing, for which Dr. Lopez prescribed steroids, but this treatment was ineffective. Her appetite has improved over the past 1 to 2 weeks.    MEDICATIONS  Current: furosemide, potassium, Mucinex, DuoNeb, Pulmicort, Brovana       At present time patient denies dyspnea, wheezing, headaches, chest pain, weight loss or hemoptysis. Patient denies fevers, chills and night sweats. Cris Agudelo is able to perform ADLs.      I have personally reviewed the review of systems, past family, social, medical and surgical histories; and agree with their findings.      Review of Systems   Constitutional: Negative.    HENT: Negative.     Respiratory:  Positive for cough.    Cardiovascular: Negative.    Musculoskeletal: Negative.    Neurological: Negative.    Psychiatric/Behavioral: Negative.           Family History   Problem Relation Age of Onset    Hypertension Mother     Arthritis Mother     Heart disease Mother     Heart failure Mother         She had congestive heart failure        Social History     Socioeconomic History    Marital status:    Tobacco Use    Smoking status: Former     Types: Cigarettes     Passive exposure: Past    Smokeless tobacco: Never    Tobacco comments:     Second hand smoke    Vaping Use    Vaping status: Never Used   Substance and Sexual Activity    Alcohol use: Yes     Alcohol/week: 2.0 standard drinks of alcohol     Types: 2 Glasses  of wine per week    Drug use: Never    Sexual activity: Not Currently        Past Medical History:   Diagnosis Date    CHF (congestive heart failure)     COPD (chronic obstructive pulmonary disease) 8/7/23    Diagnosed in [facility]    Cyst of skin     History of non-ST elevation myocardial infarction (NSTEMI) 08/23/2023    Hyperlipidemia     Hypertension         Immunization History   Administered Date(s) Administered    PPD Test 12/04/2024, 12/11/2024    Pneumococcal Conjugate 20-Valent (PCV20) 08/17/2023       Allergies   Allergen Reactions    Aleve [Naproxen] Hives          Current Outpatient Medications:     arformoterol (BROVANA) 15 MCG/2ML nebulizer solution, Take 2 mL by nebulization 2 (Two) Times a Day., Disp: , Rfl:     aspirin 81 MG EC tablet, Take 1 tablet by mouth Daily., Disp: 30 tablet, Rfl: 1    atorvastatin (LIPITOR) 20 MG tablet, Take 1 tablet by mouth Every Night. (Patient taking differently: Take 4 tablets by mouth Every Night.), Disp: 90 tablet, Rfl: 1    atorvastatin (LIPITOR) 80 MG tablet, Take 1 tablet by mouth Daily., Disp: , Rfl:     budesonide (PULMICORT) 0.5 MG/2ML nebulizer solution, Take 2 mL by nebulization 2 (Two) Times a Day., Disp: , Rfl:     carvedilol (COREG) 3.125 MG tablet, Take 1 tablet by mouth 2 (Two) Times a Day., Disp: 60 tablet, Rfl: 0    dapagliflozin Propanediol (Farxiga) 10 MG tablet, Take 10 mg by mouth Daily., Disp: 90 tablet, Rfl: 3    furosemide (LASIX) 20 MG tablet, TAKE 1 TABLET BY MOUTH EVERY OTHER DAY, Disp: 45 tablet, Rfl: 0    furosemide (LASIX) 40 MG tablet, Take 1 tablet by mouth Daily., Disp: , Rfl:     guaiFENesin (MUCINEX) 600 MG 12 hr tablet, Take 1 tablet by mouth Every 12 (Twelve) Hours., Disp: 15 tablet, Rfl: 0    ipratropium-albuterol (DUO-NEB) 0.5-2.5 mg/3 ml nebulizer, Take 3 mL by nebulization Every 6 (Six) Hours As Needed for Shortness of Air., Disp: 360 mL, Rfl: 5    mirtazapine (REMERON) 15 MG tablet, Take 1 tablet by mouth Daily., Disp: ,  "Rfl:     nitroglycerin (NITROSTAT) 0.4 MG SL tablet, Place 1 tablet under the tongue Every 5 (Five) Minutes As Needed for Chest Pain., Disp: , Rfl:     potassium chloride ER (K-TAB) 20 MEQ tablet controlled-release ER tablet, Take 1 tablet by mouth Daily., Disp: , Rfl:     potassium chloride (MICRO-K) 10 MEQ CR capsule, Take 1 capsule by mouth Daily. (Patient not taking: Reported on 3/18/2025), Disp: 30 capsule, Rfl: 0         Vital Signs   /69 (BP Location: Right arm, Patient Position: Sitting, Cuff Size: Adult)   Pulse 74   Temp 96.5 °F (35.8 °C)   Resp 16   Ht 152.4 cm (60\")   Wt 64.9 kg (143 lb)   SpO2 97%   BMI 27.93 kg/m²       Objective     Physical Exam  Vitals reviewed.   Constitutional:       General: She is not in acute distress.     Appearance: Normal appearance. She is not ill-appearing.   HENT:      Head: Normocephalic and atraumatic.      Nose: Nose normal.      Mouth/Throat:      Mouth: Mucous membranes are moist.      Pharynx: Oropharynx is clear.   Eyes:      Extraocular Movements: Extraocular movements intact.      Conjunctiva/sclera: Conjunctivae normal.      Pupils: Pupils are equal, round, and reactive to light.   Cardiovascular:      Rate and Rhythm: Normal rate and regular rhythm.      Pulses: Normal pulses.      Heart sounds: Normal heart sounds.   Pulmonary:      Effort: Pulmonary effort is normal. No respiratory distress.      Breath sounds: Normal breath sounds. No stridor. No wheezing, rhonchi or rales.   Abdominal:      General: Bowel sounds are normal.   Musculoskeletal:         General: Normal range of motion.      Cervical back: Normal range of motion and neck supple.   Skin:     General: Skin is warm and dry.   Neurological:      Mental Status: She is alert and oriented to person, place, and time.   Psychiatric:         Behavior: Behavior normal.         Physical Exam  Lungs were auscultated.  Heart was examined.         Results Review  I have personally reviewed the " prior office notes, hospital records, labs, and diagnostics.  XR Chest 2 View [IMG36] (Order [id number])  Order  Status: Final result     Study Notes     Taylor Chaparro on 1/16/2025 12:19 PM EST   PNEUMONIA     Appointment Information    PACS Images     Radiology Images  Study Result    Narrative & Impression   XR CHEST 2 VW     Date of Exam: 1/16/2025 12:10 PM EST     Indication: pneumonia     Comparison: 11/30/2024     Findings:  Heart size and pulmonary vasculature are stable. Significant improvement in bilateral infiltrates. Some small irregular opacities persist in the right midlung and right lung base. New strandy opacity in the left lung base     IMPRESSION:  Impression:     1. Near complete resolution of pneumonia with some residual opacities in the right midlung and right lung base  2. New left basilar atelectasis        Electronically Signed: Julian Krishnamurthy    1/17/2025 2:31 PM EST    Workstation ID: [id number]     Results  Laboratory Studies  Respiratory culture showed normal respiratory colleen. Fungal wash showed some yeast, which is an oral contaminant. Pneumonia panel was negative. Cytology was also negative for malignancy.    Imaging  Chest x-ray in January 2025 showed resolving pneumonia.          Assessment         Patient Active Problem List   Diagnosis    Hyponatremia    Stress-induced cardiomyopathy    Hyperlipidemia    Primary hypertension    Herpes zoster    Shingles    Non-occlusive coronary artery disease    Pneumonia    Multifocal pneumonia    Acute on chronic respiratory failure with hypoxia    HCAP (healthcare-associated pneumonia)    Weakness    Severe protein-calorie malnutrition    Lipoma of torso        Plan     Diagnoses and all orders for this visit:    1. Chronic obstructive pulmonary disease, unspecified COPD type (Primary)  -     XR Chest 2 View; Future    2. Airway clearance impairment  -     XR Chest 2 View; Future    3. Productive cough  -     XR Chest 2 View; Future    4. OROSCO  (dyspnea on exertion)  -     XR Chest 2 View; Future         Assessment & Plan  1. Cough.  She reports a persistent cough with hoarseness and the need to frequently clear her throat. She has been using Mucinex twice a day, which is helping as an expectorant. She was previously on a steroid, which did not provide lasting relief. The cough is described as ineffective, and she has been advised to attempt a more forceful cough to clear her throat. She has not had any fever. Respiratory culture showed normal respiratory colleen. Fungal wash showed some yeast, which is an oral contaminant. Pneumonia panel was negative. Cytology was also negative for malignancy. A chest x-ray will be ordered to ensure the resolution of her previous pneumonia. She is advised to continue using DuoNeb every 6 hours, four times a day, and to maintain adequate hydration.    2. Fluid retention.  She experienced fluid buildup around her heart earlier this year, which was managed with furosemide and potassium supplements. She has been off oxygen for the past three to four weeks but continues to have it available at home as a precaution. She is advised to continue her current regimen of furosemide and potassium to prevent fluid accumulation.    PROCEDURE  The patient underwent bronchoscopy during her hospital stay, which did not yield any significant findings.    She is scheduled to have a lipoma removed from her back next week.  She is encouraged to use her flutter valve and incentive spirometer to help with airway clearance and lung expansion.    Smoking status:  reports that she has quit smoking. Her smoking use included cigarettes. She has been exposed to tobacco smoke. She has never used smokeless tobacco.    Vaccination status: Reviewed  Immunization History   Administered Date(s) Administered    PPD Test 12/04/2024, 12/11/2024    Pneumococcal Conjugate 20-Valent (PCV20) 08/17/2023        Medications personally reviewed    Follow Up  Return in  about 6 months (around 9/18/2025).    Patient was given instructions and counseling regarding her condition or for health maintenance advice. Please see specific information pulled into the AVS if appropriate.     I spent 15 minutes caring for Cris Agudelo on this date of service. This time includes time spent by me in the following activities:preparing for the visit, reviewing tests, obtaining and/or reviewing a separately obtained history, performing a medically appropriate examination and/or evaluation, counseling and educating the patient/family/caregiver, ordering medications, tests, or procedures, documenting information in the medical record, independently interpreting results and communicating that information with the patient/family/caregiver and answered questions family members, discuss medications.

## 2025-03-18 ENCOUNTER — OFFICE VISIT (OUTPATIENT)
Dept: PULMONOLOGY | Facility: CLINIC | Age: 85
End: 2025-03-18
Payer: MEDICARE

## 2025-03-18 ENCOUNTER — HOSPITAL ENCOUNTER (OUTPATIENT)
Facility: HOSPITAL | Age: 85
Discharge: HOME | End: 2025-03-18
Admitting: NURSE PRACTITIONER
Payer: MEDICARE

## 2025-03-18 VITALS
HEIGHT: 60 IN | DIASTOLIC BLOOD PRESSURE: 69 MMHG | SYSTOLIC BLOOD PRESSURE: 128 MMHG | OXYGEN SATURATION: 97 % | TEMPERATURE: 96.5 F | RESPIRATION RATE: 16 BRPM | BODY MASS INDEX: 28.07 KG/M2 | HEART RATE: 74 BPM | WEIGHT: 143 LBS

## 2025-03-18 DIAGNOSIS — R06.09 DOE (DYSPNEA ON EXERTION): ICD-10-CM

## 2025-03-18 DIAGNOSIS — R06.89 AIRWAY CLEARANCE IMPAIRMENT: ICD-10-CM

## 2025-03-18 DIAGNOSIS — R05.8 PRODUCTIVE COUGH: ICD-10-CM

## 2025-03-18 DIAGNOSIS — J44.9 CHRONIC OBSTRUCTIVE PULMONARY DISEASE, UNSPECIFIED COPD TYPE: ICD-10-CM

## 2025-03-18 DIAGNOSIS — J44.9 CHRONIC OBSTRUCTIVE PULMONARY DISEASE, UNSPECIFIED COPD TYPE: Primary | ICD-10-CM

## 2025-03-18 PROCEDURE — 3074F SYST BP LT 130 MM HG: CPT | Performed by: NURSE PRACTITIONER

## 2025-03-18 PROCEDURE — 1160F RVW MEDS BY RX/DR IN RCRD: CPT | Performed by: NURSE PRACTITIONER

## 2025-03-18 PROCEDURE — 3078F DIAST BP <80 MM HG: CPT | Performed by: NURSE PRACTITIONER

## 2025-03-18 PROCEDURE — 99214 OFFICE O/P EST MOD 30 MIN: CPT | Performed by: NURSE PRACTITIONER

## 2025-03-18 PROCEDURE — 71046 X-RAY EXAM CHEST 2 VIEWS: CPT

## 2025-03-18 PROCEDURE — 1159F MED LIST DOCD IN RCRD: CPT | Performed by: NURSE PRACTITIONER

## 2025-03-25 NOTE — PRE-PROCEDURE INSTRUCTIONS
IMPORTANT INSTRUCTIONS - PRE-ADMISSION TESTING  DO NOT EAT OR CHEW anything after midnight the night before your procedure.    NPO AFTER MN EXCEPT SIPS OF WATER TO TAKE MEDICATIONS LISTED BELOW  Take the following medications the morning of your procedure with JUST A SIP OF WATER:  USE BROVANA NEB, ASA, USE PULMICORT NEB, CARVEDILOL, MUCINEX,  DUO NEB  IF NEEDED,NITRO IF NEEDED, POTASSIUM    DAUGHTER ALEJANDRA REPORTED MAY NOT GIVE ALL MEDS THAT HAS TROUBLE WITH MEDS AT TIMES ESPECIALLY IF NOT ABLE TO DRINK A LOT TO GET THEM DOWN. ADVISED SHE ESPECIALLY NEEDS TO TAKE COREG AM OF PROCEDURE    DO NOT BRING your medications to the hospital with you, UNLESS something has changed since your PRE-Admission Testing appointment.  Hold all vitamins, supplements, and NSAIDS (Non- steroidal anti-inflammatory meds) for one week prior to surgery (you MAY take Tylenol or Acetaminophen).  If you are diabetic, check your blood sugar the morning of your procedure. If it is less than 70 or if you are feeling symptomatic, call the following number for further instructions: [phone]  Use your inhalers/nebulizers as usual, the morning of your procedure. BRING YOUR INHALERS with you.   Bring your CPAP or BIPAP to hospital, ONLY IF YOU WILL BE SPENDING THE NIGHT.   Make sure you have a ride home and have someone who will stay with you the day of your procedure after you go home.  If you have any questions, please call your Pre-Admission Testing Nurse, ___DAWKETAN_____________ at [phone]____________.   Per anesthesia request, do not smoke for 24 hours before your procedure or as instructed by your surgeon.    WILL CALL ON   3/25/25      NORMALLY BETWEEN 1 AND 4 PM TO GIVE OFFICIAL ARRIVAL TIME FOR DAY OF PROCEDURE  BATHING INSTRUCTIONS GIVEN. NO JEWELRY OF ANY TYPE OR NAIL POLISH UPPER OR LOWER EXT DAY OF PROCEDURE  COME TO [facility] [address] DAY OF PROCEDURE. GET ON ELEVATOR AND COME TO FIRST FLOOR TAKE LEFT OFF OF  ELEVATOR.    DO NOT TAKE ANOTHER DOSE OF FARXIGA PRIOR TO SURGERY ON 3/26/25

## 2025-03-26 ENCOUNTER — HOSPITAL ENCOUNTER (OUTPATIENT)
Facility: HOSPITAL | Age: 85
Setting detail: HOSPITAL OUTPATIENT SURGERY
Discharge: HOME | End: 2025-03-26
Attending: SURGERY | Admitting: SURGERY
Payer: MEDICARE

## 2025-03-26 ENCOUNTER — ANESTHESIA EVENT (OUTPATIENT)
Dept: PERIOP | Facility: HOSPITAL | Age: 85
End: 2025-03-26
Payer: MEDICARE

## 2025-03-26 ENCOUNTER — ANESTHESIA (OUTPATIENT)
Dept: PERIOP | Facility: HOSPITAL | Age: 85
End: 2025-03-26
Payer: MEDICARE

## 2025-03-26 VITALS
SYSTOLIC BLOOD PRESSURE: 127 MMHG | WEIGHT: 139.55 LBS | RESPIRATION RATE: 17 BRPM | OXYGEN SATURATION: 93 % | HEIGHT: 59 IN | TEMPERATURE: 98 F | BODY MASS INDEX: 28.13 KG/M2 | HEART RATE: 65 BPM | DIASTOLIC BLOOD PRESSURE: 56 MMHG

## 2025-03-26 DIAGNOSIS — D17.1 LIPOMA OF TORSO: ICD-10-CM

## 2025-03-26 PROCEDURE — 21931 EXC BACK LES SC 3 CM/>: CPT | Performed by: SURGERY

## 2025-03-26 PROCEDURE — 25010000002 PROPOFOL 10 MG/ML EMULSION: Performed by: NURSE ANESTHETIST, CERTIFIED REGISTERED

## 2025-03-26 PROCEDURE — 25810000003 LACTATED RINGERS PER 1000 ML: Performed by: ANESTHESIOLOGY

## 2025-03-26 PROCEDURE — 25010000002 CEFAZOLIN PER 500 MG: Performed by: SURGERY

## 2025-03-26 PROCEDURE — 88304 TISSUE EXAM BY PATHOLOGIST: CPT | Performed by: SURGERY

## 2025-03-26 PROCEDURE — 25010000002 BUPIVACAINE (PF) 0.25 % SOLUTION: Performed by: SURGERY

## 2025-03-26 RX ORDER — ONDANSETRON 2 MG/ML
4 INJECTION INTRAMUSCULAR; INTRAVENOUS EVERY 6 HOURS PRN
Status: DISCONTINUED | OUTPATIENT
Start: 2025-03-26 | End: 2025-03-26 | Stop reason: HOSPADM

## 2025-03-26 RX ORDER — SODIUM CHLORIDE 0.9 % (FLUSH) 0.9 %
10 SYRINGE (ML) INJECTION AS NEEDED
Status: DISCONTINUED | OUTPATIENT
Start: 2025-03-26 | End: 2025-03-26 | Stop reason: HOSPADM

## 2025-03-26 RX ORDER — PHENYLEPHRINE HCL IN 0.9% NACL 1 MG/10 ML
SYRINGE (ML) INTRAVENOUS AS NEEDED
Status: DISCONTINUED | OUTPATIENT
Start: 2025-03-26 | End: 2025-03-26 | Stop reason: SURG

## 2025-03-26 RX ORDER — ACETAMINOPHEN 500 MG
1000 TABLET ORAL ONCE
Status: COMPLETED | OUTPATIENT
Start: 2025-03-26 | End: 2025-03-26

## 2025-03-26 RX ORDER — DEXMEDETOMIDINE HYDROCHLORIDE 100 UG/ML
INJECTION, SOLUTION INTRAVENOUS AS NEEDED
Status: DISCONTINUED | OUTPATIENT
Start: 2025-03-26 | End: 2025-03-26 | Stop reason: SURG

## 2025-03-26 RX ORDER — ONDANSETRON 2 MG/ML
4 INJECTION INTRAMUSCULAR; INTRAVENOUS ONCE AS NEEDED
Status: DISCONTINUED | OUTPATIENT
Start: 2025-03-26 | End: 2025-03-26 | Stop reason: HOSPADM

## 2025-03-26 RX ORDER — OXYCODONE HYDROCHLORIDE 5 MG/1
5 TABLET ORAL
Status: DISCONTINUED | OUTPATIENT
Start: 2025-03-26 | End: 2025-03-26 | Stop reason: HOSPADM

## 2025-03-26 RX ORDER — PROMETHAZINE HYDROCHLORIDE 25 MG/1
25 SUPPOSITORY RECTAL ONCE AS NEEDED
Status: DISCONTINUED | OUTPATIENT
Start: 2025-03-26 | End: 2025-03-26 | Stop reason: HOSPADM

## 2025-03-26 RX ORDER — ONDANSETRON 4 MG/1
4 TABLET, ORALLY DISINTEGRATING ORAL ONCE AS NEEDED
Status: DISCONTINUED | OUTPATIENT
Start: 2025-03-26 | End: 2025-03-26 | Stop reason: HOSPADM

## 2025-03-26 RX ORDER — BUPIVACAINE HYDROCHLORIDE 2.5 MG/ML
INJECTION, SOLUTION EPIDURAL; INFILTRATION; INTRACAUDAL; PERINEURAL AS NEEDED
Status: DISCONTINUED | OUTPATIENT
Start: 2025-03-26 | End: 2025-03-26 | Stop reason: HOSPADM

## 2025-03-26 RX ORDER — PROPOFOL 10 MG/ML
VIAL (ML) INTRAVENOUS CONTINUOUS PRN
Status: DISCONTINUED | OUTPATIENT
Start: 2025-03-26 | End: 2025-03-26 | Stop reason: SURG

## 2025-03-26 RX ORDER — EPHEDRINE SULFATE 50 MG/ML
50 INJECTION, SOLUTION INTRAVENOUS ONCE
Status: COMPLETED | OUTPATIENT
Start: 2025-03-26 | End: 2025-03-26

## 2025-03-26 RX ORDER — PROMETHAZINE HYDROCHLORIDE 25 MG/1
25 TABLET ORAL ONCE AS NEEDED
Status: DISCONTINUED | OUTPATIENT
Start: 2025-03-26 | End: 2025-03-26 | Stop reason: HOSPADM

## 2025-03-26 RX ORDER — SODIUM CHLORIDE, SODIUM LACTATE, POTASSIUM CHLORIDE, CALCIUM CHLORIDE 600; 310; 30; 20 MG/100ML; MG/100ML; MG/100ML; MG/100ML
9 INJECTION, SOLUTION INTRAVENOUS CONTINUOUS PRN
Status: DISCONTINUED | OUTPATIENT
Start: 2025-03-26 | End: 2025-03-26 | Stop reason: HOSPADM

## 2025-03-26 RX ORDER — IBUPROFEN 600 MG/1
600 TABLET, FILM COATED ORAL EVERY 6 HOURS PRN
Status: DISCONTINUED | OUTPATIENT
Start: 2025-03-26 | End: 2025-03-26 | Stop reason: HOSPADM

## 2025-03-26 RX ORDER — TRAMADOL HYDROCHLORIDE 50 MG/1
50 TABLET ORAL EVERY 6 HOURS PRN
Qty: 10 TABLET | Refills: 0 | Status: SHIPPED | OUTPATIENT
Start: 2025-03-26

## 2025-03-26 RX ORDER — SODIUM CHLORIDE 0.9 % (FLUSH) 0.9 %
10 SYRINGE (ML) INJECTION EVERY 12 HOURS SCHEDULED
Status: DISCONTINUED | OUTPATIENT
Start: 2025-03-26 | End: 2025-03-26 | Stop reason: HOSPADM

## 2025-03-26 RX ORDER — HYDROCODONE BITARTRATE AND ACETAMINOPHEN 5; 325 MG/1; MG/1
1 TABLET ORAL ONCE AS NEEDED
Status: DISCONTINUED | OUTPATIENT
Start: 2025-03-26 | End: 2025-03-26 | Stop reason: HOSPADM

## 2025-03-26 RX ORDER — SODIUM CHLORIDE 9 MG/ML
40 INJECTION, SOLUTION INTRAVENOUS AS NEEDED
Status: DISCONTINUED | OUTPATIENT
Start: 2025-03-26 | End: 2025-03-26 | Stop reason: HOSPADM

## 2025-03-26 RX ADMIN — DEXMEDETOMIDINE 10 MCG: 100 INJECTION, SOLUTION, CONCENTRATE INTRAVENOUS at 10:38

## 2025-03-26 RX ADMIN — Medication 100 MCG: at 10:28

## 2025-03-26 RX ADMIN — PROPOFOL 100 MCG/KG/MIN: 10 INJECTION, EMULSION INTRAVENOUS at 10:14

## 2025-03-26 RX ADMIN — ACETAMINOPHEN 1000 MG: 500 TABLET ORAL at 08:35

## 2025-03-26 RX ADMIN — SODIUM CHLORIDE, SODIUM LACTATE, POTASSIUM CHLORIDE, CALCIUM CHLORIDE 9 ML/HR: 20; 30; 600; 310 INJECTION, SOLUTION INTRAVENOUS at 08:38

## 2025-03-26 RX ADMIN — DEXMEDETOMIDINE 10 MCG: 100 INJECTION, SOLUTION, CONCENTRATE INTRAVENOUS at 10:18

## 2025-03-26 RX ADMIN — PROPOFOL 30 MG: 10 INJECTION, EMULSION INTRAVENOUS at 10:12

## 2025-03-26 RX ADMIN — Medication 100 MCG: at 10:52

## 2025-03-26 RX ADMIN — EPHEDRINE SULFATE 50 MG: 50 INJECTION INTRAVENOUS at 11:32

## 2025-03-26 RX ADMIN — SODIUM CHLORIDE 2000 MG: 9 INJECTION, SOLUTION INTRAVENOUS at 10:13

## 2025-03-26 RX ADMIN — DEXMEDETOMIDINE 10 MCG: 100 INJECTION, SOLUTION, CONCENTRATE INTRAVENOUS at 10:31

## 2025-03-26 NOTE — ANESTHESIA PREPROCEDURE EVALUATION
Anesthesia Evaluation     Patient summary reviewed and Nursing notes reviewed   NPO Solid Status: > 8 hours  NPO Liquid Status: > 2 hours           Airway   Mallampati: II  TM distance: >3 FB  Neck ROM: full  No difficulty expected  Dental    (+) upper dentures and lower dentures    Pulmonary    (+) pneumonia resolved , a smoker Former, COPD (inhalers used this morning),decreased breath sounds  Cardiovascular - normal exam  Exercise tolerance: poor (<4 METS)    ECG reviewed  Patient on routine beta blocker and Beta blocker given within 24 hours of surgery    (+) hypertension (coreg, lasix), valvular problems/murmurs (mild MR) MR, past MI ()  >12 months, CAD, CHF (lasix, potassium) Diastolic >=55%, hyperlipidemia    ROS comment: Echo (24; dyspnea):  ·  Left ventricular ejection fraction appears to be 56 - 60%.  ·  Left ventricular diastolic function is consistent with (grade I) impaired relaxation and age.  ·  Mild aortic insufficiency.     EK bpm  Sinus rhythm  LVH with secondary repolarization abnormality  Anterior  infarct, old  Electronically Signed By: Jose Luna ([facility]) 2024 14:19:04  Date and Time of Study:2024 16:03:42      Neuro/Psych  (+) weakness  GI/Hepatic/Renal/Endo    (+) renal disease (Stage 3)- CRI    Musculoskeletal     Abdominal  - normal exam   Substance History      OB/GYN          Other   arthritis,                       Anesthesia Plan    ASA 3     general and MAC     intravenous induction     Anesthetic plan, risks, benefits, and alternatives have been provided, discussed and informed consent has been obtained with: patient.    Plan discussed with CRNA.      CODE STATUS:

## 2025-03-26 NOTE — DISCHARGE INSTRUCTIONS
DISCHARGE INSTRUCTIONS  SURGICAL / AMBULATORY  PROCEDURES      For your surgery you had:  Local anesthesia  Monitored anesthesia Care  You may experience dizziness, drowsiness, or light-headedness for several hours following surgery/procedure.  Do not stay alone today or tonight.  Limit your activity for 24 hours.  Resume your diet slowly.  Follow whatever special dietary instructions you may have been given by your doctor.  You should not drive or operate machinery, drink alcohol, or sign legally binding documents for 24 hours or while you are taking pain medication.    NOTIFY YOUR DOCTOR IF YOU EXPERIENCE ANY OF THE FOLLOWING:  Temperature greater than 101 degrees Fahrenheit  Shaking Chills  Redness or excessive drainage from incision  Nausea, vomiting and/or pain that is not controlled by prescribed medications  Increase in bleeding or bleeding that is excessive  Unable to urinate in 6 hours after surgery  If unable to reach your doctor, please go to the closest Emergency Room  You may remove dressing in 48 hours.  You may shower in 48 hours, no tub baths.  Apply an ice pack 24-48 hours.      SPECIAL INSTRUCTIONS:  Follow any verbal instructions given by Dr. Palumbo.      Last dose of pain medication was given at:  Tylenol (1000mg) last at 8:35am. Do not exceed 4000mg of tylenol in a 24 hour period.  May take ultram next at anytime if needed.

## 2025-03-26 NOTE — ANESTHESIA POSTPROCEDURE EVALUATION
Patient: Cris Agudelo    Procedure Summary       Date: 03/26/25 Room / Location: [facility] OR 05 / [facility] MAIN OR    Anesthesia Start: 1005 Anesthesia Stop: 1113    Procedure: Excision of back lipoma-remove fatty tumor from back (Back) Diagnosis:       Lipoma of torso      (Lipoma of torso [D17.1])    Surgeons: Domingo Palumbo MD Provider: Colleen Montano MD    Anesthesia Type: general, MAC ASA Status: 3            Anesthesia Type: general, MAC    Vitals  Vitals Value Taken Time   /48 03/26/25 11:45   Temp 36.3 °C (97.4 °F) 03/26/25 11:45   Pulse 63 03/26/25 11:46   Resp 20 03/26/25 11:45   SpO2 91 % 03/26/25 11:46   Vitals shown include unfiled device data.        Post Anesthesia Care and Evaluation    Patient location during evaluation: bedside  Patient participation: complete - patient participated  Level of consciousness: awake  Pain management: adequate    Airway patency: patent  PONV Status: none  Cardiovascular status: acceptable and stable  Respiratory status: acceptable  Hydration status: acceptable

## 2025-03-26 NOTE — OP NOTE
LIPOMA EXCISION  Procedure Report    Patient Name:  Cris Agudelo  YOB: 1940    Date of Surgery:  3/26/2025     Indications: The patient is an 84-year-old female that presented with a large subcutaneous lipoma of the upper back.  The decision was made to proceed with an excision of this back lipoma.    Pre-op Diagnosis: Back lipoma    Post-Op Diagnosis: Same    Procedure/CPT® Codes:    Excision of back lipoma    Staff:  Surgeon(s):  Domingo Palumbo MD    Assistant: David Ling    Anesthesia: Monitored Anesthesia Care    Estimated Blood Loss: 5 mL    Implants:    Nothing was implanted during the procedure    Specimen:          Specimens       ID Source Type Tests Collected By Collected At Frozen?    A Back, Upper Tissue TISSUE PATHOLOGY EXAM   Domingo Palumbo MD 3/26/25 1042     Description: Back Lipoma    Comment: Fresh for permanent                Findings: 10 cm back lipoma    Complications: None    Description of Procedure: The patient was taken the operating room and placed on the table in right lateral decubitus position.  After administration of MAC anesthesia, the upper back was prepped and draped sterilely.  I anesthetized the skin overlying the lipoma with quarter percent Marcaine.  A transverse incision was made over the lipoma with a 15 blade scalpel.  I dissected down into the subcutaneous tissues and identified a well encapsulated lipoma.  The lipoma was excised with cautery and sent for specimen.  It was approximately 10 cm in greatest diameter.  We achieved adequate hemostasis with cautery and then irrigated the wound out with sterile saline and suctioned it dry.  The subcutaneous tissues were reapproximated with interrupted 3-0 Vicryl sutures.  The skin was closed with a running 4-0 Vicryl subcuticular suture.  Sterile dressings were applied and she was taken the postanesthesia recovery room in stable condition.    Assistant: David Ling  was responsible for performing  the following activities: Retraction, Suction, Irrigation, and Placing Dressing and their skilled assistance was necessary for the success of this case.    Domingo Palumbo MD     Date: 3/26/2025  Time: 11:02 EDT

## 2025-04-08 ENCOUNTER — OFFICE VISIT (OUTPATIENT)
Dept: SURGERY | Facility: CLINIC | Age: 85
End: 2025-04-08
Payer: MEDICARE

## 2025-04-08 VITALS
WEIGHT: 139.6 LBS | SYSTOLIC BLOOD PRESSURE: 147 MMHG | DIASTOLIC BLOOD PRESSURE: 72 MMHG | BODY MASS INDEX: 28.14 KG/M2 | HEIGHT: 59 IN | HEART RATE: 59 BPM

## 2025-04-08 DIAGNOSIS — D17.1 LIPOMA OF TORSO: Primary | ICD-10-CM

## 2025-04-08 PROCEDURE — 99024 POSTOP FOLLOW-UP VISIT: CPT | Performed by: SURGERY

## 2025-04-08 NOTE — PROGRESS NOTES
Chief Complaint  Post-op Follow-up (Lipoma Excision 03/26/25.)    Subjective          Cris Agudelo presents to [facility] GENERAL SURGERY  History of Present Illness    Cris Agudelo is a 84 y.o. female  who presents today for a postoperative visit.     Patient is here for a follow-up after an excision of an upper back lipoma.  She is doing well and had no complaints today.    Past History:  Medical History: has a past medical history of Arthritis, CHF (congestive heart failure), COPD (chronic obstructive pulmonary disease) (8/7/23), Coronary artery disease, Cyst of skin, History of non-ST elevation myocardial infarction (NSTEMI) (08/23/2023), Hyperlipidemia, Hypertension, Lipoma, Multifocal pneumonia, NSTEMI (non-ST elevated myocardial infarction) (08/23/2023), and Stress-induced cardiomyopathy.   Surgical History: has a past surgical history that includes Cardiac catheterization (N/A, 08/06/2023); Cardiac catheterization (N/A, 08/06/2023); Bronchoscopy (N/A, 11/29/2024); Hysterectomy; Eye surgery; Colonoscopy; Skin biopsy; and Lipoma Excision (N/A, 3/26/2025).   Family History: family history includes Arthritis in her mother; Heart disease in her mother; Heart failure in her mother; Hypertension in her mother.   Social History: reports that she has quit smoking. Her smoking use included cigarettes. She has been exposed to tobacco smoke. She has never used smokeless tobacco. She reports current alcohol use of about 2.0 standard drinks of alcohol per week. She reports that she does not use drugs.  Allergies: Aleve [naproxen]       Current Outpatient Medications:     arformoterol (BROVANA) 15 MCG/2ML nebulizer solution, Take 2 mL by nebulization 2 (Two) Times a Day., Disp: , Rfl:     aspirin 81 MG EC tablet, Take 1 tablet by mouth Daily., Disp: 30 tablet, Rfl: 1    atorvastatin (LIPITOR) 80 MG tablet, Take 1 tablet by mouth Every Night., Disp: , Rfl:     budesonide (PULMICORT) 0.5 MG/2ML nebulizer  "solution, Take 2 mL by nebulization 2 (Two) Times a Day., Disp: , Rfl:     carvedilol (COREG) 3.125 MG tablet, Take 1 tablet by mouth 2 (Two) Times a Day., Disp: 60 tablet, Rfl: 0    dapagliflozin Propanediol (Farxiga) 10 MG tablet, Take 10 mg by mouth Daily., Disp: 90 tablet, Rfl: 3    furosemide (LASIX) 40 MG tablet, Take 1 tablet by mouth Daily., Disp: , Rfl:     guaiFENesin (MUCINEX) 600 MG 12 hr tablet, Take 1 tablet by mouth Every 12 (Twelve) Hours., Disp: 15 tablet, Rfl: 0    ipratropium-albuterol (DUO-NEB) 0.5-2.5 mg/3 ml nebulizer, Take 3 mL by nebulization Every 6 (Six) Hours As Needed for Shortness of Air., Disp: 360 mL, Rfl: 5    mirtazapine (REMERON) 15 MG tablet, Take 1 tablet by mouth Every Night., Disp: , Rfl:     nitroglycerin (NITROSTAT) 0.4 MG SL tablet, Place 1 tablet under the tongue Every 5 (Five) Minutes As Needed for Chest Pain., Disp: , Rfl:     potassium chloride ER (K-TAB) 20 MEQ tablet controlled-release ER tablet, Take 1 tablet by mouth Daily., Disp: , Rfl:     traMADol (ULTRAM) 50 MG tablet, Take 1 tablet by mouth Every 6 (Six) Hours As Needed for Moderate Pain. (Patient not taking: Reported on 4/8/2025), Disp: 10 tablet, Rfl: 0       Physical Exam  Her incision looks good today.  She does have a little bit of a seroma there but the skin looks good and there is no evidence of infection.  Objective     Vital Signs:   /72   Pulse 59   Ht 149.9 cm (59\")   Wt 63.3 kg (139 lb 9.6 oz)   BMI 28.20 kg/m²              Assessment and Plan    Diagnoses and all orders for this visit:    1. Lipoma of torso (Primary)    Overall she seems to be doing really well.  I am going to see her back on an as-needed basis.      "

## 2025-04-16 ENCOUNTER — OFFICE VISIT (OUTPATIENT)
Dept: CARDIOLOGY | Facility: CLINIC | Age: 85
End: 2025-04-16
Payer: MEDICARE

## 2025-04-16 VITALS
DIASTOLIC BLOOD PRESSURE: 70 MMHG | BODY MASS INDEX: 28.02 KG/M2 | HEIGHT: 59 IN | SYSTOLIC BLOOD PRESSURE: 132 MMHG | HEART RATE: 78 BPM | WEIGHT: 139 LBS

## 2025-04-16 DIAGNOSIS — I10 PRIMARY HYPERTENSION: ICD-10-CM

## 2025-04-16 DIAGNOSIS — I51.81 STRESS-INDUCED CARDIOMYOPATHY: Primary | ICD-10-CM

## 2025-04-16 DIAGNOSIS — E78.2 MIXED HYPERLIPIDEMIA: ICD-10-CM

## 2025-04-16 DIAGNOSIS — I25.10 NON-OCCLUSIVE CORONARY ARTERY DISEASE: ICD-10-CM

## 2025-04-16 NOTE — PROGRESS NOTES
Chief Complaint  Follow-up    Subjective        History of Present Illness  Cris Agudelo presents to [facility] CARDIOLOGY   Ms. Agudelo is an 84-year-old female coming in today for routine cardiac follow-up.  Overall she is doing well, and has no concerns at visit today.  She did have a hospitalization back in November related to her respiratory failure and COPD exacerbation, and subsequently went to rehab temporarily.  Doing well at home currently, denies any chest pain, her breathing is stable, is tolerating medications without any issues.      Past History:     Stress-induced cardiomyopathy ; index presentation in August 2023 ejection fraction 35 to 40%.  Subsequently improved     Minimal nonobstructive coronary disease by Cardiac catheterization done in August 2023     Essential hypertension  Mixed hyperlipidemia    Past Medical History:   Diagnosis Date    Arthritis     CHF (congestive heart failure)     COPD (chronic obstructive pulmonary disease) 8/7/23    Coronary artery disease     Cyst of skin     History of non-ST elevation myocardial infarction (NSTEMI) 08/23/2023    Hyperlipidemia     Hypertension     Lipoma     Multifocal pneumonia     NSTEMI (non-ST elevated myocardial infarction) 08/23/2023    Stress-induced cardiomyopathy        Allergies   Allergen Reactions    Aleve [Naproxen] Hives        Past Surgical History:   Procedure Laterality Date    BRONCHOSCOPY N/A 11/29/2024    Procedure: BRONCHOSCOPY with BAL, Brushings and washings;  Surgeon: Adan Brown MD;  Location: [facility] ENDOSCOPY;  Service: Pulmonary;  Laterality: N/A;  Bilateral PNA with mucus pluggins    CARDIAC CATHETERIZATION N/A 08/06/2023    Procedure: Left Heart Cath;  Surgeon: Mitch Correia MD;  Location: [facility] CATH INVASIVE LOCATION;  Service: Cardiology;  Laterality: N/A;    CARDIAC CATHETERIZATION N/A 08/06/2023    Procedure: Coronary angiography;  Surgeon: Mitch Correia MD;  Location: [facility] CATH INVASIVE  LOCATION;  Service: Cardiology;  Laterality: N/A;    COLONOSCOPY      EYE SURGERY      HYSTERECTOMY      LIPOMA EXCISION N/A 3/26/2025    Procedure: Excision of back lipoma-remove fatty tumor from back;  Surgeon: Domingo Palumbo MD;  Location: [facility] MAIN OR;  Service: General;  Laterality: N/A;    SKIN BIOPSY          Social History  She  reports that she has quit smoking. Her smoking use included cigarettes. She has been exposed to tobacco smoke. She has never used smokeless tobacco. She reports current alcohol use of about 2.0 standard drinks of alcohol per week. She reports that she does not use drugs.    Family History  Her family history includes Arthritis in her mother; Heart disease in her mother; Heart failure in her mother; Hypertension in her mother.       Current Outpatient Medications on File Prior to Visit   Medication Sig    arformoterol (BROVANA) 15 MCG/2ML nebulizer solution Take 2 mL by nebulization 2 (Two) Times a Day.    aspirin 81 MG EC tablet Take 1 tablet by mouth Daily.    atorvastatin (LIPITOR) 80 MG tablet Take 1 tablet by mouth Every Night.    budesonide (PULMICORT) 0.5 MG/2ML nebulizer solution Take 2 mL by nebulization 2 (Two) Times a Day.    carvedilol (COREG) 3.125 MG tablet Take 1 tablet by mouth 2 (Two) Times a Day.    dapagliflozin Propanediol (Farxiga) 10 MG tablet Take 10 mg by mouth Daily.    furosemide (LASIX) 40 MG tablet Take 1 tablet by mouth Daily.    guaiFENesin (MUCINEX) 600 MG 12 hr tablet Take 1 tablet by mouth Every 12 (Twelve) Hours.    ipratropium-albuterol (DUO-NEB) 0.5-2.5 mg/3 ml nebulizer Take 3 mL by nebulization Every 6 (Six) Hours As Needed for Shortness of Air.    mirtazapine (REMERON) 15 MG tablet Take 1 tablet by mouth Every Night.    nitroglycerin (NITROSTAT) 0.4 MG SL tablet Place 1 tablet under the tongue Every 5 (Five) Minutes As Needed for Chest Pain.    potassium chloride ER (K-TAB) 20 MEQ tablet controlled-release ER tablet Take 1 tablet by mouth  "Daily.     No current facility-administered medications on file prior to visit.         Review of Systems   Constitutional:  Negative for fatigue.   Respiratory:  Positive for cough and shortness of breath (Mild, stable). Negative for chest tightness.    Cardiovascular:  Negative for chest pain, palpitations and leg swelling.   Gastrointestinal:  Negative for nausea and vomiting.   Neurological:  Negative for dizziness and syncope.        Objective   Vitals:    04/16/25 1520   BP: 132/70   Pulse: 78   Weight: 63 kg (139 lb)   Height: 149.9 cm (59\")         Physical Exam  General : Alert, awake, no acute distress  Neck : Supple, no carotid bruit, no jugular venous distention  CVS : Regular rate and rhythm, no murmur, no rubs or gallops  Lungs: Clear to auscultation bilaterally, no crackles or rhonchi  Abdomen: Soft, nontender, bowel sounds active  Extremities: Warm, well-perfused, no pedal edema      Result Review     The following data was reviewed by JES Cutler  proBNP   Date Value Ref Range Status   12/05/2024 741.1 0.0 - 1,800.0 pg/mL Final     CMP          12/6/2024    04:38 12/10/2024    05:08 12/12/2024    04:20   CMP   Glucose 93  119  141    BUN 12  11  10    Creatinine 0.55  0.47  0.55    EGFR 90.5  94.0  90.5    Sodium 135  136  138    Potassium 3.8  3.3  3.5    Chloride 94  96  97    Calcium 8.4  8.2  8.4    BUN/Creatinine Ratio 21.8  23.4  18.2    Anion Gap 7.6  7.2  7.5      CBC w/diff          12/3/2024    05:02 12/4/2024    07:58 12/5/2024    04:46   CBC w/Diff   WBC 12.23  12.16  10.89    RBC 4.35  4.75  4.28    Hemoglobin 11.9  13.0  11.9    Hematocrit 37.4  43.1  37.2    MCV 86.0  90.7  86.9    MCH 27.4  27.4  27.8    MCHC 31.8  30.2  32.0    RDW 13.4  13.6  13.6    Platelets 312  229  262    Neutrophil Rel % 70.1  67.6     Immature Granulocyte Rel % 2.3  1.8     Lymphocyte Rel % 19.1  21.0     Monocyte Rel % 5.8  6.4     Eosinophil Rel % 2.3  2.8     Basophil Rel % 0.4  0.4        No " "results found for: \"TSH\"   No results found for: \"FREET4\"   No results found for: \"DDIMERQUANT\"  Magnesium   Date Value Ref Range Status   12/12/2024 1.6 1.6 - 2.4 mg/dL Final      No results found for: \"DIGOXIN\"   Lab Results   Component Value Date    TROPONINT 15 (H) 11/25/2024                 Results for orders placed during the hospital encounter of 11/25/24    Adult Transthoracic Echo Complete W/ Cont if Necessary Per Protocol    Interpretation Summary    Left ventricular ejection fraction appears to be 56 - 60%.    Left ventricular diastolic function is consistent with (grade I) impaired relaxation and age.    Mild aortic insufficiency.             Assessment and Plan   Diagnoses and all orders for this visit:    1. Stress-induced cardiomyopathy (Primary)  Assessment & Plan:  Echocardiogram last fall shows recovered LVEF of 56 to 60%.  She has no evidence of volume overload.  Continue current regiment with carvedilol, Farxiga and furosemide.          2. Non-occlusive coronary artery disease  Assessment & Plan:  She is stable and has no anginal symptoms.  Previous cardiac catheterization showed minimal nonobstructive disease.  We will continue her current regiment with daily aspirin, beta-blocker and statin.      3. Mixed hyperlipidemia  Assessment & Plan:   Uncertain lipid control, will request updated labs from PCP, continue current dose atorvastatin 80 mg nightly      4. Primary hypertension  Assessment & Plan:  Blood pressure is well-controlled, she may continue current dose of carvedilol, and furosemide.              Follow Up   Return in about 9 months (around 1/16/2026) for with Dr. Hernandez.    Patient was given instructions and counseling regarding her condition or for health maintenance advice. Please see specific information pulled into the AVS if appropriate.     Signed,  Izabella Oro, APRN  04/16/2025     Dictated Utilizing Dragon Dictation: Please note that portions of this note were completed " with a voice recognition program.  Part of this note may be an electronic transcription/translation of spoken language to printed text using the Dragon Dictation System.

## 2025-05-03 NOTE — ASSESSMENT & PLAN NOTE
Uncertain lipid control, will request updated labs from PCP, continue current dose atorvastatin 80 mg nightly

## 2025-05-03 NOTE — ASSESSMENT & PLAN NOTE
Echocardiogram last fall shows recovered LVEF of 56 to 60%.  She has no evidence of volume overload.  Continue current regiment with carvedilol, Farxiga and furosemide.

## 2025-05-03 NOTE — ASSESSMENT & PLAN NOTE
She is stable and has no anginal symptoms.  Previous cardiac catheterization showed minimal nonobstructive disease.  We will continue her current regiment with daily aspirin, beta-blocker and statin.

## 2025-05-22 ENCOUNTER — TELEPHONE (OUTPATIENT)
Dept: CARDIOLOGY | Facility: CLINIC | Age: 85
End: 2025-05-22

## 2025-06-13 ENCOUNTER — SPECIALTY PHARMACY (OUTPATIENT)
Dept: CARDIOLOGY | Facility: CLINIC | Age: 85
End: 2025-06-13
Payer: MEDICARE

## 2025-07-02 ENCOUNTER — HOSPITAL ENCOUNTER (EMERGENCY)
Facility: HOSPITAL | Age: 85
Discharge: HOME | End: 2025-07-02
Attending: EMERGENCY MEDICINE | Admitting: EMERGENCY MEDICINE
Payer: MEDICARE

## 2025-07-02 ENCOUNTER — APPOINTMENT (OUTPATIENT)
Dept: GENERAL RADIOLOGY | Facility: HOSPITAL | Age: 85
End: 2025-07-02
Payer: MEDICARE

## 2025-07-02 VITALS
SYSTOLIC BLOOD PRESSURE: 176 MMHG | TEMPERATURE: 97.8 F | RESPIRATION RATE: 14 BRPM | DIASTOLIC BLOOD PRESSURE: 60 MMHG | OXYGEN SATURATION: 94 % | HEART RATE: 63 BPM

## 2025-07-02 DIAGNOSIS — J18.9 PNEUMONIA DUE TO INFECTIOUS ORGANISM, UNSPECIFIED LATERALITY, UNSPECIFIED PART OF LUNG: ICD-10-CM

## 2025-07-02 DIAGNOSIS — R07.9 CHEST PAIN, UNSPECIFIED TYPE: Primary | ICD-10-CM

## 2025-07-02 LAB
ALBUMIN SERPL-MCNC: 3.7 G/DL (ref 3.5–5.2)
ALBUMIN/GLOB SERPL: 1.3 G/DL
ALP SERPL-CCNC: 155 U/L (ref 39–117)
ALT SERPL W P-5'-P-CCNC: 16 U/L (ref 1–33)
ANION GAP SERPL CALCULATED.3IONS-SCNC: 9.6 MMOL/L (ref 5–15)
AST SERPL-CCNC: 20 U/L (ref 1–32)
BASOPHILS # BLD AUTO: 0.03 10*3/MM3 (ref 0–0.2)
BASOPHILS NFR BLD AUTO: 0.3 % (ref 0–1.5)
BILIRUB SERPL-MCNC: 0.5 MG/DL (ref 0–1.2)
BUN SERPL-MCNC: 13.4 MG/DL (ref 8–23)
BUN/CREAT SERPL: 19.7 (ref 7–25)
CALCIUM SPEC-SCNC: 8.7 MG/DL (ref 8.6–10.5)
CHLORIDE SERPL-SCNC: 104 MMOL/L (ref 98–107)
CO2 SERPL-SCNC: 24.4 MMOL/L (ref 22–29)
CREAT SERPL-MCNC: 0.68 MG/DL (ref 0.57–1)
DEPRECATED RDW RBC AUTO: 48.7 FL (ref 37–54)
EGFRCR SERPLBLD CKD-EPI 2021: 86 ML/MIN/1.73
EOSINOPHIL # BLD AUTO: 0.16 10*3/MM3 (ref 0–0.4)
EOSINOPHIL NFR BLD AUTO: 1.4 % (ref 0.3–6.2)
ERYTHROCYTE [DISTWIDTH] IN BLOOD BY AUTOMATED COUNT: 14.4 % (ref 12.3–15.4)
GLOBULIN UR ELPH-MCNC: 2.9 GM/DL
GLUCOSE SERPL-MCNC: 164 MG/DL (ref 65–99)
HCT VFR BLD AUTO: 40.2 % (ref 34–46.6)
HGB BLD-MCNC: 12.7 G/DL (ref 12–15.9)
HOLD SPECIMEN: NORMAL
HOLD SPECIMEN: NORMAL
IMM GRANULOCYTES # BLD AUTO: 0.05 10*3/MM3 (ref 0–0.05)
IMM GRANULOCYTES NFR BLD AUTO: 0.5 % (ref 0–0.5)
LIPASE SERPL-CCNC: 43 U/L (ref 13–60)
LYMPHOCYTES # BLD AUTO: 1.66 10*3/MM3 (ref 0.7–3.1)
LYMPHOCYTES NFR BLD AUTO: 15 % (ref 19.6–45.3)
MAGNESIUM SERPL-MCNC: 2 MG/DL (ref 1.6–2.4)
MCH RBC QN AUTO: 28.9 PG (ref 26.6–33)
MCHC RBC AUTO-ENTMCNC: 31.6 G/DL (ref 31.5–35.7)
MCV RBC AUTO: 91.6 FL (ref 79–97)
MONOCYTES # BLD AUTO: 0.32 10*3/MM3 (ref 0.1–0.9)
MONOCYTES NFR BLD AUTO: 2.9 % (ref 5–12)
NEUTROPHILS NFR BLD AUTO: 79.9 % (ref 42.7–76)
NEUTROPHILS NFR BLD AUTO: 8.86 10*3/MM3 (ref 1.7–7)
NRBC BLD AUTO-RTO: 0 /100 WBC (ref 0–0.2)
NT-PROBNP SERPL-MCNC: 764.2 PG/ML (ref 0–1800)
PLATELET # BLD AUTO: 237 10*3/MM3 (ref 140–450)
PMV BLD AUTO: 10.7 FL (ref 6–12)
POTASSIUM SERPL-SCNC: 3.7 MMOL/L (ref 3.5–5.2)
PROT SERPL-MCNC: 6.6 G/DL (ref 6–8.5)
QT INTERVAL: 425 MS
QTC INTERVAL: 442 MS
RBC # BLD AUTO: 4.39 10*6/MM3 (ref 3.77–5.28)
SODIUM SERPL-SCNC: 138 MMOL/L (ref 136–145)
TROPONIN T SERPL HS-MCNC: 9 NG/L
WBC NRBC COR # BLD AUTO: 11.08 10*3/MM3 (ref 3.4–10.8)
WHOLE BLOOD HOLD COAG: NORMAL
WHOLE BLOOD HOLD SPECIMEN: NORMAL

## 2025-07-02 PROCEDURE — 80053 COMPREHEN METABOLIC PANEL: CPT | Performed by: EMERGENCY MEDICINE

## 2025-07-02 PROCEDURE — 93005 ELECTROCARDIOGRAM TRACING: CPT | Performed by: EMERGENCY MEDICINE

## 2025-07-02 PROCEDURE — 83880 ASSAY OF NATRIURETIC PEPTIDE: CPT | Performed by: EMERGENCY MEDICINE

## 2025-07-02 PROCEDURE — 99284 EMERGENCY DEPT VISIT MOD MDM: CPT

## 2025-07-02 PROCEDURE — 83690 ASSAY OF LIPASE: CPT | Performed by: EMERGENCY MEDICINE

## 2025-07-02 PROCEDURE — 83735 ASSAY OF MAGNESIUM: CPT | Performed by: EMERGENCY MEDICINE

## 2025-07-02 PROCEDURE — 71045 X-RAY EXAM CHEST 1 VIEW: CPT

## 2025-07-02 PROCEDURE — 93005 ELECTROCARDIOGRAM TRACING: CPT

## 2025-07-02 PROCEDURE — 93010 ELECTROCARDIOGRAM REPORT: CPT | Performed by: SPECIALIST

## 2025-07-02 PROCEDURE — 84484 ASSAY OF TROPONIN QUANT: CPT | Performed by: EMERGENCY MEDICINE

## 2025-07-02 PROCEDURE — 85025 COMPLETE CBC W/AUTO DIFF WBC: CPT | Performed by: EMERGENCY MEDICINE

## 2025-07-02 RX ORDER — SODIUM CHLORIDE 0.9 % (FLUSH) 0.9 %
10 SYRINGE (ML) INJECTION AS NEEDED
Status: DISCONTINUED | OUTPATIENT
Start: 2025-07-02 | End: 2025-07-02 | Stop reason: HOSPADM

## 2025-07-02 RX ORDER — ASPIRIN 81 MG/1
324 TABLET, CHEWABLE ORAL ONCE
Status: COMPLETED | OUTPATIENT
Start: 2025-07-02 | End: 2025-07-02

## 2025-07-02 RX ORDER — AZITHROMYCIN 250 MG/1
TABLET, FILM COATED ORAL
Qty: 6 TABLET | Refills: 0 | Status: SHIPPED | OUTPATIENT
Start: 2025-07-02 | End: 2025-07-07

## 2025-07-02 RX ADMIN — ASPIRIN 324 MG: 81 TABLET, CHEWABLE ORAL at 09:48

## 2025-07-02 NOTE — ED PROVIDER NOTES
Time: 10:35 AM EDT  Date of encounter:  7/2/2025  Independent Historian/Clinical History and Information was obtained by:   Patient and Family    History is limited by: N/A    Chief Complaint: Chest pain      History of Present Illness:  Patient is a 84 y.o. year old female who presents to the emergency department for evaluation of chest discomfort all night long.  Patient reports it was difficult to swallow and chest discomfort during the night.  Upon my evaluation Emergency Department status reported the pain has resolved.      Patient Care Team  Primary Care Provider: Ramirez Maurice MD    Past Medical History:     Allergies   Allergen Reactions    Aleve [Naproxen] Hives     Past Medical History:   Diagnosis Date    Arthritis     CHF (congestive heart failure)     FOLLOWED BY ROBERT ANDRE. DENIES CP/SOA.  PER DAUGHTER FAIRLY ACTIVE FOR HER AGE USES CANE FOR BALANCE    COPD (chronic obstructive pulmonary disease) 8/7/23    Diagnosed in the hospital    Coronary artery disease     Cyst of skin     History of non-ST elevation myocardial infarction (NSTEMI) 08/23/2023    Hyperlipidemia     Hypertension     Lipoma     Multifocal pneumonia     DURING HOSPITALIZATION [facility] 12/4/24-12/12/24 ALSO  Acute on chronic hypoxemic respiratory failure    NSTEMI (non-ST elevated myocardial infarction) 08/23/2023    PER DR. MORRIS NOTE 7/11/24    Stress-induced cardiomyopathy      Past Surgical History:   Procedure Laterality Date    BRONCHOSCOPY N/A 11/29/2024    Procedure: BRONCHOSCOPY with BAL, Brushings and washings;  Surgeon: Adan Brown MD;  Location: [facility] ENDOSCOPY;  Service: Pulmonary;  Laterality: N/A;  Bilateral PNA with mucus pluggins    CARDIAC CATHETERIZATION N/A 08/06/2023    Procedure: Left Heart Cath;  Surgeon: Mitch Correia MD;  Location: [facility] CATH INVASIVE LOCATION;  Service: Cardiology;  Laterality: N/A;    CARDIAC CATHETERIZATION N/A 08/06/2023    Procedure: Coronary angiography;  Surgeon: Mitch Correia  MD;  Location: [facility] CATH INVASIVE LOCATION;  Service: Cardiology;  Laterality: N/A;    COLONOSCOPY      EYE SURGERY      HYSTERECTOMY      LIPOMA EXCISION N/A 3/26/2025    Procedure: Excision of back lipoma-remove fatty tumor from back;  Surgeon: Domingo Palumbo MD;  Location: [facility] MAIN OR;  Service: General;  Laterality: N/A;    SKIN BIOPSY       Family History   Problem Relation Age of Onset    Hypertension Mother     Arthritis Mother     Heart disease Mother     Heart failure Mother         She had congestive heart failure       Home Medications:  Prior to Admission medications    Medication Sig Start Date End Date Taking? Authorizing Provider   arformoterol (BROVANA) 15 MCG/2ML nebulizer solution Take 2 mL by nebulization 2 (Two) Times a Day. 6/27/24   Nazia Sam APRN   aspirin 81 MG EC tablet Take 1 tablet by mouth Daily. 8/9/23   Kevyn Sommer DO   atorvastatin (LIPITOR) 80 MG tablet Take 1 tablet by mouth Every Night.    ProviderChelle MD   budesonide (PULMICORT) 0.5 MG/2ML nebulizer solution Take 2 mL by nebulization 2 (Two) Times a Day. 6/27/24   Nazia Sam APRN   carvedilol (COREG) 3.125 MG tablet Take 1 tablet by mouth 2 (Two) Times a Day. 12/12/24   Yobani Strickland PA   dapagliflozin Propanediol (Farxiga) 10 MG tablet Take 10 mg by mouth Daily. 9/14/23   Izabella rOo APRN   furosemide (LASIX) 40 MG tablet Take 1 tablet by mouth Daily. 3/5/25 6/3/25  Chelle Ibrahim MD   guaiFENesin (MUCINEX) 600 MG 12 hr tablet Take 1 tablet by mouth Every 12 (Twelve) Hours. 8/8/23   Kevyn Sommer DO   ipratropium-albuterol (DUO-NEB) 0.5-2.5 mg/3 ml nebulizer Take 3 mL by nebulization Every 6 (Six) Hours As Needed for Shortness of Air. 6/27/24   Nazia Sam APRN   mirtazapine (REMERON) 15 MG tablet Take 1 tablet by mouth Every Night. 12/23/24   Chelle Ibrahim MD   nitroglycerin (NITROSTAT) 0.4 MG SL tablet Place 1 tablet under the tongue Every 5 (Five) Minutes As Needed for  Chest Pain. 8/14/23   ProviderChelle MD   potassium chloride ER (K-TAB) 20 MEQ tablet controlled-release ER tablet Take 1 tablet by mouth Daily. 2/23/25   ProviderChelle MD        Social History:   Social History     Tobacco Use    Smoking status: Former     Types: Cigarettes     Passive exposure: Past    Smokeless tobacco: Never    Tobacco comments:     Second hand smoke    Vaping Use    Vaping status: Never Used   Substance Use Topics    Alcohol use: Yes     Alcohol/week: 2.0 standard drinks of alcohol     Types: 2 Glasses of wine per week     Comment: Occasionally    Drug use: Never         Review of Systems:  Review of Systems   Constitutional:  Negative for chills and fever.   HENT:  Negative for congestion, rhinorrhea and sore throat.    Eyes:  Negative for pain and visual disturbance.   Respiratory:  Negative for apnea, cough, chest tightness and shortness of breath.    Cardiovascular:  Negative for chest pain and palpitations.   Gastrointestinal:  Negative for abdominal pain, diarrhea, nausea and vomiting.   Genitourinary:  Negative for difficulty urinating and dysuria.   Musculoskeletal:  Negative for joint swelling and myalgias.   Skin:  Negative for color change.   Neurological:  Negative for seizures and headaches.   Psychiatric/Behavioral: Negative.     All other systems reviewed and are negative.       Physical Exam:  /60   Pulse 64   Temp 98.4 °F (36.9 °C)   Resp 16   SpO2 91%     Physical Exam  Vitals and nursing note reviewed.   Constitutional:       General: She is not in acute distress.     Appearance: Normal appearance. She is not toxic-appearing.   HENT:      Head: Normocephalic and atraumatic.      Jaw: There is normal jaw occlusion.   Eyes:      General: Lids are normal.      Extraocular Movements: Extraocular movements intact.      Conjunctiva/sclera: Conjunctivae normal.      Pupils: Pupils are equal, round, and reactive to light.   Cardiovascular:      Rate and  Rhythm: Normal rate and regular rhythm.      Pulses: Normal pulses.      Heart sounds: Normal heart sounds.   Pulmonary:      Effort: Pulmonary effort is normal. No respiratory distress.      Breath sounds: Normal breath sounds. No wheezing or rhonchi.   Abdominal:      General: Abdomen is flat.      Palpations: Abdomen is soft.      Tenderness: There is no abdominal tenderness. There is no guarding or rebound.   Musculoskeletal:         General: Normal range of motion.      Cervical back: Normal range of motion and neck supple.      Right lower leg: No edema.      Left lower leg: No edema.   Skin:     General: Skin is warm and dry.   Neurological:      Mental Status: She is alert and oriented to person, place, and time. Mental status is at baseline.   Psychiatric:         Mood and Affect: Mood normal.                    Medical Decision Making:      Comorbidities that affect care:    Hypertension, CAD    External Notes reviewed:    Previous Clinic Note: Cardiology office visit for cardiomyopathy management      The following orders were placed and all results were independently analyzed by me:  Orders Placed This Encounter   Procedures    XR Chest 1 View    [place] Draw    High Sensitivity Troponin T    Comprehensive Metabolic Panel    Lipase    BNP    Magnesium    CBC Auto Differential    NPO Diet NPO Type: Strict NPO    Undress & Gown    Continuous Pulse Oximetry    Oxygen Therapy- Nasal Cannula; Titrate 1-6 LPM Per SpO2; 90 - 95%    ECG 12 Lead ED Triage Standing Order; Chest Pain    ECG 12 Lead ED Triage Standing Order; Chest Pain    Insert Peripheral IV    CBC & Differential    Green Top (Gel)    Lavender Top    Gold Top - SST    Light Blue Top       Medications Given in the Emergency Department:  Medications   sodium chloride 0.9 % flush 10 mL (has no administration in time range)   aspirin chewable tablet 324 mg (324 mg Oral Given 7/2/25 0948)        ED Course:         Labs:    Lab Results (last 24 hours)        Procedure Component Value Units Date/Time    High Sensitivity Troponin T [[id number]]  (Normal) Collected: 07/02/25 0926    Specimen: Blood Updated: 07/02/25 1009     HS Troponin T 9 ng/L     Narrative:      High Sensitive Troponin T Reference Range:  <14.0 ng/L- Negative Female for AMI  <22.0 ng/L- Negative Male for AMI  >=14 - Abnormal Female indicating possible myocardial injury.  >=22 - Abnormal Male indicating possible myocardial injury.   Clinicians would have to utilize clinical acumen, EKG, Troponin, and serial changes to determine if it is an Acute Myocardial Infarction or myocardial injury due to an underlying chronic condition.         CBC & Differential [[id number]]  (Abnormal) Collected: 07/02/25 0926    Specimen: Blood Updated: 07/02/25 0935    Narrative:      The following orders were created for panel order CBC & Differential.  Procedure                               Abnormality         Status                     ---------                               -----------         ------                     CBC Auto Differential[[id number]]        Abnormal            Final result                 Please view results for these tests on the individual orders.    Comprehensive Metabolic Panel [[id number]]  (Abnormal) Collected: 07/02/25 0926    Specimen: Blood Updated: 07/02/25 1009     Glucose 164 mg/dL      BUN 13.4 mg/dL      Creatinine 0.68 mg/dL      Sodium 138 mmol/L      Potassium 3.7 mmol/L      Chloride 104 mmol/L      CO2 24.4 mmol/L      Calcium 8.7 mg/dL      Total Protein 6.6 g/dL      Albumin 3.7 g/dL      ALT (SGPT) 16 U/L      AST (SGOT) 20 U/L      Alkaline Phosphatase 155 U/L      Total Bilirubin 0.5 mg/dL      Globulin 2.9 gm/dL      A/G Ratio 1.3 g/dL      BUN/Creatinine Ratio 19.7     Anion Gap 9.6 mmol/L      eGFR 86.0 mL/min/1.73     Narrative:      GFR Categories in Chronic Kidney Disease (CKD)              GFR Category          GFR (mL/min/1.73)    Interpretation  G1                     90 or greater        Normal or high (1)  G2                    60-89                Mild decrease (1)  G3a                   45-59                Mild to moderate decrease  G3b                   30-44                Moderate to severe decrease  G4                    15-29                Severe decrease  G5                    14 or less           Kidney failure    (1)In the absence of evidence of kidney disease, neither GFR category G1 or G2 fulfill the criteria for CKD.    eGFR calculation 2021 CKD-EPI creatinine equation, which does not include race as a factor    Lipase [[id number]]  (Normal) Collected: 07/02/25 0926    Specimen: Blood Updated: 07/02/25 1009     Lipase 43 U/L     BNP [[id number]]  (Normal) Collected: 07/02/25 0926    Specimen: Blood Updated: 07/02/25 1005     proBNP 764.2 pg/mL     Narrative:      This assay is used as an aid in the diagnosis of individuals suspected of having heart failure. It can be used as an aid in the diagnosis of acute decompensated heart failure (ADHF) in patients presenting with signs and symptoms of ADHF to the emergency department (ED). In addition, NT-proBNP of <300 pg/mL indicates ADHF is not likely.    Age Range Result Interpretation  NT-proBNP Concentration (pg/mL:      <50             Positive            >450                   Gray                 300-450                    Negative             <300    50-75           Positive            >900                  Gray                300-900                  Negative            <300      >75             Positive            >1800                  Gray                300-1800                  Negative            <300    Magnesium [[id number]]  (Normal) Collected: 07/02/25 0926    Specimen: Blood Updated: 07/02/25 1009     Magnesium 2.0 mg/dL     CBC Auto Differential [[id number]]  (Abnormal) Collected: 07/02/25 0926    Specimen: Blood Updated: 07/02/25 0935     WBC 11.08 10*3/mm3      RBC 4.39 10*6/mm3      Hemoglobin 12.7  g/dL      Hematocrit 40.2 %      MCV 91.6 fL      MCH 28.9 pg      MCHC 31.6 g/dL      RDW 14.4 %      RDW-SD 48.7 fl      MPV 10.7 fL      Platelets 237 10*3/mm3      Neutrophil % 79.9 %      Lymphocyte % 15.0 %      Monocyte % 2.9 %      Eosinophil % 1.4 %      Basophil % 0.3 %      Immature Grans % 0.5 %      Neutrophils, Absolute 8.86 10*3/mm3      Lymphocytes, Absolute 1.66 10*3/mm3      Monocytes, Absolute 0.32 10*3/mm3      Eosinophils, Absolute 0.16 10*3/mm3      Basophils, Absolute 0.03 10*3/mm3      Immature Grans, Absolute 0.05 10*3/mm3      nRBC 0.0 /100 WBC              Imaging:    XR Chest 1 View  Result Date: 7/2/2025  XR CHEST 1 VW Date of Exam: 7/2/2025 9:15 AM EDT Indication: Chest Pain Triage Protocol Comparison: Two-view chest dated 3/18/2025 and 1/16/2025, single view chest radiograph from 11/30/2024 and 9/22/2023 Findings: There is patchy slightly platelike density in the periphery of the left lung base could be atelectasis or pneumonia. Remainder the lungs are grossly clear. Small bilateral pleural effusions are not excluded. Cardiac, hilar, and mediastinal silhouettes are stable. No pneumothorax.     Impression: Density in the periphery of the left lung base is favored to represent evolving plate atelectasis, small effusion and/or airspace disease in this area cannot be excluded on this exam. The lungs otherwise are grossly clear. Electronically Signed: Ryan Kelly DO  7/2/2025 9:38 AM EDT  Workstation ID: [id number]        Differential Diagnosis and Discussion:    Chest Pain:  Based on the patient's signs and symptoms, I considered aortic dissection, myocardial infaction, pulmonary embolism, cardiac tamponade, pericarditis, pneumothorax, musculoskeletal chest pain and other differential diagnosis as an etiology of the patient's chest pain.     PROCEDURES:    Labs were collected in the emergency department and all labs were reviewed and interpreted by me.  X-ray were performed in the  emergency department and all X-ray impressions were independently interpreted by me.  An EKG was performed and the EKG was interpreted by me.    ECG 12 Lead ED Triage Standing Order; Chest Pain   Preliminary Result   HEART RATE=65  bpm   RR Edbilweb=863  ms   AR Aiqblaob=220  ms   P Horizontal Axis=-13  deg   P Front Axis=24  deg   QRSD Leblqbbf=732  ms   QT Talpshfw=376  ms   YCyW=870  ms   QRS Axis=-21  deg   T Wave Axis=-48  deg   - ABNORMAL ECG -   Sinus rhythm   LVH with secondary repolarization abnormality   Anterior Q waves, possibly due to LVH   Date and Time of Study:2025-07-02 09:01:50        My interpretation of EKG shows normal sinus rhythm, normal rate, normal QT, no acute ischemia    Procedures    MDM  Number of Diagnoses or Management Options  Diagnosis management comments: In summary this is an 84-year-old female who presents for evaluation of chest discomfort all through the night.  At the time of evaluation pain has resolved.  CBC independently reviewed and interpreted by me and shows no critical abnormalities.  CMP independently reviewed and interpreted by me and shows no critical abnormalities.  High-sensitivity troponin independently reviewed and interpreted by me shows no critical abnormalities.  Chest x-ray reviewed by me is unremarkable negative for acute pathology.  Patient is otherwise well-appearing and in no acute distress at this time.  Very strict return to ER and follow-up instructions have been provided to the patient.  Advised close outpatient cardiology follow-up.                       Patient Care Considerations:    CONSULT: I considered consulting cardiology, however negative cardiac workup      Consultants/Shared Management Plan:    None    Social Determinants of Health:    Patient has presented with family members who are responsible, reliable and will ensure follow up care.      Disposition and Care Coordination:    Discharged: I considered escalation of care by admitting this  patient to the hospital, however negative cardiac workup and pain is resolved therefore no indication for inpatient cardiology workup/evaluation    I have explained the patient´s condition, diagnoses and treatment plan based on the information available to me at this time. I have answered questions and addressed any concerns. The patient has a good  understanding of the patient´s diagnosis, condition, and treatment plan as can be expected at this point. The vital signs have been stable. The patient´s condition is stable and appropriate for discharge from the emergency department.      The patient will pursue further outpatient evaluation with the primary care physician or other designated or consulting physician as outlined in the discharge instructions. They are agreeable to this plan of care and follow-up instructions have been explained in detail. The patient has received these instructions in written format and has expressed an understanding of the discharge instructions. The patient is aware that any significant change in condition or worsening of symptoms should prompt an immediate return to this or the closest emergency department or call to 911.  I have explained discharge medications and the need for follow up with the patient/caretakers. This was also printed in the discharge instructions. Patient was discharged with the following medications and follow up:      Medication List      No changes were made to your prescriptions during this visit.      Izabella Oro, APRN  [address] [address]  [phone]    In 1 week         Final diagnoses:   Chest pain, unspecified type        ED Disposition       ED Disposition   Discharge    Condition   Stable    Comment   --               This medical record created using voice recognition software.             Donaldo Sarabia MD  07/02/25 1038

## 2025-07-08 ENCOUNTER — OFFICE VISIT (OUTPATIENT)
Dept: CARDIOLOGY | Facility: CLINIC | Age: 85
End: 2025-07-08
Payer: MEDICARE

## 2025-07-08 VITALS
WEIGHT: 139 LBS | BODY MASS INDEX: 28.02 KG/M2 | HEIGHT: 59 IN | DIASTOLIC BLOOD PRESSURE: 49 MMHG | HEART RATE: 63 BPM | SYSTOLIC BLOOD PRESSURE: 136 MMHG

## 2025-07-08 DIAGNOSIS — E78.2 MIXED HYPERLIPIDEMIA: ICD-10-CM

## 2025-07-08 DIAGNOSIS — I51.81 STRESS-INDUCED CARDIOMYOPATHY: Primary | ICD-10-CM

## 2025-07-08 DIAGNOSIS — I10 PRIMARY HYPERTENSION: ICD-10-CM

## 2025-07-08 DIAGNOSIS — I25.10 NON-OCCLUSIVE CORONARY ARTERY DISEASE: ICD-10-CM

## 2025-07-08 NOTE — PROGRESS NOTES
Chief Complaint  Follow-up    Subjective        History of Present Illness  Cris Agudelo presents to [facility] CARDIOLOGY   Ms. Agudelo is an 84-year-old female coming in today for follow-up after recent ER evaluation.  She initially presented to the hospital with complaints of chest pain, cardiac workup including troponin was at unremarkable.  Chest pain had improved by the time she admitted to the ER.  She ended up being treated for pneumonia.  She continues to have some chronic symptoms of cough and congestion, is currently using inhalers and nebulizers, and will routinely follow-up with pulmonology.  No further concerns related to chest pain or chest pressure.  Tolerating current medication regiment without any issue.      Past History:     Stress-induced cardiomyopathy ; index presentation in August 2023 ejection fraction 35 to 40%.  Subsequently improved     Minimal nonobstructive coronary disease by Cardiac catheterization done in August 2023     Essential hypertension  Mixed hyperlipidemia    Past Medical History:   Diagnosis Date    Arthritis     CHF (congestive heart failure)     COPD (chronic obstructive pulmonary disease) 8/7/23    Coronary artery disease     Cyst of skin     History of non-ST elevation myocardial infarction (NSTEMI) 08/23/2023    Hyperlipidemia     Hypertension     Lipoma     Multifocal pneumonia     NSTEMI (non-ST elevated myocardial infarction) 08/23/2023    Stress-induced cardiomyopathy        Allergies   Allergen Reactions    Aleve [Naproxen] Hives        Past Surgical History:   Procedure Laterality Date    BRONCHOSCOPY N/A 11/29/2024    Procedure: BRONCHOSCOPY with BAL, Brushings and washings;  Surgeon: Adan Brown MD;  Location: [facility] ENDOSCOPY;  Service: Pulmonary;  Laterality: N/A;  Bilateral PNA with mucus pluggins    CARDIAC CATHETERIZATION N/A 08/06/2023    Procedure: Left Heart Cath;  Surgeon: Mitch Correia MD;  Location: [facility] CATH INVASIVE LOCATION;   Service: Cardiology;  Laterality: N/A;    CARDIAC CATHETERIZATION N/A 08/06/2023    Procedure: Coronary angiography;  Surgeon: Mitch Correia MD;  Location: [facility] CATH INVASIVE LOCATION;  Service: Cardiology;  Laterality: N/A;    COLONOSCOPY      EYE SURGERY      HYSTERECTOMY      LIPOMA EXCISION N/A 3/26/2025    Procedure: Excision of back lipoma-remove fatty tumor from back;  Surgeon: Domingo Palumbo MD;  Location: [facility] MAIN OR;  Service: General;  Laterality: N/A;    SKIN BIOPSY          Social History  She  reports that she has quit smoking. Her smoking use included cigarettes. She has been exposed to tobacco smoke. She has never used smokeless tobacco. She reports current alcohol use of about 2.0 standard drinks of alcohol per week. She reports that she does not use drugs.    Family History  Her family history includes Arthritis in her mother; Heart disease in her mother; Heart failure in her mother; Hypertension in her mother.       Current Outpatient Medications on File Prior to Visit   Medication Sig    amoxicillin-clavulanate (AUGMENTIN) 875-125 MG per tablet Take 1 tablet by mouth 2 (Two) Times a Day.    aspirin 81 MG EC tablet Take 1 tablet by mouth Daily.    atorvastatin (LIPITOR) 80 MG tablet Take 1 tablet by mouth Every Night.    budesonide (PULMICORT) 0.5 MG/2ML nebulizer solution Take 2 mL by nebulization 2 (Two) Times a Day.    carvedilol (COREG) 3.125 MG tablet Take 1 tablet by mouth 2 (Two) Times a Day.    dapagliflozin Propanediol (Farxiga) 10 MG tablet Take 10 mg by mouth Daily.    guaiFENesin (MUCINEX) 600 MG 12 hr tablet Take 1 tablet by mouth Every 12 (Twelve) Hours.    ipratropium-albuterol (DUO-NEB) 0.5-2.5 mg/3 ml nebulizer Take 3 mL by nebulization Every 6 (Six) Hours As Needed for Shortness of Air.    mirtazapine (REMERON) 15 MG tablet Take 1 tablet by mouth Every Night.    nitroglycerin (NITROSTAT) 0.4 MG SL tablet Place 1 tablet under the tongue Every 5 (Five) Minutes As Needed for  "Chest Pain.    potassium chloride ER (K-TAB) 20 MEQ tablet controlled-release ER tablet Take 1 tablet by mouth Daily.    furosemide (LASIX) 40 MG tablet Take 1 tablet by mouth Daily.     No current facility-administered medications on file prior to visit.         Review of Systems   See HPI      Objective   Vitals:    07/08/25 0947   BP: 136/49   Pulse: 63   Weight: 63 kg (139 lb)   Height: 149.9 cm (59\")         Physical Exam  General : Alert, awake, no acute distress  Neck : Supple, no carotid bruit, no jugular venous distention  CVS : Regular rate and rhythm, no murmur, no rubs or gallops  Lungs: Clear to auscultation bilaterally, no crackles or rhonchi  Abdomen: Soft, nontender, bowel sounds active  Extremities: Warm, well-perfused, no pedal edema      Result Review     The following data was reviewed by JES Cutler  proBNP   Date Value Ref Range Status   07/02/2025 764.2 0.0 - 1,800.0 pg/mL Final     CMP          12/10/2024    05:08 12/12/2024    04:20 7/2/2025    09:26   CMP   Glucose 119  141  164    BUN 11  10  13.4    Creatinine 0.47  0.55  0.68    EGFR 94.0  90.5  86.0    Sodium 136  138  138    Potassium 3.3  3.5  3.7    Chloride 96  97  104    Calcium 8.2  8.4  8.7    Total Protein   6.6    Albumin   3.7    Globulin   2.9    Total Bilirubin   0.5    Alkaline Phosphatase   155    AST (SGOT)   20    ALT (SGPT)   16    Albumin/Globulin Ratio   1.3    BUN/Creatinine Ratio 23.4  18.2  19.7    Anion Gap 7.2  7.5  9.6      CBC w/diff          12/4/2024    07:58 12/5/2024    04:46 7/2/2025    09:26   CBC w/Diff   WBC 12.16  10.89  11.08    RBC 4.75  4.28  4.39    Hemoglobin 13.0  11.9  12.7    Hematocrit 43.1  37.2  40.2    MCV 90.7  86.9  91.6    MCH 27.4  27.8  28.9    MCHC 30.2  32.0  31.6    RDW 13.6  13.6  14.4    Platelets 229  262  237    Neutrophil Rel % 67.6   79.9    Immature Granulocyte Rel % 1.8   0.5    Lymphocyte Rel % 21.0   15.0    Monocyte Rel % 6.4   2.9    Eosinophil Rel % 2.8   1.4  " "  Basophil Rel % 0.4   0.3       No results found for: \"TSH\"   No results found for: \"FREET4\"   No results found for: \"DDIMERQUANT\"  Magnesium   Date Value Ref Range Status   07/02/2025 2.0 1.6 - 2.4 mg/dL Final      No results found for: \"DIGOXIN\"   Lab Results   Component Value Date    TROPONINT 9 07/02/2025                 Results for orders placed during the hospital encounter of 11/25/24    Adult Transthoracic Echo Complete W/ Cont if Necessary Per Protocol 11/27/2024  8:04 AM    Interpretation Summary    Left ventricular ejection fraction appears to be 56 - 60%.    Left ventricular diastolic function is consistent with (grade I) impaired relaxation and age.    Mild aortic insufficiency.             Assessment and Plan   Diagnoses and all orders for this visit:    1. Stress-induced cardiomyopathy (Primary)  Assessment & Plan:  Most recent echocardiogram shows normal LVEF of 56 to 60%.  Clinically without any volume overload.  Continue current regiment with furosemide, carvedilol, and Farxiga.      2. Non-occlusive coronary artery disease  Assessment & Plan:  Recent chest discomfort more likely related to her COPD exacerbation/pneumonia.  No true anginal symptoms.  She had previous cardiac catheterization showing minimal nonobstructive disease.  Continue current regiment with aspirin, beta-blocker and statin.      3. Mixed hyperlipidemia  Assessment & Plan:   Continue current dose atorvastatin.      4. Primary hypertension  Assessment & Plan:  Blood pressure is well-controlled, continue current medication.              Follow Up   Return for with Dr. Hernandez, As already scheduled.  In January    Patient was given instructions and counseling regarding her condition or for health maintenance advice. Please see specific information pulled into the AVS if appropriate.     Signed,  Izabella Oro, APRN  07/08/2025     Dictated Utilizing Dragon Dictation: Please note that portions of this note were completed with a " voice recognition program.  Part of this note may be an electronic transcription/translation of spoken language to printed text using the Dragon Dictation System.

## 2025-07-22 ENCOUNTER — TELEPHONE (OUTPATIENT)
Dept: PULMONOLOGY | Facility: CLINIC | Age: 85
End: 2025-07-22
Payer: MEDICARE

## 2025-07-22 NOTE — TELEPHONE ENCOUNTER
Patients pcp wants to take her off pulmicort and brovana and put her on duo neb. I explained to her that pulmicort and brovana were maintenance meds and that the duo neb was for as needed or rescue, she just wanted to know what you thought

## 2025-07-22 NOTE — TELEPHONE ENCOUNTER
Please call daughter as she has questions about her mother's medication that her primary wants to change.    Please call and advise.

## 2025-07-22 NOTE — TELEPHONE ENCOUNTER
Called and spoke with patients daughter and [facility]

## 2025-07-30 DIAGNOSIS — R91.8 MULTIPLE LUNG NODULES ON CT: ICD-10-CM

## 2025-07-30 DIAGNOSIS — J90 BILATERAL PLEURAL EFFUSION: ICD-10-CM

## 2025-07-30 DIAGNOSIS — R06.09 DOE (DYSPNEA ON EXERTION): ICD-10-CM

## 2025-07-30 RX ORDER — BUDESONIDE 0.25 MG/2ML
INHALANT ORAL
Qty: 60 EACH | Refills: 11 | Status: SHIPPED | OUTPATIENT
Start: 2025-07-30

## 2025-07-30 RX ORDER — ARFORMOTEROL TARTRATE 15 UG/2ML
SOLUTION RESPIRATORY (INHALATION)
Qty: 120 ML | Refills: 11 | Status: SHIPPED | OUTPATIENT
Start: 2025-07-30

## 2025-08-02 NOTE — ASSESSMENT & PLAN NOTE
Most recent echocardiogram shows normal LVEF of 56 to 60%.  Clinically without any volume overload.  Continue current regiment with furosemide, carvedilol, and Farxiga.

## 2025-08-02 NOTE — ASSESSMENT & PLAN NOTE
Recent chest discomfort more likely related to her COPD exacerbation/pneumonia.  No true anginal symptoms.  She had previous cardiac catheterization showing minimal nonobstructive disease.  Continue current regiment with aspirin, beta-blocker and statin.

## (undated) DEVICE — SI AVANTI+ 6F STD W/GW  NO OBT: Brand: AVANTI

## (undated) DEVICE — DISPOSABLE CYTOLOGY BRUSH: Brand: DISPOSABLE CYTOLOGY BRUSH

## (undated) DEVICE — SINGLE USE SUCTION VALVE MAJ-209: Brand: SINGLE USE SUCTION VALVE (STERILE)

## (undated) DEVICE — SOL IRR NACL 0.9PCT 500ML

## (undated) DEVICE — SOLIDIFIER LIQLOC PLS 1500CC BT

## (undated) DEVICE — INTENDED FOR TISSUE SEPARATION, AND OTHER PROCEDURES THAT REQUIRE A SHARP SURGICAL BLADE TO PUNCTURE OR CUT.: Brand: BARD-PARKER ® CARBON RIB-BACK BLADES

## (undated) DEVICE — SYR LL TP 10ML STRL

## (undated) DEVICE — Device

## (undated) DEVICE — DRAPE,U/ SHT,SPLIT,PLAS,STERIL: Brand: MEDLINE

## (undated) DEVICE — STERILE POLYISOPRENE POWDER-FREE SURGICAL GLOVES WITH EMOLLIENT COATING: Brand: PROTEXIS

## (undated) DEVICE — MAJOR-LF: Brand: MEDLINE INDUSTRIES, INC.

## (undated) DEVICE — CATH F6 ST JL 3.5 100CM: Brand: SUPERTORQUE

## (undated) DEVICE — BLCK/BITE BLOX WO/DENTL/RIM W/STRAP 54F

## (undated) DEVICE — GOWN,SIRUS,POLYRNF,BRTHSLV,2XL,18/CS: Brand: MEDLINE

## (undated) DEVICE — GW FC FLOP/TP .035 260CM 3MM

## (undated) DEVICE — CATH F6 ST JR 4 100CM: Brand: SUPERTORQUE

## (undated) DEVICE — DEV ATOMIZATION MUCOSAL/NASALTRACH

## (undated) DEVICE — PENCL SMOKE/EVAC MEGADYNE TELESCP 10FT

## (undated) DEVICE — GAUZE,SPONGE,4"X4",16PLY,STRL,LF,10/TRAY: Brand: MEDLINE

## (undated) DEVICE — CONTAINER,SPEC,PNEUM TUBE,4OZ,STRL PATH: Brand: MEDLINE

## (undated) DEVICE — ST ACC MICROPUNCTURE STFF .018 ECHO/PLDM/TP 4F/10CM 21G/7CM

## (undated) DEVICE — LINER SURG CANSTR SXN S/RIGD 1500CC

## (undated) DEVICE — THE STERILE LIGHT HANDLE COVER IS USED WITH STERIS SURGICAL LIGHTING AND VISUALIZATION SYSTEMS.

## (undated) DEVICE — SINGLE USE BIOPSY VALVE MAJ-210: Brand: SINGLE USE BIOPSY VALVE (STERILE)

## (undated) DEVICE — GLV SURG SENSICARE PI ORTHO SZ8 LF STRL

## (undated) DEVICE — ANGIO-SEAL VIP VASCULAR CLOSURE DEVICE: Brand: ANGIO-SEAL